# Patient Record
Sex: FEMALE | Race: BLACK OR AFRICAN AMERICAN | Employment: OTHER | URBAN - METROPOLITAN AREA
[De-identification: names, ages, dates, MRNs, and addresses within clinical notes are randomized per-mention and may not be internally consistent; named-entity substitution may affect disease eponyms.]

---

## 2017-01-18 ENCOUNTER — TELEPHONE (OUTPATIENT)
Dept: INTERNAL MEDICINE CLINIC | Age: 82
End: 2017-01-18

## 2017-01-18 ENCOUNTER — OFFICE VISIT (OUTPATIENT)
Dept: INTERNAL MEDICINE CLINIC | Age: 82
End: 2017-01-18

## 2017-01-18 VITALS
RESPIRATION RATE: 18 BRPM | OXYGEN SATURATION: 96 % | DIASTOLIC BLOOD PRESSURE: 88 MMHG | HEART RATE: 82 BPM | SYSTOLIC BLOOD PRESSURE: 164 MMHG | BODY MASS INDEX: 27.69 KG/M2 | TEMPERATURE: 98.1 F | HEIGHT: 65 IN | WEIGHT: 166.2 LBS

## 2017-01-18 DIAGNOSIS — M62.838 NECK MUSCLE SPASM: Primary | ICD-10-CM

## 2017-01-18 RX ORDER — TRAMADOL HYDROCHLORIDE 50 MG/1
50 TABLET ORAL
Qty: 15 TAB | Refills: 0 | Status: SHIPPED | OUTPATIENT
Start: 2017-01-18 | End: 2017-01-22

## 2017-01-18 RX ORDER — LEVOTHYROXINE SODIUM 50 UG/1
TABLET ORAL
Qty: 30 TAB | Refills: 0 | Status: SHIPPED | OUTPATIENT
Start: 2017-01-18 | End: 2018-01-01

## 2017-01-18 RX ORDER — NAPROXEN 500 MG/1
500 TABLET ORAL 2 TIMES DAILY WITH MEALS
Qty: 60 TAB | Refills: 1 | Status: SHIPPED | OUTPATIENT
Start: 2017-01-18 | End: 2017-02-27

## 2017-01-18 RX ORDER — CYCLOBENZAPRINE HCL 10 MG
10 TABLET ORAL
Qty: 20 TAB | Refills: 0 | Status: SHIPPED | OUTPATIENT
Start: 2017-01-18 | End: 2017-01-22

## 2017-01-18 NOTE — TELEPHONE ENCOUNTER
Pt was seen here in office today, Dr Bryon Reddy printed script for tramadol- pt asked if our office can call tramadol in to pharmacy. Medication below has been phoned in to [facility]. traMADol (ULTRAM) 50 mg tablet [[id number]]   Order Details   Dose: 50 mg Route: Oral Frequency: EVERY 6 HOURS AS NEEDED for Pain   Dispense Quantity:  15 Tab Refills:  0 Fills Remainin           Sig: Take 1 Tab by mouth every six (6) hours as needed for Pain.  Max Daily Amount: 200 mg.          Written Date:  17 Expiration Date:  --     Start Date:  17 End Date:  --            Ordering Provider:  -- NISA #:  -- NPI:  --    Authorizing Provider:  Corey Dias MD NISA #:  [id number] NPI:  [id number]    Diagnosis Association: Neck muscle spasm (O60.922)      Original Order:  traMADol Logan Rembrandt) 50 mg tablet [[id number]]      Pharmacy:  [facility] #:  [id number]     Pharmacy Comments:  --

## 2017-01-18 NOTE — PROGRESS NOTES
1. Have you been to the ER, urgent care clinic since your last visit? Hospitalized since your last visit?no    2. Have you seen or consulted any other health care providers outside of the [address] [place] [address] since your last visit? Include any pap smears or colon screening.  no

## 2017-01-18 NOTE — MR AVS SNAPSHOT
Visit Information Date & Time Provider Department Dept. Phone Encounter #  
 1/18/2017  4:15 PM Alondra Villela, [address] [place] [address],[address] [phone] [id number] Follow-up Instructions Return if symptoms worsen or fail to improve. Follow-up and Disposition History Your Appointments 6/16/2017  1:30 PM  
ROUTINE CARE with Alondra Villela MD  
[facility] [address] [facility]) Appt Note: 6 mon f/u  
 [facility] [address] [id number]  
[address] [facility] [address] Upcoming Health Maintenance Date Due  
 FOOT EXAM Q1 2/24/2016 MEDICARE YEARLY EXAM 7/9/2016 Pneumococcal 65+ Low/Medium Risk (2 of 2 - PCV13) 11/10/2016 EYE EXAM RETINAL OR DILATED Q1 4/11/2017 HEMOGLOBIN A1C Q6M 6/9/2017 LIPID PANEL Q1 12/9/2017 MICROALBUMIN Q1 12/16/2017 GLAUCOMA SCREENING Q2Y 4/11/2018 DTaP/Tdap/Td series (2 - Td) 11/10/2025 Allergies as of 1/18/2017  Review Complete On: 1/18/2017 By: Alondra Villela MD  
  
 Severity Noted Reaction Type Reactions Percocet [Oxycodone-acetaminophen]  10/16/2009    Itching Current Immunizations  Reviewed on 12/16/2016 Name Date Influenza Vaccine 9/16/2016, 10/15/2015, 9/2/2014 Pneumococcal Polysaccharide (PPSV-23) 11/10/2015 Pneumococcal Vaccine (Unspecified Type)  Deferred (Patient Refused) Tdap 11/10/2015 Not reviewed this visit You Were Diagnosed With   
  
 Codes Comments Neck muscle spasm    -  Primary ICD-10-CM: G73.179 ICD-9-CM: 728.85 Vitals BP Pulse Temp Resp Height(growth percentile) Weight(growth percentile) 164/88 (BP 1 Location: Left arm, BP Patient Position: Sitting) 82 98.1 °F (36.7 °C) (Oral) 18 5' 5\" (1.651 m) 166 lb 3.2 oz (75.4 kg) SpO2 BMI OB Status Smoking Status 96% 27.66 kg/m2 Hysterectomy Never Smoker Vitals History BMI and BSA Data Body Mass Index Body Surface Area  
 27.66 kg/m 2 1.86 m 2 Preferred Pharmacy Pharmacy Name Phone [address] [facility],[address] [place] [phone] Your Updated Medication List  
  
   
This list is accurate as of: 1/18/17  5:27 PM.  Always use your most recent med list.  
  
  
  
  
 aspirin delayed-release 81 mg tablet Take 81 mg by mouth daily. Blood-Glucose Meter monitoring kit Commonly known as:  Medina Minium Check blood sugar daily, as directed CALCIUM + D PO Take  by mouth. COQ-10 PO Take  by mouth. CRESTOR 20 mg tablet Generic drug:  rosuvastatin TAKE 1 TABLET BY MOUTH EVERY DAY  
  
 cyanocobalamin 1,000 mcg tablet Take 1 Tab by mouth daily. cyclobenzaprine 10 mg tablet Commonly known as:  FLEXERIL Take 1 Tab by mouth three (3) times daily as needed for Muscle Spasm(s). DULoxetine 60 mg capsule Commonly known as:  CYMBALTA Take 1 Cap by mouth daily. gabapentin 100 mg capsule Commonly known as:  NEURONTIN Take 1 Cap by mouth three (3) times daily. glucose blood VI test strips strip Commonly known as:  ONETOUCH ULTRA TEST Check fsbs up to bid E11.9  
  
 guaiFENesin-codeine 100-10 mg/5 mL solution Commonly known as:  ROBITUSSIN AC Take 5 mL by mouth three (3) times daily as needed for Cough. Max Daily Amount: 15 mL. Lancets Misc Commonly known as: One Touch Michaela Garcia Test up to bid  
  
 levothyroxine 50 mcg tablet Commonly known as:  SYNTHROID  
TAKE 1 TABLET BY MOUTH DAILY BEFORE BREAKFAST  
  
 lisinopril-hydroCHLOROthiazide 20-25 mg per tablet Commonly known as:  PRINZIDE, ZESTORETIC  
TAKE 1 TABLET BY MOUTH EVERY DAY  
  
 metFORMIN 1,000 mg tablet Commonly known as:  GLUCOPHAGE Take 1 Tab by mouth two (2) times daily (with meals). naproxen 500 mg tablet Commonly known as:  NAPROSYN  
 Take 1 Tab by mouth two (2) times daily (with meals). ondansetron 4 mg disintegrating tablet Commonly known as:  ZOFRAN ODT Take 1 Tab by mouth every eight (8) hours as needed for Nausea. pneumococcal 13 rochelle conj dip 0.5 mL Syrg injection Commonly known as:  PREVNAR 13 (PF)  
0.5 mL by IntraMUSCular route PRIOR TO DISCHARGE for 1 dose. traMADol 50 mg tablet Commonly known as:  ULTRAM  
Take 1 Tab by mouth every six (6) hours as needed for Pain. Max Daily Amount: 200 mg. Prescriptions Printed Refills  
 traMADol (ULTRAM) 50 mg tablet 0 Sig: Take 1 Tab by mouth every six (6) hours as needed for Pain. Max Daily Amount: 200 mg. Class: Print Route: Oral  
  
Prescriptions Sent to Pharmacy Refills  
 cyclobenzaprine (FLEXERIL) 10 mg tablet 0 Sig: Take 1 Tab by mouth three (3) times daily as needed for Muscle Spasm(s). Class: Normal  
 Pharmacy: [facility] [address] [place], [place] [address]est [facility], [address] [place] [address] #: [phone] Route: Oral  
 naproxen (NAPROSYN) 500 mg tablet 1 Sig: Take 1 Tab by mouth two (2) times daily (with meals). Class: Normal  
 Pharmacy: [facility] [address] [place], [place] [address]est [facility], [address] [place] [address] #: [phone] Route: Oral  
  
Follow-up Instructions Return if symptoms worsen or fail to improve. Patient Instructions Neck Pain: Care Instructions Your Care Instructions You can have neck pain anywhere from the bottom of your head to the top of your shoulders. It can spread to the upper back or arms. Injuries, painting a ceiling, sleeping with your neck twisted, staying in one position for too long, and many other activities can cause neck pain. Most neck pain gets better with home care. Your doctor may recommend medicine to relieve pain or relax your muscles.  He or she may suggest exercise and physical therapy to increase flexibility and relieve stress. You may need to wear a special (cervical) collar to support your neck for a day or two. Follow-up care is a key part of your treatment and safety. Be sure to make and go to all appointments, and call your doctor if you are having problems. It's also a good idea to know your test results and keep a list of the medicines you take. How can you care for yourself at home? · Try using a heating pad on a low or medium setting for 15 to 20 minutes every 2 or 3 hours. Try a warm shower in place of one session with the heating pad. · You can also try an ice pack for 10 to 15 minutes every 2 to 3 hours. Put a thin cloth between the ice and your skin. · Take pain medicines exactly as directed. ¨ If the doctor gave you a prescription medicine for pain, take it as prescribed. ¨ If you are not taking a prescription pain medicine, ask your doctor if you can take an over-the-counter medicine. · If your doctor recommends a cervical collar, wear it exactly as directed. When should you call for help? Call your doctor now or seek immediate medical care if: 
· You have new or worsening numbness in your arms, buttocks or legs. · You have new or worsening weakness in your arms or legs. (This could make it hard to stand up.) · You lose control of your bladder or bowels. Watch closely for changes in your health, and be sure to contact your doctor if: 
· Your neck pain is getting worse. · You are not getting better after 1 week. · You do not get better as expected. Where can you learn more? Go to http://jeremi-travis.info/. Enter [id number] in the search box to learn more about \"Neck Pain: Care Instructions. \" Current as of: May 23, 2016 Content Version: 11.1 © 2215-4881 [employer].  Care instructions adapted under license by [employer] (which disclaims liability or warranty for this information). If you have questions about a medical condition or this instruction, always ask your healthcare professional. [address] any warranty or liability for your use of this information. Introducing [facility] SERVICES! Charity Anderson introduces Transmit patient portal. Now you can access parts of your medical record, email your doctor's office, and request medication refills online. 1. In your internet browser, go to [email or web address] Backup Circle/"[email or web address] 2. Click on the First Time User? Click Here link in the Sign In box. You will see the New Member Sign Up page. 3. Enter your Transmit Access Code exactly as it appears below. You will not need to use this code after youve completed the sign-up process. If you do not sign up before the expiration date, you must request a new code. · Transmit Access Code: [email or web address] Expires: 3/16/2017  2:08 PM 
 
4. Enter the last four digits of your Social Security Number (xxxx) and Date of Birth (mm/dd/yyyy) as indicated and click Submit. You will be taken to the next sign-up page. 5. Create a Transmit ID. This will be your Transmit login ID and cannot be changed, so think of one that is secure and easy to remember. 6. Create a Transmit password. You can change your password at any time. 7. Enter your Password Reset Question and Answer. This can be used at a later time if you forget your password. 8. Enter your e-mail address. You will receive e-mail notification when new information is available in [address] 9. Click Sign Up. You can now view and download portions of your medical record. 10. Click the Download Summary menu link to download a portable copy of your medical information. If you have questions, please visit the Frequently Asked Questions section of the Transmit website. Remember, Transmit is NOT to be used for urgent needs. For medical emergencies, dial 911. Now available from your iPhone and Android! Please provide this summary of care documentation to your next provider. Your primary care clinician is listed as [place]. If you have any questions after today's visit, please call [phone].

## 2017-01-18 NOTE — PROGRESS NOTES
SUBJECTIVE  Ms. Ayan Byrne presents today acutely for     Chief Complaint   Patient presents with    Neck Pain     pt here today c/o pain in neck that radiates to L shoulder        The pain was noticed about 5 days ago, and became worse. She took a left over ultram.     She has a history of L shoulder pain and corticosteroid injection by Dr. Damaso Guerra. OBJECTIVE  Visit Vitals    /88 (BP 1 Location: Left arm, BP Patient Position: Sitting)    Pulse 82    Temp 98.1 °F (36.7 °C) (Oral)    Resp 18    Ht 5' 5\" (1.651 m)    Wt 166 lb 3.2 oz (75.4 kg)    SpO2 96%    BMI 27.66 kg/m2     Gen: Pleasant 80 y.o.  female in NAD.   HEENT: PERRLA. EOMI. OP moist and pink.  Neck: Supple.  +TTP posterior neck musculature L, with palpable spasm.  HEART: RRR, No M/G/R.   LUNGS: CTAB No W/R.   SKIN: Warm. Dry. No rashes or other lesions noted. ASSESSMENT / PLAN    ICD-10-CM ICD-9-CM    1. Neck muscle spasm M62.838 728.85 traMADol (ULTRAM) 50 mg tablet      cyclobenzaprine (FLEXERIL) 10 mg tablet      naproxen (NAPROSYN) 500 mg tablet       I have reviewed with the patient details of the assessment and plan and all questions were answered. Relevant patient education was performed. Follow-up Disposition:  Return if symptoms worsen or fail to improve.

## 2017-01-18 NOTE — PATIENT INSTRUCTIONS
Neck Pain: Care Instructions  Your Care Instructions  You can have neck pain anywhere from the bottom of your head to the top of your shoulders. It can spread to the upper back or arms. Injuries, painting a ceiling, sleeping with your neck twisted, staying in one position for too long, and many other activities can cause neck pain. Most neck pain gets better with home care. Your doctor may recommend medicine to relieve pain or relax your muscles. He or she may suggest exercise and physical therapy to increase flexibility and relieve stress. You may need to wear a special (cervical) collar to support your neck for a day or two. Follow-up care is a key part of your treatment and safety. Be sure to make and go to all appointments, and call your doctor if you are having problems. It's also a good idea to know your test results and keep a list of the medicines you take. How can you care for yourself at home? · Try using a heating pad on a low or medium setting for 15 to 20 minutes every 2 or 3 hours. Try a warm shower in place of one session with the heating pad. · You can also try an ice pack for 10 to 15 minutes every 2 to 3 hours. Put a thin cloth between the ice and your skin. · Take pain medicines exactly as directed. ¨ If the doctor gave you a prescription medicine for pain, take it as prescribed. ¨ If you are not taking a prescription pain medicine, ask your doctor if you can take an over-the-counter medicine. · If your doctor recommends a cervical collar, wear it exactly as directed. When should you call for help? Call your doctor now or seek immediate medical care if:  · You have new or worsening numbness in your arms, buttocks or legs. · You have new or worsening weakness in your arms or legs. (This could make it hard to stand up.)  · You lose control of your bladder or bowels.   Watch closely for changes in your health, and be sure to contact your doctor if:  · Your neck pain is getting worse.  · You are not getting better after 1 week. · You do not get better as expected. Where can you learn more? Go to http://jeremi-travis.info/. Enter [id number] in the search box to learn more about \"Neck Pain: Care Instructions. \"  Current as of: May 23, 2016  Content Version: 11.1  © 8992-9622 [employer]. Care instructions adapted under license by [employer] (which disclaims liability or warranty for this information). If you have questions about a medical condition or this instruction, always ask your healthcare professional. [place] [address] any warranty or liability for your use of this information.

## 2017-01-19 RX ORDER — ROSUVASTATIN CALCIUM 20 MG/1
TABLET, FILM COATED ORAL
Qty: 30 TAB | Refills: 0 | Status: SHIPPED | OUTPATIENT
Start: 2017-01-19 | End: 2017-09-04 | Stop reason: SDUPTHER

## 2017-01-22 ENCOUNTER — APPOINTMENT (OUTPATIENT)
Dept: GENERAL RADIOLOGY | Age: 82
End: 2017-01-22
Attending: STUDENT IN AN ORGANIZED HEALTH CARE EDUCATION/TRAINING PROGRAM
Payer: MEDICARE

## 2017-01-22 ENCOUNTER — APPOINTMENT (OUTPATIENT)
Dept: GENERAL RADIOLOGY | Age: 82
End: 2017-01-22
Attending: EMERGENCY MEDICINE
Payer: MEDICARE

## 2017-01-22 ENCOUNTER — HOSPITAL ENCOUNTER (EMERGENCY)
Age: 82
Discharge: HOME | End: 2017-01-22
Attending: STUDENT IN AN ORGANIZED HEALTH CARE EDUCATION/TRAINING PROGRAM
Payer: MEDICARE

## 2017-01-22 ENCOUNTER — APPOINTMENT (OUTPATIENT)
Dept: CT IMAGING | Age: 82
End: 2017-01-22
Attending: STUDENT IN AN ORGANIZED HEALTH CARE EDUCATION/TRAINING PROGRAM
Payer: MEDICARE

## 2017-01-22 VITALS
OXYGEN SATURATION: 100 % | TEMPERATURE: 98.1 F | HEART RATE: 85 BPM | HEIGHT: 65 IN | BODY MASS INDEX: 27.49 KG/M2 | DIASTOLIC BLOOD PRESSURE: 68 MMHG | WEIGHT: 165 LBS | RESPIRATION RATE: 18 BRPM | SYSTOLIC BLOOD PRESSURE: 136 MMHG
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
## 2017-01-22 DIAGNOSIS — M25.512 ACUTE PAIN OF LEFT SHOULDER: ICD-10-CM

## 2017-01-22 LAB
ALBUMIN SERPL BCP-MCNC: 3.4 G/DL (ref 3.5–5)
ALBUMIN/GLOB SERPL: 0.7 {RATIO} (ref 1.1–2.2)
ALP SERPL-CCNC: 99 U/L (ref 45–117)
ALT SERPL-CCNC: 18 U/L (ref 12–78)
ANION GAP BLD CALC-SCNC: 7 MMOL/L (ref 5–15)
APPEARANCE UR: CLEAR
AST SERPL W P-5'-P-CCNC: 45 U/L (ref 15–37)
ATRIAL RATE: 93 BPM
BACTERIA URNS QL MICRO: NEGATIVE /HPF
BASOPHILS # BLD AUTO: 0 K/UL (ref 0–0.1)
BASOPHILS # BLD: 0 % (ref 0–1)
BILIRUB SERPL-MCNC: 0.6 MG/DL (ref 0.2–1)
BILIRUB UR QL: NEGATIVE
BUN SERPL-MCNC: 13 MG/DL (ref 6–20)
BUN/CREAT SERPL: 14 (ref 12–20)
CALCIUM SERPL-MCNC: 10.1 MG/DL (ref 8.5–10.1)
CALCULATED P AXIS, ECG09: 16 DEGREES
CALCULATED R AXIS, ECG10: -29 DEGREES
CALCULATED T AXIS, ECG11: -17 DEGREES
CHLORIDE SERPL-SCNC: 97 MMOL/L (ref 97–108)
CO2 SERPL-SCNC: 29 MMOL/L (ref 21–32)
COLOR UR: NORMAL
CREAT SERPL-MCNC: 0.9 MG/DL (ref 0.55–1.02)
DIAGNOSIS, 93000: NORMAL
EOSINOPHIL # BLD: 0 K/UL (ref 0–0.4)
EOSINOPHIL NFR BLD: 0 % (ref 0–7)
EPITH CASTS URNS QL MICRO: NORMAL /LPF
ERYTHROCYTE [DISTWIDTH] IN BLOOD BY AUTOMATED COUNT: 12.6 % (ref 11.5–14.5)
GLOBULIN SER CALC-MCNC: 5 G/DL (ref 2–4)
GLUCOSE SERPL-MCNC: 172 MG/DL (ref 65–100)
GLUCOSE UR STRIP.AUTO-MCNC: NEGATIVE MG/DL
HCT VFR BLD AUTO: 34.8 % (ref 35–47)
HGB BLD-MCNC: 11.2 G/DL (ref 11.5–16)
HGB UR QL STRIP: NEGATIVE
HYALINE CASTS URNS QL MICRO: NORMAL /LPF (ref 0–5)
KETONES UR QL STRIP.AUTO: NEGATIVE MG/DL
LEUKOCYTE ESTERASE UR QL STRIP.AUTO: NEGATIVE
LYMPHOCYTES # BLD AUTO: 11 % (ref 12–49)
LYMPHOCYTES # BLD: 0.8 K/UL (ref 0.8–3.5)
MCH RBC QN AUTO: 29.9 PG (ref 26–34)
MCHC RBC AUTO-ENTMCNC: 32.2 G/DL (ref 30–36.5)
MCV RBC AUTO: 92.8 FL (ref 80–99)
MONOCYTES # BLD: 0.8 K/UL (ref 0–1)
MONOCYTES NFR BLD AUTO: 11 % (ref 5–13)
NEUTS SEG # BLD: 6.1 K/UL (ref 1.8–8)
NEUTS SEG NFR BLD AUTO: 78 % (ref 32–75)
NITRITE UR QL STRIP.AUTO: NEGATIVE
P-R INTERVAL, ECG05: 214 MS
PH UR STRIP: 6 [PH] (ref 5–8)
PLATELET # BLD AUTO: 391 K/UL (ref 150–400)
POTASSIUM SERPL-SCNC: 4.1 MMOL/L (ref 3.5–5.1)
PROT SERPL-MCNC: 8.4 G/DL (ref 6.4–8.2)
PROT UR STRIP-MCNC: NEGATIVE MG/DL
Q-T INTERVAL, ECG07: 398 MS
QRS DURATION, ECG06: 128 MS
QTC CALCULATION (BEZET), ECG08: 494 MS
RBC # BLD AUTO: 3.75 M/UL (ref 3.8–5.2)
RBC #/AREA URNS HPF: NORMAL /HPF (ref 0–5)
SODIUM SERPL-SCNC: 133 MMOL/L (ref 136–145)
SP GR UR REFRACTOMETRY: 1.01 (ref 1–1.03)
UA: UC IF INDICATED,UAUC: NORMAL
UROBILINOGEN UR QL STRIP.AUTO: 1 EU/DL (ref 0.2–1)
VENTRICULAR RATE, ECG03: 93 BPM
WBC # BLD AUTO: 7.8 K/UL (ref 3.6–11)
WBC URNS QL MICRO: NORMAL /HPF (ref 0–4)

## 2017-01-22 PROCEDURE — 74011250636 HC RX REV CODE- 250/636

## 2017-01-22 PROCEDURE — 99285 EMERGENCY DEPT VISIT HI MDM: CPT

## 2017-01-22 PROCEDURE — 70450 CT HEAD/BRAIN W/O DYE: CPT

## 2017-01-22 PROCEDURE — 93005 ELECTROCARDIOGRAM TRACING: CPT

## 2017-01-22 PROCEDURE — 80053 COMPREHEN METABOLIC PANEL: CPT | Performed by: STUDENT IN AN ORGANIZED HEALTH CARE EDUCATION/TRAINING PROGRAM

## 2017-01-22 PROCEDURE — 36415 COLL VENOUS BLD VENIPUNCTURE: CPT | Performed by: STUDENT IN AN ORGANIZED HEALTH CARE EDUCATION/TRAINING PROGRAM

## 2017-01-22 PROCEDURE — 72170 X-RAY EXAM OF PELVIS: CPT

## 2017-01-22 PROCEDURE — 81001 URINALYSIS AUTO W/SCOPE: CPT | Performed by: EMERGENCY MEDICINE

## 2017-01-22 PROCEDURE — 73030 X-RAY EXAM OF SHOULDER: CPT

## 2017-01-22 PROCEDURE — 85025 COMPLETE CBC W/AUTO DIFF WBC: CPT | Performed by: STUDENT IN AN ORGANIZED HEALTH CARE EDUCATION/TRAINING PROGRAM

## 2017-01-22 RX ORDER — ONDANSETRON 2 MG/ML
INJECTION INTRAMUSCULAR; INTRAVENOUS
Status: DISCONTINUED
Start: 2017-01-22 | End: 2017-01-22 | Stop reason: HOSPADM

## 2017-01-22 RX ORDER — HYDROMORPHONE HYDROCHLORIDE 1 MG/ML
INJECTION, SOLUTION INTRAMUSCULAR; INTRAVENOUS; SUBCUTANEOUS
Status: DISCONTINUED
Start: 2017-01-22 | End: 2017-01-22 | Stop reason: HOSPADM

## 2017-01-22 NOTE — ED TRIAGE NOTES
Triage: Patient presents from home for GLF, no LOC. Has had 2 falls in the last 2 days, progressively getting weaker and some confusion noted per daughter. Also has complaints of neck pain for the last week.

## 2017-01-22 NOTE — ED NOTES
Pt assisted up onto bedpan to void. Urine sample obtained. Pt cleaned and repositioned in a position of comfort in bed. No other needs or complaints expressed by pt or family members.

## 2017-01-22 NOTE — DISCHARGE INSTRUCTIONS
Preventing Falls: Care Instructions  Your Care Instructions  Getting around your home safely can be a challenge if you have injuries or health problems that make it easy for you to fall. Loose rugs and furniture in walkways are among the dangers for many older people who have problems walking or who have poor eyesight. People who have conditions such as arthritis, osteoporosis, or dementia also have to be careful not to fall. You can make your home safer with a few simple measures. Follow-up care is a key part of your treatment and safety. Be sure to make and go to all appointments, and call your doctor if you are having problems. It's also a good idea to know your test results and keep a list of the medicines you take. How can you care for yourself at home? Taking care of yourself  · You may get dizzy if you do not drink enough water. To prevent dehydration, drink plenty of fluids, enough so that your urine is light yellow or clear like water. Choose water and other caffeine-free clear liquids. If you have kidney, heart, or liver disease and have to limit fluids, talk with your doctor before you increase the amount of fluids you drink. · Exercise regularly to improve your strength, muscle tone, and balance. Walk if you can. Swimming may be a good choice if you cannot walk easily. · Have your vision and hearing checked each year or any time you notice a change. If you have trouble seeing and hearing, you might not be able to avoid objects and could lose your balance. · Know the side effects of the medicines you take. Ask your doctor or pharmacist whether the medicines you take can affect your balance. Sleeping pills or sedatives can affect your balance. · Limit the amount of alcohol you drink. Alcohol can impair your balance and other senses. · Ask your doctor whether calluses or corns on your feet need to be removed.  If you wear loose-fitting shoes because of calluses or corns, you can lose your balance and fall. · Talk to your doctor if you have numbness in your feet. Preventing falls at home  · Remove raised doorway thresholds, throw rugs, and clutter. Repair loose carpet or raised areas in the floor. · Move furniture and electrical cords to keep them out of walking paths. · Use nonskid floor wax, and wipe up spills right away, especially on ceramic tile floors. · If you use a walker or cane, put rubber tips on it. If you use crutches, clean the bottoms of them regularly with an abrasive pad, such as steel wool. · Keep your house well lit, especially Leon Pavy, and outside walkways. Use night-lights in areas such as hallways and bathrooms. Add extra light switches or use remote switches (such as switches that go on or off when you clap your hands) to make it easier to turn lights on if you have to get up during the night. · Install sturdy handrails on stairways. · Move items in your cabinets so that the things you use a lot are on the lower shelves (about waist level). · Keep a cordless phone and a flashlight with new batteries by your bed. If possible, put a phone in each of the main rooms of your house, or carry a cell phone in case you fall and cannot reach a phone. Or, you can wear a device around your neck or wrist. You push a button that sends a signal for help. · Wear low-heeled shoes that fit well and give your feet good support. Use footwear with nonskid soles. Check the heels and soles of your shoes for wear. Repair or replace worn heels or soles. · Do not wear socks without shoes on wood floors. · Walk on the grass when the sidewalks are slippery. If you live in an area that gets snow and ice in the winter, sprinkle salt on slippery steps and sidewalks. Preventing falls in the bath  · Install grab bars and nonskid mats inside and outside your shower or tub and near the toilet and sinks. · Use shower chairs and bath benches.   · Use a hand-held shower head that will allow you to sit while showering. · Get into a tub or shower by putting the weaker leg in first. Get out of a tub or shower with your strong side first.  · Repair loose toilet seats and consider installing a raised toilet seat to make getting on and off the toilet easier. · Keep your bathroom door unlocked while you are in the shower. Where can you learn more? Go to http://jeremi-travis.info/. Enter [id number] [id number] [phone] in the search box to learn more about \"Preventing Falls: Care Instructions. \"  Current as of: August 4, 2016  Content Version: 11.1  © 8044-9463 [employer]. Care instructions adapted under license by [employer] (which disclaims liability or warranty for this information). If you have questions about a medical condition or this instruction, always ask your healthcare professional. [place] [address] any warranty or liability for your use of this information. We hope that we have addressed all of your medical concerns. The examination and treatment you received in the Emergency Department were for an emergent problem and were not intended as complete care. It is important that you follow up with your healthcare provider(s) for ongoing care. If your symptoms worsen or do not improve as expected, and you are unable to reach your usual health care provider(s), you should return to the Emergency Department. Today's healthcare is undergoing tremendous change, and patient satisfaction surveys are one of the many tools to assess the quality of medical care. You may receive a survey from the [employer] organization regarding your experience in the Emergency Department. I hope that your experience has been completely positive, particularly the medical care that I provided. As such, please participate in the survey; anything less than excellent does not meet my expectations or intentions.         [facility], [place] and [employer] participate in nationally recognized quality of care measures. If your blood pressure is greater than 120/80, as reported below, we urge that you seek medical care to address the potential of high blood pressure, commonly known as hypertension. Hypertension can be hereditary or can be caused by certain medical conditions, pain, stress, or \"white coat syndrome. \"       Please make an appointment with your health care provider(s) for follow up of your Emergency Department visit. VITALS:   Patient Vitals for the past 8 hrs:   Temp Pulse Resp BP SpO2   01/22/17 0937 97.9 °F (36.6 °C) 88 18 153/69 100 %   01/22/17 0915 - - - - 97 %   01/22/17 0900 - - - 150/75 92 %   01/22/17 0845 - - - - 95 %   01/22/17 0835 - - - - 96 %   01/22/17 0834 - - - 151/74 -   01/22/17 0815 - - - - 95 %   01/22/17 0800 - - - - 97 %   01/22/17 0745 - - - - 98 %   01/22/17 0700 - - - 132/66 -   01/22/17 0630 - - - 141/59 95 %   01/22/17 0619 98.4 °F (36.9 °C) 86 17 141/70 95 %          Thank you for allowing us to provide you with medical care today. We realize that you have many choices for your emergency care needs. Please choose us in the future for any continued health care needs. Jose Daigle [facility], [address] [place] [address].   Office: [phone]            Recent Results (from the past 24 hour(s))   EKG, 12 LEAD, INITIAL    Collection Time: 01/22/17  6:58 AM   Result Value Ref Range    Ventricular Rate 93 BPM    Atrial Rate 93 BPM    P-R Interval 214 ms    QRS Duration 128 ms    Q-T Interval 398 ms    QTC Calculation (Bezet) 494 ms    Calculated P Axis 16 degrees    Calculated R Axis -29 degrees    Calculated T Axis -17 degrees    Diagnosis       Sinus rhythm with 1st degree AV block with premature atrial complexes  Right bundle branch block  Left ventricular hypertrophy  When compared with ECG of 12-JUN-2015 14:27,  premature atrial complexes are now present  CA interval has increased     CBC WITH AUTOMATED DIFF    Collection Time: 01/22/17  7:46 AM   Result Value Ref Range    WBC 7.8 3.6 - 11.0 K/uL    RBC 3.75 (L) 3.80 - 5.20 M/uL    HGB 11.2 (L) 11.5 - 16.0 g/dL    HCT 34.8 (L) 35.0 - 47.0 %    MCV 92.8 80.0 - 99.0 FL    MCH 29.9 26.0 - 34.0 PG    MCHC 32.2 30.0 - 36.5 g/dL    RDW 12.6 11.5 - 14.5 %    PLATELET 561 126 - 539 K/uL    NEUTROPHILS 78 (H) 32 - 75 %    LYMPHOCYTES 11 (L) 12 - 49 %    MONOCYTES 11 5 - 13 %    EOSINOPHILS 0 0 - 7 %    BASOPHILS 0 0 - 1 %    ABS. NEUTROPHILS 6.1 1.8 - 8.0 K/UL    ABS. LYMPHOCYTES 0.8 0.8 - 3.5 K/UL    ABS. MONOCYTES 0.8 0.0 - 1.0 K/UL    ABS. EOSINOPHILS 0.0 0.0 - 0.4 K/UL    ABS. BASOPHILS 0.0 0.0 - 0.1 K/UL   METABOLIC PANEL, COMPREHENSIVE    Collection Time: 01/22/17  7:46 AM   Result Value Ref Range    Sodium 133 (L) 136 - 145 mmol/L    Potassium 4.1 3.5 - 5.1 mmol/L    Chloride 97 97 - 108 mmol/L    CO2 29 21 - 32 mmol/L    Anion gap 7 5 - 15 mmol/L    Glucose 172 (H) 65 - 100 mg/dL    BUN 13 6 - 20 MG/DL    Creatinine 0.90 0.55 - 1.02 MG/DL    BUN/Creatinine ratio 14 12 - 20      GFR est AA >60 >60 ml/min/1.73m2    GFR est non-AA 60 (L) >60 ml/min/1.73m2    Calcium 10.1 8.5 - 10.1 MG/DL    Bilirubin, total 0.6 0.2 - 1.0 MG/DL    ALT 18 12 - 78 U/L    AST 45 (H) 15 - 37 U/L    Alk.  phosphatase 99 45 - 117 U/L    Protein, total 8.4 (H) 6.4 - 8.2 g/dL    Albumin 3.4 (L) 3.5 - 5.0 g/dL    Globulin 5.0 (H) 2.0 - 4.0 g/dL    A-G Ratio 0.7 (L) 1.1 - 2.2     URINALYSIS W/ REFLEX CULTURE    Collection Time: 01/22/17  8:31 AM   Result Value Ref Range    Color YELLOW/STRAW      Appearance CLEAR CLEAR      Specific gravity 1.013 1.003 - 1.030      pH (UA) 6.0 5.0 - 8.0      Protein NEGATIVE  NEG mg/dL    Glucose NEGATIVE  NEG mg/dL    Ketone NEGATIVE  NEG mg/dL    Bilirubin NEGATIVE  NEG      Blood NEGATIVE  NEG      Urobilinogen 1.0 0.2 - 1.0 EU/dL    Nitrites NEGATIVE  NEG      Leukocyte Esterase NEGATIVE  NEG      WBC 0-4 0 - 4 /hpf    RBC 0-5 0 - 5 /hpf    Epithelial cells FEW FEW /lpf    Bacteria NEGATIVE  NEG /hpf    UA:UC IF INDICATED CULTURE NOT INDICATED BY UA RESULT CNI      Hyaline Cast 2-5 0 - 5 /lpf       Xr Shoulder Lt Ap/lat Min 2 V    Result Date: 1/22/2017  EXAM:  XR SHOULDER LT AP/LAT MIN 2 V INDICATION:   Trauma. COMPARISON: None. FINDINGS: Three views of the left shoulder demonstrate no fracture, dislocation or other acute abnormality. Incidentally noted significant hypertrophic bone formation at the acromioclavicular joint and the glenohumeral joint. IMPRESSION:  No acute abnormality. Ct Head Wo Cont    Result Date: 1/22/2017  INDICATION: fall EXAM: CT HEAD WITHOUT CONTRAST. COMPARISON: None . PROCEDURE: Unenhanced head CT. Axial images were obtained from skull base to the vertex. Images were reformatted in the coronal and sagittal planes. Soft tissue and bone windows were examined. No contrast. CT dose reduction was achieved through use of a standardized protocol tailored for this examination and automatic exposure control for dose modulation. FINDINGS: Cerebral sulci and ventricular size are increased, but commensurate with age. There are patchy diffuse mild to moderate stable periventricular white matter changes. While nonspecific these are likely due to small vessel ischemic change. There is no evidence of midline shift, extra-axial collection, mass lesion or mass effect. No evidence of acute bleed. No acute bony abnormality. No obvious acute ischemia. Willis Patterson IMPRESSION: 1. No acute intracranial abnormality. 2. Microvascular ischemic and other age-related changes. Xr Pelv 1 Or 2 V    Result Date: 1/22/2017  INDICATION:  falls EXAM: SINGLE VIEW PELVIS. FINDINGS: An AP view of the pelvis reveals no fracture, dislocation or other acute or focal bony abnormality. IMPRESSION: No acute bony abnormality of the pelvis.

## 2017-01-22 NOTE — ED NOTES
Bedside and Verbal shift change report given to Irene Moya RN (oncoming nurse) by Ela Watkins RN (offgoing nurse). Report included the following information ED Summary.

## 2017-01-22 NOTE — ED PROVIDER NOTES
HPI Comments: 80 y.o. female with past medical history significant for diabetes, HTN, arthritis who presents from home via EMS accompanied by family with chief complaint of evaluation s/p GLF. Daughter states she saw the patient fall after being unable to support herself last night. Daughter states patient has fallen 3 times in 3 days. Daughter states patient fell onto her bottom on 1/19/17 and has been complaining of some lower back pain since. Patient also complains of decreased strength in her legs as well as feeling progressively weaker. Daughter states patient was recently prescribed Tramadol and Flexeril for some left sided neck and shoulder pain by her PCP. Daughter states patient is not on any blood thinners. There are no other acute medical concerns at this time. Social hx: never smoker, rare alcohol  PCP: Elysia Dan MD    Note written by Meghna Foster, as dictated by Aleksandar Smart MD 6:47 AM     The history is provided by the patient. No  was used. Past Medical History:   Diagnosis Date    Anxiety     Arthritis     Chest pain 2008     Negative stress test and Holter monitor w/Dr Josie Avila.  Chronic back pain     Chronic venous insufficiency     Colon polyp      last scope normal 2007; Dr. Susu Martin Cyst of right kidney     Depression     Diabetes ([facility] [address]     Hypercholesterolemia     Hypertension     Hypothyroidism     Memory disorder     Other ill-defined conditions(799.89)      heart murmur    Painful hip     Urinary frequency      [facility] Bruce Ramirez [facility]       Past Surgical History:   Procedure Laterality Date    Hx knee replacement       left    Hx knee replacement  2006     right    Hx hysterectomy       Due to a prolapsed uterus    Hx breast biopsy  2002    Hx orthopaedic       B knees.      Upper gi endoscopy,biopsy  6/12/2015          Colonoscopy,diagnostic  6/12/2015               Family History: Problem Relation Age of Onset    Cancer Mother      pancreatic    Cancer Father      colon    Cancer Sister      pancreatic     Diabetes Sister     Alzheimer Brother        Social History     Social History    Marital status:      Spouse name: N/A    Number of children: N/A    Years of education: N/A     Occupational History    Not on file. Social History Main Topics    Smoking status: Never Smoker    Smokeless tobacco: Never Used    Alcohol use No      Comment: Rare    Drug use: No    Sexual activity: No     Other Topics Concern    Not on file     Social History Narrative         ALLERGIES: Percocet [oxycodone-acetaminophen]    Review of Systems   Constitutional: Negative for activity change, diaphoresis, fatigue and fever. HENT: Negative for congestion and sore throat. Eyes: Negative for photophobia and visual disturbance. Respiratory: Negative for chest tightness and shortness of breath. Cardiovascular: Negative for chest pain, palpitations and leg swelling. Gastrointestinal: Negative for abdominal pain, blood in stool, constipation, diarrhea, nausea and vomiting. Genitourinary: Negative for difficulty urinating, dysuria, flank pain, frequency and hematuria. Musculoskeletal: Positive for arthralgias and back pain. Neurological: Positive for weakness. Negative for dizziness, syncope, numbness and headaches. Vitals:    01/22/17 0619   BP: 141/70   Pulse: 86   Resp: 17   Temp: 98.4 °F (36.9 °C)   SpO2: 95%   Weight: 74.8 kg (165 lb)   Height: 5' 5\" (1.651 m)            Physical Exam   Constitutional: She is oriented to person, place, and time. She appears well-developed and well-nourished. No distress. HENT:   Head: Normocephalic and atraumatic. Nose: Nose normal.   Mouth/Throat: Oropharynx is clear and moist. No oropharyngeal exudate. Eyes: Conjunctivae and EOM are normal. Right eye exhibits no discharge. Left eye exhibits no discharge. No scleral icterus. Neck: Normal range of motion. Neck supple. No JVD present. No tracheal deviation present. No thyromegaly present. Cardiovascular: Normal rate, regular rhythm, normal heart sounds and intact distal pulses. Exam reveals no gallop and no friction rub. 4/6 systolic murmur at the left sternal border   Pulmonary/Chest: Effort normal and breath sounds normal. No stridor. No respiratory distress. She has no wheezes. She has no rales. She exhibits no tenderness. Abdominal: Bowel sounds are normal. She exhibits no distension and no mass. There is no tenderness. There is no rebound. Musculoskeletal: Normal range of motion. She exhibits no edema or tenderness. B/l posterior iliac crest pain  Left shoulder tenderness   Lymphadenopathy:     She has no cervical adenopathy. Neurological: She is alert and oriented to person, place, and time. No cranial nerve deficit. Coordination normal.   Skin: Skin is warm and dry. No rash noted. She is not diaphoretic. No erythema. No pallor. Psychiatric: She has a normal mood and affect. Her behavior is normal. Judgment and thought content normal.   Note written by Meghna Ravi, as dictated by Nancy Mcelroy MD 6:56 AM      MDM  Number of Diagnoses or Management Options  Diagnosis management comments: Medication reaction, syncope, fall. 79 y/o female with multiple recent falls which appears mechanical however pt. Recently on Ultram AND Flexeril for muscle pains which could be causing falls. Will eval with CT head, pelvis xray, cbc, cmp, ua. dispo pending.        Amount and/or Complexity of Data Reviewed  Clinical lab tests: ordered and reviewed  Tests in the radiology section of CPT®: ordered and reviewed  Obtain history from someone other than the patient: yes  Review and summarize past medical records: yes  Discuss the patient with other providers: yes    Risk of Complications, Morbidity, and/or Mortality  Presenting problems: moderate  Diagnostic procedures: moderate  Management options: moderate    Patient Progress  Patient progress: stable    ED Course       Procedures

## 2017-01-22 NOTE — ED NOTES
8:21 AM  Dr. Costa Pryor: Change of shift. Care of patient taken over from Dr. Ludy Chapman; H&P reviewed, handoff complete. Awaiting labs/imaging/consultant.

## 2017-01-24 ENCOUNTER — TELEPHONE (OUTPATIENT)
Dept: INTERNAL MEDICINE CLINIC | Age: 82
End: 2017-01-24

## 2017-01-24 ENCOUNTER — PATIENT OUTREACH (OUTPATIENT)
Dept: INTERNAL MEDICINE CLINIC | Age: 82
End: 2017-01-24

## 2017-01-24 NOTE — LETTER
1/24/2017 3:35 PM 
 
Ms. Carrie Gordon [address] [place] [phone] I am a Nurse Navigator working with Dr. Latha Corbin. Part of my job is to follow up with patients who have recently been discharged from the Emergency Department to see how they are feeling, answer any questions they may have about their ED Visit or discharge instructions, and also to make sure that they have the recommended outpatient follow-up appointments scheduled. I have been unable to reach you by telephone and wanted to follow-up with you. Additionally, our office is open Monday-Friday, 8:00 am till 5:00 pm.  There is always a doctor on-call after our office closes and until we reopen and this includes weekends. The doctor on-call can advise you on what you need to do about your medical concerns. Our office phone number is [phone]. If you have any questions or concerns, please feel free to call me on my direct line at [phone]. Thank you for allowing us to participate in your care!  
 
 
 
 
 
 
Sincerely, 
 
 
Steve Flores RN

## 2017-01-24 NOTE — PROGRESS NOTES
This note will not be viewable in [address]            Most recent HIPAA form in the patient's chart (dated 6/23/16) was reviewed; authorized individual(s) listed on HIPAA form include Neelam Mota, Patsy Bergeron, Malachi Moe. NN follow-up    Patient referred to this NN via Referral from [facility] ED Report. Patient with ED visit to [facility] on 1/22/17 with diagnosis of Fall (Initial Encounter), Acute Pain of Left Shoulder. Per notes, patient was discharged from ED to Home. New Presciption(s) at ED Discharge: N/A  Change(s) to existing Medication(s) at ED Discharge: N/A  Medication(s) discontinued at ED Discharge: Flexeril, Tramadol  Patient to follow-up with: Follow-up Information   Follow up With Details Comments [facility] MD Adrián   [address]   [address] [place] [address] [address] [address] [phone]      [address]  If symptoms worsen [address] [facility]   [phone]         Patient's most recent Office Visit with PCP: 1/18/17  Patient's currently scheduled future appointments are listed below. Future Appointments      Provider Ashleigh Michaud   6/16/2017 1:30 PM Andreia Marquez MD [address] [facility]               NN follow-up phone call  NN attempted to reach the patient today to complete NN Post-ED discharge assessment and to schedule the patient for a follow-up appointment with Dr. Kvng Camarena. NN was unable to reach the patient today. LVM requesting a return phone call to this NN; NN direct contact information provided. NN will route this encounter to Dr. Kvng Camarena for notification/review.

## 2017-01-26 ENCOUNTER — PATIENT OUTREACH (OUTPATIENT)
Dept: INTERNAL MEDICINE CLINIC | Age: 82
End: 2017-01-26

## 2017-01-26 NOTE — PROGRESS NOTES
Most recent HIPAA form in the patient's chart (dated 6/23/16) was reviewed; authorized individual(s) listed on HIPAA form include Cesar Gutierrez, Isela Mason, Bri Andresmary jane.           NN follow-up     Patient referred to this NN via Referral from [facility] ED Report.        Patient with ED visit to [facility] on 1/22/17 with diagnosis of Fall (Initial Encounter), Acute Pain of Left Shoulder. Per notes, patient was discharged from ED to Home. New Presciption(s) at ED Discharge: N/A  Change(s) to existing Medication(s) at ED Discharge: N/A  Medication(s) discontinued at ED Discharge: Flexeril, Tramadol  Patient to follow-up with: Follow-up Information   Follow up With Details Comments [facility] MD Adrián     1965 [address]   [address] [address]   [phone]     [facility] EMERGENCY DEP   If symptoms worsen [address] [facility]   [phone]            Patient's most recent Office Visit with PCP: 1/18/17  Patient's currently scheduled future appointments are listed below. NN follow-up phone call - Second Attempt  NN returned phone call to the patient's Daughter, Cesar Gutierrez, today to complete NN Post-ED discharge assessment (see telephone encounter dated 1/24/17). Two patient identifiers verified. NN introduced self to the patient's Daughter, including NN role and purpose of NN follow-up phone call; patient's Daughter verbalizes understanding. NN inquired about the patient's current condition and how the patient is feeling; Ashley Ceja states, \"She's walking much better and she's doing better, but she's still got that pain in her neck and her shoulder. \"     Ashley Ceja denies patient falls since ED visit on 1/22/17. Ashley Ceja denies presence of hallucinations and/or confusion in patient at this time; states, \"The first night (as ED Visit) she did, but she's better now; she kept seeing these bugs. \"         Dot Fercho understanding that Tramadol and Flexeril were discontinued at ED Visit; denies any questions/concerns. Quinn Cabral expressed no questions, concerns or needs for this NN at this time. Quinn Cabral verbalized understanding of all information discussed. NN contact information provided and Quinn Cabral advised to contact NN as needed. PLAN    -Patient to attend ED follow-up Appointment with Dr. Jamil Flores - Patient's Daughter declines patient PCP follow-up at this time; states that patient will follow-up with Dr. Loreto Mead (Ortho) for management of Shoulder Pain as patient was referred to Dr. Loreto Mead by PCP 6/2016 (Tramadol and Flexeril prescribed by PCP on 1/18/17 - see note for details). NN provided patient's Daughter with contact information for Dr. Loreto Mead' office.  -Patient to contact this NN and/or PCP office with any questions/concerns.  -Notified of availability of PCP on-call physician after-hours and on the weekends for non-emergent/non-life threatening medical questions/concerns; patient's Daughter verbalizes understanding. NN will route this encounter to Dr. Jamil Flores for notification/review.

## 2017-02-06 RX ORDER — LISINOPRIL AND HYDROCHLOROTHIAZIDE 20; 25 MG/1; MG/1
TABLET ORAL
Qty: 90 TAB | Refills: 0 | Status: SHIPPED | OUTPATIENT
Start: 2017-02-06 | End: 2017-05-29 | Stop reason: SDUPTHER

## 2017-02-14 RX ORDER — LEVOTHYROXINE SODIUM 50 UG/1
TABLET ORAL
Qty: 30 TAB | Refills: 0 | Status: SHIPPED | OUTPATIENT
Start: 2017-02-14 | End: 2018-01-01

## 2017-02-27 ENCOUNTER — OFFICE VISIT (OUTPATIENT)
Dept: INTERNAL MEDICINE CLINIC | Age: 82
End: 2017-02-27

## 2017-02-27 VITALS
OXYGEN SATURATION: 97 % | WEIGHT: 168 LBS | DIASTOLIC BLOOD PRESSURE: 84 MMHG | HEIGHT: 65 IN | RESPIRATION RATE: 18 BRPM | BODY MASS INDEX: 27.99 KG/M2 | SYSTOLIC BLOOD PRESSURE: 135 MMHG | HEART RATE: 71 BPM | TEMPERATURE: 98.2 F
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
## 2017-02-27 DIAGNOSIS — G93.40 ENCEPHALOPATHY: Primary | ICD-10-CM

## 2017-02-27 NOTE — MR AVS SNAPSHOT
Visit Information Date & Time Provider Department Dept. Phone Encounter #  
 2/27/2017  1:45 PM Andrea Marshall, [address] [facility] [phone] [id number] Your Appointments 6/16/2017  1:30 PM  
ROUTINE CARE with Andrea Marshall MD  
[facility] Appt Note: 6 mon f/u  
 [address] [address]  
[address] [place] [address] Upcoming Health Maintenance Date Due  
 FOOT EXAM Q1 2/24/2016 MEDICARE YEARLY EXAM 7/9/2016 Pneumococcal 65+ Low/Medium Risk (2 of 2 - PCV13) 11/10/2016 EYE EXAM RETINAL OR DILATED Q1 4/11/2017 HEMOGLOBIN A1C Q6M 6/9/2017 LIPID PANEL Q1 12/9/2017 MICROALBUMIN Q1 12/16/2017 GLAUCOMA SCREENING Q2Y 4/11/2018 DTaP/Tdap/Td series (2 - Td) 11/10/2025 Allergies as of 2/27/2017  Review Complete On: 2/27/2017 By: Andrea Marshall MD  
  
 Severity Noted Reaction Type Reactions Flexeril [Cyclobenzaprine]  02/27/2017    Other (comments) Caused pt to fall & hallucination Percocet [Oxycodone-acetaminophen]  10/16/2009    Itching Tramadol  02/27/2017    Other (comments) Caused falls Current Immunizations  Reviewed on 12/16/2016 Name Date Influenza Vaccine 9/16/2016, 10/15/2015, 9/2/2014 Pneumococcal Polysaccharide (PPSV-23) 11/10/2015 Pneumococcal Vaccine (Unspecified Type)  Deferred (Patient Refused) Tdap 11/10/2015 Not reviewed this visit You Were Diagnosed With   
  
 Codes Comments Encephalopathy    -  Primary ICD-10-CM: G93.40 ICD-9-CM: 348.30 Falls, initial encounter     ICD-10-CM: W19. Hilario Palacios ICD-9-CM: E888.9 Vitals BP  
  
  
  
  
  
 135/84 (BP 1 Location: Left arm, BP Patient Position: Sitting) Vitals History BMI and BSA Data Body Mass Index Body Surface Area  
 27.96 kg/m 2 1.87 m 2 Preferred Pharmacy Pharmacy Name Phone [facility] [phone] Your Updated Medication List  
  
   
This list is accurate as of: 2/27/17  2:44 PM.  Always use your most recent med list.  
  
  
  
  
 aspirin delayed-release 81 mg tablet Take 81 mg by mouth daily. Blood-Glucose Meter monitoring kit Commonly known as:  Ileene Ohm Check blood sugar daily, as directed CALCIUM + D PO Take  by mouth. COQ-10 PO Take  by mouth. CRESTOR 20 mg tablet Generic drug:  rosuvastatin TAKE 1 TABLET BY MOUTH EVERY DAY  
  
 cyanocobalamin 1,000 mcg tablet Take 1 Tab by mouth daily. glucose blood VI test strips strip Commonly known as:  ONETOUCH ULTRA TEST Check fsbs up to bid E11.9 Lancets Misc Commonly known as: One Touch Karron Feller Test up to bid Leg Brace Misc Commonly known as:  KNEE BRACE Bilateral knee instability. Use as directed * levothyroxine 50 mcg tablet Commonly known as:  SYNTHROID  
TAKE 1 TABLET BY MOUTH DAILY BEFORE BREAKFAST * levothyroxine 50 mcg tablet Commonly known as:  SYNTHROID  
TAKE 1 TABLET BY MOUTH DAILY BEFORE BREAKFAST  
  
 lisinopril-hydroCHLOROthiazide 20-25 mg per tablet Commonly known as:  PRINZIDE, ZESTORETIC  
TAKE 1 TABLET BY MOUTH EVERY DAY  
  
 metFORMIN 1,000 mg tablet Commonly known as:  GLUCOPHAGE Take 1 Tab by mouth two (2) times daily (with meals). miscellaneous medical supply [facility] Adult Diapers, use as directed * Notice: This list has 2 medication(s) that are the same as other medications prescribed for you. Read the directions carefully, and ask your doctor or other care provider to review them with you. Prescriptions Printed Refills Leg Brace (KNEE BRACE) misc 0 Sig: Bilateral knee instability. Use as directed  Class: Print  
 miscellaneous medical supply misc 11  
 Sig: Adult Diapers, use as directed Class: Print Introducing [facility] & HEALTH SERVICES! Lilo Gordon introduces BidRazor patient portal. Now you can access parts of your medical record, email your doctor's office, and request medication refills online. 1. In your internet browser, go to [email or web address] 2. Click on the First Time User? Click Here link in the Sign In box. You will see the New Member Sign Up page. 3. Enter your BidRazor Access Code exactly as it appears below. You will not need to use this code after youve completed the sign-up process. If you do not sign up before the expiration date, you must request a new code. · BidRazor Access Code: [email or web address] Expires: 3/16/2017  2:08 PM 
 
4. Enter the last four digits of your Social Security Number (xxxx) and Date of Birth (mm/dd/yyyy) as indicated and click Submit. You will be taken to the next sign-up page. 5. Create a BidRazor ID. This will be your BidRazor login ID and cannot be changed, so think of one that is secure and easy to remember. 6. Create a BidRazor password. You can change your password at any time. 7. Enter your Password Reset Question and Answer. This can be used at a later time if you forget your password. 8. Enter your e-mail address. You will receive e-mail notification when new information is available in [address] 9. Click Sign Up. You can now view and download portions of your medical record. 10. Click the Download Summary menu link to download a portable copy of your medical information. If you have questions, please visit the Frequently Asked Questions section of the BidRazor website. Remember, BidRazor is NOT to be used for urgent needs. For medical emergencies, dial 911. Now available from your iPhone and Android! Please provide this summary of care documentation to your next provider. Your primary care clinician is listed as [place].  If you have any questions after today's visit, please call [phone].

## 2017-02-27 NOTE — PROGRESS NOTES
SUBJECTIVE  Ms. Susie Zhu presents today acutely for     Chief Complaint   Patient presents with    Medication Evaluation     pt here today to discuss her medication; pt states that flexeril and tramadol caused falls and hallucinations    Knee Pain     pt c/o pain in both knee's; R knee is worse, pt wants to discuss getting a brace        \"I wound up in the hospital at [facility], due to falls. They said I was on medicines that was too strong for me. \"     Per ER notes from 1/22:   80 y.o. female with past medical history significant for diabetes, HTN, arthritis who presents from home via EMS accompanied by family with chief complaint of evaluation s/p GLF. Daughter states she saw the patient fall after being unable to support herself last night. Daughter states patient has fallen 3 times in 3 days. Daughter states patient fell onto her bottom on 1/19/17 and has been complaining of some lower back pain since. Patient also complains of decreased strength in her legs as well as feeling progressively weaker. Daughter states patient was recently prescribed Tramadol and Flexeril for some left sided neck and shoulder pain by her PCP. Daughter states patient is not on any blood thinners. There are no other acute medical concerns at this time. Now, she is feeling better. No falls since Er visit. \"I feel weak and tired sometimes. \"  Her thinking has cleared up. Reviewing med list, she says she is not on naproxen, and has read that it can cause heart attacks. She is not on duloxetine. OBJECTIVE  Visit Vitals    /84 (BP 1 Location: Left arm, BP Patient Position: Sitting)    Pulse 71    Temp 98.2 °F (36.8 °C) (Oral)    Resp 18    Ht 5' 5\" (1.651 m)    Wt 168 lb (76.2 kg)    SpO2 97%    BMI 27.96 kg/m2     Gen: Pleasant 80 y.o.  female in NAD.   HEENT: PERRLA. EOMI.  OP moist and pink.  Neck: Supple.  No LAD.  HEART: RRR, No M/G/R.   LUNGS: CTAB No W/R.   ABDOMEN: S, NT, ND, BS+.   EXTREMITIES: Warm. No C/C/E. MUSCULOSKELETAL: Normal ROM, muscle strength 5/5 all groups. NEURO: Alert and oriented x 3.  Cranial nerves grossly intact.  No focal sensory or motor deficits noted. SKIN: Warm. Dry. No rashes or other lesions noted. ASSESSMENT   Fatigue / falls / Enecphalpathy: Better off tramadol and flexeril. Recommend she stay off these. In fact, \"by the book\" she is really limited to tylenol for pain (NSAIDs contraindicated in elderly per [facility] list). Can consider topical agents. PLAN  Stay off flexeril, and tramadol. Stop naproxen. She is not currently taken duloxetine--could consider this down the road. I have reviewed with the patient details of the assessment and plan and all questions were answered. Relevant patient education was performed. Follow-up Disposition: As planned.

## 2017-04-11 RX ORDER — LEVOTHYROXINE SODIUM 50 UG/1
TABLET ORAL
Qty: 30 TAB | Refills: 0 | Status: SHIPPED | OUTPATIENT
Start: 2017-04-11 | End: 2017-05-11 | Stop reason: SDUPTHER

## 2017-04-12 ENCOUNTER — PATIENT OUTREACH (OUTPATIENT)
Dept: INTERNAL MEDICINE CLINIC | Age: 82
End: 2017-04-12

## 2017-05-09 ENCOUNTER — OFFICE VISIT (OUTPATIENT)
Dept: INTERNAL MEDICINE CLINIC | Age: 82
End: 2017-05-09

## 2017-05-09 VITALS
OXYGEN SATURATION: 97 % | WEIGHT: 175 LBS | BODY MASS INDEX: 29.12 KG/M2 | DIASTOLIC BLOOD PRESSURE: 79 MMHG | HEART RATE: 75 BPM | TEMPERATURE: 97.7 F | SYSTOLIC BLOOD PRESSURE: 147 MMHG | RESPIRATION RATE: 97 BRPM

## 2017-05-09 DIAGNOSIS — M25.562 LEFT KNEE PAIN, UNSPECIFIED CHRONICITY: ICD-10-CM

## 2017-05-09 DIAGNOSIS — M25.552 LEFT HIP PAIN: Primary | ICD-10-CM

## 2017-05-09 NOTE — PROGRESS NOTES
SUBJECTIVE  Ms. Zach Perea presents today acutely for     Chief Complaint   Patient presents with    Hip Pain     Pt here today c.o pain in L hip radiating down to L knee x 4 days; pt states that pain is worse at night        Pain especially in knee, which she thinks is swollen a bit. No falls, or other trauma or injury that she can recall. She has taken a couple of aleve. OBJECTIVE  Visit Vitals    /79 (BP 1 Location: Left arm, BP Patient Position: Sitting)    Pulse 75    Temp 97.7 °F (36.5 °C) (Oral)    Resp (!) 97    Ht (P) 5' 5\" (1.651 m)    Wt 175 lb (79.4 kg)    SpO2 97%    BMI (P) 29.12 kg/m2     Gen: Pleasant 80 y.o.  female in NAD.   HEENT: PERRLA. EOMI. OP moist and pink.  Neck: Supple.  No LAD.  HEART: RRR, No M/G/R.   LUNGS: CTAB No W/R.   ABDOMEN: S, NT, ND, BS+.   EXTREMITIES: Warm. No C/C/E. MUSCULOSKELETAL: Old scar from L knee replacement noted; joint is nontender. ASSESSMENT / PLAN    ICD-10-CM ICD-9-CM    1. Left hip pain M25.552 719.45 REFERRAL TO ORTHOPEDIC SURGERY      XR FEMUR LT 2 V      XR HIP LT W OR WO PELV MIN 4 VWS   2. Left knee pain, unspecified chronicity M25.562 719.46 REFERRAL TO ORTHOPEDIC SURGERY      XR KNEE LT MIN 4 V       I have reviewed with the patient details of the assessment and plan and all questions were answered. Relevant patient education was performed. Follow-up Disposition:  Return if symptoms worsen or fail to improve.

## 2017-05-09 NOTE — MR AVS SNAPSHOT
Visit Information Date & Time Provider Department Dept. Phone Encounter #  
 5/9/2017 11:45 AM Flores Bran, [address] [place] [address],[address] [phone] [id number] Follow-up Instructions Return if symptoms worsen or fail to improve. Follow-up and Disposition History Your Appointments 6/16/2017  1:30 PM  
ROUTINE CARE with Flores Bran MD  
[facility]) Appt Note: 6 mon f/u  
 [address] [phone]  
[address] [facility] [address] [place] [address] Upcoming Health Maintenance Date Due  
 FOOT EXAM Q1 2/24/2016 MEDICARE YEARLY EXAM 7/9/2016 Pneumococcal 65+ Low/Medium Risk (2 of 2 - PCV13) 11/10/2016 EYE EXAM RETINAL OR DILATED Q1 4/11/2017 HEMOGLOBIN A1C Q6M 6/9/2017 INFLUENZA AGE 9 TO ADULT 8/1/2017 LIPID PANEL Q1 12/9/2017 MICROALBUMIN Q1 12/16/2017 GLAUCOMA SCREENING Q2Y 4/11/2018 DTaP/Tdap/Td series (2 - Td) 11/10/2025 Allergies as of 5/9/2017  Review Complete On: 5/9/2017 By: Flores Bran MD  
  
 Severity Noted Reaction Type Reactions Flexeril [Cyclobenzaprine]  02/27/2017    Other (comments) Caused pt to fall & hallucination Percocet [Oxycodone-acetaminophen]  10/16/2009    Itching Tramadol  02/27/2017    Other (comments) Caused falls Current Immunizations  Reviewed on 12/16/2016 Name Date Influenza Vaccine 9/16/2016, 10/15/2015, 9/2/2014 Pneumococcal Polysaccharide (PPSV-23) 11/10/2015 Pneumococcal Vaccine (Unspecified Type)  Deferred (Patient Refused) Tdap 11/10/2015 Not reviewed this visit You Were Diagnosed With   
  
 Codes Comments Left hip pain    -  Primary ICD-10-CM: A58.127 ICD-9-CM: 719.45 Left knee pain, unspecified chronicity     ICD-10-CM: S49.901 ICD-9-CM: 719.46 Vitals BP Pulse Temp Resp Height(growth percentile) Weight(growth percentile) 147/79 (BP 1 Location: Left arm, BP Patient Position: Sitting) 75 97.7 °F (36.5 °C) (Oral) (!) 97 (P) 5' 5\" (1.651 m) 175 lb (79.4 kg) SpO2 BMI OB Status Smoking Status 97% (P) 29.12 kg/m2 Hysterectomy Never Smoker Vitals History BMI and BSA Data Body Mass Index Body Surface Area (P) 29.12 kg/m 2 (P) 1.91 m 2 Preferred Pharmacy Pharmacy Name Phone [facility] [phone] Your Updated Medication List  
  
   
This list is accurate as of: 5/9/17 12:22 PM.  Always use your most recent med list.  
  
  
  
  
 aspirin delayed-release 81 mg tablet Take 81 mg by mouth daily. Blood-Glucose Meter monitoring kit Commonly known as:  March Landau Check blood sugar daily, as directed CALCIUM + D PO Take  by mouth. COQ-10 PO Take  by mouth. CRESTOR 20 mg tablet Generic drug:  rosuvastatin TAKE 1 TABLET BY MOUTH EVERY DAY  
  
 cyanocobalamin 1,000 mcg tablet Take 1 Tab by mouth daily. glucose blood VI test strips strip Commonly known as:  ONETOUCH ULTRA TEST Check fsbs up to bid E11.9 Lancets Misc Commonly known as: One Touch Marquita Larger Test up to bid Leg Brace Misc Commonly known as:  KNEE BRACE Bilateral knee instability. Use as directed * levothyroxine 50 mcg tablet Commonly known as:  SYNTHROID  
TAKE 1 TABLET BY MOUTH DAILY BEFORE BREAKFAST * levothyroxine 50 mcg tablet Commonly known as:  SYNTHROID  
TAKE 1 TABLET BY MOUTH DAILY BEFORE BREAKFAST * levothyroxine 50 mcg tablet Commonly known as:  SYNTHROID  
TAKE 1 TABLET BY MOUTH DAILY BEFORE BREAKFAST  
  
 lisinopril-hydroCHLOROthiazide 20-25 mg per tablet Commonly known as:  PRINZIDE, ZESTORETIC  
TAKE 1 TABLET BY MOUTH EVERY DAY  
  
 metFORMIN 1,000 mg tablet Commonly known as:  GLUCOPHAGE  
 Take 1 Tab by mouth two (2) times daily (with meals). miscellaneous medical supply [facility] Adult Diapers, use as directed * Notice: This list has 3 medication(s) that are the same as other medications prescribed for you. Read the directions carefully, and ask your doctor or other care provider to review them with you. We Performed the Following REFERRAL TO ORTHOPEDIC SURGERY [REF62 Custom] Follow-up Instructions Return if symptoms worsen or fail to improve. To-Do List   
 05/09/2017 Imaging:  XR FEMUR LT 2 V   
  
 05/09/2017 Imaging:  XR HIP LT W OR WO PELV MIN 4 VWS   
  
 05/09/2017 Imaging:  XR KNEE LT MIN 4 V Referral Information Referral ID Referred By Referred To  
  
 [id number] [facility] [id number], [address] Visits Status Start Date End Date 1 New Request 5/9/17 5/9/18 If your referral has a status of pending review or denied, additional information will be sent to support the outcome of this decision. Introducing [facility] & HEALTH SERVICES! [facility] introduces MyPronostic patient portal. Now you can access parts of your medical record, email your doctor's office, and request medication refills online. 1. In your internet browser, go to [email or web address] 2. Click on the First Time User? Click Here link in the Sign In box. You will see the New Member Sign Up page. 3. Enter your MyPronostic Access Code exactly as it appears below. You will not need to use this code after youve completed the sign-up process. If you do not sign up before the expiration date, you must request a new code. · MyPronostic Access Code: [email or web address] Expires: 8/7/2017 12:21 PM 
 
4. Enter the last four digits of your Social Security Number (xxxx) and Date of Birth (mm/dd/yyyy) as indicated and click Submit. You will be taken to the next sign-up page. 5. Create a for; to (do) ID. This will be your for; to (do) login ID and cannot be changed, so think of one that is secure and easy to remember. 6. Create a for; to (do) password. You can change your password at any time. 7. Enter your Password Reset Question and Answer. This can be used at a later time if you forget your password. 8. Enter your e-mail address. You will receive e-mail notification when new information is available in [address] 9. Click Sign Up. You can now view and download portions of your medical record. 10. Click the Download Summary menu link to download a portable copy of your medical information. If you have questions, please visit the Frequently Asked Questions section of the for; to (do) website. Remember, for; to (do) is NOT to be used for urgent needs. For medical emergencies, dial 911. Now available from your iPhone and Android! Please provide this summary of care documentation to your next provider. Your primary care clinician is listed as [place]. If you have any questions after today's visit, please call [phone].

## 2017-05-09 NOTE — PROGRESS NOTES
1. Have you been to the ER, urgent care clinic since your last visit? Hospitalized since your last visit?no  2. Have you seen or consulted any other health care providers outside of the [address] [place] [address] since your last visit? Include any pap smears or colon screening.  no

## 2017-05-11 DIAGNOSIS — E03.4 HYPOTHYROIDISM DUE TO ACQUIRED ATROPHY OF THYROID: Primary | ICD-10-CM

## 2017-05-11 RX ORDER — LEVOTHYROXINE SODIUM 50 UG/1
TABLET ORAL
Qty: 30 TAB | Refills: 3 | Status: SHIPPED | OUTPATIENT
Start: 2017-05-11 | End: 2017-06-13 | Stop reason: SDUPTHER

## 2017-05-11 NOTE — TELEPHONE ENCOUNTER
Eliza with [address] states that the pt is needing this refilled. Eliza states that they tried sending the request through but the fax would not go through. Please call Eliza if needed.

## 2017-05-30 RX ORDER — LISINOPRIL AND HYDROCHLOROTHIAZIDE 20; 25 MG/1; MG/1
TABLET ORAL
Qty: 90 TAB | Refills: 0 | Status: SHIPPED | OUTPATIENT
Start: 2017-05-30 | End: 2017-08-31 | Stop reason: SDUPTHER

## 2017-05-30 RX ORDER — METFORMIN HYDROCHLORIDE 1000 MG/1
1000 TABLET ORAL 2 TIMES DAILY WITH MEALS
Qty: 180 TAB | Refills: 3 | Status: SHIPPED | OUTPATIENT
Start: 2017-05-30 | End: 2018-01-01 | Stop reason: SDUPTHER

## 2017-06-13 DIAGNOSIS — E03.4 HYPOTHYROIDISM DUE TO ACQUIRED ATROPHY OF THYROID: ICD-10-CM

## 2017-06-13 RX ORDER — LEVOTHYROXINE SODIUM 50 UG/1
TABLET ORAL
Qty: 90 TAB | Refills: 3 | Status: SHIPPED | OUTPATIENT
Start: 2017-06-13 | End: 2018-01-01

## 2017-06-16 ENCOUNTER — HOSPITAL ENCOUNTER (OUTPATIENT)
Dept: LAB | Age: 82
Discharge: HOME | End: 2017-06-16
Payer: MEDICARE

## 2017-06-16 ENCOUNTER — OFFICE VISIT (OUTPATIENT)
Dept: INTERNAL MEDICINE CLINIC | Age: 82
End: 2017-06-16

## 2017-06-16 VITALS
WEIGHT: 169 LBS | SYSTOLIC BLOOD PRESSURE: 135 MMHG | RESPIRATION RATE: 18 BRPM | BODY MASS INDEX: 28.16 KG/M2 | HEART RATE: 72 BPM | TEMPERATURE: 98.8 F | OXYGEN SATURATION: 97 % | DIASTOLIC BLOOD PRESSURE: 75 MMHG | HEIGHT: 65 IN

## 2017-06-16 DIAGNOSIS — I10 ESSENTIAL HYPERTENSION: ICD-10-CM

## 2017-06-16 DIAGNOSIS — E78.00 HYPERCHOLESTEROLEMIA: ICD-10-CM

## 2017-06-16 DIAGNOSIS — R53.1 WEAKNESS: ICD-10-CM

## 2017-06-16 DIAGNOSIS — Z13.39 SCREENING FOR ALCOHOLISM: ICD-10-CM

## 2017-06-16 DIAGNOSIS — Z23 ENCOUNTER FOR IMMUNIZATION: ICD-10-CM

## 2017-06-16 DIAGNOSIS — Z78.0 POSTMENOPAUSAL: ICD-10-CM

## 2017-06-16 DIAGNOSIS — Z00.00 ROUTINE GENERAL MEDICAL EXAMINATION AT A HEALTH CARE FACILITY: Primary | ICD-10-CM

## 2017-06-16 DIAGNOSIS — E03.4 HYPOTHYROIDISM DUE TO ACQUIRED ATROPHY OF THYROID: ICD-10-CM

## 2017-06-16 DIAGNOSIS — E11.9 TYPE 2 DIABETES MELLITUS WITHOUT COMPLICATION, UNSPECIFIED LONG TERM INSULIN USE STATUS: ICD-10-CM

## 2017-06-16 PROCEDURE — 80061 LIPID PANEL: CPT

## 2017-06-16 PROCEDURE — 36415 COLL VENOUS BLD VENIPUNCTURE: CPT

## 2017-06-16 PROCEDURE — 83036 HEMOGLOBIN GLYCOSYLATED A1C: CPT

## 2017-06-16 PROCEDURE — 84443 ASSAY THYROID STIM HORMONE: CPT

## 2017-06-16 PROCEDURE — 85025 COMPLETE CBC W/AUTO DIFF WBC: CPT

## 2017-06-16 PROCEDURE — 84439 ASSAY OF FREE THYROXINE: CPT

## 2017-06-16 PROCEDURE — 80053 COMPREHEN METABOLIC PANEL: CPT

## 2017-06-16 NOTE — PROGRESS NOTES
This is a Subsequent Medicare Annual Wellness Visit providing Personalized Prevention Plan Services (PPPS) (Performed 12 months after initial AWV and PPPS )    I have reviewed the patient's medical history in detail and updated the computerized patient record. History     Past Medical History:   Diagnosis Date    Anxiety     Arthritis     Chest pain 2008    Negative stress test and Holter monitor w/Dr Sher Edge.  Chronic back pain     Chronic venous insufficiency     Colon polyp     last scope normal 2007; Dr. Sierra Neither Cyst of right kidney     Depression     Diabetes ([address]     Hypercholesterolemia     Hypertension     Hypothyroidism     Memory disorder     Other ill-defined conditions     heart murmur    Painful hip     Urinary frequency     Sees Dr[facility] Kavin Cassidy with [facility]      Past Surgical History:   Procedure Laterality Date    COLONOSCOPY,DIAGNOSTIC  6/12/2015         HX BREAST BIOPSY  2002    HX HYSTERECTOMY      Due to a prolapsed uterus    HX KNEE REPLACEMENT      left    HX KNEE REPLACEMENT  2006    right    HX ORTHOPAEDIC      B knees.  UPPER GI ENDOSCOPY,BIOPSY  6/12/2015          Current Outpatient Prescriptions   Medication Sig Dispense Refill    levothyroxine (SYNTHROID) 50 mcg tablet TAKE 1 TABLET BY MOUTH DAILY BEFORE BREAKFAST 90 Tab 3    lisinopril-hydroCHLOROthiazide (PRINZIDE, ZESTORETIC) 20-25 mg per tablet TAKE 1 TABLET BY MOUTH EVERY DAY 90 Tab 0    metFORMIN (GLUCOPHAGE) 1,000 mg tablet Take 1 Tab by mouth two (2) times daily (with meals). 180 Tab 3    Leg Brace (KNEE BRACE) misc Bilateral knee instability.    Use as directed 2 Each 0    miscellaneous medical supply [facility] Adult Diapers, use as directed 30 Each 11    levothyroxine (SYNTHROID) 50 mcg tablet TAKE 1 TABLET BY MOUTH DAILY BEFORE BREAKFAST 30 Tab 0    CRESTOR 20 mg tablet TAKE 1 TABLET BY MOUTH EVERY DAY 30 Tab 0    levothyroxine (SYNTHROID) 50 mcg tablet TAKE 1 TABLET BY MOUTH DAILY BEFORE BREAKFAST 30 Tab 0    glucose blood VI test strips (ONETOUCH ULTRA TEST) strip Check fsbs up to bid E11.9 100 Strip 11    Blood-Glucose Meter (ONETOUCH ULTRA2) monitoring kit Check blood sugar daily, as directed 1 Kit 0    Lancets (ONE TOUCH DELICA) misc Test up to bid 1 Package 11    cyanocobalamin 1,000 mcg tablet Take 1 Tab by mouth daily. 30 Each 11    aspirin delayed-release 81 mg tablet Take 81 mg by mouth daily.  CALCIUM CARBONATE/VITAMIN D3 (CALCIUM + D PO) Take  by mouth.  UBIDECARENONE (COQ-10 PO) Take  by mouth. Allergies   Allergen Reactions    Flexeril [Cyclobenzaprine] Other (comments)     Caused pt to fall & hallucination    Percocet [Oxycodone-Acetaminophen] Itching    Tramadol Other (comments)     Caused falls      Family History   Problem Relation Age of Onset    Cancer Mother      pancreatic    Cancer Father      colon    Cancer Sister      pancreatic     Diabetes Sister     Alzheimer Brother      Social History   Substance Use Topics    Smoking status: Never Smoker    Smokeless tobacco: Never Used    Alcohol use No      Comment: Rare     Patient Active Problem List   Diagnosis Code    Hypertension I10    Hypercholesterolemia E78.00    Hypothyroidism E03.9    Depression F32.9    Anxiety F41.9    Urinary frequency R35.0    Chronic venous insufficiency I87.2    Chronic back pain M54.9, G89.29    Painful hip M25.559    Syncope and collapse R55    Diabetes mellitus (HCC) E11.9    Somatization disorder F45.0    ADD (attention deficit disorder) F98.8       Depression Risk Factor Screening:     PHQ over the last two weeks 6/16/2017   Little interest or pleasure in doing things Not at all   Feeling down, depressed or hopeless Not at all   Total Score PHQ 2 0     Alcohol Risk Factor Screening: On any occasion during the past 3 months, have you had more than 3 drinks containing alcohol? No    Do you average more than 7 drinks per week? No      Functional Ability and Level of Safety:     Hearing Loss   normal-to-mild    Activities of Daily Living   Partial assistance. Requires assistance with: walking - using walker  ADL Assessment 6/16/2017   Feeding yourself No Help Needed   Getting from bed to chair No Help Needed   Getting dressed No Help Needed   Bathing or showering No Help Needed   Walk across the room (includes cane/walker) Help Needed   Using the telphone No Help Needed   Taking your medications No Help Needed   Preparing meals No Help Needed   Managing money (expenses/bills) No Help Needed   Moderately strenuous housework (laundry) No Help Needed   Shopping for personal items (toiletries/medicines) No Help Needed   Shopping for groceries No Help Needed   Driving No Help Needed   Climbing a flight of stairs Help Needed   Getting to places beyond walking distances No Help Needed       Fall Risk     Fall Risk Assessment, last 12 mths 6/16/2017   Able to walk? Yes   Fall in past 12 months? Yes   Fall with injury? Yes   Number of falls in past 12 months 4   Fall Risk Score 5     Falls due to medication side effects. Abuse Screen   Patient is not abused    Review of Systems   Not required    Physical Examination     Evaluation of Cognitive Function:  Mood/affect:  happy  Appearance: age appropriate and casually dressed  Family member/caregiver input: n/a    No exam performed today for medicare wellness visit.     Patient Care Team:  Jillian Correa MD as PCP - General  [facility]  Rd Castro MD (Ophthalmology)  Jovita Winn MD (Gastroenterology)  Lisa Emmanuel MD (Neurology)  Sonia Miranda PsyD (Psychology)  Shanta Sprague RN as Ambulatory Care Navigator    Advice/Referrals/Counseling   Education and counseling provided:  Pneumococcal Vaccine  Screening Mammography  Screening Pap and pelvic (covered once every 2 years)  Cardiovascular screening blood test  Bone mass measurement (DEXA)  Screening for glaucoma  Diabetes screening test      Assessment/Plan     Encounter Diagnoses   Name Primary?  Type 2 diabetes mellitus without complication, unspecified long term insulin use status Yes    Routine general medical examination at a health care facility     Screening for alcoholism     Weakness     Encounter for immunization     Essential hypertension     Hypercholesterolemia     Hypothyroidism due to acquired atrophy of thyroid     Postmenopausal      ADL's and support. Patient lives independently at home. Daughter lives next store and provides support as needed. Glaucoma screenings. Annual eye exams with Dr. Cyndie Rodriguez. Pneumonia vaccine. Completed today. Advance care planning. NN discussed advance care planning with the patient. Patient expressed interest.  NN provided invitation for a facilitated conversation through [employer]. Patient to review honoring choices patient packet provided during visit and schedule appointment when ready. Bone density. Ordered today. Mammography. Declined.     Brief history and med reconciliation completed by MA/LPN and reviewed by MD.
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
## 2017-06-16 NOTE — PROGRESS NOTES
1. Have you been to the ER, urgent care clinic since your last visit? Hospitalized since your last visit?no    2. Have you seen or consulted any other health care providers outside of the [address] [place] [address] since your last visit? Include any pap smears or colon screening.  no

## 2017-06-16 NOTE — MR AVS SNAPSHOT
Visit Information Date & Time Provider Department Dept. Phone Encounter #  
 6/16/2017  1:30 PM Dione Lopez, [address] [place] [address],[address] [phone] [id number] Follow-up Instructions Return in about 6 months (around 12/16/2017) for DM. Upcoming Health Maintenance Date Due  
 FOOT EXAM Q1 2/24/2016 MEDICARE YEARLY EXAM 7/9/2016 Pneumococcal 65+ Low/Medium Risk (2 of 2 - PCV13) 11/10/2016 EYE EXAM RETINAL OR DILATED Q1 4/11/2017 HEMOGLOBIN A1C Q6M 6/9/2017 INFLUENZA AGE 9 TO ADULT 8/1/2017 LIPID PANEL Q1 12/9/2017 MICROALBUMIN Q1 12/16/2017 GLAUCOMA SCREENING Q2Y 4/11/2018 DTaP/Tdap/Td series (2 - Td) 11/10/2025 Allergies as of 6/16/2017  Review Complete On: 6/16/2017 By: Dione Lopez MD  
  
 Severity Noted Reaction Type Reactions Flexeril [Cyclobenzaprine]  02/27/2017    Other (comments) Caused pt to fall & hallucination Percocet [Oxycodone-acetaminophen]  10/16/2009    Itching Tramadol  02/27/2017    Other (comments) Caused falls Current Immunizations  Reviewed on 12/16/2016 Name Date Influenza Vaccine 9/16/2016, 10/15/2015, 9/2/2014 Pneumococcal Conjugate (PCV-13)  Incomplete Pneumococcal Polysaccharide (PPSV-23) 11/10/2015 Pneumococcal Vaccine (Unspecified Type)  Deferred (Patient Refused) Tdap 11/10/2015 Not reviewed this visit You Were Diagnosed With   
  
 Codes Comments Type 2 diabetes mellitus without complication, unspecified long term insulin use status    -  Primary ICD-10-CM: E11.9 ICD-9-CM: 250.00 Routine general medical examination at a health care facility     ICD-10-CM: Z00.00 ICD-9-CM: V70.0 Screening for alcoholism     ICD-10-CM: Z13.89 ICD-9-CM: V79.1 Weakness     ICD-10-CM: R53.1 ICD-9-CM: 780.79 Encounter for immunization     ICD-10-CM: F23 ICD-9-CM: V03.89 Essential hypertension     ICD-10-CM: I10 
ICD-9-CM: 401.9 Hypercholesterolemia     ICD-10-CM: E78.00 ICD-9-CM: 272.0 Hypothyroidism due to acquired atrophy of thyroid     ICD-10-CM: E03.4 ICD-9-CM: 244.8, 246.8 Postmenopausal     ICD-10-CM: Z78.0 ICD-9-CM: V49.81 Vitals BP Pulse Temp Resp Height(growth percentile) Weight(growth percentile) 135/75 (BP 1 Location: Left arm, BP Patient Position: Sitting) 72 98.8 °F (37.1 °C) (Oral) 18 5' 5\" (1.651 m) 169 lb (76.7 kg) SpO2 BMI OB Status Smoking Status 97% 28.12 kg/m2 Hysterectomy Never Smoker Vitals History BMI and BSA Data Body Mass Index Body Surface Area  
 28.12 kg/m 2 1.88 m 2 Preferred Pharmacy [facility]acy Name [facility] [phone] Your Updated Medication List  
  
   
This list is accurate as of: 6/16/17  2:30 PM.  Always use your most recent med list.  
  
  
  
  
 aspirin delayed-release 81 mg tablet Take 81 mg by mouth daily. Blood-Glucose Meter monitoring kit Commonly known as:  Angel Otoole Check blood sugar daily, as directed CALCIUM + D PO Take  by mouth. COQ-10 PO Take  by mouth. CRESTOR 20 mg tablet Generic drug:  rosuvastatin TAKE 1 TABLET BY MOUTH EVERY DAY  
  
 cyanocobalamin 1,000 mcg tablet Take 1 Tab by mouth daily. glucose blood VI test strips strip Commonly known as:  ONETOUCH ULTRA TEST Check fsbs up to bid E11.9 Lancets Misc Commonly known as: One Touch Zollie Right Test up to bid Leg Brace Misc Commonly known as:  KNEE SUPPORT BRACE Bilateral knee instability. Use as directed * levothyroxine 50 mcg tablet Commonly known as:  SYNTHROID  
TAKE 1 TABLET BY MOUTH DAILY BEFORE BREAKFAST * levothyroxine 50 mcg tablet Commonly known as:  SYNTHROID  
TAKE 1 TABLET BY MOUTH DAILY BEFORE BREAKFAST * levothyroxine 50 mcg tablet Commonly known as:  SYNTHROID  
TAKE 1 TABLET BY MOUTH DAILY BEFORE BREAKFAST  
  
 lisinopril-hydroCHLOROthiazide 20-25 mg per tablet Commonly known as:  PRINZIDE, ZESTORETIC  
TAKE 1 TABLET BY MOUTH EVERY DAY  
  
 metFORMIN 1,000 mg tablet Commonly known as:  GLUCOPHAGE Take 1 Tab by mouth two (2) times daily (with meals). miscellaneous medical supply [facility], use as directed  
  
 varicella zoster vacine live 19,400 unit/0.65 mL Susr injection Commonly known as:  varicella-zoster vacine live 1 Vial by SubCUTAneous route once for 1 dose. * Notice: This list has 3 medication(s) that are the same as other medications prescribed for you. Read the directions carefully, and ask your doctor or other care provider to review them with you. Prescriptions Printed Refills  
 varicella zoster vacine live (VARICELLA-ZOSTER VACINE LIVE) 19,400 unit/0.65 mL susr injection 0 Si Vial by SubCUTAneous route once for 1 dose. Class: Print Route: SubCUTAneous We Performed the Following CBC WITH AUTOMATED DIFF [97182 CPT(R)] HEMOGLOBIN A1C WITH EAG [32649 CPT(R)] LIPID PANEL [98464 CPT(R)] METABOLIC PANEL, COMPREHENSIVE [82837 CPT(R)] PNEUMOCOCCAL CONJ VACCINE 13 VALENT IM [id number] CPT(R)] REFERRAL TO PHYSICAL THERAPY [MPH57 Custom] T4, FREE [id number] CPT(R)] TSH 3RD GENERATION [19257 CPT(R)] Follow-up Instructions Return in about 6 months (around 2017) for DM. To-Do List   
 2017 Imaging:  DEXA BONE DENSITY STUDY AXIAL Referral Information Referral ID Referred By Referred To  
  
 [id number] [facility] Not Available Visits Status Start Date End Date 1 New Request 17 If your referral has a status of pending review or denied, additional information will be sent to support the outcome of this decision. Patient Instructions Damian Tuttlens Date 17 Medicare Part B Preventive Services Limitations Recommendation/Date completed if known Scheduled/ Next Due Bone Mass Measurement 
(age 72 & older, biennial) Requires diagnosis related to osteoporosis or estrogen deficiency. Biennial benefit unless patient has history of long-term glucocorticoid tx or baseline is needed because initial test was by other method Completed: 
7/22/15 Osteopenia Recommended every 2 years DUE: 
 
7/2017 Cardiovascular Screening Blood Tests (every 5 years) Total cholesterol, HDL, Triglycerides Order as a panel if possible Completed: 
12/9/16 Recommended annually DUE: 
12/2017 Colorectal Cancer Screening 
-Fecal occult blood test (annual) -Flexible sigmoidoscopy (5y) 
-Screening colonoscopy (10y) -Barium Enema  Completed: 
 
6/12/15 Colonoscopy with Dr. Bayron Craft DUE: 
 
Routine screening not indicated Counseling to Prevent Tobacco Use (up to 8 sessions per year) - Counseling greater than 3 and up to 10 minutes - Counseling greater than 10 minutes Patients must be asymptomatic of tobacco-related conditions to receive as preventive service  Keep up the good work! Diabetes Screening Tests (at least every 3 years, Medicare covers annually or at 6-month intervals for prediabetic patients) Fasting blood sugar (FBS) or glucose tolerance test (GTT) Patient must be diagnosed with one of the following: 
-Hypertension, Dyslipidemia, obesity, previous impaired FBS or GTT 
Or any two of the following: overweight, FH of diabetes, age ? 72, history of gestational diabetes, birth of baby weighing more than 9 pounds Completed: 
 
A1c checked 12/9/16 DUE: 
 
A1c due now Diabetes Self-Management Training (DSMT) (no USPSTF recommendation) Requires referral by treating physician for patient with diabetes or renal disease. 10 hours of initial DSMT session of no less than 30 minutes each in a continuous 12-month period.   2 hours of follow-up DSMT in subsequent years.  Contact us if you would like refresher classes Glaucoma Screening (no USPSTF recommendation) Diabetes mellitus, family history, , age 48 or over,  American, age 72 or over Completed: Annually Dr. Flora Sharma Recommended annually DUE: 
 
Now - please schedule Human Immunodeficiency Virus (HIV) Screening (annually for increased risk patients) HIV-1 and HIV-2 by EIA, PILI, rapid antibody test, or oral mucosa transudate Patient must be at increased risk for HIV infection per USPSTF guidelines or pregnant. Tests covered annually for patients at increased risk. Pregnant patients may receive up to 3 test during pregnancy. N/a Medical Nutrition Therapy (MNT) (for diabetes or renal disease not recommended schedule) Requires referral by treating physician for patient with diabetes or renal disease. Can be provided in same year as diabetes self-management training (DSMT), and CMS recommends medical nutrition therapy take place after DSMT. Up to 3 hours for initial year and 2 hours in subsequent years. N/a Seasonal Influenza Vaccination (annually)  Completed: 
 
9/16/16 Recommended annually DUE every Fall Pneumococcal Vaccination (once after 65)  Completed: 
Pneumovax 23 - 11/10/15 A Shingles Vaccine is also recommended once in a lifetime after age 61. Due now Hepatitis B Vaccinations (if medium/high risk) Medium/high risk factors:  End-stage renal disease, Hemophiliacs who received Factor VIII or IX concentrates, Clients of institutions for the mentally retarded, Persons who live in the same house as a HepB virus carrier, Homosexual men, Illicit injectable drug abusers. N/a Screening Mammography (biennial age 54-69)? Annually (age 36 or over) Completed:  
5/28/15 Recommended annually DUE: 
Declined Screening Pap Tests and Pelvic Examination (up to age 79 and after 79 if unknown history or abnormal study last 10 years) Every 24 months except high risk Completed: 
8/24/16 with  [address] DUE: 
8/2017 Ultrasound Screening for Abdominal Aortic Aneurysm (AAA) (once) Patient must be referred through IPPE and not have had a screening for abdominal aortic aneurysm before under Medicare. Limited to patients who meet one of the following criteria: 
- Men who are 73-68 years old and have smoked more than 100 cigarettes in their lifetime. 
-Anyone with a FH of AAA 
-Anyone recommended for screening by USPSTF Not covered by Medicare as preventive. Not indicated at this time Advance Directives: Care Instructions Your Care Instructions An advance directive is a legal way to state your wishes at the end of your life. It tells your family and your doctor what to do if you can no longer say what you want. There are two main types of advance directives. You can change them any time that your wishes change. · A living will tells your family and your doctor your wishes about life support and other treatment. · A durable power of  for health care lets you name a person to make treatment decisions for you when you can't speak for yourself. This person is called a health care agent. If you do not have an advance directive, decisions about your medical care may be made by a doctor or a  who doesn't know you. It may help to think of an advance directive as a gift to the people who care for you. If you have one, they won't have to make tough decisions by themselves. Follow-up care is a key part of your treatment and safety. Be sure to make and go to all appointments, and call your doctor if you are having problems. It's also a good idea to know your test results and keep a list of the medicines you take. How can you care for yourself at home? · Discuss your wishes with your loved ones and your doctor. This way, there are no surprises. · Many states have a unique form.  Or you might use a universal form that has been approved by many states. This kind of form can sometimes be completed and stored online. Your electronic copy will then be available wherever you have a connection to the Internet. In most cases, doctors will respect your wishes even if you have a form from a different state. · You don't need a  to do an advance directive. But you may want to get legal advice. · Think about these questions when you prepare an advance directive: ¨ Who do you want to make decisions about your medical care if you are not able to? Many people choose a family member or close friend. ¨ Do you know enough about life support methods that might be used? If not, talk to your doctor so you understand. ¨ What are you most afraid of that might happen? You might be afraid of having pain, losing your independence, or being kept alive by machines. ¨ Where would you prefer to die? Choices include your home, a hospital, or a nursing home. ¨ Would you like to have information about hospice care to support you and your family? ¨ Do you want to donate organs when you die? ¨ Do you want certain Jehovah's witness practices performed before you die? If so, put your wishes in the advance directive. · Read your advance directive every year, and make changes as needed. When should you call for help? Be sure to contact your doctor if you have any questions. Where can you learn more? Go to http://jeremi-travis.info/. Enter R264 in the search box to learn more about \"Advance Directives: Care Instructions. \" Current as of: November 17, 2016 Content Version: 11.2 © 3029-9415 [employer]wise[employer]. Care instructions adapted under license by [employer] (which disclaims liability or warranty for this information).  If you have questions about a medical condition or this instruction, always ask your healthcare professional. [address] any warranty or liability for your use of this information. Vaccine Information Statement Pneumococcal Conjugate Vaccine (PCV13): What You Need to Know Many Vaccine Information Statements are available in [place]ic and other languages. See www.immunize.org/vis. Hojas de información Sobre Vacunas están disponibles en español y en muchos otros idiomas. Visite www.immunize.org/vis. 1. Why get vaccinated? Vaccination can protect both children and adults from pneumococcal disease. Pneumococcal disease is caused by bacteria that can spread from person to person through close contact. It can cause ear infections, and it can also lead to more serious infections of the: 
 Lungs (pneumonia),  Blood (bacteremia), and 
 Covering of the brain and spinal cord (meningitis). Pneumococcal pneumonia is most common among adults. Pneumococcal meningitis can cause deafness and brain damage, and it kills about 1 child in 10 who get it. Anyone can get pneumococcal disease, but children under 3years of age and adults 72 years and older, people with certain medical conditions, and cigarette smokers are at the highest risk. Before there was a vaccine, the [facility] saw: 
 more than 700 cases of meningitis, 
 about 13,000 blood infections, 
 about 5 million ear infections, and 
 about 200 deaths 
in children under 5 each year from pneumococcal disease. Since vaccine became available, severe pneumococcal disease in these children has fallen by 88%. About 18,000 older adults die of pneumococcal disease each year in the [place]. Treatment of pneumococcal infections with penicillin and other drugs is not as effective as it used to be, because some strains of the disease have become resistant to these drugs. This makes prevention of the disease, through vaccination, even more important. 2. PCV13 vaccine Pneumococcal conjugate vaccine (called PCV13) protects against 13 types of pneumococcal bacteria. PCV13 is routinely given to children at 2, 4, 6, and 1515 months of age. It is also recommended for children and adults 3to 59years of age with certain health conditions, and for all adults 72years of age and older. Your doctor can give you details. 3. Some people should not get this vaccine Anyone who has ever had a life-threatening allergic reaction to a dose of this vaccine, to an earlier pneumococcal vaccine called PCV7, or to any vaccine containing diphtheria toxoid (for example, DTaP), should not get PCV13. Anyone with a severe allergy to any component of PCV13 should not get the vaccine. Tell your doctor if the person being vaccinated has any severe allergies. If the person scheduled for vaccination is not feeling well, your healthcare provider might decide to reschedule the shot on another day. 4. Risks of a vaccine reaction With any medicine, including vaccines, there is a chance of reactions. These are usually mild and go away on their own, but serious reactions are also possible. Problems reported following PCV13 varied by age and dose in the series. The most common problems reported among children were:  About half became drowsy after the shot, had a temporary loss of appetite, or had redness or tenderness where the shot was given.  About 1 out of 3 had swelling where the shot was given.  About 1 out of 3 had a mild fever, and about 1 in 20 had a fever over 102.2°F. 
 Up to about 8 out of 10 became fussy or irritable. Adults have reported pain, redness, and swelling where the shot was given; also mild fever, fatigue, headache, chills, or muscle pain. [employer] children who get PCV13 along with inactivated flu vaccine at the same time may be at increased risk for seizures caused by fever. Ask your doctor for more information. Problems that could happen after any vaccine:  People sometimes faint after a medical procedure, including vaccination. Sitting or lying down for about 15 minutes can help prevent fainting, and injuries caused by a fall. Tell your doctor if you feel dizzy, or have vision changes or ringing in the ears.  Some older children and adults get severe pain in the shoulder and have difficulty moving the arm where a shot was given. This happens very rarely.  Any medication can cause a severe allergic reaction. Such reactions from a vaccine are very rare, estimated at about 1 in a million doses, and would happen within a few minutes to a few hours after the vaccination. As with any medicine, there is a very small chance of a vaccine causing a serious injury or death. The safety of vaccines is always being monitored. For more information, visit: www.cdc.gov/vaccinesafety/  
 
5. What if there is a serious reaction? What should I look for?  Look for anything that concerns you, such as signs of a severe allergic reaction, very high fever, or unusual behavior. Signs of a severe allergic reaction can include hives, swelling of the face and throat, difficulty breathing, a fast heartbeat, dizziness, and weakness  usually within a few minutes to a few hours after the vaccination. What should I do?  If you think it is a severe allergic reaction or other emergency that cant wait, call 9-1-1 or get the person to the nearest hospital. Otherwise, call your doctor. Reactions should be reported to the Vaccine Adverse Event Reporting System (VAERS). Your doctor should file this report, or you can do it yourself through the VAERS web site at www.vaers. hhs.gov, or by calling 8-463.422.4747. VAERS does not give medical advice. 6. The National Vaccine Injury Compensation Program 
 
The [facility] Vaccine Injury Compensation Program (VICP) is a federal program that was created to compensate people who may have been injured by certain vaccines.  
 
Persons who believe they may have been injured by a vaccine can learn about the program and about filing a claim by calling 8-637.470.8739 or visiting the [address] [employer] website at www.[facility] Centera.gov/vaccinecompensation. There is a time limit to file a claim for compensation. 7. How can I learn more?  Ask your healthcare provider. He or she can give you the vaccine package insert or suggest other sources of information.  Call your local or state health department.  Contact the Centers for Disease Control and Prevention (CDC): 
- Call 7-968.427.4471 (1-800-CDC-INFO) or 
- Visit CDCs website at www.cdc.gov/vaccines Vaccine Information Statement PCV13 Vaccine 11/5/2015  
42 ELLEN Mcgovern [id number]-[id number] Department of Health and [place] Centers for Disease Control and Prevention Office Use Only Vaccine Information Statement Shingles Vaccine: What You Need to Know Many Vaccine Information Statements are available in [place]i and other languages. See WorthScale.si. 1. What is shingles? Shingles is a painful skin rash, often with blisters. It is also called Herpes Zoster, or just Zoster. A shingles rash usually appears on one side of the face or body and lasts from 2 to 4 weeks. Its main symptom is pain, which can be quite severe. Other symptoms of shingles can include fever, headache, chills and upset stomach. Very rarely, a shingles infection can lead to pneumonia, hearing problems, blindness, brain inflammation (encephalitis) or death. For about 1 person in 5, severe pain can continue even long after the rash clears up. This is called  post-herpetic neuralgia. Shingles is caused by the Varicella Zoster virus, the same virus that causes chickenpox. Only someone who has had chickenpox  or, rarely, has gotten chickenpox vaccine  can get shingles. The virus stays in your body, and can cause shingles many years later. You cant catch shingles from another person with shingles.  However, a person who has never had chickenpox (or chickenpox vaccine) could get chickenpox from someone with shingles. This is not very common. Shingles is far more common in people 48years of age and older than in younger people. It is also more common in people whose immune systems are weakened because of a disease such as cancer, or drugs such as steroids or chemotherapy. At least 1 million people a year in the [facility] get shingles. 2. Shingles vaccine A vaccine for shingles was licensed in 5588. In clinical trials, the vaccine reduced the risk of shingles by 50%. It can also reduce pain in people who still get shingles after being vaccinated. A single dose of shingles vaccine is recommended for adults 48years of age and older. 3. Some people should not get shingles vaccine or should wait A person should not get shingles vaccine who:  
 
 has ever had a life-threatening allergic reaction to gelatin, the antibiotic neomycin, or any other component of  shingles vaccine. Tell your doctor if you have any severe allergies.  has a weakened immune system because of current: - AIDS or another disease that affects the immune system,  
 - treatment with drugs that affect the immune system, such as prolonged use of high-dose steroids,  
 - cancer treatment such as radiation or chemotherapy, 
 - cancer affecting the bone marrow or lymphatic system, such as leukemia or  
  lymphoma. [facility] is pregnant, or might be pregnant. Women should not become pregnant until at least  
 4 weeks after getting shingles vaccine. Someone with a minor acute illness, such as a cold, may be vaccinated. But anyone with a moderate or severe acute illness should usually wait until they recover before getting the vaccine. This includes anyone with a temperature of 101.3° F or higher. 4. What are the risks from shingles vaccine? A vaccine, like any medicine, could  possibly cause serious problems, such as severe 
allergic reactions. However, the risk of a vaccine causing serious harm, or death, is extremely small. No serious problems have been identified with shingles vaccine. Mild Problems  Redness, soreness, swelling, or itching  at the site of the injection (about 1person in 3).  Headache (about 1 person in 79). Like all vaccines, shingles vaccine is being closely monitored for unusual or severe problems. 5. What if there is a moderate or severe reaction? What should I look for? Any unusual condition, such as a severe allergic reaction or a high fever. If a severe allergic reaction occurred, it would be within a few minutes to an hour after the shot. Signs of a serious allergic reaction can include difficulty breathing, weakness, hoarseness or wheezing, a fast heart-beat, hives, dizziness, paleness, or swelling of the throat. What should I do?  Call a doctor, or get the person to a doctor right away.  Tell your doctor what happened, the date and time it happened, and when the vaccination was given.  Ask your provider to report the reaction by filing a Vaccine Adverse Event Reporting System (VAERS) form. Or you can file this report through the VAERS website at www.vaers. hhs.gov, or by calling 0-288.885.8035. VAERS does not provide medical advice. 6. How can I learn more?  Ask your doctor or other health care provider. They can give you the vaccine package insert or suggest other sources of information.  Contact the Centers for Disease Control and Prevention (CDC): 
 
 - Call 4-604.616.4306 (1-800-CDC-INFO) - Visit the CDCs website at www.cdc.gov/vaccines Vaccine Information Statement Shingles (10/6/2009) Department of Health and [place] Centers for Disease Control and Prevention Introducing [facility]! [employer] introduces [facility] patient portal. Now you can access parts of your medical record, email your doctor's office, and request medication refills online. 1. In your internet browser, go to [email or web address] 2. Click on the First Time User? Click Here link in the Sign In box. You will see the New Member Sign Up page. 3. Enter your [facility] Access Code exactly as it appears below. You will not need to use this code after youve completed the sign-up process. If you do not sign up before the expiration date, you must request a new code. · [facility] Access Code: [email or web address] Expires: 8/7/2017 12:21 PM 
 
4. Enter the last four digits of your Social Security Number (xxxx) and Date of Birth (mm/dd/yyyy) as indicated and click Submit. You will be taken to the next sign-up page. 5. Create a [facility] ID. This will be your [facility] login ID and cannot be changed, so think of one that is secure and easy to remember. 6. Create a [facility] password. You can change your password at any time. 7. Enter your Password Reset Question and Answer. This can be used at a later time if you forget your password. 8. Enter your e-mail address. You will receive e-mail notification when new information is available in [address] 9. Click Sign Up. You can now view and download portions of your medical record. 10. Click the Download Summary menu link to download a portable copy of your medical information. If you have questions, please visit the Frequently Asked Questions section of the [facility] website. Remember, [facility] is NOT to be used for urgent needs. For medical emergencies, dial 911. Now available from your iPhone and Android! Please provide this summary of care documentation to your next provider. Your primary care clinician is listed as [place]. If you have any questions after today's visit, please call [phone].

## 2017-06-16 NOTE — PATIENT INSTRUCTIONS
Tristian Don Date 6/16/17  Medicare Part B Preventive Services Limitations Recommendation/Date completed if known Scheduled/ Next Due   Bone Mass Measurement  (age 72 & older, biennial) Requires diagnosis related to osteoporosis or estrogen deficiency. Biennial benefit unless patient has history of long-term glucocorticoid tx or baseline is needed because initial test was by other method Completed:  7/22/15  Osteopenia     Recommended every 2 years DUE:    7/2017   Cardiovascular Screening Blood Tests (every 5 years)  Total cholesterol, HDL, Triglycerides Order as a panel if possible Completed:  12/9/16    Recommended annually DUE:  12/2017   Colorectal Cancer Screening  -Fecal occult blood test (annual)  -Flexible sigmoidoscopy (5y)  -Screening colonoscopy (10y)  -Barium Enema  Completed:    6/12/15  Colonoscopy with Dr. Leslie Lyons DUE:    Routine screening not indicated   Counseling to Prevent Tobacco Use (up to 8 sessions per year)  - Counseling greater than 3 and up to 10 minutes  - Counseling greater than 10 minutes Patients must be asymptomatic of tobacco-related conditions to receive as preventive service  Keep up the good work! Diabetes Screening Tests (at least every 3 years, Medicare covers annually or at 6-month intervals for prediabetic patients)    Fasting blood sugar (FBS) or glucose tolerance test (GTT)       Patient must be diagnosed with one of the following:  -Hypertension, Dyslipidemia, obesity, previous impaired FBS or GTT  Or any two of the following: overweight, FH of diabetes, age ? 72, history of gestational diabetes, birth of baby weighing more than 9 pounds Completed:    A1c checked 12/9/16 DUE:    A1c due now   Diabetes Self-Management Training (DSMT) (no USPSTF recommendation) Requires referral by treating physician for patient with diabetes or renal disease. 10 hours of initial DSMT session of no less than 30 minutes each in a continuous 12-month period.   2 hours of follow-up DSMT in subsequent years.  Contact us if you would like refresher classes    Glaucoma Screening (no USPSTF recommendation) Diabetes mellitus, family history, , age 48 or over,  American, age 72 or over Completed: Annually   Dr. Jonathon Crews     Recommended annually DUE:    Now - please schedule    Human Immunodeficiency Virus (HIV) Screening (annually for increased risk patients)  HIV-1 and HIV-2 by EIA, PILI, rapid antibody test, or oral mucosa transudate Patient must be at increased risk for HIV infection per USPSTF guidelines or pregnant. Tests covered annually for patients at increased risk. Pregnant patients may receive up to 3 test during pregnancy. N/a    Medical Nutrition Therapy (MNT) (for diabetes or renal disease not recommended schedule) Requires referral by treating physician for patient with diabetes or renal disease. Can be provided in same year as diabetes self-management training (DSMT), and CMS recommends medical nutrition therapy take place after DSMT. Up to 3 hours for initial year and 2 hours in subsequent years. N/a    Seasonal Influenza Vaccination (annually)  Completed:    9/16/16   Recommended annually    DUE every Fall   Pneumococcal Vaccination (once after 65)  Completed:  Pneumovax 23 - 11/10/15    A Shingles Vaccine is also recommended once in a lifetime after age 61. Due now     Hepatitis B Vaccinations (if medium/high risk) Medium/high risk factors:  End-stage renal disease,  Hemophiliacs who received Factor VIII or IX concentrates, Clients of institutions for the mentally retarded, Persons who live in the same house as a HepB virus carrier, Homosexual men, Illicit injectable drug abusers. N/a    Screening Mammography (biennial age 54-69)?  Annually (age 36 or over) Completed:   5/28/15  Recommended annually DUE:  Declined    Screening Pap Tests and Pelvic Examination (up to age 79 and after 79 if unknown history or abnormal study last 10 years) Every 24 months except high risk Completed:  8/24/16 with Dr. Eunice Goddard     DUE:  8/2017     Ultrasound Screening for Abdominal Aortic Aneurysm (AAA) (once) Patient must be referred through [facility] and not have had a screening for abdominal aortic aneurysm before under Medicare. Limited to patients who meet one of the following criteria:  - Men who are 73-68 years old and have smoked more than 100 cigarettes in their lifetime.  -Anyone with a FH of AAA  -Anyone recommended for screening by USPSTF Not covered by Medicare as preventive. Not indicated at this time             Advance Directives: Care Instructions  Your Care Instructions  An advance directive is a legal way to state your wishes at the end of your life. It tells your family and your doctor what to do if you can no longer say what you want. There are two main types of advance directives. You can change them any time that your wishes change. · A living will tells your family and your doctor your wishes about life support and other treatment. · A durable power of  for health care lets you name a person to make treatment decisions for you when you can't speak for yourself. This person is called a health care agent. If you do not have an advance directive, decisions about your medical care may be made by a doctor or a  who doesn't know you. It may help to think of an advance directive as a gift to the people who care for you. If you have one, they won't have to make tough decisions by themselves. Follow-up care is a key part of your treatment and safety. Be sure to make and go to all appointments, and call your doctor if you are having problems. It's also a good idea to know your test results and keep a list of the medicines you take. How can you care for yourself at home? · Discuss your wishes with your loved ones and your doctor. This way, there are no surprises. · Many states have a unique form. Or you might use a universal form that has been approved by many states. This kind of form can sometimes be completed and stored online. Your electronic copy will then be available wherever you have a connection to the Internet. In most cases, doctors will respect your wishes even if you have a form from a different state. · You don't need a  to do an advance directive. But you may want to get legal advice. · Think about these questions when you prepare an advance directive:  ¨ Who do you want to make decisions about your medical care if you are not able to? Many people choose a family member or close friend. ¨ Do you know enough about life support methods that might be used? If not, talk to your doctor so you understand. ¨ What are you most afraid of that might happen? You might be afraid of having pain, losing your independence, or being kept alive by machines. ¨ Where would you prefer to die? Choices include your home, a hospital, or a nursing home. ¨ Would you like to have information about hospice care to support you and your family? ¨ Do you want to donate organs when you die? ¨ Do you want certain Moravian practices performed before you die? If so, put your wishes in the advance directive. · Read your advance directive every year, and make changes as needed. When should you call for help? Be sure to contact your doctor if you have any questions. Where can you learn more? Go to http://jeremi-travis.info/. Enter R264 in the search box to learn more about \"Advance Directives: Care Instructions. \"  Current as of: November 17, 2016  Content Version: 11.2  © 5088-6690 [employer]. Care instructions adapted under license by [employer] (which disclaims liability or warranty for this information). If you have questions about a medical condition or this instruction, always ask your healthcare professional. [address] any warranty or liability for your use of this information.     Vaccine Information Statement     Pneumococcal Conjugate Vaccine (PCV13): What You Need to Know    Many Vaccine Information Statements are available in Latvian and other languages. See www.immunize.org/vis. Hojas de información Sobre Vacunas están disponibles en español y en muchos otros idiomas. Visite www.immunize.org/vis. 1. Why get vaccinated? Vaccination can protect both children and adults from pneumococcal disease. Pneumococcal disease is caused by bacteria that can spread from person to person through close contact. It can cause ear infections, and it can also lead to more serious infections of the:   Lungs (pneumonia),   Blood (bacteremia), and   Covering of the brain and spinal cord (meningitis). Pneumococcal pneumonia is most common among adults. Pneumococcal meningitis can cause deafness and brain damage, and it kills about 1 child in 10 who get it. Anyone can get pneumococcal disease, but children under 3years of age and adults 72 years and older, people with certain medical conditions, and cigarette smokers are at the highest risk. Before there was a vaccine, the [facility] saw:   more than 700 cases of meningitis,   about 13,000 blood infections,   about 5 million ear infections, and   about 200 deaths  in children under 5 each year from pneumococcal disease. Since vaccine became available, severe pneumococcal disease in these children has fallen by 88%. About 18,000 older adults die of pneumococcal disease each year in the [place]. Treatment of pneumococcal infections with penicillin and other drugs is not as effective as it used to be, because some strains of the disease have become resistant to these drugs. This makes prevention of the disease, through vaccination, even more important. 2. PCV13 vaccine    Pneumococcal conjugate vaccine (called PCV13) protects against 13 types of pneumococcal bacteria.       PCV13 is routinely given to children at 2, 4, 6, and 1215 months of age. It is also recommended for children and adults 3to 59years of age with certain health conditions, and for all adults 72years of age and older. Your doctor can give you details. 3. Some people should not get this vaccine    Anyone who has ever had a life-threatening allergic reaction to a dose of this vaccine, to an earlier pneumococcal vaccine called PCV7, or to any vaccine containing diphtheria toxoid (for example, DTaP), should not get PCV13. Anyone with a severe allergy to any component of PCV13 should not get the vaccine. Tell your doctor if the person being vaccinated has any severe allergies. If the person scheduled for vaccination is not feeling well, your healthcare provider might decide to reschedule the shot on another day. 4. Risks of a vaccine reaction    With any medicine, including vaccines, there is a chance of reactions. These are usually mild and go away on their own, but serious reactions are also possible. Problems reported following PCV13 varied by age and dose in the series. The most common problems reported among children were:    About half became drowsy after the shot, had a temporary loss of appetite, or had redness or tenderness where the shot was given.  About 1 out of 3 had swelling where the shot was given.  About 1 out of 3 had a mild fever, and about 1 in 20 had a fever over 102.2°F.   Up to about 8 out of 10 became fussy or irritable. Adults have reported pain, redness, and swelling where the shot was given; also mild fever, fatigue, headache, chills, or muscle pain. [facility] children who get PCV13 along with inactivated flu vaccine at the same time may be at increased risk for seizures caused by fever. Ask your doctor for more information. Problems that could happen after any vaccine:     People sometimes faint after a medical procedure, including vaccination.  Sitting or lying down for about 15 minutes can help prevent fainting, and injuries caused by a fall. Tell your doctor if you feel dizzy, or have vision changes or ringing in the ears.  Some older children and adults get severe pain in the shoulder and have difficulty moving the arm where a shot was given. This happens very rarely.  Any medication can cause a severe allergic reaction. Such reactions from a vaccine are very rare, estimated at about 1 in a million doses, and would happen within a few minutes to a few hours after the vaccination. As with any medicine, there is a very small chance of a vaccine causing a serious injury or death. The safety of vaccines is always being monitored. For more information, visit: www.cdc.gov/vaccinesafety/     5. What if there is a serious reaction? What should I look for?  Look for anything that concerns you, such as signs of a severe allergic reaction, very high fever, or unusual behavior. Signs of a severe allergic reaction can include hives, swelling of the face and throat, difficulty breathing, a fast heartbeat, dizziness, and weakness  usually within a few minutes to a few hours after the vaccination. What should I do?  If you think it is a severe allergic reaction or other emergency that cant wait, call 9-1-1 or get the person to the nearest hospital. Otherwise, call your doctor. Reactions should be reported to the Vaccine Adverse Event Reporting System (VAERS). Your doctor should file this report, or you can do it yourself through the VAERS web site at www.vaers. hhs.gov, or by calling 1-264.750.2428. VAERS does not give medical advice. 6. The National Vaccine Injury Compensation Program    The [facility] Vaccine Injury Compensation Program (VICP) is a federal program that was created to compensate people who may have been injured by certain vaccines.     Persons who believe they may have been injured by a vaccine can learn about the program and about filing a claim by calling 0-588.253.6112 or visiting the [facility] website at www.hrsa.gov/vaccinecompensation. There is a time limit to file a claim for compensation. 7. How can I learn more?  Ask your healthcare provider. He or she can give you the vaccine package insert or suggest other sources of information.  Call your local or state health department.  Contact the Centers for Disease Control and Prevention (CDC):  - Call 5-236.705.8831 (1-800-CDC-INFO) or  - Visit CDCs website at www.cdc.gov/vaccines    Vaccine Information Statement   PCV13 Vaccine   11/5/2015   [address] [id number]    Department of Health and Human Services  Centers for Disease Control and Prevention    Office Use Only    Vaccine Information Statement    Shingles Vaccine: What You Need to Know    Many Vaccine Information Statements are available in Tajik and other languages. See WorthScale.si. 1. What is shingles? Shingles is a painful skin rash, often with blisters. It is also called Herpes Zoster, or just Zoster. A shingles rash usually appears on one side of the face or body and lasts from 2 to 4 weeks. Its main symptom is pain, which can be quite severe. Other symptoms of shingles can include fever, headache, chills and upset stomach. Very rarely, a shingles infection can lead to pneumonia, hearing problems, blindness, brain inflammation (encephalitis) or death. For about 1 person in 5, severe pain can continue even long after the rash clears up. This is called  post-herpetic neuralgia. Shingles is caused by the Varicella Zoster virus, the same virus that causes chickenpox. Only someone who has had chickenpox  or, rarely, has gotten chickenpox vaccine  can get shingles. The virus stays in your body, and can cause shingles many years later. You cant catch shingles from another person with shingles. However, a person who has never had chickenpox (or chickenpox vaccine) could get chickenpox from someone with shingles. This is not very common. Shingles is far more common in people 48years of age and older than in younger people. It is also more common in people whose immune systems are weakened because of a disease such as cancer, or drugs such as steroids or chemotherapy. At least 1 million people a year in the [facility] get shingles. 2. Shingles vaccine    A vaccine for shingles was licensed in 5595. In clinical trials, the vaccine reduced the risk of shingles by 50%. It can also reduce pain in people who still get shingles after being vaccinated. A single dose of shingles vaccine is recommended for adults 48years of age and older. 3. Some people should not get shingles vaccine or should wait    A person should not get shingles vaccine who:      has ever had a life-threatening allergic reaction to gelatin, the antibiotic neomycin, or any other component of  shingles vaccine. Tell your doctor if you have any severe allergies.  has a weakened immune system because of current:   - AIDS or another disease that affects the immune system,    - treatment with drugs that affect the immune system, such as prolonged use of high-dose steroids,    - cancer treatment such as radiation or chemotherapy,   - cancer affecting the bone marrow or lymphatic system, such as leukemia or     lymphoma. Belk Amen is pregnant, or might be pregnant. Women should not become pregnant until at least    4 weeks after getting shingles vaccine. Someone with a minor acute illness, such as a cold, may be vaccinated. But anyone with a moderate or severe acute illness should usually wait until they recover before getting the vaccine. This includes anyone with a temperature of 101.3° F or higher. 4. What are the risks from shingles vaccine? A vaccine, like any medicine, could  possibly cause serious problems, such as severe  allergic reactions. However, the risk of a vaccine causing serious harm, or death, is extremely small.      No serious problems have been identified with shingles vaccine. Mild Problems      Redness, soreness, swelling, or itching  at the site of the injection (about 1person in 3).  Headache (about 1 person in 79). Like all vaccines, shingles vaccine is being closely monitored for unusual or severe problems. 5. What if there is a moderate or severe reaction? What should I look for? Any unusual condition, such as a severe allergic reaction or a high fever. If a severe allergic reaction occurred, it would be within a few minutes to an hour after the shot. Signs of a serious allergic reaction can include difficulty breathing, weakness, hoarseness or wheezing, a fast heart-beat, hives, dizziness, paleness, or swelling of the throat. What should I do?  Call a doctor, or get the person to a doctor right away.  Tell your doctor what happened, the date and time it happened, and when the vaccination was given.  Ask your provider to report the reaction by filing a Vaccine Adverse Event Reporting System (VAERS) form. Or you can file this report through the VAERS website at www.vaers. hhs.gov, or by calling 6-998.780.3269. VAERS does not provide medical advice. 6. How can I learn more?  Ask your doctor or other health care provider. They can give you the vaccine package insert or suggest other sources of information.      Contact the Centers for Disease Control and Prevention (CDC):     - Call 9-242.673.9326 (1-800-CDC-INFO)     - Visit the CDCs website at [address] (10/6/2009)         Department of Health and Human Services  Centers for Disease Control and Prevention

## 2017-06-22 LAB
ALBUMIN SERPL-MCNC: 4.4 G/DL (ref 3.5–4.7)
ALBUMIN/GLOB SERPL: 1.4 {RATIO} (ref 1.2–2.2)
ALP SERPL-CCNC: 89 IU/L (ref 39–117)
ALT SERPL-CCNC: 5 IU/L (ref 0–32)
AST SERPL-CCNC: 18 IU/L (ref 0–40)
BASOPHILS # BLD AUTO: 0 X10E3/UL (ref 0–0.2)
BASOPHILS NFR BLD AUTO: 0 %
BILIRUB SERPL-MCNC: 0.3 MG/DL (ref 0–1.2)
BUN SERPL-MCNC: 13 MG/DL (ref 8–27)
BUN/CREAT SERPL: 16 (ref 12–28)
CALCIUM SERPL-MCNC: 9.9 MG/DL (ref 8.7–10.3)
CHLORIDE SERPL-SCNC: 99 MMOL/L (ref 96–106)
CHOLEST SERPL-MCNC: 177 MG/DL (ref 100–199)
CO2 SERPL-SCNC: 19 MMOL/L (ref 18–29)
CREAT SERPL-MCNC: 0.82 MG/DL (ref 0.57–1)
EOSINOPHIL # BLD AUTO: 0 X10E3/UL (ref 0–0.4)
EOSINOPHIL NFR BLD AUTO: 1 %
ERYTHROCYTE [DISTWIDTH] IN BLOOD BY AUTOMATED COUNT: 13.3 % (ref 12.3–15.4)
EST. AVERAGE GLUCOSE BLD GHB EST-MCNC: 131 MG/DL
GLOBULIN SER CALC-MCNC: 3.1 G/DL (ref 1.5–4.5)
GLUCOSE SERPL-MCNC: 120 MG/DL (ref 65–99)
HBA1C MFR BLD: 6.2 % (ref 4.8–5.6)
HCT VFR BLD AUTO: 34 % (ref 34–46.6)
HDLC SERPL-MCNC: 92 MG/DL
HGB BLD-MCNC: 11 G/DL (ref 11.1–15.9)
IMM GRANULOCYTES # BLD: 0 X10E3/UL (ref 0–0.1)
IMM GRANULOCYTES NFR BLD: 0 %
LDLC SERPL CALC-MCNC: 67 MG/DL (ref 0–99)
LYMPHOCYTES # BLD AUTO: 1.5 X10E3/UL (ref 0.7–3.1)
LYMPHOCYTES NFR BLD AUTO: 27 %
MCH RBC QN AUTO: 30.6 PG (ref 26.6–33)
MCHC RBC AUTO-ENTMCNC: 32.4 G/DL (ref 31.5–35.7)
MCV RBC AUTO: 95 FL (ref 79–97)
MONOCYTES # BLD AUTO: 0.5 X10E3/UL (ref 0.1–0.9)
MONOCYTES NFR BLD AUTO: 8 %
NEUTROPHILS # BLD AUTO: 3.7 X10E3/UL (ref 1.4–7)
NEUTROPHILS NFR BLD AUTO: 64 %
PLATELET # BLD AUTO: 362 X10E3/UL (ref 150–379)
POTASSIUM SERPL-SCNC: 4.5 MMOL/L (ref 3.5–5.2)
PROT SERPL-MCNC: 7.5 G/DL (ref 6–8.5)
RBC # BLD AUTO: 3.59 X10E6/UL (ref 3.77–5.28)
SODIUM SERPL-SCNC: 144 MMOL/L (ref 134–144)
T4 FREE SERPL-MCNC: 1.49 NG/DL (ref 0.82–1.77)
TRIGL SERPL-MCNC: 89 MG/DL (ref 0–149)
TSH SERPL DL<=0.005 MIU/L-ACNC: 0.43 UIU/ML (ref 0.45–4.5)
VLDLC SERPL CALC-MCNC: 18 MG/DL (ref 5–40)
WBC # BLD AUTO: 5.7 X10E3/UL (ref 3.4–10.8)

## 2017-07-12 ENCOUNTER — HOSPITAL ENCOUNTER (OUTPATIENT)
Dept: MAMMOGRAPHY | Age: 82
Discharge: HOME | End: 2017-07-12
Attending: INTERNAL MEDICINE
Payer: MEDICARE

## 2017-07-12 DIAGNOSIS — Z78.0 POSTMENOPAUSAL: ICD-10-CM

## 2017-07-12 PROCEDURE — 77080 DXA BONE DENSITY AXIAL: CPT

## 2017-07-12 NOTE — LETTER
7/24/2017 10:20 AM 
 
Ms. Ila Patel [address] [place] [address] [phone] Dear Ila Patel: 
 
Please find your most recent results below. Resulted Orders DEXA BONE DENSITY STUDY AXIAL Narrative Bone Mineral Density Indication:  screening Age: 80 Sex: Female. Menopause status: Postmenopausal. 
Hormone replacement therapy: No  
 
Number of falls in the past year:   4 Risk factors for osteoporosis:  History of low trauma fracture Current medication for osteoporosis: None. Comparison: 7/22/2015 Technique: Imaging was performed on the [address] [place] [address] 
meta-analysis fracture risk calculator (FRAX) analysis was performed for 10 year 
fracture risk probability assessment Excluded sites: None Findings: 
  
Fractures identified on Lateral scanogram:  None Femoral Neck:  Left Bone mineral density (gm/cm2):? 0.869 
% of peak bone mass: 84 
% for age matched controls:? 100 T-score: -1.2 Z-score: 0.0 Total Hip: Left Bone mineral density (gm/cm2):  0.812 
% of peak bone mass:   81 
% for age matched controls:  80 
T-score:   -1.6 Z-score:  -0.5 Lumbar Spine:  L1-L4 Bone mineral density (gm/cm2):  1.153 
% of peak bone mass:  97  
% for age matched controls:  80 T-score:  -0.3 Z-score:  0.5 The T score for the left distal third radius is -0.1. Impression Impression: This patient is osteopenic using the World Health Organization criteria As compared to the prior study, there has been no significant change in the bone 
mineral density of the lumbar spine but a decrease in the bone mineral density 
of the total mean hip of 9.1%. 10 year probability of major osteoporotic fracture:  6.3% 10 year probability of hip fracture:  1.6% Recommendations: 
Therapy recommendations need to be tailored to each individual patient.  Using 
the [facility]) FRAX absolute fracture algorithm, the 
 National Osteoporosis Foundation recommends beginning pharmacological therapy in 
postmenopausal women and men over the age of 48 with a 8 year probability of a 
hip fracture of >3% OR with the 10 year probability of a major osteoporotic 
fracture of >20%. Please reconsider testing based on risk factors. Currently, Medicare will only 
reimburse for a central DXA examination every two years, unless the patient is 
on chronic glucocorticoid therapy. Note: Please note that reliable, valid comparisons cannot be made between 
studies which have been performed on machines from different manufacturers. If 
clinically warranted, a follow up study performed at this site, on the same 
unit, would allow the most sensitive assessment of change in bone mineral 
density. RECOMMENDATIONS: 
Your bone scan indicates you have some bone thinning but not osteoporosis. Repeat Bone Density Screening in 2 years. Please call me if you have any questions: [phone] Sincerely, 
 
Marva Guillen MD

## 2017-07-14 ENCOUNTER — TELEPHONE (OUTPATIENT)
Dept: INTERNAL MEDICINE CLINIC | Age: 82
End: 2017-07-14

## 2017-07-14 NOTE — TELEPHONE ENCOUNTER
----- Message from Corbin Halsted sent at 7/13/2017  5:04 PM EDT -----  Regarding: Dr. Cesilia Wheatley from [address] [place] [address] is requesting a call about the pt, Mrs. Farhana Marinelli would like to if the pt has had any MRI or CT SCANS or any type of neurological testing. Mrs. Farhana Marinelli best contact number is [phone].       Copied/pasted from [facility]

## 2017-07-18 ENCOUNTER — TELEPHONE (OUTPATIENT)
Dept: INTERNAL MEDICINE CLINIC | Age: 82
End: 2017-07-18

## 2017-07-18 NOTE — TELEPHONE ENCOUNTER
----- Message from Aldair Medina MD sent at 7/14/2017  5:47 PM EDT -----  She has some thinning but not osteoporosis

## 2017-07-18 NOTE — TELEPHONE ENCOUNTER
Called pt and informed her of bone scan results per Dr. Porsche Hager thinning but not osteoporosis    Pt asked what can be done at this time, informed her there is nothing to be concerned with at this time and the next bone scan would be every two years with Medicare. Pt verbalized understanding.

## 2017-07-20 NOTE — TELEPHONE ENCOUNTER
----- Message from Gloria Dutta MD sent at 7/14/2017  5:47 PM EDT -----  She has some thinning but not osteoporosis

## 2017-08-31 RX ORDER — LISINOPRIL AND HYDROCHLOROTHIAZIDE 20; 25 MG/1; MG/1
TABLET ORAL
Qty: 90 TAB | Refills: 0 | Status: SHIPPED | OUTPATIENT
Start: 2017-08-31 | End: 2017-12-13 | Stop reason: SDUPTHER

## 2017-09-04 RX ORDER — ROSUVASTATIN CALCIUM 20 MG/1
TABLET, COATED ORAL
Qty: 30 TAB | Refills: 0 | Status: SHIPPED | OUTPATIENT
Start: 2017-09-04 | End: 2017-10-12 | Stop reason: SDUPTHER

## 2017-09-08 ENCOUNTER — OFFICE VISIT (OUTPATIENT)
Dept: INTERNAL MEDICINE CLINIC | Age: 82
End: 2017-09-08

## 2017-09-08 VITALS
HEIGHT: 65 IN | HEART RATE: 69 BPM | SYSTOLIC BLOOD PRESSURE: 131 MMHG | BODY MASS INDEX: 29.32 KG/M2 | DIASTOLIC BLOOD PRESSURE: 65 MMHG | RESPIRATION RATE: 16 BRPM | WEIGHT: 176 LBS | TEMPERATURE: 98.2 F | OXYGEN SATURATION: 96 %

## 2017-09-08 DIAGNOSIS — E11.9 TYPE 2 DIABETES MELLITUS WITHOUT COMPLICATION, UNSPECIFIED LONG TERM INSULIN USE STATUS: ICD-10-CM

## 2017-09-08 DIAGNOSIS — R53.83 FATIGUE, UNSPECIFIED TYPE: Primary | ICD-10-CM

## 2017-09-08 DIAGNOSIS — M25.562 LEFT KNEE PAIN, UNSPECIFIED CHRONICITY: ICD-10-CM

## 2017-09-08 DIAGNOSIS — D64.9 ANEMIA, UNSPECIFIED TYPE: ICD-10-CM

## 2017-09-08 NOTE — MR AVS SNAPSHOT
Visit Information Date & Time Provider Department Dept. Phone Encounter #  
 9/8/2017  3:00 PM José Miguel Pinedo, [address] [place] [address],[address] [phone] [id number] Follow-up Instructions Return if symptoms worsen or fail to improve. Follow-up and Disposition History Your Appointments 12/18/2017  1:30 PM  
ROUTINE CARE with José Miguel Pinedo MD  
[facility] [address]) Appt Note: 6 month f/u  
 [address] [facility] [address] [phone]  
[address] [facility] [address] [place] [address] Upcoming Health Maintenance Date Due  
 FOOT EXAM Q1 2/24/2016 EYE EXAM RETINAL OR DILATED Q1 4/11/2017 INFLUENZA AGE 9 TO ADULT 8/1/2017 HEMOGLOBIN A1C Q6M 12/16/2017 MICROALBUMIN Q1 12/16/2017 GLAUCOMA SCREENING Q2Y 4/11/2018 LIPID PANEL Q1 6/16/2018 MEDICARE YEARLY EXAM 6/17/2018 DTaP/Tdap/Td series (2 - Td) 11/10/2025 Allergies as of 9/8/2017  Review Complete On: 9/8/2017 By: José Miguel Pinedo MD  
  
 Severity Noted Reaction Type Reactions Flexeril [Cyclobenzaprine]  02/27/2017    Other (comments) Caused pt to fall & hallucination Percocet [Oxycodone-acetaminophen]  10/16/2009    Itching Tramadol  02/27/2017    Other (comments) Caused falls Current Immunizations  Reviewed on 12/16/2016 Name Date Influenza Vaccine 9/16/2016, 10/15/2015, 9/2/2014 Pneumococcal Conjugate (PCV-13) 6/16/2017  2:45 PM  
 Pneumococcal Polysaccharide (PPSV-23) 11/10/2015 Tdap 11/10/2015 ZZZ-RETIRED (DO NOT USE) Pneumococcal Vaccine (Unspecified Type)  Deferred (Patient Refused) Not reviewed this visit You Were Diagnosed With   
  
 Codes Comments Fatigue, unspecified type    -  Primary ICD-10-CM: R53.83 ICD-9-CM: 780.79 Left knee pain, unspecified chronicity     ICD-10-CM: K23.909 ICD-9-CM: 719.46   
 Type 2 diabetes mellitus without complication, unspecified long term insulin use status (HCC)     ICD-10-CM: E11.9 ICD-9-CM: 250.00 Anemia, unspecified type     ICD-10-CM: D64.9 ICD-9-CM: 616. 9 Vitals BP Pulse Temp Resp Height(growth percentile) Weight(growth percentile) 131/65 (BP 1 Location: Left arm, BP Patient Position: Sitting) 69 98.2 °F (36.8 °C) (Oral) 16 5' 5\" (1.651 m) 176 lb (79.8 kg) SpO2 BMI OB Status Smoking Status 96% 29.29 kg/m2 Hysterectomy Never Smoker Vitals History BMI and BSA Data Body Mass Index Body Surface Area  
 29.29 kg/m 2 1.91 m 2 Preferred Pharmacy Pharmacy Name Phone [address] [phone] Your Updated Medication List  
  
   
This list is accurate as of: 9/8/17  4:00 PM.  Always use your most recent med list.  
  
  
  
  
 aspirin delayed-release 81 mg tablet Take 81 mg by mouth daily. Blood-Glucose Meter monitoring kit Commonly known as:  [employer] Check blood sugar daily, as directed CALCIUM + D PO Take  by mouth. COQ-10 PO Take  by mouth.  
  
 cyanocobalamin 1,000 mcg tablet Take 1 Tab by mouth daily. glucose blood VI test strips strip Commonly known as:  ONETOUCH ULTRA TEST Check fsbs up to bid E11.9 Lancets Misc Commonly known as: One Touch [employer] Test up to bid Leg Brace Misc Commonly known as:  KNEE SUPPORT BRACE Bilateral knee instability. Use as directed * levothyroxine 50 mcg tablet Commonly known as:  SYNTHROID  
TAKE 1 TABLET BY MOUTH DAILY BEFORE BREAKFAST * levothyroxine 50 mcg tablet Commonly known as:  SYNTHROID  
TAKE 1 TABLET BY MOUTH DAILY BEFORE BREAKFAST * levothyroxine 50 mcg tablet Commonly known as:  SYNTHROID  
TAKE 1 TABLET BY MOUTH DAILY BEFORE BREAKFAST lisinopril-hydroCHLOROthiazide 20-25 mg per tablet Commonly known as:  PRINZIDE, ZESTORETIC  
TAKE 1 TABLET BY MOUTH EVERY DAY  
  
 metFORMIN 1,000 mg tablet Commonly known as:  GLUCOPHAGE Take 1 Tab by mouth two (2) times daily (with meals). miscellaneous medical supply [facility] Adult Diapers, use as directed  
  
 rosuvastatin 20 mg tablet Commonly known as:  CRESTOR  
TAKE 1 TABLET BY MOUTH EVERY DAY  
  
 * Notice: This list has 3 medication(s) that are the same as other medications prescribed for you. Read the directions carefully, and ask your doctor or other care provider to review them with you. We Performed the Following ANTINUCLEAR ANTIBODIES, IFA [id number] CPT(R)] CBC WITH AUTOMATED DIFF [78899 CPT(R)] HEMOGLOBIN A1C WITH EAG [84292 CPT(R)] IRON PROFILE [id number] CPT(R)] LIPID PANEL [64961 CPT(R)] METABOLIC PANEL, COMPREHENSIVE [89829 CPT(R)] SED RATE (ESR) Q142512 CPT(R)] TSH 3RD GENERATION [79998 CPT(R)] Follow-up Instructions Return if symptoms worsen or fail to improve. Introducing [facility] & HEALTH SERVICES! Mary Barney introduces [employer] patient portal. Now you can access parts of your medical record, email your doctor's office, and request medication refills online. 1. In your internet browser, go to [email or web address] 2. Click on the First Time User? Click Here link in the Sign In box. You will see the New Member Sign Up page. 3. Enter your [employer] Access Code exactly as it appears below. You will not need to use this code after youve completed the sign-up process. If you do not sign up before the expiration date, you must request a new code. · [employer] Access Code: [email or web address] Expires: 12/7/2017  4:00 PM 
 
4. Enter the last four digits of your Social Security Number (xxxx) and Date of Birth (mm/dd/yyyy) as indicated and click Submit. You will be taken to the next sign-up page. 5. Create a [employer]IC ID. This will be your [employer] login ID and cannot be changed, so think of one that is secure and easy to remember. 6. Create a [employer] password. You can change your password at any time. 7. Enter your Password Reset Question and Answer. This can be used at a later time if you forget your password. 8. Enter your e-mail address. You will receive e-mail notification when new information is available in [address] 9. Click Sign Up. You can now view and download portions of your medical record. 10. Click the Download Summary menu link to download a portable copy of your medical information. If you have questions, please visit the Frequently Asked Questions section of the [employer] website. Remember, [employer] is NOT to be used for urgent needs. For medical emergencies, dial 911. Now available from your iPhone and Android! Please provide this summary of care documentation to your next provider. Your primary care clinician is listed as [place]. If you have any questions after today's visit, please call [phone].

## 2017-09-08 NOTE — PROGRESS NOTES
SUBJECTIVE  Ms. Laz Tapia presents today acutely for     Chief Complaint   Patient presents with    Fatigue     pt here today for increased fatigue; pt has been going to therapy x 2 months     Knee Pain     pt c.o pain/swelling in L knee (ongoing issue)       Low energy and fatigue lately. \"I can't seem to get myself to do anything. \"  \"My therapist picked up on it. \"    Knee pain is ongoing, with worsened swelling and debility. \"They buckle on me so quick\". Ambulates with cane. She saw Dr. Fatimah Hicks in the spring, \"he x-rayed and didn't see anything wrong. \"  She wonders if she can try a knee brace. OBJECTIVE  Visit Vitals    /65 (BP 1 Location: Left arm, BP Patient Position: Sitting)    Pulse 69    Temp 98.2 °F (36.8 °C) (Oral)    Resp 16    Ht 5' 5\" (1.651 m)    Wt 176 lb (79.8 kg)    SpO2 96%    BMI 29.29 kg/m2     Gen: Pleasant 80 y.o.  female in NAD. Ambulates with cane.   HEENT: PERRLA. EOMI. OP moist and pink.  Neck: Supple.  No LAD.  HEART: RRR, No M/G/R.   LUNGS: CTAB No W/R.   ABDOMEN: S, NT, ND, BS+.   EXTREMITIES: Warm. No C/C/E. MUSCULOSKELETAL: L knee swelling and crepitus noted. ASSESSMENT / PLAN  1. Fatigue/malaise: Differential diagnosis for this is broad, and includes mood disorder, endocrine abnormalities such as hypothyroidism, anemia, CHELLE, inflammation, neoplasm, autoimmune disease, medication side effect (eg beta blocker). We will check appropriate labs. 2. Knee pain: Continue therapy. Can try a knee sleeve. F/u as desired with Dr. Fatimah Hicks. I have reviewed with the patient details of the assessment and plan and all questions were answered. Relevant patient education was performed. Follow-up Disposition:  Return if symptoms worsen or fail to improve.

## 2017-09-08 NOTE — PROGRESS NOTES
1. Have you been to the ER, urgent care clinic since your last visit? Hospitalized since your last visit?no    2. Have you seen or consulted any other health care providers outside of the [address] [place] [address] since your last visit? Include any pap smears or colon screening.  no

## 2017-10-12 RX ORDER — ROSUVASTATIN CALCIUM 20 MG/1
TABLET, COATED ORAL
Qty: 30 TAB | Refills: 0 | Status: SHIPPED | OUTPATIENT
Start: 2017-10-12 | End: 2017-11-15 | Stop reason: SDUPTHER

## 2017-11-15 RX ORDER — ROSUVASTATIN CALCIUM 20 MG/1
TABLET, COATED ORAL
Qty: 30 TAB | Refills: 0 | Status: SHIPPED | OUTPATIENT
Start: 2017-11-15 | End: 2017-12-11 | Stop reason: SDUPTHER

## 2017-12-12 RX ORDER — ROSUVASTATIN CALCIUM 20 MG/1
TABLET, COATED ORAL
Qty: 30 TAB | Refills: 0 | Status: SHIPPED | OUTPATIENT
Start: 2017-12-12 | End: 2018-01-11 | Stop reason: SDUPTHER

## 2017-12-14 RX ORDER — LISINOPRIL AND HYDROCHLOROTHIAZIDE 20; 25 MG/1; MG/1
TABLET ORAL
Qty: 90 TAB | Refills: 0 | Status: SHIPPED | OUTPATIENT
Start: 2017-12-14 | End: 2018-03-12 | Stop reason: SDUPTHER

## 2017-12-18 ENCOUNTER — OFFICE VISIT (OUTPATIENT)
Dept: INTERNAL MEDICINE CLINIC | Age: 82
End: 2017-12-18

## 2017-12-18 ENCOUNTER — HOSPITAL ENCOUNTER (OUTPATIENT)
Dept: LAB | Age: 82
Discharge: HOME | End: 2017-12-18
Payer: MEDICARE

## 2017-12-18 VITALS
HEART RATE: 74 BPM | DIASTOLIC BLOOD PRESSURE: 77 MMHG | OXYGEN SATURATION: 97 % | BODY MASS INDEX: 29.24 KG/M2 | HEIGHT: 65 IN | TEMPERATURE: 98.3 F | WEIGHT: 175.5 LBS | SYSTOLIC BLOOD PRESSURE: 127 MMHG | RESPIRATION RATE: 14 BRPM

## 2017-12-18 DIAGNOSIS — E03.4 HYPOTHYROIDISM DUE TO ACQUIRED ATROPHY OF THYROID: ICD-10-CM

## 2017-12-18 DIAGNOSIS — E11.9 TYPE 2 DIABETES MELLITUS WITHOUT COMPLICATION, UNSPECIFIED LONG TERM INSULIN USE STATUS: Primary | ICD-10-CM

## 2017-12-18 DIAGNOSIS — I10 ESSENTIAL HYPERTENSION: ICD-10-CM

## 2017-12-18 DIAGNOSIS — I87.2 CHRONIC VENOUS INSUFFICIENCY: ICD-10-CM

## 2017-12-18 DIAGNOSIS — E78.00 HYPERCHOLESTEROLEMIA: ICD-10-CM

## 2017-12-18 PROCEDURE — 84443 ASSAY THYROID STIM HORMONE: CPT

## 2017-12-18 PROCEDURE — 86038 ANTINUCLEAR ANTIBODIES: CPT

## 2017-12-18 PROCEDURE — 85651 RBC SED RATE NONAUTOMATED: CPT

## 2017-12-18 PROCEDURE — 80053 COMPREHEN METABOLIC PANEL: CPT

## 2017-12-18 PROCEDURE — 80061 LIPID PANEL: CPT

## 2017-12-18 PROCEDURE — 85025 COMPLETE CBC W/AUTO DIFF WBC: CPT

## 2017-12-18 PROCEDURE — 83550 IRON BINDING TEST: CPT

## 2017-12-18 PROCEDURE — 83036 HEMOGLOBIN GLYCOSYLATED A1C: CPT

## 2017-12-18 PROCEDURE — 36415 COLL VENOUS BLD VENIPUNCTURE: CPT

## 2017-12-18 RX ORDER — POLYETHYLENE GLYCOL 3350 17 G/17G
17 POWDER, FOR SOLUTION ORAL DAILY
Qty: 30 PACKET | Refills: 11 | Status: SHIPPED | OUTPATIENT
Start: 2017-12-18 | End: 2018-01-01

## 2017-12-18 NOTE — MR AVS SNAPSHOT
Visit Information Date & Time Provider Department Dept. Phone Encounter #  
 12/18/2017  1:30 PM Christen Jeff, [address] [place] [address],[address] [phone] [id number] Follow-up Instructions Return in about 6 months (around 6/18/2018) for DM. Follow-up and Disposition History Upcoming Health Maintenance Date Due  
 FOOT EXAM Q1 2/24/2016 EYE EXAM RETINAL OR DILATED Q1 4/11/2017 Influenza Age 5 to Adult 8/1/2017 HEMOGLOBIN A1C Q6M 12/16/2017 MICROALBUMIN Q1 12/16/2017 GLAUCOMA SCREENING Q2Y 4/11/2018 LIPID PANEL Q1 6/16/2018 MEDICARE YEARLY EXAM 6/17/2018 DTaP/Tdap/Td series (2 - Td) 11/10/2025 Allergies as of 12/18/2017  Review Complete On: 12/18/2017 By: Christen Jeff MD  
  
 Severity Noted Reaction Type Reactions Flexeril [Cyclobenzaprine]  02/27/2017    Other (comments) Caused pt to fall & hallucination Percocet [Oxycodone-acetaminophen]  10/16/2009    Itching Tramadol  02/27/2017    Other (comments) Caused falls Current Immunizations  Reviewed on 12/16/2016 Name Date Influenza Vaccine 9/16/2016, 10/15/2015, 9/2/2014 Pneumococcal Conjugate (PCV-13) 6/16/2017  2:45 PM  
 Pneumococcal Polysaccharide (PPSV-23) 11/10/2015 Tdap 11/10/2015 ZZZ-RETIRED (DO NOT USE) Pneumococcal Vaccine (Unspecified Type)  Deferred (Patient Refused) Not reviewed this visit You Were Diagnosed With   
  
 Codes Comments Type 2 diabetes mellitus without complication, unspecified long term insulin use status (HCC)    -  Primary ICD-10-CM: E11.9 ICD-9-CM: 250.00 Essential hypertension     ICD-10-CM: I10 
ICD-9-CM: 401.9 Hypercholesterolemia     ICD-10-CM: E78.00 ICD-9-CM: 272.0 Hypothyroidism due to acquired atrophy of thyroid     ICD-10-CM: E03.4 ICD-9-CM: 244.8, 246.8 Chronic venous insufficiency     ICD-10-CM: I87.2 ICD-9-CM: 459.81 Vitals BP Pulse Temp Resp Height(growth percentile) Weight(growth percentile) 127/77 (BP 1 Location: Left arm, BP Patient Position: Sitting) 74 98.3 °F (36.8 °C) (Oral) 14 5' 5\" (1.651 m) 175 lb 8 oz (79.6 kg) SpO2 BMI OB Status Smoking Status 97% 29.2 kg/m2 Hysterectomy Never Smoker Vitals History BMI and BSA Data Body Mass Index Body Surface Area  
 29.2 kg/m 2 1.91 m 2 Preferred Pharmacy Pharmacy Name Phone [address] [phone] Your Updated Medication List  
  
   
This list is accurate as of: 12/18/17  2:12 PM.  Always use your most recent med list.  
  
  
  
  
 aspirin delayed-release 81 mg tablet Take 81 mg by mouth daily. Blood-Glucose Meter monitoring kit Commonly known as:  [employer] Check blood sugar daily, as directed CALCIUM + D PO Take  by mouth. COQ-10 PO Take  by mouth.  
  
 cyanocobalamin 1,000 mcg tablet Take 1 Tab by mouth daily. diosmin complex no. 1 630 mg Tab Commonly known as:  Syble Rater Take 1 Tab by mouth daily. glucose blood VI test strips strip Commonly known as:  ONETOUCH ULTRA TEST Check fsbs up to bid E11.9 Lancets Misc Commonly known as: One Touch [place] Test up to bid Leg Brace Misc Commonly known as:  KNEE SUPPORT BRACE Bilateral knee instability. Use as directed * levothyroxine 50 mcg tablet Commonly known as:  SYNTHROID  
TAKE 1 TABLET BY MOUTH DAILY BEFORE BREAKFAST * levothyroxine 50 mcg tablet Commonly known as:  SYNTHROID  
TAKE 1 TABLET BY MOUTH DAILY BEFORE BREAKFAST * levothyroxine 50 mcg tablet Commonly known as:  SYNTHROID  
TAKE 1 TABLET BY MOUTH DAILY BEFORE BREAKFAST  
  
 lisinopril-hydroCHLOROthiazide 20-25 mg per tablet Commonly known as:  PRINZIDE, ZESTORETIC  
TAKE 1 TABLET BY MOUTH EVERY DAY  
  
 metFORMIN 1,000 mg tablet Commonly known as:  GLUCOPHAGE Take 1 Tab by mouth two (2) times daily (with meals). miscellaneous medical supply Misc Adult Diapers, use as directed  
  
 polyethylene glycol 17 gram packet Commonly known as:  Leata Sans Take 1 Packet by mouth daily. rosuvastatin 20 mg tablet Commonly known as:  CRESTOR  
TAKE 1 TABLET BY MOUTH EVERY DAY  
  
 * Notice: This list has 3 medication(s) that are the same as other medications prescribed for you. Read the directions carefully, and ask your doctor or other care provider to review them with you. Prescriptions Sent to Pharmacy Refills  
 diosmin complex no.1 (VASCULERA) 630 mg tab 11 Sig: Take 1 Tab by mouth daily. Class: Normal  
 Pharmacy: [facility] [address] Ph #: [phone] Route: Oral  
 polyethylene glycol (MIRALAX) 17 gram packet 11 Sig: Take 1 Packet by mouth daily. Class: Normal  
 Pharmacy: [facility] [address] Ph #: [phone] Route: Oral  
  
Follow-up Instructions Return in about 6 months (around 6/18/2018) for DM. Introducing [facility] & HEALTH SERVICES! Dai Ireland introduces [email or web address] patient portal. Now you can access parts of your medical record, email your doctor's office, and request medication refills online. 1. In your internet browser, go to [email or web address] 2. Click on the First Time User? Click Here link in the Sign In box. You will see the New Member Sign Up page. 3. Enter your [email or web address] Access Code exactly as it appears below. You will not need to use this code after youve completed the sign-up process. If you do not sign up before the expiration date, you must request a new code. · [email or web address] Access Code: [email or web address] Expires: 3/18/2018  2:12 PM 
 
 4. Enter the last four digits of your Social Security Number (xxxx) and Date of Birth (mm/dd/yyyy) as indicated and click Submit. You will be taken to the next sign-up page. 5. Create a [employer] ID. This will be your [employer] login ID and cannot be changed, so think of one that is secure and easy to remember. 6. Create a [employer] password. You can change your password at any time. 7. Enter your Password Reset Question and Answer. This can be used at a later time if you forget your password. 8. Enter your e-mail address. You will receive e-mail notification when new information is available in [address] 9. Click Sign Up. You can now view and download portions of your medical record. 10. Click the Download Summary menu link to download a portable copy of your medical information. If you have questions, please visit the Frequently Asked Questions section of the [employer] website. Remember, [employer] is NOT to be used for urgent needs. For medical emergencies, dial 911. Now available from your iPhone and Android! Please provide this summary of care documentation to your next provider. Your primary care clinician is listed as [place]. If you have any questions after today's visit, please call [phone].
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
## 2017-12-18 NOTE — PROGRESS NOTES
1. Have you been to the ER, urgent care clinic since your last visit? Hospitalized since your last visit?no    2. Have you seen or consulted any other health care providers outside of the [address] [place] [address] since your last visit? Include any pap smears or colon screening.  no

## 2017-12-19 ENCOUNTER — TELEPHONE (OUTPATIENT)
Dept: INTERNAL MEDICINE CLINIC | Age: 82
End: 2017-12-19

## 2017-12-19 LAB
ALBUMIN SERPL-MCNC: 4.1 G/DL (ref 3.5–4.7)
ALBUMIN/GLOB SERPL: 1.3 {RATIO} (ref 1.2–2.2)
ALP SERPL-CCNC: 84 IU/L (ref 39–117)
ALT SERPL-CCNC: 6 IU/L (ref 0–32)
ANA TITR SER IF: NEGATIVE {TITER}
AST SERPL-CCNC: 12 IU/L (ref 0–40)
BASOPHILS # BLD AUTO: 0 X10E3/UL (ref 0–0.2)
BASOPHILS NFR BLD AUTO: 0 %
BILIRUB SERPL-MCNC: 0.3 MG/DL (ref 0–1.2)
BUN SERPL-MCNC: 12 MG/DL (ref 8–27)
BUN/CREAT SERPL: 14 (ref 12–28)
CALCIUM SERPL-MCNC: 9.6 MG/DL (ref 8.7–10.3)
CHLORIDE SERPL-SCNC: 96 MMOL/L (ref 96–106)
CHOLEST SERPL-MCNC: 160 MG/DL (ref 100–199)
CO2 SERPL-SCNC: 27 MMOL/L (ref 18–29)
CREAT SERPL-MCNC: 0.85 MG/DL (ref 0.57–1)
EOSINOPHIL # BLD AUTO: 0 X10E3/UL (ref 0–0.4)
EOSINOPHIL NFR BLD AUTO: 1 %
ERYTHROCYTE [DISTWIDTH] IN BLOOD BY AUTOMATED COUNT: 13.9 % (ref 12.3–15.4)
ERYTHROCYTE [SEDIMENTATION RATE] IN BLOOD BY WESTERGREN METHOD: 22 MM/HR (ref 0–40)
EST. AVERAGE GLUCOSE BLD GHB EST-MCNC: 151 MG/DL
GLOBULIN SER CALC-MCNC: 3.1 G/DL (ref 1.5–4.5)
GLUCOSE SERPL-MCNC: 203 MG/DL (ref 65–99)
HBA1C MFR BLD: 6.9 % (ref 4.8–5.6)
HCT VFR BLD AUTO: 32.5 % (ref 34–46.6)
HDLC SERPL-MCNC: 80 MG/DL
HGB BLD-MCNC: 10 G/DL (ref 11.1–15.9)
IMM GRANULOCYTES # BLD: 0 X10E3/UL (ref 0–0.1)
IMM GRANULOCYTES NFR BLD: 0 %
IRON SATN MFR SERPL: 29 % (ref 15–55)
IRON SERPL-MCNC: 83 UG/DL (ref 27–139)
LDLC SERPL CALC-MCNC: 63 MG/DL (ref 0–99)
LYMPHOCYTES # BLD AUTO: 1.4 X10E3/UL (ref 0.7–3.1)
LYMPHOCYTES NFR BLD AUTO: 24 %
MCH RBC QN AUTO: 29.1 PG (ref 26.6–33)
MCHC RBC AUTO-ENTMCNC: 30.8 G/DL (ref 31.5–35.7)
MCV RBC AUTO: 95 FL (ref 79–97)
MONOCYTES # BLD AUTO: 0.6 X10E3/UL (ref 0.1–0.9)
MONOCYTES NFR BLD AUTO: 10 %
NEUTROPHILS # BLD AUTO: 3.7 X10E3/UL (ref 1.4–7)
NEUTROPHILS NFR BLD AUTO: 65 %
PLATELET # BLD AUTO: 353 X10E3/UL (ref 150–379)
POTASSIUM SERPL-SCNC: 3.6 MMOL/L (ref 3.5–5.2)
PROT SERPL-MCNC: 7.2 G/DL (ref 6–8.5)
RBC # BLD AUTO: 3.44 X10E6/UL (ref 3.77–5.28)
SODIUM SERPL-SCNC: 139 MMOL/L (ref 134–144)
TIBC SERPL-MCNC: 286 UG/DL (ref 250–450)
TRIGL SERPL-MCNC: 83 MG/DL (ref 0–149)
TSH SERPL DL<=0.005 MIU/L-ACNC: 0.63 UIU/ML (ref 0.45–4.5)
UIBC SERPL-MCNC: 203 UG/DL (ref 118–369)
VLDLC SERPL CALC-MCNC: 17 MG/DL (ref 5–40)
WBC # BLD AUTO: 5.7 X10E3/UL (ref 3.4–10.8)

## 2018-01-01 ENCOUNTER — APPOINTMENT (OUTPATIENT)
Dept: GENERAL RADIOLOGY | Age: 83
DRG: 683 | End: 2018-01-01
Attending: EMERGENCY MEDICINE
Payer: MEDICARE

## 2018-01-01 ENCOUNTER — HOME CARE VISIT (OUTPATIENT)
Dept: SCHEDULING | Facility: HOME HEALTH | Age: 83
End: 2018-01-01
Payer: MEDICARE

## 2018-01-01 ENCOUNTER — OFFICE VISIT (OUTPATIENT)
Dept: INTERNAL MEDICINE CLINIC | Age: 83
End: 2018-01-01

## 2018-01-01 ENCOUNTER — PATIENT OUTREACH (OUTPATIENT)
Dept: FAMILY MEDICINE CLINIC | Age: 83
End: 2018-01-01

## 2018-01-01 ENCOUNTER — OFFICE VISIT (OUTPATIENT)
Dept: NEUROLOGY | Age: 83
End: 2018-01-01

## 2018-01-01 ENCOUNTER — TELEPHONE (OUTPATIENT)
Dept: INTERNAL MEDICINE CLINIC | Age: 83
End: 2018-01-01

## 2018-01-01 ENCOUNTER — APPOINTMENT (OUTPATIENT)
Dept: CT IMAGING | Age: 83
DRG: 683 | End: 2018-01-01
Attending: EMERGENCY MEDICINE
Payer: MEDICARE

## 2018-01-01 ENCOUNTER — APPOINTMENT (OUTPATIENT)
Dept: MRI IMAGING | Age: 83
DRG: 683 | End: 2018-01-01
Attending: INTERNAL MEDICINE
Payer: MEDICARE

## 2018-01-01 ENCOUNTER — HOME HEALTH ADMISSION (OUTPATIENT)
Dept: HOME HEALTH SERVICES | Facility: HOME HEALTH | Age: 83
End: 2018-01-01
Payer: MEDICARE

## 2018-01-01 ENCOUNTER — APPOINTMENT (OUTPATIENT)
Dept: ULTRASOUND IMAGING | Age: 83
DRG: 683 | End: 2018-01-01
Attending: INTERNAL MEDICINE
Payer: MEDICARE

## 2018-01-01 ENCOUNTER — HOME CARE VISIT (OUTPATIENT)
Dept: HOME HEALTH SERVICES | Facility: HOME HEALTH | Age: 83
End: 2018-01-01
Payer: MEDICARE

## 2018-01-01 ENCOUNTER — HOSPITAL ENCOUNTER (OUTPATIENT)
Dept: LAB | Age: 83
Discharge: HOME | End: 2018-06-15
Payer: MEDICARE

## 2018-01-01 ENCOUNTER — DOCUMENTATION ONLY (OUTPATIENT)
Dept: NEUROLOGY | Age: 83
End: 2018-01-01

## 2018-01-01 ENCOUNTER — HOSPITAL ENCOUNTER (INPATIENT)
Age: 83
LOS: 2 days | Discharge: HOME | DRG: 683 | End: 2018-06-19
Attending: EMERGENCY MEDICINE | Admitting: INTERNAL MEDICINE
Payer: MEDICARE

## 2018-01-01 VITALS
OXYGEN SATURATION: 97 % | TEMPERATURE: 98.5 F | RESPIRATION RATE: 18 BRPM | DIASTOLIC BLOOD PRESSURE: 70 MMHG | HEART RATE: 70 BPM | SYSTOLIC BLOOD PRESSURE: 140 MMHG

## 2018-01-01 VITALS
HEART RATE: 78 BPM | OXYGEN SATURATION: 98 % | RESPIRATION RATE: 18 BRPM | SYSTOLIC BLOOD PRESSURE: 140 MMHG | DIASTOLIC BLOOD PRESSURE: 70 MMHG | TEMPERATURE: 98.1 F

## 2018-01-01 VITALS
DIASTOLIC BLOOD PRESSURE: 70 MMHG | HEART RATE: 74 BPM | TEMPERATURE: 98.2 F | OXYGEN SATURATION: 98 % | RESPIRATION RATE: 18 BRPM | SYSTOLIC BLOOD PRESSURE: 140 MMHG

## 2018-01-01 VITALS
DIASTOLIC BLOOD PRESSURE: 74 MMHG | RESPIRATION RATE: 20 BRPM | TEMPERATURE: 98.9 F | HEIGHT: 65 IN | SYSTOLIC BLOOD PRESSURE: 142 MMHG | OXYGEN SATURATION: 99 % | HEART RATE: 76 BPM | BODY MASS INDEX: 26.42 KG/M2 | WEIGHT: 158.6 LBS

## 2018-01-01 VITALS
OXYGEN SATURATION: 98 % | TEMPERATURE: 98.2 F | RESPIRATION RATE: 18 BRPM | HEART RATE: 62 BPM | SYSTOLIC BLOOD PRESSURE: 130 MMHG | DIASTOLIC BLOOD PRESSURE: 70 MMHG

## 2018-01-01 VITALS
DIASTOLIC BLOOD PRESSURE: 70 MMHG | SYSTOLIC BLOOD PRESSURE: 138 MMHG | TEMPERATURE: 98.8 F | HEART RATE: 94 BPM | OXYGEN SATURATION: 99 % | RESPIRATION RATE: 18 BRPM

## 2018-01-01 VITALS
HEART RATE: 64 BPM | RESPIRATION RATE: 18 BRPM | SYSTOLIC BLOOD PRESSURE: 140 MMHG | TEMPERATURE: 98.5 F | OXYGEN SATURATION: 98 % | DIASTOLIC BLOOD PRESSURE: 60 MMHG

## 2018-01-01 VITALS
WEIGHT: 154 LBS | SYSTOLIC BLOOD PRESSURE: 140 MMHG | BODY MASS INDEX: 25.66 KG/M2 | HEIGHT: 65 IN | OXYGEN SATURATION: 98 % | HEART RATE: 75 BPM | DIASTOLIC BLOOD PRESSURE: 88 MMHG

## 2018-01-01 VITALS
HEART RATE: 60 BPM | OXYGEN SATURATION: 98 % | SYSTOLIC BLOOD PRESSURE: 140 MMHG | DIASTOLIC BLOOD PRESSURE: 80 MMHG | TEMPERATURE: 98.5 F | RESPIRATION RATE: 18 BRPM

## 2018-01-01 VITALS
TEMPERATURE: 98.5 F | SYSTOLIC BLOOD PRESSURE: 142 MMHG | HEART RATE: 77 BPM | HEIGHT: 65 IN | RESPIRATION RATE: 14 BRPM | BODY MASS INDEX: 27.29 KG/M2 | DIASTOLIC BLOOD PRESSURE: 83 MMHG | WEIGHT: 163.8 LBS | OXYGEN SATURATION: 96 %

## 2018-01-01 VITALS
SYSTOLIC BLOOD PRESSURE: 126 MMHG | DIASTOLIC BLOOD PRESSURE: 80 MMHG | TEMPERATURE: 98.4 F | OXYGEN SATURATION: 98 % | RESPIRATION RATE: 18 BRPM | HEART RATE: 86 BPM

## 2018-01-01 VITALS
HEART RATE: 70 BPM | SYSTOLIC BLOOD PRESSURE: 140 MMHG | DIASTOLIC BLOOD PRESSURE: 80 MMHG | TEMPERATURE: 98.2 F | RESPIRATION RATE: 18 BRPM | OXYGEN SATURATION: 96 %

## 2018-01-01 VITALS
HEIGHT: 65 IN | HEART RATE: 74 BPM | DIASTOLIC BLOOD PRESSURE: 82 MMHG | WEIGHT: 147 LBS | SYSTOLIC BLOOD PRESSURE: 171 MMHG | TEMPERATURE: 97.6 F | BODY MASS INDEX: 24.49 KG/M2 | OXYGEN SATURATION: 96 %

## 2018-01-01 VITALS
RESPIRATION RATE: 18 BRPM | OXYGEN SATURATION: 98 % | DIASTOLIC BLOOD PRESSURE: 60 MMHG | TEMPERATURE: 99.7 F | HEART RATE: 60 BPM | SYSTOLIC BLOOD PRESSURE: 130 MMHG

## 2018-01-01 VITALS
OXYGEN SATURATION: 98 % | HEART RATE: 54 BPM | TEMPERATURE: 98.7 F | RESPIRATION RATE: 18 BRPM | SYSTOLIC BLOOD PRESSURE: 125 MMHG | DIASTOLIC BLOOD PRESSURE: 60 MMHG

## 2018-01-01 VITALS
HEART RATE: 74 BPM | TEMPERATURE: 98.4 F | RESPIRATION RATE: 18 BRPM | DIASTOLIC BLOOD PRESSURE: 70 MMHG | SYSTOLIC BLOOD PRESSURE: 130 MMHG | OXYGEN SATURATION: 99 %

## 2018-01-01 VITALS
DIASTOLIC BLOOD PRESSURE: 77 MMHG | HEIGHT: 65 IN | OXYGEN SATURATION: 96 % | RESPIRATION RATE: 14 BRPM | WEIGHT: 155.2 LBS | BODY MASS INDEX: 25.86 KG/M2 | HEART RATE: 73 BPM | SYSTOLIC BLOOD PRESSURE: 143 MMHG | TEMPERATURE: 97.4 F

## 2018-01-01 VITALS
TEMPERATURE: 98.4 F | HEART RATE: 80 BPM | RESPIRATION RATE: 18 BRPM | SYSTOLIC BLOOD PRESSURE: 130 MMHG | OXYGEN SATURATION: 98 % | DIASTOLIC BLOOD PRESSURE: 70 MMHG

## 2018-01-01 VITALS
DIASTOLIC BLOOD PRESSURE: 70 MMHG | SYSTOLIC BLOOD PRESSURE: 130 MMHG | TEMPERATURE: 99 F | HEART RATE: 80 BPM | RESPIRATION RATE: 18 BRPM | OXYGEN SATURATION: 98 %

## 2018-01-01 VITALS
DIASTOLIC BLOOD PRESSURE: 70 MMHG | HEART RATE: 76 BPM | OXYGEN SATURATION: 98 % | SYSTOLIC BLOOD PRESSURE: 140 MMHG | HEART RATE: 80 BPM | OXYGEN SATURATION: 97 % | DIASTOLIC BLOOD PRESSURE: 70 MMHG | RESPIRATION RATE: 18 BRPM | RESPIRATION RATE: 18 BRPM | SYSTOLIC BLOOD PRESSURE: 160 MMHG | TEMPERATURE: 98.8 F | TEMPERATURE: 99.4 F

## 2018-01-01 VITALS
DIASTOLIC BLOOD PRESSURE: 70 MMHG | SYSTOLIC BLOOD PRESSURE: 138 MMHG | BODY MASS INDEX: 24.49 KG/M2 | WEIGHT: 147 LBS | HEART RATE: 94 BPM | HEIGHT: 65 IN | OXYGEN SATURATION: 98 %

## 2018-01-01 VITALS
HEART RATE: 59 BPM | RESPIRATION RATE: 21 BRPM | OXYGEN SATURATION: 97 % | DIASTOLIC BLOOD PRESSURE: 60 MMHG | TEMPERATURE: 98.3 F | SYSTOLIC BLOOD PRESSURE: 156 MMHG

## 2018-01-01 VITALS
RESPIRATION RATE: 18 BRPM | BODY MASS INDEX: 26.12 KG/M2 | OXYGEN SATURATION: 99 % | WEIGHT: 156.8 LBS | HEIGHT: 65 IN | HEART RATE: 86 BPM | TEMPERATURE: 97.8 F | SYSTOLIC BLOOD PRESSURE: 121 MMHG | DIASTOLIC BLOOD PRESSURE: 71 MMHG

## 2018-01-01 VITALS
OXYGEN SATURATION: 96 % | RESPIRATION RATE: 18 BRPM | DIASTOLIC BLOOD PRESSURE: 70 MMHG | TEMPERATURE: 98.4 F | HEART RATE: 60 BPM | SYSTOLIC BLOOD PRESSURE: 140 MMHG

## 2018-01-01 VITALS
DIASTOLIC BLOOD PRESSURE: 60 MMHG | TEMPERATURE: 99.2 F | RESPIRATION RATE: 18 BRPM | OXYGEN SATURATION: 98 % | SYSTOLIC BLOOD PRESSURE: 140 MMHG | HEART RATE: 62 BPM

## 2018-01-01 VITALS
BODY MASS INDEX: 27.33 KG/M2 | HEART RATE: 77 BPM | RESPIRATION RATE: 16 BRPM | WEIGHT: 164.24 LBS | TEMPERATURE: 99.1 F | DIASTOLIC BLOOD PRESSURE: 81 MMHG | OXYGEN SATURATION: 97 % | SYSTOLIC BLOOD PRESSURE: 139 MMHG

## 2018-01-01 VITALS
TEMPERATURE: 98.5 F | RESPIRATION RATE: 18 BRPM | HEART RATE: 60 BPM | OXYGEN SATURATION: 97 % | SYSTOLIC BLOOD PRESSURE: 150 MMHG | DIASTOLIC BLOOD PRESSURE: 70 MMHG

## 2018-01-01 DIAGNOSIS — Z00.00 MEDICARE ANNUAL WELLNESS VISIT, SUBSEQUENT: Primary | ICD-10-CM

## 2018-01-01 DIAGNOSIS — E78.00 HYPERCHOLESTEROLEMIA: ICD-10-CM

## 2018-01-01 DIAGNOSIS — E11.9 TYPE 2 DIABETES MELLITUS WITHOUT COMPLICATION, UNSPECIFIED WHETHER LONG TERM INSULIN USE (HCC): Primary | ICD-10-CM

## 2018-01-01 DIAGNOSIS — E11.9 TYPE 2 DIABETES MELLITUS WITHOUT COMPLICATION, UNSPECIFIED WHETHER LONG TERM INSULIN USE (HCC): ICD-10-CM

## 2018-01-01 DIAGNOSIS — F32.A DEPRESSION, UNSPECIFIED DEPRESSION TYPE: ICD-10-CM

## 2018-01-01 DIAGNOSIS — R27.0 ATAXIA: Primary | ICD-10-CM

## 2018-01-01 DIAGNOSIS — I95.9 HYPOTENSIVE EPISODE: ICD-10-CM

## 2018-01-01 DIAGNOSIS — E11.9 CONTROLLED TYPE 2 DIABETES MELLITUS WITHOUT COMPLICATION, UNSPECIFIED WHETHER LONG TERM INSULIN USE (HCC): ICD-10-CM

## 2018-01-01 DIAGNOSIS — I10 ESSENTIAL HYPERTENSION: ICD-10-CM

## 2018-01-01 DIAGNOSIS — R63.0 POOR APPETITE: ICD-10-CM

## 2018-01-01 DIAGNOSIS — G45.9 TRANSIENT CEREBRAL ISCHEMIA, UNSPECIFIED TYPE: Primary | ICD-10-CM

## 2018-01-01 DIAGNOSIS — I80.8 SUPERFICIAL THROMBOPHLEBITIS OF RIGHT UPPER EXTREMITY: ICD-10-CM

## 2018-01-01 DIAGNOSIS — R19.8 ANAL SYMPTOMS: ICD-10-CM

## 2018-01-01 DIAGNOSIS — G20 PD (PARKINSON'S DISEASE) (HCC): ICD-10-CM

## 2018-01-01 DIAGNOSIS — E87.6 HYPOKALEMIA: ICD-10-CM

## 2018-01-01 DIAGNOSIS — E03.4 HYPOTHYROIDISM DUE TO ACQUIRED ATROPHY OF THYROID: ICD-10-CM

## 2018-01-01 DIAGNOSIS — I10 ESSENTIAL HYPERTENSION: Primary | ICD-10-CM

## 2018-01-01 DIAGNOSIS — G20 PD (PARKINSON'S DISEASE) (HCC): Primary | ICD-10-CM

## 2018-01-01 DIAGNOSIS — N17.9 ACUTE RENAL FAILURE, UNSPECIFIED ACUTE RENAL FAILURE TYPE (HCC): Primary | ICD-10-CM

## 2018-01-01 DIAGNOSIS — R53.81 DEBILITY: Primary | ICD-10-CM

## 2018-01-01 DIAGNOSIS — R60.9 EDEMA, UNSPECIFIED TYPE: ICD-10-CM

## 2018-01-01 DIAGNOSIS — R10.9 RIGHT FLANK PAIN: ICD-10-CM

## 2018-01-01 LAB
ALBUMIN SERPL-MCNC: 2.7 G/DL (ref 3.5–5)
ALBUMIN SERPL-MCNC: 3.1 G/DL (ref 3.5–5)
ALBUMIN SERPL-MCNC: 3.3 G/DL (ref 3.5–5)
ALBUMIN SERPL-MCNC: 4.1 G/DL (ref 3.5–4.7)
ALBUMIN/GLOB SERPL: 0.8 {RATIO} (ref 1.1–2.2)
ALBUMIN/GLOB SERPL: 0.9 {RATIO} (ref 1.1–2.2)
ALBUMIN/GLOB SERPL: 1.5 {RATIO} (ref 1.2–2.2)
ALP SERPL-CCNC: 66 U/L (ref 45–117)
ALP SERPL-CCNC: 67 U/L (ref 45–117)
ALP SERPL-CCNC: 69 IU/L (ref 39–117)
ALT SERPL-CCNC: 11 U/L (ref 12–78)
ALT SERPL-CCNC: 12 U/L (ref 12–78)
ALT SERPL-CCNC: 8 IU/L (ref 0–32)
ANA SER QL: NEGATIVE
ANION GAP SERPL CALC-SCNC: 10 MMOL/L (ref 5–15)
ANION GAP SERPL CALC-SCNC: 11 MMOL/L (ref 5–15)
ANION GAP SERPL CALC-SCNC: 13 MMOL/L (ref 5–15)
ANION GAP SERPL CALC-SCNC: 9 MMOL/L (ref 5–15)
ANION GAP SERPL CALC-SCNC: 9 MMOL/L (ref 5–15)
APPEARANCE UR: CLEAR
AST SERPL-CCNC: 18 U/L (ref 15–37)
AST SERPL-CCNC: 19 IU/L (ref 0–40)
AST SERPL-CCNC: 21 U/L (ref 15–37)
ATRIAL RATE: 75 BPM
BACTERIA URNS QL MICRO: NEGATIVE /HPF
BASOPHILS # BLD AUTO: 0 X10E3/UL (ref 0–0.2)
BASOPHILS # BLD: 0 K/UL (ref 0–0.1)
BASOPHILS NFR BLD AUTO: 0 %
BASOPHILS NFR BLD: 0 % (ref 0–1)
BILIRUB SERPL-MCNC: 0.4 MG/DL (ref 0.2–1)
BILIRUB SERPL-MCNC: 0.4 MG/DL (ref 0.2–1)
BILIRUB SERPL-MCNC: 0.4 MG/DL (ref 0–1.2)
BILIRUB UR QL: NEGATIVE
BUN SERPL-MCNC: 18 MG/DL (ref 6–20)
BUN SERPL-MCNC: 19 MG/DL (ref 6–20)
BUN SERPL-MCNC: 24 MG/DL (ref 6–20)
BUN SERPL-MCNC: 24 MG/DL (ref 8–27)
BUN SERPL-MCNC: 26 MG/DL (ref 6–20)
BUN SERPL-MCNC: 28 MG/DL (ref 6–20)
BUN/CREAT SERPL: 7 (ref 12–20)
BUN/CREAT SERPL: 7 (ref 12–20)
BUN/CREAT SERPL: 8 (ref 12–20)
BUN/CREAT SERPL: 8 (ref 12–28)
BUN/CREAT SERPL: 9 (ref 12–20)
BUN/CREAT SERPL: 9 (ref 12–20)
C-ANCA TITR SER IF: NORMAL TITER
C3 SERPL-MCNC: 112 MG/DL (ref 82–167)
C4 SERPL-MCNC: 35 MG/DL (ref 14–44)
CALCIUM SERPL-MCNC: 8 MG/DL (ref 8.5–10.1)
CALCIUM SERPL-MCNC: 8.3 MG/DL (ref 8.5–10.1)
CALCIUM SERPL-MCNC: 8.5 MG/DL (ref 8.5–10.1)
CALCIUM SERPL-MCNC: 8.8 MG/DL (ref 8.5–10.1)
CALCIUM SERPL-MCNC: 8.8 MG/DL (ref 8.5–10.1)
CALCIUM SERPL-MCNC: 9.1 MG/DL (ref 8.7–10.3)
CALCULATED P AXIS, ECG09: 36 DEGREES
CALCULATED R AXIS, ECG10: -29 DEGREES
CALCULATED T AXIS, ECG11: -16 DEGREES
CHLORIDE SERPL-SCNC: 101 MMOL/L (ref 97–108)
CHLORIDE SERPL-SCNC: 105 MMOL/L (ref 97–108)
CHLORIDE SERPL-SCNC: 110 MMOL/L (ref 97–108)
CHLORIDE SERPL-SCNC: 110 MMOL/L (ref 97–108)
CHLORIDE SERPL-SCNC: 93 MMOL/L (ref 96–106)
CHLORIDE SERPL-SCNC: 97 MMOL/L (ref 97–108)
CHOLEST SERPL-MCNC: 120 MG/DL
CHOLEST SERPL-MCNC: 139 MG/DL (ref 100–199)
CK MB CFR SERPL CALC: NORMAL % (ref 0–2.5)
CK MB CFR SERPL CALC: NORMAL % (ref 0–2.5)
CK MB SERPL-MCNC: <1 NG/ML (ref 5–25)
CK MB SERPL-MCNC: <1 NG/ML (ref 5–25)
CK SERPL-CCNC: 104 U/L (ref 26–192)
CK SERPL-CCNC: 108 U/L (ref 26–192)
CK SERPL-CCNC: 87 U/L (ref 26–192)
CO2 SERPL-SCNC: 25 MMOL/L (ref 21–32)
CO2 SERPL-SCNC: 26 MMOL/L (ref 21–32)
CO2 SERPL-SCNC: 28 MMOL/L (ref 21–32)
CO2 SERPL-SCNC: 29 MMOL/L (ref 20–29)
CO2 SERPL-SCNC: 29 MMOL/L (ref 21–32)
CO2 SERPL-SCNC: 32 MMOL/L (ref 21–32)
COLOR UR: ABNORMAL
CREAT SERPL-MCNC: 2.53 MG/DL (ref 0.55–1.02)
CREAT SERPL-MCNC: 2.59 MG/DL (ref 0.55–1.02)
CREAT SERPL-MCNC: 2.85 MG/DL (ref 0.55–1.02)
CREAT SERPL-MCNC: 2.92 MG/DL (ref 0.57–1)
CREAT SERPL-MCNC: 2.94 MG/DL (ref 0.55–1.02)
CREAT SERPL-MCNC: 3.21 MG/DL (ref 0.55–1.02)
DIAGNOSIS, 93000: NORMAL
DIFFERENTIAL METHOD BLD: ABNORMAL
EOSINOPHIL # BLD AUTO: 0.1 X10E3/UL (ref 0–0.4)
EOSINOPHIL # BLD: 0.1 K/UL (ref 0–0.4)
EOSINOPHIL NFR BLD AUTO: 1 %
EOSINOPHIL NFR BLD: 1 % (ref 0–7)
EOSINOPHIL NFR BLD: 2 % (ref 0–7)
EOSINOPHIL NFR BLD: 2 % (ref 0–7)
EPITH CASTS URNS QL MICRO: ABNORMAL /LPF
ERYTHROCYTE [DISTWIDTH] IN BLOOD BY AUTOMATED COUNT: 13.2 % (ref 11.5–14.5)
ERYTHROCYTE [DISTWIDTH] IN BLOOD BY AUTOMATED COUNT: 13.3 % (ref 11.5–14.5)
ERYTHROCYTE [DISTWIDTH] IN BLOOD BY AUTOMATED COUNT: 13.5 % (ref 11.5–14.5)
ERYTHROCYTE [DISTWIDTH] IN BLOOD BY AUTOMATED COUNT: 13.6 % (ref 11.5–14.5)
ERYTHROCYTE [DISTWIDTH] IN BLOOD BY AUTOMATED COUNT: 14.2 % (ref 12.3–15.4)
EST. AVERAGE GLUCOSE BLD GHB EST-MCNC: 117 MG/DL
EST. AVERAGE GLUCOSE BLD GHB EST-MCNC: 120 MG/DL
FERRITIN SERPL-MCNC: 172 NG/ML (ref 8–252)
FOLATE SERPL-MCNC: 11.9 NG/ML (ref 5–21)
GFR SERPLBLD CREATININE-BSD FMLA CKD-EPI: 14 ML/MIN/1.73
GFR SERPLBLD CREATININE-BSD FMLA CKD-EPI: 16 ML/MIN/1.73
GLOBULIN SER CALC-MCNC: 2.8 G/DL (ref 1.5–4.5)
GLOBULIN SER CALC-MCNC: 3.4 G/DL (ref 2–4)
GLOBULIN SER CALC-MCNC: 4.1 G/DL (ref 2–4)
GLUCOSE BLD STRIP.AUTO-MCNC: 106 MG/DL (ref 65–100)
GLUCOSE BLD STRIP.AUTO-MCNC: 113 MG/DL (ref 65–100)
GLUCOSE BLD STRIP.AUTO-MCNC: 121 MG/DL (ref 65–100)
GLUCOSE BLD STRIP.AUTO-MCNC: 137 MG/DL (ref 65–100)
GLUCOSE BLD STRIP.AUTO-MCNC: 137 MG/DL (ref 65–100)
GLUCOSE BLD STRIP.AUTO-MCNC: 140 MG/DL (ref 65–100)
GLUCOSE BLD STRIP.AUTO-MCNC: 144 MG/DL (ref 65–100)
GLUCOSE BLD STRIP.AUTO-MCNC: 147 MG/DL (ref 65–100)
GLUCOSE BLD STRIP.AUTO-MCNC: 155 MG/DL (ref 65–100)
GLUCOSE BLD STRIP.AUTO-MCNC: 178 MG/DL (ref 65–100)
GLUCOSE BLD STRIP.AUTO-MCNC: 180 MG/DL (ref 65–100)
GLUCOSE BLD STRIP.AUTO-MCNC: 216 MG/DL (ref 65–100)
GLUCOSE BLD STRIP.AUTO-MCNC: 85 MG/DL (ref 65–100)
GLUCOSE BLD STRIP.AUTO-MCNC: 95 MG/DL (ref 65–100)
GLUCOSE BLD STRIP.AUTO-MCNC: 96 MG/DL (ref 65–100)
GLUCOSE BLD STRIP.AUTO-MCNC: 99 MG/DL (ref 65–100)
GLUCOSE SERPL-MCNC: 110 MG/DL (ref 65–100)
GLUCOSE SERPL-MCNC: 115 MG/DL (ref 65–100)
GLUCOSE SERPL-MCNC: 124 MG/DL (ref 65–99)
GLUCOSE SERPL-MCNC: 150 MG/DL (ref 65–100)
GLUCOSE SERPL-MCNC: 82 MG/DL (ref 65–100)
GLUCOSE SERPL-MCNC: 92 MG/DL (ref 65–100)
GLUCOSE UR STRIP.AUTO-MCNC: NEGATIVE MG/DL
HAV IGM SER QL: NONREACTIVE
HBA1C MFR BLD: 5.7 % (ref 4.8–5.6)
HBA1C MFR BLD: 5.8 % (ref 4.2–6.3)
HBV CORE IGM SER QL: NONREACTIVE
HBV SURFACE AG SER QL: <0.1 INDEX
HBV SURFACE AG SER QL: NEGATIVE
HCT VFR BLD AUTO: 25.6 % (ref 35–47)
HCT VFR BLD AUTO: 26.2 % (ref 35–47)
HCT VFR BLD AUTO: 26.6 % (ref 35–47)
HCT VFR BLD AUTO: 29.5 % (ref 35–47)
HCT VFR BLD AUTO: 31 % (ref 34–46.6)
HCV AB SERPL QL IA: NONREACTIVE
HCV COMMENT,HCGAC: NORMAL
HDLC SERPL-MCNC: 63 MG/DL
HDLC SERPL-MCNC: 63 MG/DL
HDLC SERPL: 1.9 {RATIO} (ref 0–5)
HGB BLD-MCNC: 8.4 G/DL (ref 11.5–16)
HGB BLD-MCNC: 8.5 G/DL (ref 11.5–16)
HGB BLD-MCNC: 8.6 G/DL (ref 11.5–16)
HGB BLD-MCNC: 9.4 G/DL (ref 11.5–16)
HGB BLD-MCNC: 9.7 G/DL (ref 11.1–15.9)
HGB UR QL STRIP: ABNORMAL
HYALINE CASTS URNS QL MICRO: ABNORMAL /LPF (ref 0–5)
IGA SERPL-MCNC: 173 MG/DL (ref 64–422)
IGG SERPL-MCNC: 801 MG/DL (ref 700–1600)
IGM SERPL-MCNC: 37 MG/DL (ref 26–217)
IMM GRANULOCYTES # BLD: 0 K/UL (ref 0–0.04)
IMM GRANULOCYTES # BLD: 0 X10E3/UL (ref 0–0.1)
IMM GRANULOCYTES NFR BLD AUTO: 0 % (ref 0–0.5)
IMM GRANULOCYTES NFR BLD: 0 %
INR PPP: 1 (ref 0.9–1.1)
IRON SATN MFR SERPL: 15 % (ref 20–50)
IRON SERPL-MCNC: 37 UG/DL (ref 35–150)
KAPPA LC FREE SER-MCNC: 43.6 MG/L (ref 3.3–19.4)
KAPPA LC FREE/LAMBDA FREE SER: 1.08 {RATIO} (ref 0.26–1.65)
KETONES UR QL STRIP.AUTO: NEGATIVE MG/DL
LAMBDA LC FREE SERPL-MCNC: 40.3 MG/L (ref 5.7–26.3)
LDLC SERPL CALC-MCNC: 39 MG/DL (ref 0–100)
LDLC SERPL CALC-MCNC: 54 MG/DL (ref 0–99)
LEUKOCYTE ESTERASE UR QL STRIP.AUTO: ABNORMAL
LIPID PROFILE,FLP: NORMAL
LYMPHOCYTES # BLD AUTO: 1.2 X10E3/UL (ref 0.7–3.1)
LYMPHOCYTES # BLD: 1.8 K/UL (ref 0.8–3.5)
LYMPHOCYTES # BLD: 1.9 K/UL (ref 0.8–3.5)
LYMPHOCYTES # BLD: 2.1 K/UL (ref 0.8–3.5)
LYMPHOCYTES NFR BLD AUTO: 21 %
LYMPHOCYTES NFR BLD: 27 % (ref 12–49)
LYMPHOCYTES NFR BLD: 34 % (ref 12–49)
LYMPHOCYTES NFR BLD: 43 % (ref 12–49)
MAGNESIUM SERPL-MCNC: 1.7 MG/DL (ref 1.6–2.4)
MAGNESIUM SERPL-MCNC: 1.9 MG/DL (ref 1.6–2.4)
MCH RBC QN AUTO: 29.8 PG (ref 26.6–33)
MCH RBC QN AUTO: 30.8 PG (ref 26–34)
MCH RBC QN AUTO: 30.9 PG (ref 26–34)
MCH RBC QN AUTO: 31.4 PG (ref 26–34)
MCH RBC QN AUTO: 31.8 PG (ref 26–34)
MCHC RBC AUTO-ENTMCNC: 31.3 G/DL (ref 31.5–35.7)
MCHC RBC AUTO-ENTMCNC: 31.9 G/DL (ref 30–36.5)
MCHC RBC AUTO-ENTMCNC: 32.3 G/DL (ref 30–36.5)
MCHC RBC AUTO-ENTMCNC: 32.4 G/DL (ref 30–36.5)
MCHC RBC AUTO-ENTMCNC: 32.8 G/DL (ref 30–36.5)
MCV RBC AUTO: 95 FL (ref 79–97)
MCV RBC AUTO: 95.3 FL (ref 80–99)
MCV RBC AUTO: 96.7 FL (ref 80–99)
MCV RBC AUTO: 97 FL (ref 80–99)
MCV RBC AUTO: 97 FL (ref 80–99)
MONOCYTES # BLD AUTO: 0.6 X10E3/UL (ref 0.1–0.9)
MONOCYTES # BLD: 0.4 K/UL (ref 0–1)
MONOCYTES # BLD: 0.5 K/UL (ref 0–1)
MONOCYTES # BLD: 0.6 K/UL (ref 0–1)
MONOCYTES NFR BLD AUTO: 10 %
MONOCYTES NFR BLD: 10 % (ref 5–13)
MONOCYTES NFR BLD: 9 % (ref 5–13)
MONOCYTES NFR BLD: 9 % (ref 5–13)
MYELOPEROXIDASE AB SER IA-ACNC: <9 U/ML (ref 0–9)
NEUTROPHILS # BLD AUTO: 4.1 X10E3/UL (ref 1.4–7)
NEUTROPHILS NFR BLD AUTO: 68 %
NEUTS SEG # BLD: 2.2 K/UL (ref 1.8–8)
NEUTS SEG # BLD: 3 K/UL (ref 1.8–8)
NEUTS SEG # BLD: 4.1 K/UL (ref 1.8–8)
NEUTS SEG NFR BLD: 46 % (ref 32–75)
NEUTS SEG NFR BLD: 55 % (ref 32–75)
NEUTS SEG NFR BLD: 62 % (ref 32–75)
NITRITE UR QL STRIP.AUTO: NEGATIVE
NRBC # BLD: 0 K/UL (ref 0–0.01)
NRBC BLD-RTO: 0 PER 100 WBC
P-ANCA ATYPICAL TITR SER IF: NORMAL TITER
P-ANCA TITR SER IF: NORMAL TITER
P-R INTERVAL, ECG05: 200 MS
PH UR STRIP: 7.5 [PH] (ref 5–8)
PHOSPHATE SERPL-MCNC: 2.6 MG/DL (ref 2.6–4.7)
PHOSPHATE SERPL-MCNC: 3.1 MG/DL (ref 2.6–4.7)
PLATELET # BLD AUTO: 275 K/UL (ref 150–400)
PLATELET # BLD AUTO: 276 K/UL (ref 150–400)
PLATELET # BLD AUTO: 285 K/UL (ref 150–400)
PLATELET # BLD AUTO: 302 K/UL (ref 150–400)
PLATELET # BLD AUTO: 353 X10E3/UL (ref 150–379)
PMV BLD AUTO: 10.3 FL (ref 8.9–12.9)
PMV BLD AUTO: 10.5 FL (ref 8.9–12.9)
PMV BLD AUTO: 10.7 FL (ref 8.9–12.9)
PMV BLD AUTO: 10.7 FL (ref 8.9–12.9)
POTASSIUM SERPL-SCNC: 2.4 MMOL/L (ref 3.5–5.1)
POTASSIUM SERPL-SCNC: 2.5 MMOL/L (ref 3.5–5.1)
POTASSIUM SERPL-SCNC: 2.8 MMOL/L (ref 3.5–5.1)
POTASSIUM SERPL-SCNC: 2.8 MMOL/L (ref 3.5–5.2)
POTASSIUM SERPL-SCNC: 3 MMOL/L (ref 3.5–5.1)
POTASSIUM SERPL-SCNC: 3.6 MMOL/L (ref 3.5–5.1)
PROT PATTERN SERPL IFE-IMP: NORMAL
PROT SERPL-MCNC: 6.5 G/DL (ref 6.4–8.2)
PROT SERPL-MCNC: 6.9 G/DL (ref 6–8.5)
PROT SERPL-MCNC: 7.4 G/DL (ref 6.4–8.2)
PROT UR STRIP-MCNC: 30 MG/DL
PROTEINASE3 AB SER IA-ACNC: <3.5 U/ML (ref 0–3.5)
PROTHROMBIN TIME: 10.4 SEC (ref 9–11.1)
Q-T INTERVAL, ECG07: 442 MS
QRS DURATION, ECG06: 156 MS
QTC CALCULATION (BEZET), ECG08: 493 MS
RBC # BLD AUTO: 2.64 M/UL (ref 3.8–5.2)
RBC # BLD AUTO: 2.71 M/UL (ref 3.8–5.2)
RBC # BLD AUTO: 2.79 M/UL (ref 3.8–5.2)
RBC # BLD AUTO: 3.04 M/UL (ref 3.8–5.2)
RBC # BLD AUTO: 3.26 X10E6/UL (ref 3.77–5.28)
RBC #/AREA URNS HPF: ABNORMAL /HPF (ref 0–5)
SEE BELOW:, 164879: NORMAL
SERVICE CMNT-IMP: ABNORMAL
SERVICE CMNT-IMP: NORMAL
SODIUM SERPL-SCNC: 138 MMOL/L (ref 136–145)
SODIUM SERPL-SCNC: 143 MMOL/L (ref 134–144)
SODIUM SERPL-SCNC: 143 MMOL/L (ref 136–145)
SODIUM SERPL-SCNC: 143 MMOL/L (ref 136–145)
SODIUM SERPL-SCNC: 145 MMOL/L (ref 136–145)
SODIUM SERPL-SCNC: 146 MMOL/L (ref 136–145)
SP GR UR REFRACTOMETRY: 1.01 (ref 1–1.03)
SP1: NORMAL
SP2: NORMAL
SP3: NORMAL
T4 FREE SERPL-MCNC: 1.72 NG/DL (ref 0.82–1.77)
TIBC SERPL-MCNC: 241 UG/DL (ref 250–450)
TRIGL SERPL-MCNC: 112 MG/DL (ref 0–149)
TRIGL SERPL-MCNC: 90 MG/DL (ref ?–150)
TROPONIN I SERPL-MCNC: <0.05 NG/ML
TSH SERPL DL<=0.005 MIU/L-ACNC: 0.77 UIU/ML (ref 0.45–4.5)
TSH SERPL DL<=0.05 MIU/L-ACNC: 0.97 UIU/ML (ref 0.36–3.74)
UR CULT HOLD, URHOLD: NORMAL
UROBILINOGEN UR QL STRIP.AUTO: 1 EU/DL (ref 0.2–1)
VENTRICULAR RATE, ECG03: 75 BPM
VIT B12 SERPL-MCNC: 202 PG/ML (ref 193–986)
VLDLC SERPL CALC-MCNC: 18 MG/DL
VLDLC SERPL CALC-MCNC: 22 MG/DL (ref 5–40)
WBC # BLD AUTO: 4.9 K/UL (ref 3.6–11)
WBC # BLD AUTO: 5.5 K/UL (ref 3.6–11)
WBC # BLD AUTO: 6 X10E3/UL (ref 3.4–10.8)
WBC # BLD AUTO: 6.6 K/UL (ref 3.6–11)
WBC # BLD AUTO: 8 K/UL (ref 3.6–11)
WBC URNS QL MICRO: ABNORMAL /HPF (ref 0–4)

## 2018-01-01 PROCEDURE — 3331090001 HH PPS REVENUE CREDIT

## 2018-01-01 PROCEDURE — 3331090002 HH PPS REVENUE DEBIT

## 2018-01-01 PROCEDURE — 74011250636 HC RX REV CODE- 250/636: Performed by: NURSE PRACTITIONER

## 2018-01-01 PROCEDURE — 80053 COMPREHEN METABOLIC PANEL: CPT | Performed by: EMERGENCY MEDICINE

## 2018-01-01 PROCEDURE — 80053 COMPREHEN METABOLIC PANEL: CPT | Performed by: INTERNAL MEDICINE

## 2018-01-01 PROCEDURE — G0151 HHCP-SERV OF PT,EA 15 MIN: HCPCS

## 2018-01-01 PROCEDURE — 82550 ASSAY OF CK (CPK): CPT | Performed by: INTERNAL MEDICINE

## 2018-01-01 PROCEDURE — 74011250636 HC RX REV CODE- 250/636: Performed by: EMERGENCY MEDICINE

## 2018-01-01 PROCEDURE — 71046 X-RAY EXAM CHEST 2 VIEWS: CPT

## 2018-01-01 PROCEDURE — 74011250636 HC RX REV CODE- 250/636: Performed by: INTERNAL MEDICINE

## 2018-01-01 PROCEDURE — 93880 EXTRACRANIAL BILAT STUDY: CPT

## 2018-01-01 PROCEDURE — 84484 ASSAY OF TROPONIN QUANT: CPT | Performed by: EMERGENCY MEDICINE

## 2018-01-01 PROCEDURE — 84439 ASSAY OF FREE THYROXINE: CPT

## 2018-01-01 PROCEDURE — 82550 ASSAY OF CK (CPK): CPT | Performed by: HOSPITALIST

## 2018-01-01 PROCEDURE — 3331090003 HH PPS REVENUE ADJ

## 2018-01-01 PROCEDURE — 74011000258 HC RX REV CODE- 258: Performed by: INTERNAL MEDICINE

## 2018-01-01 PROCEDURE — 99218 HC RM OBSERVATION: CPT

## 2018-01-01 PROCEDURE — G8978 MOBILITY CURRENT STATUS: HCPCS

## 2018-01-01 PROCEDURE — 82962 GLUCOSE BLOOD TEST: CPT

## 2018-01-01 PROCEDURE — 85025 COMPLETE CBC W/AUTO DIFF WBC: CPT | Performed by: HOSPITALIST

## 2018-01-01 PROCEDURE — 86160 COMPLEMENT ANTIGEN: CPT | Performed by: HOSPITALIST

## 2018-01-01 PROCEDURE — 97535 SELF CARE MNGMENT TRAINING: CPT

## 2018-01-01 PROCEDURE — 84100 ASSAY OF PHOSPHORUS: CPT | Performed by: INTERNAL MEDICINE

## 2018-01-01 PROCEDURE — 97161 PT EVAL LOW COMPLEX 20 MIN: CPT

## 2018-01-01 PROCEDURE — G8980 MOBILITY D/C STATUS: HCPCS

## 2018-01-01 PROCEDURE — 85025 COMPLETE CBC W/AUTO DIFF WBC: CPT | Performed by: EMERGENCY MEDICINE

## 2018-01-01 PROCEDURE — 70544 MR ANGIOGRAPHY HEAD W/O DYE: CPT

## 2018-01-01 PROCEDURE — 83036 HEMOGLOBIN GLYCOSYLATED A1C: CPT

## 2018-01-01 PROCEDURE — 96372 THER/PROPH/DIAG INJ SC/IM: CPT

## 2018-01-01 PROCEDURE — 74011250637 HC RX REV CODE- 250/637: Performed by: HOSPITALIST

## 2018-01-01 PROCEDURE — 80069 RENAL FUNCTION PANEL: CPT | Performed by: HOSPITALIST

## 2018-01-01 PROCEDURE — 80061 LIPID PANEL: CPT | Performed by: INTERNAL MEDICINE

## 2018-01-01 PROCEDURE — 96365 THER/PROPH/DIAG IV INF INIT: CPT

## 2018-01-01 PROCEDURE — 83520 IMMUNOASSAY QUANT NOS NONAB: CPT | Performed by: HOSPITALIST

## 2018-01-01 PROCEDURE — 76770 US EXAM ABDO BACK WALL COMP: CPT

## 2018-01-01 PROCEDURE — 65660000000 HC RM CCU STEPDOWN

## 2018-01-01 PROCEDURE — 83735 ASSAY OF MAGNESIUM: CPT | Performed by: HOSPITALIST

## 2018-01-01 PROCEDURE — 74011636637 HC RX REV CODE- 636/637: Performed by: INTERNAL MEDICINE

## 2018-01-01 PROCEDURE — 84484 ASSAY OF TROPONIN QUANT: CPT | Performed by: INTERNAL MEDICINE

## 2018-01-01 PROCEDURE — 36415 COLL VENOUS BLD VENIPUNCTURE: CPT | Performed by: HOSPITALIST

## 2018-01-01 PROCEDURE — 85610 PROTHROMBIN TIME: CPT | Performed by: EMERGENCY MEDICINE

## 2018-01-01 PROCEDURE — 36415 COLL VENOUS BLD VENIPUNCTURE: CPT | Performed by: EMERGENCY MEDICINE

## 2018-01-01 PROCEDURE — 92610 EVALUATE SWALLOWING FUNCTION: CPT | Performed by: SPEECH-LANGUAGE PATHOLOGIST

## 2018-01-01 PROCEDURE — 80074 ACUTE HEPATITIS PANEL: CPT | Performed by: HOSPITALIST

## 2018-01-01 PROCEDURE — 83540 ASSAY OF IRON: CPT | Performed by: INTERNAL MEDICINE

## 2018-01-01 PROCEDURE — 86038 ANTINUCLEAR ANTIBODIES: CPT | Performed by: HOSPITALIST

## 2018-01-01 PROCEDURE — 74011250637 HC RX REV CODE- 250/637: Performed by: NURSE PRACTITIONER

## 2018-01-01 PROCEDURE — 83735 ASSAY OF MAGNESIUM: CPT | Performed by: INTERNAL MEDICINE

## 2018-01-01 PROCEDURE — 80061 LIPID PANEL: CPT

## 2018-01-01 PROCEDURE — 85025 COMPLETE CBC W/AUTO DIFF WBC: CPT | Performed by: INTERNAL MEDICINE

## 2018-01-01 PROCEDURE — 51798 US URINE CAPACITY MEASURE: CPT

## 2018-01-01 PROCEDURE — 70450 CT HEAD/BRAIN W/O DYE: CPT

## 2018-01-01 PROCEDURE — 93005 ELECTROCARDIOGRAM TRACING: CPT

## 2018-01-01 PROCEDURE — 80048 BASIC METABOLIC PNL TOTAL CA: CPT | Performed by: HOSPITALIST

## 2018-01-01 PROCEDURE — 97165 OT EVAL LOW COMPLEX 30 MIN: CPT

## 2018-01-01 PROCEDURE — 74011250637 HC RX REV CODE- 250/637: Performed by: INTERNAL MEDICINE

## 2018-01-01 PROCEDURE — 82607 VITAMIN B-12: CPT | Performed by: INTERNAL MEDICINE

## 2018-01-01 PROCEDURE — 36415 COLL VENOUS BLD VENIPUNCTURE: CPT | Performed by: INTERNAL MEDICINE

## 2018-01-01 PROCEDURE — 82784 ASSAY IGA/IGD/IGG/IGM EACH: CPT | Performed by: HOSPITALIST

## 2018-01-01 PROCEDURE — 82746 ASSAY OF FOLIC ACID SERUM: CPT | Performed by: INTERNAL MEDICINE

## 2018-01-01 PROCEDURE — 83036 HEMOGLOBIN GLYCOSYLATED A1C: CPT | Performed by: INTERNAL MEDICINE

## 2018-01-01 PROCEDURE — 93306 TTE W/DOPPLER COMPLETE: CPT

## 2018-01-01 PROCEDURE — 400013 HH SOC

## 2018-01-01 PROCEDURE — 80053 COMPREHEN METABOLIC PANEL: CPT

## 2018-01-01 PROCEDURE — 84443 ASSAY THYROID STIM HORMONE: CPT | Performed by: INTERNAL MEDICINE

## 2018-01-01 PROCEDURE — 85025 COMPLETE CBC W/AUTO DIFF WBC: CPT

## 2018-01-01 PROCEDURE — 85027 COMPLETE CBC AUTOMATED: CPT | Performed by: HOSPITALIST

## 2018-01-01 PROCEDURE — 84443 ASSAY THYROID STIM HORMONE: CPT

## 2018-01-01 PROCEDURE — 74011250636 HC RX REV CODE- 250/636: Performed by: HOSPITALIST

## 2018-01-01 PROCEDURE — 82728 ASSAY OF FERRITIN: CPT | Performed by: INTERNAL MEDICINE

## 2018-01-01 PROCEDURE — 70551 MRI BRAIN STEM W/O DYE: CPT

## 2018-01-01 PROCEDURE — G8979 MOBILITY GOAL STATUS: HCPCS

## 2018-01-01 PROCEDURE — 99285 EMERGENCY DEPT VISIT HI MDM: CPT

## 2018-01-01 PROCEDURE — 83883 ASSAY NEPHELOMETRY NOT SPEC: CPT | Performed by: HOSPITALIST

## 2018-01-01 PROCEDURE — 81001 URINALYSIS AUTO W/SCOPE: CPT | Performed by: HOSPITALIST

## 2018-01-01 RX ORDER — ROSUVASTATIN CALCIUM 20 MG/1
TABLET, COATED ORAL
Qty: 90 TAB | Refills: 0 | Status: SHIPPED | OUTPATIENT
Start: 2018-01-01 | End: 2018-01-01

## 2018-01-01 RX ORDER — MIRTAZAPINE 15 MG/1
15 TABLET, FILM COATED ORAL
Qty: 30 TAB | Refills: 11 | Status: SHIPPED | OUTPATIENT
Start: 2018-01-01 | End: 2018-01-01 | Stop reason: SDUPTHER

## 2018-01-01 RX ORDER — MEGESTROL ACETATE 40 MG/1
40 TABLET ORAL
Status: DISCONTINUED | OUTPATIENT
Start: 2018-01-01 | End: 2018-01-01

## 2018-01-01 RX ORDER — CARBIDOPA AND LEVODOPA 25; 100 MG/1; MG/1
1 TABLET ORAL 3 TIMES DAILY
Qty: 90 TAB | Refills: 2 | Status: SHIPPED | OUTPATIENT
Start: 2018-01-01 | End: 2018-01-01 | Stop reason: SDUPTHER

## 2018-01-01 RX ORDER — AMLODIPINE BESYLATE 10 MG/1
10 TABLET ORAL DAILY
Qty: 30 TAB | Refills: 3 | Status: SHIPPED | OUTPATIENT
Start: 2018-01-01 | End: 2018-01-01

## 2018-01-01 RX ORDER — POTASSIUM CHLORIDE 7.45 MG/ML
10 INJECTION INTRAVENOUS
Status: COMPLETED | OUTPATIENT
Start: 2018-01-01 | End: 2018-01-01

## 2018-01-01 RX ORDER — ROSUVASTATIN CALCIUM 20 MG/1
TABLET, COATED ORAL
Qty: 30 TAB | Refills: 0 | Status: SHIPPED | OUTPATIENT
Start: 2018-01-01 | End: 2018-01-01 | Stop reason: SDUPTHER

## 2018-01-01 RX ORDER — GLIMEPIRIDE 4 MG/1
TABLET ORAL
Status: ON HOLD | COMMUNITY
End: 2019-01-01

## 2018-01-01 RX ORDER — DEXTROSE 50 % IN WATER (D50W) INTRAVENOUS SYRINGE
12.5-25 AS NEEDED
Status: DISCONTINUED | OUTPATIENT
Start: 2018-01-01 | End: 2018-01-01 | Stop reason: HOSPADM

## 2018-01-01 RX ORDER — AMLODIPINE BESYLATE 5 MG/1
5 TABLET ORAL DAILY
Qty: 30 TAB | Refills: 2 | Status: SHIPPED | OUTPATIENT
Start: 2018-01-01 | End: 2018-01-01

## 2018-01-01 RX ORDER — MIRTAZAPINE 15 MG/1
15 TABLET, FILM COATED ORAL
COMMUNITY
End: 2018-01-01

## 2018-01-01 RX ORDER — MAGNESIUM SULFATE HEPTAHYDRATE 40 MG/ML
2 INJECTION, SOLUTION INTRAVENOUS ONCE
Status: COMPLETED | OUTPATIENT
Start: 2018-01-01 | End: 2018-01-01

## 2018-01-01 RX ORDER — SODIUM CHLORIDE 0.9 % (FLUSH) 0.9 %
5-10 SYRINGE (ML) INJECTION AS NEEDED
Status: DISCONTINUED | OUTPATIENT
Start: 2018-01-01 | End: 2018-01-01 | Stop reason: HOSPADM

## 2018-01-01 RX ORDER — METFORMIN HYDROCHLORIDE 1000 MG/1
500 TABLET ORAL 2 TIMES DAILY WITH MEALS
COMMUNITY
End: 2018-01-01

## 2018-01-01 RX ORDER — ONDANSETRON 2 MG/ML
4 INJECTION INTRAMUSCULAR; INTRAVENOUS
Status: DISCONTINUED | OUTPATIENT
Start: 2018-01-01 | End: 2018-01-01 | Stop reason: HOSPADM

## 2018-01-01 RX ORDER — MIRTAZAPINE 15 MG/1
TABLET, FILM COATED ORAL
Qty: 90 TAB | Refills: 11 | Status: SHIPPED | OUTPATIENT
Start: 2018-01-01 | End: 2018-01-01

## 2018-01-01 RX ORDER — ROSUVASTATIN CALCIUM 10 MG/1
20 TABLET, COATED ORAL
Status: DISCONTINUED | OUTPATIENT
Start: 2018-01-01 | End: 2018-01-01

## 2018-01-01 RX ORDER — POTASSIUM CHLORIDE 14.9 MG/ML
10 INJECTION INTRAVENOUS
Status: DISCONTINUED | OUTPATIENT
Start: 2018-01-01 | End: 2018-01-01

## 2018-01-01 RX ORDER — POTASSIUM CHLORIDE 20MEQ/15ML
20 LIQUID (ML) ORAL DAILY
Status: DISCONTINUED | OUTPATIENT
Start: 2018-01-01 | End: 2018-01-01

## 2018-01-01 RX ORDER — METFORMIN HYDROCHLORIDE 1000 MG/1
TABLET ORAL
Qty: 180 TAB | Refills: 0 | Status: SHIPPED | OUTPATIENT
Start: 2018-01-01 | End: 2018-01-01

## 2018-01-01 RX ORDER — DICLOXACILLIN SODIUM 250 MG/1
250 CAPSULE ORAL
Qty: 28 CAP | Refills: 0 | Status: SHIPPED | OUTPATIENT
Start: 2018-01-01 | End: 2018-01-01

## 2018-01-01 RX ORDER — GLIMEPIRIDE 4 MG/1
4 TABLET ORAL
Qty: 30 TAB | Refills: 11 | Status: SHIPPED | OUTPATIENT
Start: 2018-01-01 | End: 2018-01-01 | Stop reason: SDUPTHER

## 2018-01-01 RX ORDER — SODIUM CHLORIDE 9 MG/ML
100 INJECTION, SOLUTION INTRAVENOUS CONTINUOUS
Status: DISCONTINUED | OUTPATIENT
Start: 2018-01-01 | End: 2018-01-01

## 2018-01-01 RX ORDER — MIRTAZAPINE 15 MG/1
TABLET, FILM COATED ORAL
Qty: 90 TAB | Refills: 5 | Status: SHIPPED | OUTPATIENT
Start: 2018-01-01 | End: 2018-01-01 | Stop reason: SDUPTHER

## 2018-01-01 RX ORDER — MIRTAZAPINE 15 MG/1
15 TABLET, FILM COATED ORAL
Status: DISCONTINUED | OUTPATIENT
Start: 2018-01-01 | End: 2018-01-01 | Stop reason: HOSPADM

## 2018-01-01 RX ORDER — GLIMEPIRIDE 4 MG/1
TABLET ORAL
Qty: 90 TAB | Refills: 11 | Status: SHIPPED | OUTPATIENT
Start: 2018-01-01 | End: 2018-01-01

## 2018-01-01 RX ORDER — ROSUVASTATIN CALCIUM 20 MG/1
TABLET, COATED ORAL
Qty: 90 TAB | Refills: 0 | Status: SHIPPED | OUTPATIENT
Start: 2018-01-01 | End: 2019-01-01

## 2018-01-01 RX ORDER — AMLODIPINE BESYLATE AND ATORVASTATIN CALCIUM 10; 10 MG/1; MG/1
1 TABLET, FILM COATED ORAL DAILY
COMMUNITY
End: 2019-01-01

## 2018-01-01 RX ORDER — INSULIN PUMP SYRINGE, 3 ML
EACH MISCELLANEOUS
Qty: 1 KIT | Refills: 0 | Status: ON HOLD | OUTPATIENT
Start: 2018-01-01 | End: 2019-01-01

## 2018-01-01 RX ORDER — LISINOPRIL 40 MG/1
TABLET ORAL
Refills: 3 | COMMUNITY
Start: 2018-01-01 | End: 2018-01-01

## 2018-01-01 RX ORDER — SODIUM CHLORIDE 0.9 % (FLUSH) 0.9 %
5-10 SYRINGE (ML) INJECTION EVERY 8 HOURS
Status: DISCONTINUED | OUTPATIENT
Start: 2018-01-01 | End: 2018-01-01 | Stop reason: HOSPADM

## 2018-01-01 RX ORDER — POTASSIUM CHLORIDE 750 MG/1
40 TABLET, FILM COATED, EXTENDED RELEASE ORAL EVERY 6 HOURS
Status: COMPLETED | OUTPATIENT
Start: 2018-01-01 | End: 2018-01-01

## 2018-01-01 RX ORDER — INSULIN LISPRO 100 [IU]/ML
INJECTION, SOLUTION INTRAVENOUS; SUBCUTANEOUS
Status: DISCONTINUED | OUTPATIENT
Start: 2018-01-01 | End: 2018-01-01 | Stop reason: HOSPADM

## 2018-01-01 RX ORDER — ASPIRIN 81 MG/1
81 TABLET ORAL DAILY
Status: DISCONTINUED | OUTPATIENT
Start: 2018-01-01 | End: 2018-01-01 | Stop reason: HOSPADM

## 2018-01-01 RX ORDER — MEGESTROL ACETATE 40 MG/1
20 TABLET ORAL 2 TIMES DAILY WITH MEALS
Status: DISCONTINUED | OUTPATIENT
Start: 2018-01-01 | End: 2018-01-01

## 2018-01-01 RX ORDER — METFORMIN HYDROCHLORIDE 500 MG/1
1 TABLET, FILM COATED, EXTENDED RELEASE ORAL 2 TIMES DAILY
Qty: 180 TAB | Refills: 3 | Status: SHIPPED | OUTPATIENT
Start: 2018-01-01 | End: 2018-01-01

## 2018-01-01 RX ORDER — LEVOTHYROXINE SODIUM 50 UG/1
50 TABLET ORAL
Status: DISCONTINUED | OUTPATIENT
Start: 2018-01-01 | End: 2018-01-01 | Stop reason: HOSPADM

## 2018-01-01 RX ORDER — MAGNESIUM SULFATE 100 %
4 CRYSTALS MISCELLANEOUS AS NEEDED
Status: DISCONTINUED | OUTPATIENT
Start: 2018-01-01 | End: 2018-01-01 | Stop reason: HOSPADM

## 2018-01-01 RX ORDER — MIRTAZAPINE 15 MG/1
TABLET, FILM COATED ORAL
Qty: 90 TAB | Refills: 5 | Status: SHIPPED | OUTPATIENT
Start: 2018-01-01 | End: 2018-01-01 | Stop reason: ALTCHOICE

## 2018-01-01 RX ORDER — SITAGLIPTIN 25 MG/1
TABLET, FILM COATED ORAL
COMMUNITY
Start: 2018-01-01 | End: 2018-01-01

## 2018-01-01 RX ORDER — CARBIDOPA AND LEVODOPA 25; 100 MG/1; MG/1
TABLET ORAL
Qty: 270 TAB | Refills: 2 | Status: SHIPPED | OUTPATIENT
Start: 2018-01-01

## 2018-01-01 RX ORDER — AMLODIPINE BESYLATE 5 MG/1
5 TABLET ORAL DAILY
Status: DISCONTINUED | OUTPATIENT
Start: 2018-01-01 | End: 2018-01-01 | Stop reason: HOSPADM

## 2018-01-01 RX ORDER — POTASSIUM CHLORIDE AND SODIUM CHLORIDE 450; 150 MG/100ML; MG/100ML
INJECTION, SOLUTION INTRAVENOUS CONTINUOUS
Status: DISCONTINUED | OUTPATIENT
Start: 2018-01-01 | End: 2018-01-01 | Stop reason: HOSPADM

## 2018-01-01 RX ORDER — MIRTAZAPINE 15 MG/1
15 TABLET, FILM COATED ORAL
Qty: 30 TAB | Refills: 5 | Status: SHIPPED | OUTPATIENT
Start: 2018-01-01 | End: 2018-01-01 | Stop reason: SDUPTHER

## 2018-01-01 RX ORDER — POTASSIUM CHLORIDE 750 MG/1
40 TABLET, FILM COATED, EXTENDED RELEASE ORAL 3 TIMES DAILY
Status: COMPLETED | OUTPATIENT
Start: 2018-01-01 | End: 2018-01-01

## 2018-01-01 RX ORDER — POTASSIUM CHLORIDE 7.45 MG/ML
10 INJECTION INTRAVENOUS
Status: DISCONTINUED | OUTPATIENT
Start: 2018-01-01 | End: 2018-01-01

## 2018-01-01 RX ORDER — HYDRALAZINE HYDROCHLORIDE 20 MG/ML
10 INJECTION INTRAMUSCULAR; INTRAVENOUS
Status: DISCONTINUED | OUTPATIENT
Start: 2018-01-01 | End: 2018-01-01 | Stop reason: HOSPADM

## 2018-01-01 RX ORDER — POTASSIUM CHLORIDE 750 MG/1
20 TABLET, FILM COATED, EXTENDED RELEASE ORAL
Status: COMPLETED | OUTPATIENT
Start: 2018-01-01 | End: 2018-01-01

## 2018-01-01 RX ORDER — MEGESTROL ACETATE 40 MG/1
80 TABLET ORAL 2 TIMES DAILY WITH MEALS
Status: DISCONTINUED | OUTPATIENT
Start: 2018-01-01 | End: 2018-01-01 | Stop reason: HOSPADM

## 2018-01-01 RX ORDER — AMLODIPINE BESYLATE 10 MG/1
5 TABLET ORAL DAILY
Qty: 30 TAB | Refills: 3 | Status: SHIPPED | OUTPATIENT
Start: 2018-01-01 | End: 2018-01-01

## 2018-01-01 RX ORDER — POTASSIUM CHLORIDE 750 MG/1
40 TABLET, FILM COATED, EXTENDED RELEASE ORAL DAILY
Status: DISCONTINUED | OUTPATIENT
Start: 2018-01-01 | End: 2018-01-01 | Stop reason: DRUGHIGH

## 2018-01-01 RX ORDER — LEVOTHYROXINE SODIUM 50 UG/1
TABLET ORAL
Qty: 90 TAB | Refills: 3 | Status: SHIPPED | OUTPATIENT
Start: 2018-01-01

## 2018-01-01 RX ORDER — HEPARIN SODIUM 5000 [USP'U]/ML
5000 INJECTION, SOLUTION INTRAVENOUS; SUBCUTANEOUS EVERY 8 HOURS
Status: DISCONTINUED | OUTPATIENT
Start: 2018-01-01 | End: 2018-01-01 | Stop reason: HOSPADM

## 2018-01-01 RX ORDER — METFORMIN HYDROCHLORIDE 500 MG/1
1 TABLET, FILM COATED, EXTENDED RELEASE ORAL 2 TIMES DAILY
COMMUNITY
End: 2018-01-01

## 2018-01-01 RX ORDER — ROSUVASTATIN CALCIUM 20 MG/1
TABLET, COATED ORAL
Qty: 90 TAB | Refills: 0 | Status: SHIPPED | OUTPATIENT
Start: 2018-01-01 | End: 2018-01-01 | Stop reason: SDUPTHER

## 2018-01-01 RX ADMIN — SODIUM CHLORIDE 100 ML/HR: 900 INJECTION, SOLUTION INTRAVENOUS at 11:23

## 2018-01-01 RX ADMIN — POTASSIUM CHLORIDE AND SODIUM CHLORIDE: 450; 150 INJECTION, SOLUTION INTRAVENOUS at 13:02

## 2018-01-01 RX ADMIN — MEGESTROL ACETATE 40 MG: 40 TABLET ORAL at 11:22

## 2018-01-01 RX ADMIN — HEPARIN SODIUM 5000 UNITS: 5000 INJECTION, SOLUTION INTRAVENOUS; SUBCUTANEOUS at 21:20

## 2018-01-01 RX ADMIN — POTASSIUM CHLORIDE 40 MEQ: 750 TABLET, EXTENDED RELEASE ORAL at 08:51

## 2018-01-01 RX ADMIN — LEVOTHYROXINE SODIUM 50 MCG: 50 TABLET ORAL at 07:06

## 2018-01-01 RX ADMIN — AMLODIPINE BESYLATE 5 MG: 5 TABLET ORAL at 09:02

## 2018-01-01 RX ADMIN — Medication 10 ML: at 22:05

## 2018-01-01 RX ADMIN — SODIUM CHLORIDE 100 ML/HR: 900 INJECTION, SOLUTION INTRAVENOUS at 13:11

## 2018-01-01 RX ADMIN — IRON SUCROSE 300 MG: 20 INJECTION, SOLUTION INTRAVENOUS at 16:11

## 2018-01-01 RX ADMIN — ASPIRIN 81 MG: 81 TABLET, COATED ORAL at 09:05

## 2018-01-01 RX ADMIN — HEPARIN SODIUM 5000 UNITS: 5000 INJECTION, SOLUTION INTRAVENOUS; SUBCUTANEOUS at 22:22

## 2018-01-01 RX ADMIN — HEPARIN SODIUM 5000 UNITS: 5000 INJECTION, SOLUTION INTRAVENOUS; SUBCUTANEOUS at 11:21

## 2018-01-01 RX ADMIN — MAGNESIUM SULFATE HEPTAHYDRATE 2 G: 40 INJECTION, SOLUTION INTRAVENOUS at 08:52

## 2018-01-01 RX ADMIN — HEPARIN SODIUM 5000 UNITS: 5000 INJECTION, SOLUTION INTRAVENOUS; SUBCUTANEOUS at 12:59

## 2018-01-01 RX ADMIN — ASPIRIN 81 MG: 81 TABLET, COATED ORAL at 08:51

## 2018-01-01 RX ADMIN — MIRTAZAPINE 15 MG: 15 TABLET, FILM COATED ORAL at 22:01

## 2018-01-01 RX ADMIN — POTASSIUM CHLORIDE 10 MEQ: 10 INJECTION, SOLUTION INTRAVENOUS at 05:13

## 2018-01-01 RX ADMIN — MEGESTROL ACETATE 80 MG: 40 TABLET ORAL at 07:30

## 2018-01-01 RX ADMIN — Medication 10 ML: at 05:01

## 2018-01-01 RX ADMIN — MEGESTROL ACETATE 80 MG: 40 TABLET ORAL at 07:20

## 2018-01-01 RX ADMIN — AMLODIPINE BESYLATE 5 MG: 5 TABLET ORAL at 08:51

## 2018-01-01 RX ADMIN — POTASSIUM CHLORIDE 20 MEQ: 750 TABLET, EXTENDED RELEASE ORAL at 05:17

## 2018-01-01 RX ADMIN — INSULIN LISPRO 2 UNITS: 100 INJECTION, SOLUTION INTRAVENOUS; SUBCUTANEOUS at 13:16

## 2018-01-01 RX ADMIN — LEVOTHYROXINE SODIUM 50 MCG: 50 TABLET ORAL at 07:30

## 2018-01-01 RX ADMIN — POTASSIUM CHLORIDE 40 MEQ: 750 TABLET, EXTENDED RELEASE ORAL at 17:06

## 2018-01-01 RX ADMIN — Medication 10 ML: at 05:18

## 2018-01-01 RX ADMIN — MEGESTROL ACETATE 40 MG: 40 TABLET ORAL at 07:06

## 2018-01-01 RX ADMIN — POTASSIUM CHLORIDE 10 MEQ: 10 INJECTION, SOLUTION INTRAVENOUS at 21:58

## 2018-01-01 RX ADMIN — LEVOTHYROXINE SODIUM 50 MCG: 50 TABLET ORAL at 07:20

## 2018-01-01 RX ADMIN — POTASSIUM CHLORIDE 10 MEQ: 200 INJECTION, SOLUTION INTRAVENOUS at 23:18

## 2018-01-01 RX ADMIN — INSULIN LISPRO 2 UNITS: 100 INJECTION, SOLUTION INTRAVENOUS; SUBCUTANEOUS at 11:21

## 2018-01-01 RX ADMIN — INSULIN LISPRO 2 UNITS: 100 INJECTION, SOLUTION INTRAVENOUS; SUBCUTANEOUS at 17:06

## 2018-01-01 RX ADMIN — Medication 10 ML: at 13:11

## 2018-01-01 RX ADMIN — HEPARIN SODIUM 5000 UNITS: 5000 INJECTION, SOLUTION INTRAVENOUS; SUBCUTANEOUS at 22:01

## 2018-01-01 RX ADMIN — HEPARIN SODIUM 5000 UNITS: 5000 INJECTION, SOLUTION INTRAVENOUS; SUBCUTANEOUS at 05:28

## 2018-01-01 RX ADMIN — ASPIRIN 81 MG: 81 TABLET, COATED ORAL at 10:29

## 2018-01-01 RX ADMIN — LEVOTHYROXINE SODIUM 50 MCG: 50 TABLET ORAL at 06:54

## 2018-01-01 RX ADMIN — POTASSIUM CHLORIDE 10 MEQ: 10 INJECTION, SOLUTION INTRAVENOUS at 06:58

## 2018-01-01 RX ADMIN — HEPARIN SODIUM 5000 UNITS: 5000 INJECTION, SOLUTION INTRAVENOUS; SUBCUTANEOUS at 05:18

## 2018-01-01 RX ADMIN — ASPIRIN 81 MG: 81 TABLET, COATED ORAL at 09:02

## 2018-01-01 RX ADMIN — SODIUM CHLORIDE 100 ML/HR: 900 INJECTION, SOLUTION INTRAVENOUS at 21:57

## 2018-01-01 RX ADMIN — Medication 10 ML: at 21:21

## 2018-01-01 RX ADMIN — POTASSIUM CHLORIDE 40 MEQ: 750 TABLET, EXTENDED RELEASE ORAL at 13:13

## 2018-01-01 RX ADMIN — Medication 10 ML: at 21:35

## 2018-01-01 RX ADMIN — Medication 10 ML: at 13:31

## 2018-01-01 RX ADMIN — MEGESTROL ACETATE 40 MG: 40 TABLET ORAL at 22:01

## 2018-01-01 RX ADMIN — MIRTAZAPINE 15 MG: 15 TABLET, FILM COATED ORAL at 21:35

## 2018-01-01 RX ADMIN — Medication 10 ML: at 11:21

## 2018-01-01 RX ADMIN — HEPARIN SODIUM 5000 UNITS: 5000 INJECTION, SOLUTION INTRAVENOUS; SUBCUTANEOUS at 21:35

## 2018-01-01 RX ADMIN — POTASSIUM CHLORIDE 40 MEQ: 750 TABLET, EXTENDED RELEASE ORAL at 21:20

## 2018-01-01 RX ADMIN — SODIUM CHLORIDE 100 ML/HR: 900 INJECTION, SOLUTION INTRAVENOUS at 05:01

## 2018-01-01 RX ADMIN — Medication 10 ML: at 05:28

## 2018-01-01 RX ADMIN — MEGESTROL ACETATE 80 MG: 40 TABLET ORAL at 17:21

## 2018-01-01 RX ADMIN — POTASSIUM CHLORIDE 40 MEQ: 750 TABLET, EXTENDED RELEASE ORAL at 13:00

## 2018-01-01 RX ADMIN — INSULIN LISPRO 2 UNITS: 100 INJECTION, SOLUTION INTRAVENOUS; SUBCUTANEOUS at 21:35

## 2018-01-01 RX ADMIN — HEPARIN SODIUM 5000 UNITS: 5000 INJECTION, SOLUTION INTRAVENOUS; SUBCUTANEOUS at 13:00

## 2018-01-01 RX ADMIN — MEGESTROL ACETATE 80 MG: 40 TABLET ORAL at 16:14

## 2018-01-01 RX ADMIN — POTASSIUM CHLORIDE 40 MEQ: 750 TABLET, EXTENDED RELEASE ORAL at 09:05

## 2018-01-01 RX ADMIN — HEPARIN SODIUM 5000 UNITS: 5000 INJECTION, SOLUTION INTRAVENOUS; SUBCUTANEOUS at 05:00

## 2018-01-01 RX ADMIN — IRON SUCROSE 300 MG: 20 INJECTION, SOLUTION INTRAVENOUS at 13:00

## 2018-01-01 RX ADMIN — Medication 10 ML: at 07:07

## 2018-01-01 RX ADMIN — MEGESTROL ACETATE 40 MG: 40 TABLET ORAL at 17:06

## 2018-01-01 RX ADMIN — POTASSIUM CHLORIDE 10 MEQ: 200 INJECTION, SOLUTION INTRAVENOUS at 01:36

## 2018-01-01 RX ADMIN — MIRTAZAPINE 15 MG: 15 TABLET, FILM COATED ORAL at 21:20

## 2018-01-01 RX ADMIN — POTASSIUM CHLORIDE 40 MEQ: 750 TABLET, EXTENDED RELEASE ORAL at 07:30

## 2018-01-01 RX ADMIN — Medication 10 ML: at 00:00

## 2018-01-11 RX ORDER — ROSUVASTATIN CALCIUM 20 MG/1
TABLET, COATED ORAL
Qty: 30 TAB | Refills: 0 | Status: SHIPPED | OUTPATIENT
Start: 2018-01-11 | End: 2018-02-20 | Stop reason: SDUPTHER

## 2018-01-31 ENCOUNTER — OFFICE VISIT (OUTPATIENT)
Dept: SURGERY | Age: 83
End: 2018-01-31

## 2018-01-31 VITALS
OXYGEN SATURATION: 97 % | WEIGHT: 170.6 LBS | SYSTOLIC BLOOD PRESSURE: 157 MMHG | HEART RATE: 74 BPM | BODY MASS INDEX: 28.42 KG/M2 | TEMPERATURE: 96.8 F | DIASTOLIC BLOOD PRESSURE: 79 MMHG | HEIGHT: 65 IN

## 2018-01-31 DIAGNOSIS — N95.9 MENOPAUSAL AND POSTMENOPAUSAL DISORDER: ICD-10-CM

## 2018-01-31 DIAGNOSIS — N95.2 POSTMENOPAUSAL ATROPHIC VAGINITIS: ICD-10-CM

## 2018-01-31 DIAGNOSIS — N81.6 RECTOCELE: ICD-10-CM

## 2018-01-31 DIAGNOSIS — Z90.710 HISTORY OF HYSTERECTOMY INCLUDING CERVIX: ICD-10-CM

## 2018-01-31 DIAGNOSIS — N99.3 PROLAPSE OF VAGINAL VAULT AFTER HYSTERECTOMY: Primary | ICD-10-CM

## 2018-01-31 NOTE — MR AVS SNAPSHOT
[address] [address] [phone] Patient: Miguelina Paula MRN:  [id number] Visit Information Date & Time Provider Department Dept. Phone Encounter #  
 1/31/2018  9:45 AM Forest Muniz, [facility] Surgical [address] [id number] Follow-up Instructions Return if symptoms worsen or fail to improve. Routing History Follow-up and Disposition History Your Appointments 6/15/2018  1:45 PM  
ROUTINE CARE with Emile Palacios MD  
[facility]) Appt Note: 6 mos for DM  
 [address] [address]  
[address] [place] [address] [address] [place] [address] Upcoming Health Maintenance Date Due  
 FOOT EXAM Q1 2/24/2016 Influenza Age 5 to Adult 8/1/2017 MICROALBUMIN Q1 12/16/2017 MEDICARE YEARLY EXAM 6/17/2018 HEMOGLOBIN A1C Q6M 6/18/2018 EYE EXAM RETINAL OR DILATED Q1 11/15/2018 LIPID PANEL Q1 12/18/2018 GLAUCOMA SCREENING Q2Y 11/15/2019 DTaP/Tdap/Td series (2 - Td) 11/10/2025 Allergies as of 1/31/2018  Review Complete On: 1/31/2018 By: Forest Muniz MD  
  
 Severity Noted Reaction Type Reactions Flexeril [Cyclobenzaprine]  02/27/2017    Other (comments) Caused pt to fall & hallucination Percocet [Oxycodone-acetaminophen]  10/16/2009    Itching Tramadol  02/27/2017    Other (comments) Caused falls Current Immunizations  Reviewed on 12/16/2016 Name Date Influenza Vaccine 9/16/2016, 10/15/2015, 9/2/2014 Pneumococcal Conjugate (PCV-13) 6/16/2017  2:45 PM  
 Pneumococcal Polysaccharide (PPSV-23) 11/10/2015 Tdap 11/10/2015 ZZZ-RETIRED (DO NOT USE) Pneumococcal Vaccine (Unspecified Type)  Deferred (Patient Refused) Not reviewed this visit You Were Diagnosed With   
  
 Codes Comments Prolapse of vaginal vault after hysterectomy    -  Primary ICD-10-CM: N99.3 ICD-9-CM: 618.5 Menopausal and postmenopausal disorder     ICD-10-CM: N95.9 ICD-9-CM: 627.9 Postmenopausal atrophic vaginitis     ICD-10-CM: N95.2 ICD-9-CM: 627.3 History of hysterectomy including cervix     ICD-10-CM: Z90.710 ICD-9-CM: V88.01 Rectocele     ICD-10-CM: N81.6 ICD-9-CM: 618.04 Vitals BP Pulse Temp Height(growth percentile) Weight(growth percentile) SpO2  
 157/79 74 96.8 °F (36 °C) (Temporal) 5' 5\" (1.651 m) 170 lb 9.6 oz (77.4 kg) 97% BMI OB Status Smoking Status 28.39 kg/m2 Hysterectomy Never Smoker Vitals History BMI and BSA Data Body Mass Index Body Surface Area  
 28.39 kg/m 2 1.88 m 2 Preferred Pharmacy Pharmacy Name Phone [facility] [address] [phone] Your Updated Medication List  
  
   
This list is accurate as of: 1/31/18 11:23 AM.  Always use your most recent med list.  
  
  
  
  
 aspirin delayed-release 81 mg tablet Take 81 mg by mouth daily. Blood-Glucose Meter monitoring kit Commonly known as:  Power Judd Check blood sugar daily, as directed CALCIUM + D PO Take  by mouth. COQ-10 PO Take  by mouth.  
  
 cyanocobalamin 1,000 mcg tablet Take 1 Tab by mouth daily. diosmin complex no. 1 630 mg Tab Commonly known as:  Venson Angela Take 1 Tab by mouth daily. glucose blood VI test strips strip Commonly known as:  ONETOUCH ULTRA TEST Check fsbs up to bid E11.9 Lancets Misc Commonly known as: One Touch Margarie Patter Test up to bid Leg Brace Misc Commonly known as:  KNEE SUPPORT BRACE Bilateral knee instability. Use as directed * levothyroxine 50 mcg tablet Commonly known as:  SYNTHROID  
TAKE 1 TABLET BY MOUTH DAILY BEFORE BREAKFAST * levothyroxine 50 mcg tablet Commonly known as:  SYNTHROID  
TAKE 1 TABLET BY MOUTH DAILY BEFORE BREAKFAST * levothyroxine 50 mcg tablet Commonly known as:  SYNTHROID  
TAKE 1 TABLET BY MOUTH DAILY BEFORE BREAKFAST  
  
 lisinopril-hydroCHLOROthiazide 20-25 mg per tablet Commonly known as:  PRINZIDE, ZESTORETIC  
TAKE 1 TABLET BY MOUTH EVERY DAY  
  
 metFORMIN 1,000 mg tablet Commonly known as:  GLUCOPHAGE Take 1 Tab by mouth two (2) times daily (with meals). miscellaneous medical supply Misc Adult Diapers, use as directed  
  
 polyethylene glycol 17 gram packet Commonly known as:  Amy Arts Take 1 Packet by mouth daily. rosuvastatin 20 mg tablet Commonly known as:  CRESTOR  
TAKE 1 TABLET BY MOUTH EVERY DAY  
  
 * Notice: This list has 3 medication(s) that are the same as other medications prescribed for you. Read the directions carefully, and ask your doctor or other care provider to review them with you. We Performed the Following REFERRAL TO FEMALE PELVIC MEDICINE AND RECONSTRUCTIVE SURGERY [XRT489 Custom] Follow-up Instructions Return if symptoms worsen or fail to improve. Referral Information Referral ID Referred By Referred To [id number] [facility] [address], [address] [facility] Visits Status Start Date End Date 1 New Request 1/31/18 1/31/19 If your referral has a status of pending review or denied, additional information will be sent to support the outcome of this decision. Introducing [address]! Sarina Salmeron introduces [employer] patient portal. Now you can access parts of your medical record, email your doctor's office, and request medication refills online. 1. In your internet browser, go to [email or web address] 2. Click on the First Time User? Click Here link in the Sign In box. You will see the New Member Sign Up page. 3. Enter your [employer] Access Code exactly as it appears below. You will not need to use this code after youve completed the sign-up process. If you do not sign up before the expiration date, you must request a new code. · [employer] Access Code: [email or web address] Expires: 3/18/2018  2:12 PM 
 
4. Enter the last four digits of your Social Security Number (xxxx) and Date of Birth (mm/dd/yyyy) as indicated and click Submit. You will be taken to the next sign-up page. 5. Create a Marakana ID. This will be your Marak[employer] login ID and cannot be changed, so think of one that is secure and easy to remember. 6. Create a [employer] password. You can change your password at any time. 7. Enter your Password Reset Question and Answer. This can be used at a later time if you forget your password. 8. Enter your e-mail address. You will receive e-mail notification when new information is available in [address] 9. Click Sign Up. You can now view and download portions of your medical record. 10. Click the Download Summary menu link to download a portable copy of your medical information. If you have questions, please visit the Frequently Asked Questions section of the [employer] website. Remember, [employer] is NOT to be used for urgent needs. For medical emergencies, dial 911. Now available from your iPhone and Android! Please provide this summary of care documentation to your next provider. Your primary care clinician is listed as [place]. If you have any questions after today's visit, please call [phone].

## 2018-01-31 NOTE — PROGRESS NOTES
Gynecology Consult  Referred by Dr. Misbah Romero    Name: Emilee Garza MRN: [id number] SSN: [id number]    YOB: 1932  Age: 80 y.o. Sex: female       Subjective:      Chief complaint:  Vaginal growth    Ana Maria Maldonado is a 80 y.o.  female, D8M3Sn2 (Abortions 0, Miscarriages 0), with a history of vaginal growth that was detected about one month ago. Having no pain or bleeding. Previous treatment measures include none. Symptom onset has been abrupt for a time period of 1 month(s). Severity is described as mild. No LMP recorded. Patient has had a hysterectomy. The current method of family planning is status post hysterectomy. Allergies   Allergen Reactions    Flexeril [Cyclobenzaprine] Other (comments)     Caused pt to fall & hallucination    Percocet [Oxycodone-Acetaminophen] Itching    Tramadol Other (comments)     Caused falls      Past Medical History:   Diagnosis Date    Anxiety     Arthritis     Chest pain     Negative stress test and Holter monitor w/Dr Ybarra .  Chronic back pain     Chronic venous insufficiency     Colon polyp     last scope normal ; Dr. Gordon Lozano Cyst of right kidney     Depression     Diabetes ([facility] [address]     Hypercholesterolemia     Hypertension     Hypothyroidism     Memory disorder     Other ill-defined conditions(799.89)     heart murmur    Painful hip     Urinary frequency     Sees Dr[facility] Frieda Hernandez with [facility]     Past Surgical History:   Procedure Laterality Date    COLONOSCOPY,DIAGNOSTIC  2015         HX BREAST BIOPSY      HX HYSTERECTOMY      Due to a prolapsed uterus    HX KNEE REPLACEMENT      left    HX KNEE REPLACEMENT      right    HX ORTHOPAEDIC      B knees.      UPPER GI ENDOSCOPY,BIOPSY  2015          OB History     No data available        Current Outpatient Prescriptions   Medication Sig    rosuvastatin (CRESTOR) 20 mg tablet TAKE 1 TABLET BY MOUTH EVERY DAY    diosmin complex no.1 (VASCULERA) 630 mg tab Take 1 Tab by mouth daily.  polyethylene glycol (MIRALAX) 17 gram packet Take 1 Packet by mouth daily.  lisinopril-hydroCHLOROthiazide (PRINZIDE, ZESTORETIC) 20-25 mg per tablet TAKE 1 TABLET BY MOUTH EVERY DAY    levothyroxine (SYNTHROID) 50 mcg tablet TAKE 1 TABLET BY MOUTH DAILY BEFORE BREAKFAST    metFORMIN (GLUCOPHAGE) 1,000 mg tablet Take 1 Tab by mouth two (2) times daily (with meals).  Leg Brace (KNEE BRACE) misc Bilateral knee instability. Use as directed    miscellaneous medical supply [facility] Adult Diapers, use as directed    levothyroxine (SYNTHROID) 50 mcg tablet TAKE 1 TABLET BY MOUTH DAILY BEFORE BREAKFAST    levothyroxine (SYNTHROID) 50 mcg tablet TAKE 1 TABLET BY MOUTH DAILY BEFORE BREAKFAST    glucose blood VI test strips (ONETOUCH ULTRA TEST) strip Check fsbs up to bid E11.9    Blood-Glucose Meter (ONETOUCH ULTRA2) monitoring kit Check blood sugar daily, as directed    Lancets (ONE TOUCH DELICA) misc Test up to bid    cyanocobalamin 1,000 mcg tablet Take 1 Tab by mouth daily.  aspirin delayed-release 81 mg tablet Take 81 mg by mouth daily.  CALCIUM CARBONATE/VITAMIN D3 (CALCIUM + D PO) Take  by mouth.  UBIDECARENONE (COQ-10 PO) Take  by mouth. No current facility-administered medications for this visit. Family History   Problem Relation Age of Onset   Ana Rung Cancer Mother      pancreatic    Cancer Father      colon    Cancer Sister      pancreatic     Diabetes Sister     Alzheimer Brother      Social History     Social History    Marital status:      Spouse name: N/A    Number of children: N/A    Years of education: N/A     Occupational History    Not on file.      Social History Main Topics    Smoking status: Never Smoker    Smokeless tobacco: Never Used    Alcohol use No      Comment: Rare    Drug use: No    Sexual activity: No     Other Topics Concern    Not on file     Social History Narrative       Review of Systems:  Constitutional: No weight change, chills or fever, anorexia, weakness or sleep disturbance . Cardiovascular: No chest pain, shortness of breath, or palpitations . Respiratory: No cough, shortness of breath, hemoptysis, or orthopnea . Neurologic: No syncope, headaches or seizures . Hematologic: No easy bruising or unusual bleeding . Psychiatric: No insomnia, confusion, depression, or anxiety . GI:No nausea and vomiting, diarrhea or constipation  . : See HPI . Musculoskeletal: No joint pain or muscle pain . Endocrine: No polydipsia, polyuria, cold intolerance, excessive fatigue, or sleep disturbance . Integumentary: No breast pain, lumps, nipple discharge, or axillary lumps . Objective:     Vitals:    01/31/18 1015   BP: 157/79   Pulse: 74   Temp: 96.8 °F (36 °C)   TempSrc: Temporal   SpO2: 97%   Weight: 170 lb 9.6 oz (77.4 kg)   Height: 5' 5\" (1.651 m)       General:  alert, cooperative, no distress, appears stated age   Skin:  no rash or abnormalities   Eyes: negative   Mouth: MMM no lesions   Lymph Nodes:  Cervical, supraclavicular, and axillary nodes normal.   Breast Exam: normal appearance, no masses or tenderness    Lungs:  clear to auscultation bilaterally   Heart:  regular rate and rhythm   Abdomen: soft, non-tender. Bowel sounds normal. No masses,  no organomegaly   Back:  Costovertebral angle tenderness absent   Genitourinary: VULVA: normal appearing vulva with no masses, tenderness or lesions, VAGINA: atrophic, PELVIC FLOOR EXAM: rectocele 3rd degree, CERVIX: surgically absent, UTERUS: surgically absent, vaginal cuff well healed, ADNEXA: surgically absent bilateral.    Extremities:  extremities normal, atraumatic, no cyanosis or edema   Neurologic:  sensation grossly intact. Psychiatric:  non focal     Assessment:       ICD-10-CM ICD-9-CM    1.  Prolapse of vaginal vault after hysterectomy N99.3 618.5 REFERRAL TO FEMALE PELVIC MEDICINE AND RECONSTRUCTIVE SURGERY   2. Menopausal and postmenopausal disorder N95.9 627.9    3. Postmenopausal atrophic vaginitis N95.2 627.3    4. History of hysterectomy including cervix Z90.710 V88.01    5. Rectocele N81.6 618.04        Plan:     Ready to have surgery ASAP. Refer to Dr. Bruce Hughes.     Follow-up Disposition: Not on File    Signed By:  Ilan Irby MD     January 31, 2018

## 2018-02-21 RX ORDER — ROSUVASTATIN CALCIUM 20 MG/1
TABLET, COATED ORAL
Qty: 30 TAB | Refills: 0 | Status: SHIPPED | OUTPATIENT
Start: 2018-02-21 | End: 2018-03-20 | Stop reason: SDUPTHER

## 2018-03-05 ENCOUNTER — TELEPHONE (OUTPATIENT)
Dept: INTERNAL MEDICINE CLINIC | Age: 83
End: 2018-03-05

## 2018-03-05 NOTE — TELEPHONE ENCOUNTER
----- Message from Love Cross sent at 3/5/2018  9:29 AM EST -----  Regarding: Dr. Shereen Tabor Telephone  Pt will like to know when will be a good time to bring in 7 page Medical Report on the pt to be filled out by the doctor and returned to [facility] by Monday 3/12/18. Best Contact [phone].         Message copied/pasted from [facility]

## 2018-03-05 NOTE — TELEPHONE ENCOUNTER
Pt's daughter on [employer] dropped off dmv forms to be completed and Faxed to [facility]. Pt's daughter also states that patient would like a copy of forms after they have been completed. Pt daughter was advised that forms do take at least 7 to 10 business days to be completed, she states that forms need to be done by 3/15/18.

## 2018-03-05 NOTE — TELEPHONE ENCOUNTER
Identified patient 2 identifiers verified. Patient will be bringing in [facility] forms for completion DMV requesting because of patient's age.

## 2018-03-07 NOTE — TELEPHONE ENCOUNTER
Patient is requesting a call back concerning the DMV form that was dropped off to the office this week.

## 2018-03-07 NOTE — TELEPHONE ENCOUNTER
Identified patient 2 identifiers verified. Patient aware that forms  Are pending completion by Dr. Molly Bella and she will be notified when the have been completed .  Message was sent to Dr. Molly Bella for review

## 2018-03-08 NOTE — TELEPHONE ENCOUNTER
Identified patient 2 identifiers verified. Patient aware that DMV form have been completed and that the copy will be at . Forms faxed to [facility]. Fax confirmation was received.

## 2018-03-12 RX ORDER — LISINOPRIL AND HYDROCHLOROTHIAZIDE 20; 25 MG/1; MG/1
TABLET ORAL
Qty: 90 TAB | Refills: 0 | Status: SHIPPED | OUTPATIENT
Start: 2018-03-12 | End: 2018-01-01

## 2018-03-20 RX ORDER — ROSUVASTATIN CALCIUM 20 MG/1
TABLET, COATED ORAL
Qty: 30 TAB | Refills: 0 | Status: SHIPPED | OUTPATIENT
Start: 2018-03-20 | End: 2018-04-24 | Stop reason: SDUPTHER

## 2018-03-22 ENCOUNTER — TELEPHONE (OUTPATIENT)
Dept: INTERNAL MEDICINE CLINIC | Age: 83
End: 2018-03-22

## 2018-03-22 NOTE — TELEPHONE ENCOUNTER
----- Message from [facility] sent at 3/22/2018 11:58 AM EDT -----  Regarding: Dr. Collette Norfolk  Pt has been having some issues with her bowels and wanted to come in to see Dr. Lupis Thrasher sometime next week if possible? Pt best contact [phone].           Message copied/pasted from [facility]

## 2018-03-23 NOTE — TELEPHONE ENCOUNTER
Message was left for patient to call the office if she wanted to be scheduled for march 27 at 2:30pm with Dr. Susana Bettencourt

## 2018-03-26 NOTE — TELEPHONE ENCOUNTER
Called patient. Two patient identifiers verified. Offered appointment with Dr. Susana Bettencourt on 3/27/18 at 26 [phone] PM.   Patient accepted appointment and scheduled in [facility].

## 2018-03-26 NOTE — TELEPHONE ENCOUNTER
----- Message from Jacek Hernandez sent at 3/23/2018  4:38 PM EDT -----  Regarding: Dr. Neeru Solares Telephone  Contact: [phone]  Pt. Returning call from practice.          Message copied/pasted from [facility]

## 2018-03-27 ENCOUNTER — OFFICE VISIT (OUTPATIENT)
Dept: INTERNAL MEDICINE CLINIC | Age: 83
End: 2018-03-27

## 2018-03-27 VITALS
WEIGHT: 164.6 LBS | HEART RATE: 97 BPM | HEIGHT: 65 IN | DIASTOLIC BLOOD PRESSURE: 67 MMHG | BODY MASS INDEX: 27.42 KG/M2 | SYSTOLIC BLOOD PRESSURE: 109 MMHG | RESPIRATION RATE: 14 BRPM | OXYGEN SATURATION: 97 % | TEMPERATURE: 97.3 F

## 2018-03-27 DIAGNOSIS — R01.1 HEART MURMUR: ICD-10-CM

## 2018-03-27 DIAGNOSIS — R19.7 DIARRHEA, UNSPECIFIED TYPE: ICD-10-CM

## 2018-03-27 DIAGNOSIS — R15.9 INCONTINENCE OF FECES, UNSPECIFIED FECAL INCONTINENCE TYPE: ICD-10-CM

## 2018-03-27 DIAGNOSIS — E73.9 LACTOSE INTOLERANCE: Primary | ICD-10-CM

## 2018-03-27 NOTE — MR AVS SNAPSHOT
[address] [facility] 
[phone] Patient: Tiffanie Newman MRN:  [id number] Visit Information Date & Time Provider Department Dept. Phone Encounter #  
 3/27/2018  2:30 PM Alondra Morales, [address] [place] [address],[address] [phone] [id number] Follow-up Instructions Return if symptoms worsen or fail to improve. Follow-up and Disposition History Your Appointments 6/15/2018  1:45 PM  
ROUTINE CARE with Alondra Morales MD  
[facility] [address]) Appt Note: 6 mos for DM  
 [address] [address]  
[address] [place] [address] [address] [facility] Upcoming Health Maintenance Date Due  
 FOOT EXAM Q1 2/24/2016 Influenza Age 5 to Adult 8/1/2017 MICROALBUMIN Q1 12/16/2017 MEDICARE YEARLY EXAM 6/17/2018 HEMOGLOBIN A1C Q6M 6/18/2018 EYE EXAM RETINAL OR DILATED Q1 11/15/2018 LIPID PANEL Q1 12/18/2018 GLAUCOMA SCREENING Q2Y 11/15/2019 DTaP/Tdap/Td series (2 - Td) 11/10/2025 Allergies as of 3/27/2018  Review Complete On: 3/27/2018 By: Ellie Puente Severity Noted Reaction Type Reactions Flexeril [Cyclobenzaprine]  02/27/2017    Other (comments) Caused pt to fall & hallucination Percocet [Oxycodone-acetaminophen]  10/16/2009    Itching Tramadol  02/27/2017    Other (comments) Caused falls Current Immunizations  Reviewed on 12/16/2016 Name Date Influenza Vaccine 9/16/2016, 10/15/2015, 9/2/2014 Pneumococcal Conjugate (PCV-13) 6/16/2017  2:45 PM  
 Pneumococcal Polysaccharide (PPSV-23) 11/10/2015 Tdap 11/10/2015 ZZZ-RETIRED (DO NOT USE) Pneumococcal Vaccine (Unspecified Type)  Deferred (Patient Refused) Not reviewed this visit You Were Diagnosed With   
  
 Codes Comments Lactose intolerance    -  Primary ICD-10-CM: E73.9 ICD-9-CM: 271.3 Heart murmur     ICD-10-CM: R01.1 ICD-9-CM: 785.2 Incontinence of feces, unspecified fecal incontinence type     ICD-10-CM: R15.9 ICD-9-CM: 787.60 Diarrhea, unspecified type     ICD-10-CM: R19.7 ICD-9-CM: 787.91 Vitals BP Pulse Temp Resp Height(growth percentile) Weight(growth percentile) 109/67 (BP 1 Location: Left arm, BP Patient Position: Sitting) 97 97.3 °F (36.3 °C) (Oral) 14 5' 5\" (1.651 m) 164 lb 9.6 oz (74.7 kg) SpO2 BMI OB Status Smoking Status 97% 27.39 kg/m2 Hysterectomy Never Smoker Vitals History BMI and BSA Data Body Mass Index Body Surface Area  
 27.39 kg/m 2 1.85 m 2 Preferred Pharmacy Pharmacy Name Phone [facility] [phone] Your Updated Medication List  
  
   
This list is accurate as of 3/27/18  3:24 PM.  Always use your most recent med list.  
  
  
  
  
 aspirin delayed-release 81 mg tablet Take 81 mg by mouth daily. Blood-Glucose Meter monitoring kit Commonly known as:  Lima Barros Check blood sugar daily, as directed CALCIUM + D PO Take  by mouth. COQ-10 PO Take  by mouth.  
  
 cyanocobalamin 1,000 mcg tablet Take 1 Tab by mouth daily. diosmin complex no. 1 630 mg Tab Commonly known as:  Venson Angela Take 1 Tab by mouth daily. glucose blood VI test strips strip Commonly known as:  ONETOUCH ULTRA TEST Check fsbs up to bid E11.9 Lancets Misc Commonly known as: One Touch Margarie Patter Test up to bid Leg Brace Misc Commonly known as:  KNEE SUPPORT BRACE Bilateral knee instability. Use as directed * levothyroxine 50 mcg tablet Commonly known as:  SYNTHROID  
TAKE 1 TABLET BY MOUTH DAILY BEFORE BREAKFAST * levothyroxine 50 mcg tablet Commonly known as:  SYNTHROID  
TAKE 1 TABLET BY MOUTH DAILY BEFORE BREAKFAST * levothyroxine 50 mcg tablet Commonly known as:  SYNTHROID  
TAKE 1 TABLET BY MOUTH DAILY BEFORE BREAKFAST  
  
 lisinopril-hydroCHLOROthiazide 20-25 mg per tablet Commonly known as:  PRINZIDE, ZESTORETIC  
TAKE 1 TABLET BY MOUTH EVERY DAY  
  
 metFORMIN 1,000 mg tablet Commonly known as:  GLUCOPHAGE Take 1 Tab by mouth two (2) times daily (with meals). miscellaneous medical supply [facility] Adult Diapers, use as directed  
  
 polyethylene glycol 17 gram packet Commonly known as:  Chaim Angie Take 1 Packet by mouth daily. rosuvastatin 20 mg tablet Commonly known as:  CRESTOR  
TAKE 1 TABLET BY MOUTH EVERY DAY  
  
 * Notice: This list has 3 medication(s) that are the same as other medications prescribed for you. Read the directions carefully, and ask your doctor or other care provider to review them with you. We Performed the Following REFERRAL TO GASTROENTEROLOGY [PSA23 Custom] Follow-up Instructions Return if symptoms worsen or fail to improve. To-Do List   
 03/27/2018 ECHO:  2D ECHO COMPLETE ADULT (TTE) W OR WO CONTR Referral Information Referral ID Referred By Referred To  
  
 [id number] [address]   
   305 [facility] [address] Visits Status Start Date End Date 1 New Request 3/27/18 3/27/19 If your referral has a status of pending review or denied, additional information will be sent to support the outcome of this decision. Patient Instructions Lactose Intolerance: Care Instructions Your Care Instructions Lactose is sugar that is found in milk and milk products. Some people do not make enough of an enzyme called lactase, which digests lactose. When this happens it can cause gas, belly pain, diarrhea, and bloating. This is called lactose intolerance. This is not the same as food allergy to milk. Lactose intolerance affects different people in different ways. Some people cannot digest any milk products. Other people can eat or drink small amounts of milk products or certain types of milk products without problems. You can learn how to avoid discomfort and still get enough calcium to maintain healthy bones. Follow-up care is a key part of your treatment and safety. Be sure to make and go to all appointments, and call your doctor if you are having problems. It's also a good idea to know your test results and keep a list of the medicines you take. How can you care for yourself at home? · Limit the amount of milk and milk products in your diet. Try to drink 1 glass of milk each day. Drink small amounts several times a day. All types of milk contain the same amount of lactose. If you are not sure whether a milk product causes symptoms, try a small amount and wait to see how you feel before you eat or drink more. · Eat or drink milk and milk products along with other foods. For some people, combining a solid food (like cereal) with a dairy product (like milk) can reduce symptoms. · Eat small amounts of milk products throughout the day instead of larger amounts all at once. · Eat or drink milk and milk products that have reduced lactose. In most grocery stores, you can buy milk with reduced lactose, such as Lactaid milk. · Eat or drink other foods instead of milk and milk products. Try soy milk and soy cheese, and use nondairy creamers in your coffee. Keep in mind that nondairy creamers may contain more fat than milk. · Use lactase products. These are dietary supplements that help you digest lactose. Some are pills that you chew (such as Lactaid) before you eat or drink milk products. Others are liquids that you add to milk 24 hours before you drink it. Try a few products and brands to see which ones work best for you.  
· Some people who are lactose-intolerant can eat some kinds of yogurt without problems, especially yogurt with live cultures. It's best to try a small amount of different brands of yogurt to see which ones work best for you. · Watch out for lactose in foods you buy. Some prepared foods contain lactose, including breads and baked goods, breakfast cereals, instant potatoes and soups, margarine, salad dressings, and many snacks. Be sure to read labels for lactose and for lactose's \"hidden\" names. These include dry milk solids, whey, curds, milk by-products, and nonfat dry milk powder. · Be sure to get enough calcium in your diet, especially if you avoid milk products completely. To get enough calcium, you would need to eat calcium-rich foods as often as someone would drink milk. Calcium is very important because it keeps bones strong and reduces the risk of osteoporosis. Ask your dietitian for advice on how to get enough calcium. Foods that have calcium include: ¨ Broccoli, spinach, kale, and lindsay, mustard, and turnip greens. ¨ Canned sardines and other small fish that have bones you can eat. ¨ Calcium-fortified orange juice. ¨ Soy products such as fortified soy milk and tofu. ¨ Almonds. ¨ Dried beans. · If you are worried about getting enough nutrients, ask your doctor about taking supplements, such as calcium and vitamin D. When should you call for help? Call your doctor now or seek immediate medical care if: 
? · You have new or worse belly pain. ? Watch closely for changes in your health, and be sure to contact your doctor if: 
? · You do not get better as expected. Where can you learn more? Go to http://jeremi-travis.info/. Enter H201 in the search box to learn more about \"Lactose Intolerance: Care Instructions. \" Current as of: September 29, 2016 Content Version: 11.4 © 2331-8525 [employer]wise, [employer].  Care instructions adapted under license by [employer] (which disclaims liability or warranty for this information). If you have questions about a medical condition or this instruction, always ask your healthcare professional. [address] any warranty or liability for your use of this information. Introducing [address]! Emerald Grant introduces Basha patient portal. Now you can access parts of your medical record, email your doctor's office, and request medication refills online. 1. In your internet browser, go to [email or web address] 2. Click on the First Time User? Click Here link in the Sign In box. You will see the New Member Sign Up page. 3. Enter your Basha Access Code exactly as it appears below. You will not need to use this code after youve completed the sign-up process. If you do not sign up before the expiration date, you must request a new code. · Basha Access Code: [email or web address] Expires: 6/25/2018  3:22 PM 
 
4. Enter the last four digits of your Social Security Number (xxxx) and Date of Birth (mm/dd/yyyy) as indicated and click Submit. You will be taken to the next sign-up page. 5. Create a Basha ID. This will be your Basha login ID and cannot be changed, so think of one that is secure and easy to remember. 6. Create a Basha password. You can change your password at any time. 7. Enter your Password Reset Question and Answer. This can be used at a later time if you forget your password. 8. Enter your e-mail address. You will receive e-mail notification when new information is available in [address] 9. Click Sign Up. You can now view and download portions of your medical record. 10. Click the Download Summary menu link to download a portable copy of your medical information. If you have questions, please visit the Frequently Asked Questions section of the Basha website. Remember, Basha is NOT to be used for urgent needs. For medical emergencies, dial 911. Now available from your iPhone and Android! Please provide this summary of care documentation to your next provider. Your primary care clinician is listed as [place]. If you have any questions after today's visit, please call [phone].

## 2018-03-27 NOTE — PATIENT INSTRUCTIONS
Lactose Intolerance: Care Instructions  Your Care Instructions    Lactose is sugar that is found in milk and milk products. Some people do not make enough of an enzyme called lactase, which digests lactose. When this happens it can cause gas, belly pain, diarrhea, and bloating. This is called lactose intolerance. This is not the same as food allergy to milk. Lactose intolerance affects different people in different ways. Some people cannot digest any milk products. Other people can eat or drink small amounts of milk products or certain types of milk products without problems. You can learn how to avoid discomfort and still get enough calcium to maintain healthy bones. Follow-up care is a key part of your treatment and safety. Be sure to make and go to all appointments, and call your doctor if you are having problems. It's also a good idea to know your test results and keep a list of the medicines you take. How can you care for yourself at home? · Limit the amount of milk and milk products in your diet. Try to drink 1 glass of milk each day. Drink small amounts several times a day. All types of milk contain the same amount of lactose. If you are not sure whether a milk product causes symptoms, try a small amount and wait to see how you feel before you eat or drink more. · Eat or drink milk and milk products along with other foods. For some people, combining a solid food (like cereal) with a dairy product (like milk) can reduce symptoms. · Eat small amounts of milk products throughout the day instead of larger amounts all at once. · Eat or drink milk and milk products that have reduced lactose. In most grocery stores, you can buy milk with reduced lactose, such as Lactaid milk. · Eat or drink other foods instead of milk and milk products. Try soy milk and soy cheese, and use nondairy creamers in your coffee. Keep in mind that nondairy creamers may contain more fat than milk. · Use lactase products.  These are dietary supplements that help you digest lactose. Some are pills that you chew (such as Lactaid) before you eat or drink milk products. Others are liquids that you add to milk 24 hours before you drink it. Try a few products and brands to see which ones work best for you. · Some people who are lactose-intolerant can eat some kinds of yogurt without problems, especially yogurt with live cultures. It's best to try a small amount of different brands of yogurt to see which ones work best for you. · Watch out for lactose in foods you buy. Some prepared foods contain lactose, including breads and baked goods, breakfast cereals, instant potatoes and soups, margarine, salad dressings, and many snacks. Be sure to read labels for lactose and for lactose's \"hidden\" names. These include dry milk solids, whey, curds, milk by-products, and nonfat dry milk powder. · Be sure to get enough calcium in your diet, especially if you avoid milk products completely. To get enough calcium, you would need to eat calcium-rich foods as often as someone would drink milk. Calcium is very important because it keeps bones strong and reduces the risk of osteoporosis. Ask your dietitian for advice on how to get enough calcium. Foods that have calcium include:  ¨ Broccoli, spinach, kale, and lindsay, mustard, and turnip greens. ¨ Canned sardines and other small fish that have bones you can eat. ¨ Calcium-fortified orange juice. ¨ Soy products such as fortified soy milk and tofu. ¨ Almonds. ¨ Dried beans. · If you are worried about getting enough nutrients, ask your doctor about taking supplements, such as calcium and vitamin D. When should you call for help? Call your doctor now or seek immediate medical care if:  ? · You have new or worse belly pain. ? Watch closely for changes in your health, and be sure to contact your doctor if:  ? · You do not get better as expected. Where can you learn more?   Go to http://jeremi-travis.info/. Enter H201 in the search box to learn more about \"Lactose Intolerance: Care Instructions. \"  Current as of: September 29, 2016  Content Version: 11.4  © 0230-8203 [employer]. Care instructions adapted under license by [employer] (which disclaims liability or warranty for this information). If you have questions about a medical condition or this instruction, always ask your healthcare professional. [place] [address] any warranty or liability for your use of this information.

## 2018-03-27 NOTE — PROGRESS NOTES
SUBJECTIVE  Ms. Soo Garcia presents today acutely for acute complaint    Chief Complaint   Patient presents with    Incontinence     pt concnered of bowel incontinence    Medication Evaluation     pt wants to discuss a mediation pertaing with diary product        She has had some rectal incontinence. \"I don't even know I'm going. I have had to start wearing depends. \"  Small stools, \"like water\". Since her prolapse surgery, \"hasn't been as much. \"      She has come to feel that maybe she is allergic to dairy products. \"I stopped dairy, and I haven't had a diarrhea attack in 3 months. \"     SH:  reports that she has never smoked. She has never used smokeless tobacco. She reports that she does not drink alcohol or use illicit drugs. ROS: Complete review of systems was performed and is otherwise unremarkable except as noted elsewhere. OBJECTIVE  Visit Vitals    /67 (BP 1 Location: Left arm, BP Patient Position: Sitting)    Pulse 97    Temp 97.3 °F (36.3 °C) (Oral)    Resp 14    Ht 5' 5\" (1.651 m)    Wt 164 lb 9.6 oz (74.7 kg)    SpO2 97%    BMI 27.39 kg/m2     Gen: Pleasant 80 y.o.  female in NAD.   HEENT: PERRLA. EOMI. OP moist and pink.  Neck: Supple.  No LAD.  HEART: RRR, 3/6 systolic murmur LSB.   LUNGS: CTAB No W/R.   ABDOMEN: S, NT, ND, BS+.   EXTREMITIES: Warm. No C/C/E.     ASSESSMENT / PLAN    ICD-10-CM ICD-9-CM    1. Lactose intolerance E73.9 271.3    2. Heart murmur R01.1 785.2 2D ECHO COMPLETE ADULT (TTE) W OR WO CONTR   3. Incontinence of feces, unspecified fecal incontinence type R15.9 787.60 REFERRAL TO GASTROENTEROLOGY   4. Diarrhea, unspecified type R19.7 787.91 REFERRAL TO GASTROENTEROLOGY       I have reviewed with the patient details of the assessment and plan and all questions were answered. Relevant patient education was performed. Follow-up Disposition:  Return if symptoms worsen or fail to improve.

## 2018-03-27 NOTE — PROGRESS NOTES
1. Have you been to the ER, urgent care clinic since your last visit? Hospitalized since your last visit?no  2. Have you seen or consulted any other health care providers outside of the [address] [place] [address] since your last visit? Include any pap smears or colon screening.  no

## 2018-04-19 ENCOUNTER — HOSPITAL ENCOUNTER (OUTPATIENT)
Dept: NON INVASIVE DIAGNOSTICS | Age: 83
Discharge: HOME | End: 2018-04-19
Attending: INTERNAL MEDICINE
Payer: MEDICARE

## 2018-04-19 DIAGNOSIS — R01.1 HEART MURMUR: ICD-10-CM

## 2018-04-19 PROCEDURE — 93306 TTE W/DOPPLER COMPLETE: CPT

## 2018-04-20 ENCOUNTER — TELEPHONE (OUTPATIENT)
Dept: INTERNAL MEDICINE CLINIC | Age: 83
End: 2018-04-20

## 2018-04-20 NOTE — PROGRESS NOTES
Please call the patient and let the patient know that her test result(s) is/are normal.  Thanks. Velvet Abarca.

## 2018-04-20 NOTE — TELEPHONE ENCOUNTER
----- Message from Baron Delio MD sent at 4/20/2018  3:12 PM EDT -----  Please call the patient and let the patient know that her test result(s) is/are normal.  Thanks. Nicole Proctor.

## 2018-04-20 NOTE — TELEPHONE ENCOUNTER
----- Message from Michell Baltazar MD sent at 4/20/2018  3:12 PM EDT -----  Please call the patient and let the patient know that her test result(s) is/are normal.  Thanks. Leila Galan.

## 2018-04-24 RX ORDER — ROSUVASTATIN CALCIUM 20 MG/1
TABLET, COATED ORAL
Qty: 30 TAB | Refills: 0 | Status: SHIPPED | OUTPATIENT
Start: 2018-04-24 | End: 2018-01-01 | Stop reason: SDUPTHER

## 2018-05-09 ENCOUNTER — HOSPITAL ENCOUNTER (OUTPATIENT)
Dept: CT IMAGING | Age: 83
Discharge: HOME | End: 2018-05-09
Attending: INTERNAL MEDICINE
Payer: MEDICARE

## 2018-05-09 DIAGNOSIS — R19.7 DIARRHEA: ICD-10-CM

## 2018-05-09 DIAGNOSIS — R63.4 ABNORMAL WEIGHT LOSS: ICD-10-CM

## 2018-05-09 PROCEDURE — 74176 CT ABD & PELVIS W/O CONTRAST: CPT

## 2018-06-15 PROBLEM — G45.9 TIA (TRANSIENT ISCHEMIC ATTACK): Status: ACTIVE | Noted: 2018-01-01

## 2018-06-15 NOTE — IP AVS SNAPSHOT
[address] [place] [address] 
[phone] Patient: Jensen Lara MRN: [id number] A check gabriel indicates which time of day the medication should be taken. My Medications START taking these medications Instructions Each Dose to Equal  
 Morning Noon Evening Bedtime  
 amLODIPine 10 mg tablet Commonly known as:  Sundra [place] Start taking on:  6/20/2018 Your last dose was: Your next dose is: Take 1 Tab by mouth daily. Indications: hypertension 10 mg CONTINUE taking these medications Instructions Each Dose to Equal  
 Morning Noon Evening Bedtime  
 aspirin delayed-release 81 mg tablet Your last dose was: Your next dose is: Take 81 mg by mouth daily. 81 mg  
    
   
   
   
  
 levothyroxine 50 mcg tablet Commonly known as:  SYNTHROID Your last dose was: Your next dose is: TAKE 1 TABLET BY MOUTH DAILY BEFORE BREAKFAST  
     
   
   
   
  
 mirtazapine 15 mg tablet Commonly known as:  Unk Lorinst Your last dose was: Your next dose is: Take 15 mg by mouth nightly. New script 6/15/18 (has never taken) 15 mg  
    
   
   
   
  
 polyethylene glycol 17 gram packet Commonly known as:  Lugene Minder Your last dose was: Your next dose is: Take 1 Packet by mouth daily. 17 g  
    
   
   
   
  
 rosuvastatin 20 mg tablet Commonly known as:  CRESTOR Your last dose was: Your next dose is: TAKE 1 TABLET BY MOUTH EVERY DAY  
     
   
   
   
  
  
STOP taking these medications GLUMETZA 500 mg Tg24 24 hour tablet Generic drug:  metFORMIN  
   
  
 lisinopril-hydroCHLOROthiazide 20-25 mg per tablet Commonly known as:  Shivam Rose Where to Get Your Medications Information on where to get these meds will be given to you by the nurse or doctor. ! Ask your nurse or doctor about these medications  
  amLODIPine 10 mg tablet

## 2018-06-15 NOTE — IP AVS SNAPSHOT
[address] [facility] [address] [place] [address] 
[phone] Patient: Jensen Lara MRN: [id number] VGC:[id number] About your hospitalization You were admitted on:  Jaqueline 15, 2018 You last received care in the:  [facility] 2N MED SURG You were discharged on:  June 19, 2018 Why you were hospitalized Your primary diagnosis was:  Arf (Acute Renal Failure) (Hcc) Your diagnoses also included:  Tia (Transient Ischemic Attack) Follow-up Information Follow up With Details Comments Contact Info Dayday Vilchis MD In 1 week  [address] [facility] 
[phone] Gauri Bob MD In 2 weeks  [facility] 7 [address] 
[phone] Discharge Orders None A check gabriel indicates which time of day the medication should be taken. My Medications START taking these medications Instructions Each Dose to Equal  
 Morning Noon Evening Bedtime  
 amLODIPine 10 mg tablet Commonly known as:  Sundra [place] Start taking on:  6/20/2018 Your last dose was: Your next dose is: Take 1 Tab by mouth daily. Indications: hypertension 10 mg CONTINUE taking these medications Instructions Each Dose to Equal  
 Morning Noon Evening Bedtime  
 aspirin delayed-release 81 mg tablet Your last dose was: Your next dose is: Take 81 mg by mouth daily. 81 mg  
    
   
   
   
  
 levothyroxine 50 mcg tablet Commonly known as:  SYNTHROID Your last dose was: Your next dose is: TAKE 1 TABLET BY MOUTH DAILY BEFORE BREAKFAST  
     
   
   
   
  
 mirtazapine 15 mg tablet Commonly known as:  Unk Lust Your last dose was: Your next dose is: Take 15 mg by mouth nightly. New script 6/15/18 (has never taken)  15 mg  
    
   
   
   
  
 polyethylene glycol 17 gram packet Commonly known as:  Maurene Lamp Your last dose was: Your next dose is: Take 1 Packet by mouth daily. 17 g  
    
   
   
   
  
 rosuvastatin 20 mg tablet Commonly known as:  CRESTOR Your last dose was: Your next dose is: TAKE 1 TABLET BY MOUTH EVERY DAY  
     
   
   
   
  
  
STOP taking these medications GLUMETZA 500 mg Tg24 24 hour tablet Generic drug:  metFORMIN  
   
  
 lisinopril-hydroCHLOROthiazide 20-25 mg per tablet Commonly known as:  Ramila Martineza Where to Get Your Medications Information on where to get these meds will be given to you by the nurse or doctor. ! Ask your nurse or doctor about these medications  
  amLODIPine 10 mg tablet Discharge Instructions Discharge Instructions PATIENT ID: Tim Caal MRN: [id number] YOB: 1932 DATE OF ADMISSION: 6/15/2018  7:15 PM   
DATE OF DISCHARGE: 6/19/2018 PRIMARY CARE PROVIDER: Yong Monet MD  
 
 
DISCHARGING PHYSICIAN: Arsh Loredo MD   
To contact this individual call [phone] and ask the  to page. If unavailable ask to be transferred the Adult Hospitalist Department. DISCHARGE DIAGNOSES: Acute kidney Injury, Hypokalemia, CONSULTATIONS: IP CONSULT TO NEUROLOGY 
IP CONSULT TO NEPHROLOGY PROCEDURES/SURGERIES: * No surgery found * PENDING TEST RESULTS:  
At the time of discharge the following test results are still pending: Acute Kidney Kidney Injury, Hypokalemia FOLLOW UP APPOINTMENTS:  
Follow-up Information Follow up With Details Comments Contact Info Yong Monet MD In 1 week  [address] [facility] 83. 
[phone] Diamond Rios MD In 2 weeks  [facility] 7 [address] 
[phone] ADDITIONAL CARE RECOMMENDATIONS: repeat BMP in 6/26/18 DIET: Cardiac Diet ACTIVITY: Activity as tolerated WOUND CARE: none EQUIPMENT needed:  
 
 
  
 SNF/Inpatient Rehab/LTAC Independent/assisted living Hospice Other: CDMP Checked: Yes X Signed:  
Mariama Gant MD 
6/19/2018 
2:11 PM 
 
ACO Transitions of Care [facility] offers a voluntary care coordination program to provide high quality service and care to [facility] fee-for-service beneficiaries. Lizzie Ramirez was designed to help you enhance your health and well-being through the following services: ? Transitions of Care  support for individuals who are transitioning from one care setting to another (example: Hospital to home). ? Chronic and Complex Care Coordination  support for individuals and caregivers of those with serious or chronic illnesses or with more than one chronic (ongoing) condition and those who take a number of different medications.   
 
 
If you meet specific medical criteria, a [address] [place] [address] Rd may call you directly to coordinate your care with your primary care physician and your other care providers. For questions about the [facility] programs, please, contact your physicians office. For general questions or additional information about Accountable Care Organizations: 
Please visit www.medicare.gov/acos. html or call 1-800-MEDICARE (2-363.344.5301) TTY users should call 0-535.604.2009. [employer] Announcement We are excited to announce that we are making your provider's discharge notes available to you in [employer]. You will see these notes when they are completed and signed by the physician that discharged you from your recent hospital stay. If you have any questions or concerns about any information you see in [employer], please call the Health Information Department where you were seen or reach out to your Primary Care Provider for more information about your plan of care. Introducing [facility] & HEALTH SERVICES! [employer] introduces [employer] patient portal. Now you can access parts of your medical record, email your doctor's office, and request medication refills online. 1. In your internet browser, go to [email or web address] 2. Click on the First Time User? Click Here link in the Sign In box. You will see the New Member Sign Up page. 3. Enter your [employer] Access Code exactly as it appears below. You will not need to use this code after youve completed the sign-up process. If you do not sign up before the expiration date, you must request a new code. · [employer] Access Code: [email or web address] Expires: 6/25/2018  3:22 PM 
 
4. Enter the last four digits of your Social Security Number (xxxx) and Date of Birth (mm/dd/yyyy) as indicated and click Submit. You will be taken to the next sign-up page. 5. Create a [employer] ID. This will be your [employer] login ID and cannot be changed, so think of one that is secure and easy to remember. 6. Create a [employer] password. You can change your password at any time. 7. Enter your Password Reset Question and Answer. This can be used at a later time if you forget your password. 8. Enter your e-mail address. You will receive e-mail notification when new information is available in [address] 9. Click Sign Up. You can now view and download portions of your medical record. 10. Click the Download Summary menu link to download a portable copy of your medical information. If you have questions, please visit the Frequently Asked Questions section of the [employer] website. Remember, [employer] is NOT to be used for urgent needs. For medical emergencies, dial 911. Now available from your iPhone and Android! Introducing Shay Corea As a [employer] patient, I wanted to make you aware of our electronic visit tool called Shay Anmol. [employer] allows you to connect within minutes with a medical provider 24 hours a day, seven days a week via a mobile device or tablet or logging into a secure website from your computer. You can access Shay Corea from anywhere in the [place]. A virtual visit might be right for you when you have a simple condition and feel like you just dont want to get out of bed, or cant get away from work for an appointment, when your regular [employer] provider is not available (evenings, weekends or holidays), or when youre out of town and need minor care. Electronic visits cost only $49 and if the [employer] provider determines a prescription is needed to treat your condition, one can be electronically transmitted to a nearby pharmacy*. Please take a moment to enroll today if you have not already done so. The enrollment process is free and takes just a few minutes. To enroll, please download the [employer] lyle to your tablet or phone, or visit [email or web address] to enroll on your computer.    
And, as an [address] [place] [address] patient with a [employer] account, the results of your visits will be scanned into your electronic medical record and your primary care provider will be able to view the scanned results. We urge you to continue to see your regular [employer] provider for your ongoing medical care. And while your primary care provider may not be the one available when you seek a Shay Corea virtual visit, the peace of mind you get from getting a real diagnosis real time can be priceless. For more information on Shay Waltersheribertofin, view our Frequently Asked Questions (FAQs) at [email or web address]. Sincerely, 
 
Tiffany Anglin MD 
Chief Medical Officer Gissell Krause *:  certain medications cannot be prescribed via Shay Corea Unresulted tests-please follow up with your PCP on these results Procedure/Test Authorizing Provider  2D ECHO COMPLETE ADULT (TTE) W OR WO CONTR Razaak Peterson Saba MD  
 AMPARO QL, W/REFLEX CASCADE Caterina Pace MD  
 ANCA PANEL Caterina Pace MD  
 CBC W/O DIFF Caterina Pace MD  
 CBC WITH AUTOMATED DIFF Dot Tamez MD  
 CBC WITH AUTOMATED DIFF Karla Barrera MD  
 CBC WITH AUTOMATED DIFF Sheryl Fuentes MD  
 CK Caterina Pace MD  
 CK W/ CKMB & INDEX Karla Barrera MD  
 CK W/ CKMB & INDEX Karla Barrera MD  
 COMPLEMENT, C3 Caterina Pace MD  
 COMPLEMENT, C4 Caterina Pace MD  
 CT CODE NEURO HEAD WO CONTRAST Dot Tamez MD  
 [address] [facility] Peterson Saba MD  
 EKG, 12 LEAD, INITIAL MD Randa Medina MD Seleta Bimler, MD  
 FREE LIGHT CHAINS, KAPPA/LAMBDA, QT Caterina Pace MD  
 HEMOGLOBIN A1C WITH Lora Angeles MD  
 HEPATITIS PANEL, ACUTE Caterina Pace MD  
 IMMUNOELECTROPHORESIS ([facility] MD Alka Smalls MD  
 LIPID PANEL MD Carlos Nixon MD MAGNESIUM Hermine Brash, MD  
 Tamia Gale MD  
 MAGNESIUM Gunjan Harry MD  
 METABOLIC PANEL, BASIC Randa Gastelum MD  
 METABOLIC PANEL, BASIC Gunjan Harry MD  
 METABOLIC PANEL, COMPREHENSIVE Mo Zamorano MD  
 METABOLIC PANEL, MD Nurys Garcia MD  
 MRI Vidya Bowles MD  
 PHOSPHORUS Corwin Cervantes MD  
 PROTHROMBIN TIME + INR Mo Zamorano MD  
 RENAL FUNCTION PANEL Gunjan Harry MD  
 SAMPLES BEING HELD Mo Zamorano MD  
 TROPONIN I Jeremiah Pugh, MD Harlon Closs, MD Harlon Closs, MD  
 TSH Shamar Salinas MD  
 URINALYSIS W/MICROSCOPIC Randa Gastelum MD  
 URINE CULTURE HOLD SAMPLE Randa Gastelum MD  
 US RETROPERITONEUM COMP Litzy Kasper MD  
 VITAMIN B12 Corwin Cervantes MD  
 XR CHEST PA LAT Mo Zamorano MD  
  
Providers Seen During Your Hospitalization Provider Specialty Primary office phone Mo Zamorano MD Emergency Medicine [phone] Corwin Cervantes MD Internal Medicine [phone] Randa Gastelum MD Internal Medicine [phone] Ashleigh Lee MD Internal Medicine [phone] Gunjan Harry MD Internal Medicine [phone] Your Primary Care Physician (PCP) Primary Care Physician Office Phone Office Fax Larry Ochoa [phone] You are allergic to the following Allergen Reactions Flexeril (Cyclobenzaprine) Other (comments) Caused pt to fall & hallucination Percocet (Oxycodone-Acetaminophen) Itching Tramadol Other (comments) Caused falls Recent Documentation Weight BMI OB Status Smoking Status 74.5 kg 27.33 kg/m2 Hysterectomy Never Smoker Emergency Contacts Name Discharge Info Relation Home Work Mobile Teresa Childs DISCHARGE CAREGIVER [3] Daughter [21] [phone] Teresa Childs DISCHARGE CAREGIVER [3] Child [2] [phone] Patient Belongings The following personal items are in your possession at time of discharge: 
  Dental Appliances: With patient  Visual Aid: None      Home Medications: None   Jewelry: Sent home  Clothing: At bedside    Other Valuables: Jelly Berumen Please provide this summary of care documentation to your next provider. Signatures-by signing, you are acknowledging that this After Visit Summary has been reviewed with you and you have received a copy. Patient Signature:  ____________________________________________________________ Date:  ____________________________________________________________  
  
[facility] Provider Signature:  ____________________________________________________________ Date:  ____________________________________________________________

## 2018-06-15 NOTE — ED PROVIDER NOTES
HPI Comments: 80 y.o. female with past medical history significant for hypercholesterolemia, hypertension, and diabetes who presents from home via private vehiclewith chief complaint of dizziness. Pt reports onset of symptoms this evening at approximately 1730. Per daughter, the pt had a doctors appointment this morning and was doing fine. Per daughter, they went shopping today and then took the pt home. Per daughter, she found the pt in her bed who \"didn't sound like herself. \" Pt reports she felt dizzy for a brief moment. Per daughter, the pt's blood glucose was 127 and her blood pressure was 110/43. Per daughter, her local pharmacist recommended her to bring the pt to the ED. Pt denies loss of consciousness, headache, diaphoresis, fever, chills, cough, congestion, chest pain, shortness of breath, abdominal pain, nausea, vomiting, diarrhea, difficulty with urination or dysuria. There are no other acute medical concerns at this time. Social hx - Tobacco use: none, Alcohol Use: none    PCP: Darek Timmons MD    Note written by Domingo Quintanilla, as dictated by Juana Corona MD 7:38 PM.        The history is provided by the patient and a relative. No  was used. Past Medical History:   Diagnosis Date    Anxiety     Arthritis     Chest pain 2008    Negative stress test and Holter monitor w/Dr Russ Muller.  Chronic back pain     Chronic venous insufficiency     Colon polyp     last scope normal 2007;  Dr. Campos Million Cyst of right kidney     Depression     Diabetes ([facility] [address]     Hypercholesterolemia     Hypertension     Hypothyroidism     Memory disorder     Other ill-defined conditions(799.89)     heart murmur    Painful hip     Urinary frequency     Se[facility] Longo Mask [facility]       Past Surgical History:   Procedure Laterality Date    COLONOSCOPY,DIAGNOSTIC  6/12/2015         HX BREAST BIOPSY  2002    HX HYSTERECTOMY      Due to a prolapsed uterus    HX KNEE REPLACEMENT      left    HX KNEE REPLACEMENT  2006    right    HX ORTHOPAEDIC      B knees.  HX OTHER SURGICAL      pelvic prolasp    UPPER GI ENDOSCOPY,BIOPSY  6/12/2015              Family History:   Problem Relation Age of Onset    Cancer Mother      pancreatic    Cancer Father      colon    Cancer Sister      pancreatic     Diabetes Sister     Alzheimer Brother        Social History     Social History    Marital status:      Spouse name: N/A    Number of children: N/A    Years of education: N/A     Occupational History    Not on file. Social History Main Topics    Smoking status: Never Smoker    Smokeless tobacco: Never Used    Alcohol use No      Comment: Rare    Drug use: No    Sexual activity: No     Other Topics Concern    Not on file     Social History Narrative         ALLERGIES: Flexeril [cyclobenzaprine]; Percocet [oxycodone-acetaminophen]; and Tramadol    Review of Systems   Constitutional: Negative for chills, fatigue and fever. HENT: Negative for ear pain and sore throat. Eyes: Negative for pain. Respiratory: Negative for chest tightness and shortness of breath. Cardiovascular: Negative for chest pain and leg swelling. Gastrointestinal: Negative for abdominal pain, nausea and vomiting. Genitourinary: Negative for dysuria and flank pain. Musculoskeletal: Negative for back pain. Skin: Negative for rash. Neurological: Positive for dizziness and speech difficulty. Negative for syncope and headaches. All other systems reviewed and are negative. Vitals:    06/15/18 1930 06/15/18 1945 06/15/18 2100 06/15/18 2115   BP: 154/73 153/58 126/55 136/59   Pulse:  75 72 71   Resp:  17 15 17   SpO2:  98% 98% 99%            Physical Exam   Constitutional: She is oriented to person, place, and time. She appears well-developed and well-nourished. No distress. HENT:   Head: Normocephalic and atraumatic.    Nose: Nose normal. Mouth/Throat: Oropharynx is clear and moist.   Eyes: Conjunctivae and EOM are normal. Pupils are equal, round, and reactive to light. No scleral icterus. Neck: Normal range of motion. Neck supple. No JVD present. No tracheal deviation present. No thyromegaly present. No carotid bruits noted. Cardiovascular: Normal rate, regular rhythm, normal heart sounds and intact distal pulses. Exam reveals no gallop and no friction rub. No murmur heard. Pulmonary/Chest: Effort normal and breath sounds normal. No respiratory distress. She has no wheezes. She has no rales. She exhibits no tenderness. Abdominal: Soft. Bowel sounds are normal. She exhibits no distension and no mass. There is no tenderness. There is no rebound and no guarding. Musculoskeletal: Normal range of motion. She exhibits no edema or tenderness. Lymphadenopathy:     She has no cervical adenopathy. Neurological: She is alert and oriented to person, place, and time. She has normal reflexes. No cranial nerve deficit. Coordination normal.   Skin: Skin is warm and dry. No rash noted. No erythema. Psychiatric: She has a normal mood and affect. Her behavior is normal. Judgment and thought content normal.   Nursing note and vitals reviewed.      Note written by Meghna Morillo, as dictated by Brian Mao MD 7:38 PM.    MDM  Number of Diagnoses or Management Options     Amount and/or Complexity of Data Reviewed  Clinical lab tests: ordered and reviewed  Tests in the radiology section of CPT®: reviewed and ordered  Decide to obtain previous medical records or to obtain history from someone other than the patient: yes  Obtain history from someone other than the patient: yes  Review and summarize past medical records: yes  Discuss the patient with other providers: yes  Independent visualization of images, tracings, or specimens: yes    Risk of Complications, Morbidity, and/or Mortality  Presenting problems: high  Diagnostic procedures: high  Management options: high    Patient Progress  Patient progress: stable        ED Course       Procedures    ED EKG interpretation:  7:30 PM  Rhythm: normal sinus rhythm; and regular . Rate (approx.): 75; Axis: left axis deviation; right bundle branch block  Note written by Mary Fallon, as dictated by Key Rondon MD 7:38 PM.    CONSULT NOTE:  7.35 PM Key Rondon MD spoke with Dr. Zeferino Craft, Consult for Neurology. Discussed available diagnostic tests and clinical findings. Dr. Zeferino Craft recommends admitting the pt for further workup. CONSULT NOTE:  8:33 PM Key Rondon MD spoke with Dr. New Carpenter for Hospitalist.  Discussed available diagnostic tests and clinical findings. Dr. Melanie Kim will come evaluate the pt for admission. 9:08 PM  Patient is being admitted to the hospital.  The results of their tests and reasons for their admission have been discussed with them and/or available family. They convey agreement and understanding for the need to be admitted and for their admission diagnosis. Consultation will be made now with the inpatient physician for hospitalization.

## 2018-06-15 NOTE — PROGRESS NOTES
Reviewed record in preparation for visit and have obtained necessary documentation. Identified pt with two pt identifiers(name and ). Chief Complaint   Patient presents with    Diabetes     6 month check up       Health Maintenance Due   Topic Date Due    Diabetic Foot Care  2016    Albumin Urine Test  2017    Hemoglobin A1C    2018       Ms. Coreen Almaguer has a reminder for a \"due or due soon\" health maintenance. I have asked that she discuss this further with her primary care provider for follow-up on this health maintenance. Coordination of Care Questionnaire:  :     1) Have you been to an emergency room, urgent care clinic since your last visit? no   Hospitalized since your last visit? no             2) Have you seen or consulted any other health care providers outside of [address] [place] [address] since your last visit? no  (Include any pap smears or colon screenings in this section.)    3) In the event something were to happen to you and you were unable to speak on your behalf, do you have an Advance Directive/ Living Will in place stating your wishes? NO    Do you have an Advance Directive on file? no    4) Are you interested in receiving information on Advance Directives? NO    Patient is accompanied by daughter I have received verbal consent from Estrellita Proctor to discuss any/all medical information while they are present in the room.    mariel

## 2018-06-15 NOTE — MR AVS SNAPSHOT
[address] 
[phone] Patient: Lillie Manjarrez MRN:  [id number] Visit Information Date & Time Provider Department Dept. Phone Encounter #  
 6/15/2018  1:45 PM Lm Tapia, [address] [place] [address],[address] [phone] [id number] Follow-up Instructions Return in about 6 months (around 12/15/2018) for DM. Upcoming Health Maintenance Date Due  
 FOOT EXAM Q1 2/24/2016 MICROALBUMIN Q1 12/16/2017 HEMOGLOBIN A1C Q6M 6/18/2018 MEDICARE YEARLY EXAM 6/17/2018 Influenza Age 5 to Adult 8/1/2018 EYE EXAM RETINAL OR DILATED Q1 11/15/2018 LIPID PANEL Q1 12/18/2018 GLAUCOMA SCREENING Q2Y 11/15/2019 DTaP/Tdap/Td series (2 - Td) 11/10/2025 Allergies as of 6/15/2018  Review Complete On: 6/15/2018 By: Lm Tapia MD  
  
 Severity Noted Reaction Type Reactions Flexeril [Cyclobenzaprine]  02/27/2017    Other (comments) Caused pt to fall & hallucination Percocet [Oxycodone-acetaminophen]  10/16/2009    Itching Tramadol  02/27/2017    Other (comments) Caused falls Current Immunizations  Reviewed on 12/16/2016 Name Date Influenza Vaccine 9/16/2016, 10/15/2015, 9/2/2014 Pneumococcal Conjugate (PCV-13) 6/16/2017  2:45 PM  
 Pneumococcal Polysaccharide (PPSV-23) 11/10/2015 Tdap 11/10/2015 ZZZ-RETIRED (DO NOT USE) Pneumococcal Vaccine (Unspecified Type)  Deferred (Patient Refused) Not reviewed this visit You Were Diagnosed With   
  
 Codes Comments Controlled type 2 diabetes mellitus without complication, unspecified whether long term insulin use ([facility]    -  Primary ICD-10-CM: E11.9 ICD-9-CM: 250.00 Essential hypertension     ICD-10-CM: I10 
ICD-9-CM: 401.9 Hypercholesterolemia     ICD-10-CM: E78.00 ICD-9-CM: 272.0 Hypothyroidism due to acquired atrophy of thyroid     ICD-10-CM: E03.4 ICD-9-CM: 244.8, 246.8 Depression, unspecified depression type     ICD-10-CM: F32.9 ICD-9-CM: 466 Medicare annual wellness visit, subsequent     ICD-10-CM: Z00.00 ICD-9-CM: V70.0 Anal symptoms     ICD-10-CM: R19.8 ICD-9-CM: 787.99 Vitals BP Pulse Temp Resp Height(growth percentile) Weight(growth percentile) 121/71 (BP 1 Location: Right arm, BP Patient Position: Sitting) 86 97.8 °F (36.6 °C) (Oral) 18 5' 5\" (1.651 m) 156 lb 12.8 oz (71.1 kg) SpO2 BMI OB Status Smoking Status 99% 26.09 kg/m2 Hysterectomy Never Smoker Vitals History BMI and BSA Data Body Mass Index Body Surface Area 26.09 kg/m 2 1.81 m 2 Preferred Pharmacy Pharmacy Name Phone [address] [phone] Your Updated Medication List  
  
   
This list is accurate as of 6/15/18  2:39 PM.  Always use your most recent med list.  
  
  
  
  
 aspirin delayed-release 81 mg tablet Take 81 mg by mouth daily. Blood-Glucose Meter monitoring kit Commonly known as:  Lv Maldonado Check blood sugar daily, as directed CALCIUM + D PO Take  by mouth. COQ-10 PO Take  by mouth.  
  
 cyanocobalamin 1,000 mcg tablet Take 1 Tab by mouth daily. diosmin complex no. 1 630 mg Tab Commonly known as:  [place]  Take 1 Tab by mouth daily. glucose blood VI test strips strip Commonly known as:  ONETOUCH ULTRA TEST Check fsbs up to bid E11.9 Lancets Misc Commonly known as: One Touch Bard Joaquín Test up to bid Leg Brace Misc Commonly known as:  KNEE SUPPORT BRACE Bilateral knee instability. Use as directed * levothyroxine 50 mcg tablet Commonly known as:  SYNTHROID  
TAKE 1 TABLET BY MOUTH DAILY BEFORE BREAKFAST * levothyroxine 50 mcg tablet Commonly known as:  SYNTHROID  
TAKE 1 TABLET BY MOUTH DAILY BEFORE BREAKFAST * levothyroxine 50 mcg tablet Commonly known as:  SYNTHROID  
TAKE 1 TABLET BY MOUTH DAILY BEFORE BREAKFAST  
  
 lisinopril-hydroCHLOROthiazide 20-25 mg per tablet Commonly known as:  PRINZIDE, ZESTORETIC  
TAKE 1 TABLET BY MOUTH EVERY DAY  
  
 metFORMIN 500 mg Tg24 24 hour tablet Commonly known asElio Garcia ER Take 500 mg by mouth two (2) times a day. miscellaneous medical supply [facility] Adult Diapers, use as directed  
  
 polyethylene glycol 17 gram packet Commonly known as:  Arben Spaniel Take 1 Packet by mouth daily. rosuvastatin 20 mg tablet Commonly known as:  CRESTOR  
TAKE 1 TABLET BY MOUTH EVERY DAY  
  
 * Notice: This list has 3 medication(s) that are the same as other medications prescribed for you. Read the directions carefully, and ask your doctor or other care provider to review them with you. Prescriptions Sent to Pharmacy Refills  
 metFORMIN (GLUMETZA ER) 500 mg TG24 24 hour tablet 3 Sig: Take 500 mg by mouth two (2) times a day. Class: Normal  
 Pharmacy: [facility] [address] [place] [address] #: [phone] Route: Oral  
  
We Performed the Following  DIABETES FOOT EXAM [HM7 Custom] REFERRAL TO COLON AND RECTAL SURGERY [REF17 Custom] Follow-up Instructions Return in about 6 months (around 12/15/2018) for DM. Referral Information Referral ID Referred By Referred To  
  
 [id number] Dave PORTILLO MD   
   [facility] [address] Phone: [phone] Fax: [phone] Visits Status Start Date End Date 1 New Request 6/15/18 6/15/19 If your referral has a status of pending review or denied, additional information will be sent to support the outcome of this decision. Patient Instructions Medicare Wellness Visit, Female The best way to live healthy is to have a lifestyle where you eat a well-balanced diet, exercise regularly, limit alcohol use, and quit all forms of tobacco/nicotine, if applicable. Regular preventive services are another way to keep healthy. Preventive services (vaccines, screening tests, monitoring & exams) can help personalize your care plan, which helps you manage your own care. Screening tests can find health problems at the earliest stages, when they are easiest to treat. DeniaLon Autumn Krause follows the current, evidence-based guidelines published by the [facility] ([facility]) when recommending preventive services for our patients. Because we follow these guidelines, sometimes recommendations change over time as research supports it. (For example, mammograms used to be recommended annually. Even though Medicare will still pay for an annual mammogram, the newer guidelines recommend a mammogram every two years for women of average risk.) Of course, you and your provider may decide to screen more often for some diseases, based on your risk and co-morbidities (chronic disease you are already diagnosed with). Preventive services for you include: - Medicare offers their members a free annual wellness visit, which is time for you and your primary care provider to discuss and plan for your preventive service needs. Take advantage of this benefit every year! 
 
-All people over age 72 should receive the recommended pneumonia vaccines. Current USPSTF guidelines recommend a series of two vaccines for the best pneumonia protection.  
 
-All adults should have a yearly flu vaccine and a tetanus vaccine every 10 years. All adults age 61 years should receive a shingles vaccine once in their lifetime.   
 
-A bone mass density test is recommended when a woman turns 65 to screen for osteoporosis. This test is only recommended once as a screening.  Some providers will use this same test as a disease monitoring tool if you already have osteoporosis. -All adults age 38-68 years who are overweight should have a diabetes screening test once every three years.  
 
-Other screening tests & preventive services for persons with diabetes include: an eye exam to screen for diabetic retinopathy, a kidney function test, a foot exam, and stricter control over your cholesterol.  
 
-Cardiovascular screening for adults with routine risk involves an electrocardiogram (ECG) at intervals determined by the provider.  
 
-Colorectal cancer screenings should be done for adults age 54-65 years with normal risk. There are a number of acceptable methods of screening for this type of cancer. Each test has its own benefits and drawbacks. Discuss with your provider what is most appropriate for you during your annual wellness visit. The different tests include: colonoscopy (considered the best screening method), a fecal occult blood test, a fecal DNA test, and sigmoidoscopy. -Breast cancer screenings are recommended every other year for women of normal risk age 54-69 years.  
 
-Cervical cancer screenings for women over age 72 are only recommended with certain risk factors.  
 
-All adults born between [facility] should be screened once for Hepatitis C. Here is a list of your current Health Maintenance items (your personalized list of preventive services) with a due date: 
Health Maintenance Due Topic Date Due  
 Diabetic Foot Care  02/24/2016  Albumin Urine Test  12/16/2017  Hemoglobin A1C    06/18/2018 Introducing [facility]! [facility] introduces [employer] patient portal. Now you can access parts of your medical record, email your doctor's office, and request medication refills online. 1. In your internet browser, go to [email or web address] 2. Click on the First Time User? Click Here link in the Sign In box. You will see the New Member Sign Up page. 3. Enter your Magic Leap Access Code exactly as it appears below. You will not need to use this code after youve completed the sign-up process. If you do not sign up before the expiration date, you must request a new code. · Magic Leap Access Code: [email or web address] Expires: 6/25/2018  3:22 PM 
 
4. Enter the last four digits of your Social Security Number (xxxx) and Date of Birth (mm/dd/yyyy) as indicated and click Submit. You will be taken to the next sign-up page. 5. Create a Magic Leap ID. This will be your Magic Leap login ID and cannot be changed, so think of one that is secure and easy to remember. 6. Create a Magic Leap password. You can change your password at any time. 7. Enter your Password Reset Question and Answer. This can be used at a later time if you forget your password. 8. Enter your e-mail address. You will receive e-mail notification when new information is available in [address]. 9. Click Sign Up. You can now view and download portions of your medical record. 10. Click the Download Summary menu link to download a portable copy of your medical information. If you have questions, please visit the Frequently Asked Questions section of the Magic Leap website. Remember, Magic Leap is NOT to be used for urgent needs. For medical emergencies, dial 911. Now available from your iPhone and Android! Please provide this summary of care documentation to your next provider. Your primary care clinician is listed as [place]. If you have any questions after today's visit, please call [phone].
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
## 2018-06-15 NOTE — PROGRESS NOTES
This is the Subsequent Medicare Annual Wellness Exam, performed 12 months or more after the Initial AWV or the last Subsequent AWV    I have reviewed the patient's medical history in detail and updated the computerized patient record. History     Past Medical History:   Diagnosis Date    Anxiety     Arthritis     Chest pain 2008    Negative stress test and Holter monitor w/Dr Eligio Mercado.  Chronic back pain     Chronic venous insufficiency     Colon polyp     last scope normal 2007; Dr. Rell Mandujano Cyst of right kidney     Depression     Diabetes ([facility] [address]     Hypercholesterolemia     Hypertension     Hypothyroidism     Memory disorder     Other ill-defined conditions(799.89)     heart murmur    Painful hip     Urinary frequency     Sees Dr. Catrachito Drew with [facility]      Past Surgical History:   Procedure Laterality Date    COLONOSCOPY,DIAGNOSTIC  6/12/2015         HX BREAST BIOPSY  2002    HX HYSTERECTOMY      Due to a prolapsed uterus    HX KNEE REPLACEMENT      left    HX KNEE REPLACEMENT  2006    right    HX ORTHOPAEDIC      B knees.  HX OTHER SURGICAL      pelvic prolasp    UPPER GI ENDOSCOPY,BIOPSY  6/12/2015          Current Outpatient Prescriptions   Medication Sig Dispense Refill    metFORMIN (GLUCOPHAGE) 1,000 mg tablet TAKE 1 TABLET BY MOUTH TWICE DAILY WITH MEALS 180 Tab 0    rosuvastatin (CRESTOR) 20 mg tablet TAKE 1 TABLET BY MOUTH EVERY DAY 90 Tab 0    lisinopril-hydroCHLOROthiazide (PRINZIDE, ZESTORETIC) 20-25 mg per tablet TAKE 1 TABLET BY MOUTH EVERY DAY 90 Tab 0    diosmin complex no.1 (VASCULERA) 630 mg tab Take 1 Tab by mouth daily. 30 Each 11    polyethylene glycol (MIRALAX) 17 gram packet Take 1 Packet by mouth daily. 30 Packet 11    levothyroxine (SYNTHROID) 50 mcg tablet TAKE 1 TABLET BY MOUTH DAILY BEFORE BREAKFAST 90 Tab 3    Leg Brace (KNEE BRACE) misc Bilateral knee instability.    Use as directed 2 Each 0    miscellaneous medical supply misc Adult Diapers, use as directed 30 Each 11    levothyroxine (SYNTHROID) 50 mcg tablet TAKE 1 TABLET BY MOUTH DAILY BEFORE BREAKFAST 30 Tab 0    levothyroxine (SYNTHROID) 50 mcg tablet TAKE 1 TABLET BY MOUTH DAILY BEFORE BREAKFAST 30 Tab 0    glucose blood VI test strips (ONETOUCH ULTRA TEST) strip Check fsbs up to bid E11.9 100 Strip 11    Blood-Glucose Meter (ONETOUCH ULTRA2) monitoring kit Check blood sugar daily, as directed 1 Kit 0    Lancets (ONE TOUCH DELICA) misc Test up to bid 1 Package 11    cyanocobalamin 1,000 mcg tablet Take 1 Tab by mouth daily. 30 Each 11    aspirin delayed-release 81 mg tablet Take 81 mg by mouth daily.  CALCIUM CARBONATE/VITAMIN D3 (CALCIUM + D PO) Take  by mouth.  UBIDECARENONE (COQ-10 PO) Take  by mouth.        Allergies   Allergen Reactions    Flexeril [Cyclobenzaprine] Other (comments)     Caused pt to fall & hallucination    Percocet [Oxycodone-Acetaminophen] Itching    Tramadol Other (comments)     Caused falls      Family History   Problem Relation Age of Onset   Sherry Barrs Cancer Mother      pancreatic    Cancer Father      colon    Cancer Sister      pancreatic     Diabetes Sister     Alzheimer Brother      Social History   Substance Use Topics    Smoking status: Never Smoker    Smokeless tobacco: Never Used    Alcohol use No      Comment: Rare     Patient Active Problem List   Diagnosis Code    Hypertension I10    Hypercholesterolemia E78.00    Hypothyroidism E03.9    Depression F32.9    Anxiety F41.9    Urinary frequency R35.0    Chronic venous insufficiency I87.2    Chronic back pain M54.9, G89.29    Painful hip M25.559    Syncope and collapse R55    Diabetes mellitus (HCC) E11.9    Somatization disorder F45.0    ADD (attention deficit disorder) F98.8    Postmenopausal atrophic vaginitis N95.2    History of hysterectomy including cervix Z90.710    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 Functional Ability and Level of Safety:   Hearing Loss  The patient wears hearing aids. Activities of Daily Living  The home contains: Uses cane to ambulate. +Grab bars, shower chair. Patient does total self care    Fall Risk  Fall Risk Assessment, last 12 mths 6/15/2018   Able to walk? Yes   Fall in past 12 months? No   Fall with injury? -   Number of falls in past 12 months -   Fall Risk Score -       Abuse Screen  Patient is not abused    Cognitive Screening   Evaluation of Cognitive Function:  Has your family/caregiver stated any concerns about your memory: yes  MMSE 26/30 (-3 recall, -1 orientation to date)    Patient Care Team   Patient Care Team:  Gina Noriega MD as PCP - Monique Wells MD (Ophthalmology)  Rowan Fu MD (Gastroenterology)  Gibran Danielson MD (Neurology)  Italia Stovall PsyD (Psychology)    Assessment/Plan   Education and counseling provided:  Are appropriate based on today's review and evaluation    Diagnoses and all orders for this visit:    1. Controlled type 2 diabetes mellitus without complication, unspecified whether long term insulin use (HCC)  -      DIABETES FOOT EXAM    2. Essential hypertension    3. Hypercholesterolemia    4. Hypothyroidism due to acquired atrophy of thyroid    5. Depression, unspecified depression type    6.  Medicare annual wellness visit, subsequent        Health Maintenance Due   Topic Date Due    FOOT EXAM Q1  02/24/2016    MICROALBUMIN Q1  12/16/2017    HEMOGLOBIN A1C Q6M  06/18/2018

## 2018-06-15 NOTE — PATIENT INSTRUCTIONS
Medicare Wellness Visit, Female    The best way to live healthy is to have a lifestyle where you eat a well-balanced diet, exercise regularly, limit alcohol use, and quit all forms of tobacco/nicotine, if applicable. Regular preventive services are another way to keep healthy. Preventive services (vaccines, screening tests, monitoring & exams) can help personalize your care plan, which helps you manage your own care. Screening tests can find health problems at the earliest stages, when they are easiest to treat. [facility] follows the current, evidence-based guidelines published by the [facility] ([facility]) when recommending preventive services for our patients. Because we follow these guidelines, sometimes recommendations change over time as research supports it. (For example, mammograms used to be recommended annually. Even though Medicare will still pay for an annual mammogram, the newer guidelines recommend a mammogram every two years for women of average risk.)    Of course, you and your provider may decide to screen more often for some diseases, based on your risk and co-morbidities (chronic disease you are already diagnosed with). Preventive services for you include:    - Medicare offers their members a free annual wellness visit, which is time for you and your primary care provider to discuss and plan for your preventive service needs. Take advantage of this benefit every year!    -All people over age 72 should receive the recommended pneumonia vaccines. Current USPSTF guidelines recommend a series of two vaccines for the best pneumonia protection.     -All adults should have a yearly flu vaccine and a tetanus vaccine every 10 years. All adults age 61 years should receive a shingles vaccine once in their lifetime.      -A bone mass density test is recommended when a woman turns 65 to screen for osteoporosis.  This test is only recommended once as a screening. Some providers will use this same test as a disease monitoring tool if you already have osteoporosis. -All adults age 38-68 years who are overweight should have a diabetes screening test once every three years.     -Other screening tests & preventive services for persons with diabetes include: an eye exam to screen for diabetic retinopathy, a kidney function test, a foot exam, and stricter control over your cholesterol.     -Cardiovascular screening for adults with routine risk involves an electrocardiogram (ECG) at intervals determined by the provider.     -Colorectal cancer screenings should be done for adults age 54-65 years with normal risk. There are a number of acceptable methods of screening for this type of cancer. Each test has its own benefits and drawbacks. Discuss with your provider what is most appropriate for you during your annual wellness visit. The different tests include: colonoscopy (considered the best screening method), a fecal occult blood test, a fecal DNA test, and sigmoidoscopy. -Breast cancer screenings are recommended every other year for women of normal risk age 54-69 years.     -Cervical cancer screenings for women over age 72 are only recommended with certain risk factors.     -All adults born between [facility] should be screened once for Hepatitis C.      Here is a list of your current Health Maintenance items (your personalized list of preventive services) with a due date:  Health Maintenance Due   Topic Date Due    Diabetic Foot Care  02/24/2016    Albumin Urine Test  12/16/2017    Hemoglobin A1C    06/18/2018

## 2018-06-15 NOTE — ED TRIAGE NOTES
Triage note: about 45 minutes pt started not feeling well, complaining of dizziness and daughter reports abnormal speak. Symptoms now resolved.  NIH - 0  Glucose - 137

## 2018-06-16 PROBLEM — N17.9 ARF (ACUTE RENAL FAILURE) (HCC): Status: ACTIVE | Noted: 2018-01-01

## 2018-06-16 NOTE — ED NOTES
TRANSFER - OUT REPORT:    Verbal report given to Lizandro Ch RN(name) on Vena Figures  being transferred to NEURO(unit) for routine progression of care       Report consisted of patients Situation, Background, Assessment and   Recommendations(SBAR). Information from the following report(s) SBAR, ED Summary, Intake/Output, MAR, Recent Results and Cardiac Rhythm SR was reviewed with the receiving nurse. Lines:   Peripheral IV 06/15/18 Right Wrist (Active)   Site Assessment Clean, dry, & intact 6/15/2018  7:35 PM   Phlebitis Assessment 0 6/15/2018  7:35 PM   Infiltration Assessment 0 6/15/2018  7:35 PM   Dressing Status Clean, dry, & intact 6/15/2018  7:35 PM   Dressing Type Tape;Transparent 6/15/2018  7:35 PM   Hub Color/Line Status Pink;Capped;Flushed;Patent 6/15/2018  7:35 PM   Action Taken Blood drawn 6/15/2018  7:35 PM        Opportunity for questions and clarification was provided.       Patient transported with:   [employer]

## 2018-06-16 NOTE — PROGRESS NOTES
physical Therapy EVALUATION/DISCHARGE  Patient: Tim Caal (85 y.o. female)  Date: 6/16/2018  Primary Diagnosis: TIA (transient ischemic attack)        Precautions:   Fall  ASSESSMENT :  Based on the objective data described below, the patient presents with good strength, balance, and mobility at baseline following admission for TIA. CT scan and MRI negative at this time. Patient is overall mod I for transfers and gait. She ambulated around unit with SPC without LOB or gait deviations. Patient scored a 42/56 on GUTIERREZ this date indicating low fall risk. No reports of dizziness throughout session with position changes or mobility. She reports no concerns with going home. Will complete order. Skilled physical therapy is not indicated at this time. PLAN :  Discharge Recommendations: None  Further Equipment Recommendations for Discharge: Owns [facility]     SUBJECTIVE:   Patient stated I wasn't having difficulty talking I was just tired.     OBJECTIVE DATA SUMMARY:   HISTORY:    Past Medical History:   Diagnosis Date    Anxiety     Arthritis     Chest pain 2008    Negative stress test and Holter monitor w/Dr Andrea Rodriguez.  Chronic back pain     Chronic venous insufficiency     Colon polyp     last scope normal 2007; Dr. Bailey Correa Cyst of right kidney     Depression     Diabetes ([facility] [address]     Hypercholesterolemia     Hypertension     Hypothyroidism     Memory disorder     Other ill-defined conditions(799.89)     heart murmur    Painful hip     Urinary frequency     Sees Dr. Aleksandra Hammonds with [facility]     Past Surgical History:   Procedure Laterality Date    COLONOSCOPY,DIAGNOSTIC  6/12/2015         HX BREAST BIOPSY  2002    HX HYSTERECTOMY      Due to a prolapsed uterus    HX KNEE REPLACEMENT      left    HX KNEE REPLACEMENT  2006    right    HX ORTHOPAEDIC      B knees.      HX OTHER SURGICAL      pelvic prolasp    UPPER GI ENDOSCOPY,BIOPSY  6/12/2015          Prior Level of Function/Home Situation: mod I with SPC  Personal factors and/or comorbidities impacting plan of care:     Home Situation  Home Environment: Apartment  # Steps to Enter: 0  One/Two Story Residence: One story  Living Alone: Yes  Support Systems: Child(nicanor)  Patient Expects to be Discharged to[de-identified] Private residence  Current DME Used/Available at Home: [facility] [address], straight, Walker, rolling, Walker, rollator    EXAMINATION/PRESENTATION/DECISION MAKING:   Critical Behavior:  Neurologic State: Alert  Orientation Level: Oriented X4  Cognition: Appropriate for age attention/concentration     Hearing: Auditory  Auditory Impairment: Hard of hearing, bilateral  Skin:    Edema:   Range Of Motion:  AROM: Within functional limits           PROM: Within functional limits           Strength:    Strength: Within functional limits (5/5 B LE's)                    Tone & Sensation:                                  Coordination:     Vision:      Functional Mobility:  Bed Mobility:     Supine to Sit:  (received up in chair)        Transfers:  Sit to Stand: Modified independent  Stand to Sit: Modified independent  Stand Pivot Transfers: Modified independent                    Balance:   Sitting: Intact  Standing: Intact  Ambulation/Gait Training:  Distance (ft): 350 Feet (ft)  Assistive Device: Cane, straight  Ambulation - Level of Assistance: Modified independent                                                    Stairs:                Therapeutic Exercises:       Functional Measure:  Tavarez Balance Test:    Sitting to Standin  Standing Unsupported: 4  Sitting with Back Unsupported: 4  Standing to Sittin  Transfers: 4  Standing Unsupported with Eyes Closed: 4  Standing Unsupported with Feet Together: 4  Reach Forward with Outstretched Arm: 4   Object: 3  Turn to Look Over Shoulders: 4  Turn 360 Degrees: 1  Alternate Foot on Step/Stool: 0  Standing Unsupported One Foot in Front: 1  Stand on One Le  Total: 42         56=Maximum possible score;   0-20=High fall risk  21-40=Moderate fall risk   41-56=Low fall risk     Gutierrez Balance Test and G-code impairment scale:  Percentage of Impairment CH    0%   CI    1-19% CJ    20-39% CK    40-59% CL    60-79% CM    80-99% CN     100%   Gutierrez   Score 0-56 56 45-55 34-44 23-33 12-22 1-11 0           G codes: In compliance with CMSs Claims Based Outcome Reporting, the following G-code set was chosen for this patient based on their primary functional limitation being treated: The outcome measure chosen to determine the severity of the functional limitation was the GUTIERREZ with a score of 42/56 which was correlated with the impairment scale. ? Mobility - Walking and Moving Around:     - CURRENT STATUS: CJ - 20%-39% impaired, limited or restricted    - GOAL STATUS: CJ - 20%-39% impaired, limited or restricted    - D/C STATUS:  CJ - 20%-39% impaired, limited or restricted            Pain:  Pain Scale 1: Numeric (0 - 10)  Pain Intensity 1: 0     Activity Tolerance:   Good  Please refer to the flowsheet for vital signs taken during this treatment. After treatment:   [x]   Patient left in no apparent distress sitting up in chair  []   Patient left in no apparent distress in bed  [x]   Call bell left within reach  [x]   Nursing notified  []   Caregiver present  []   Bed alarm activated    COMMUNICATION/EDUCATION:   Communication/Collaboration:  [x]   Fall prevention education was provided and the patient/caregiver indicated understanding. [x]   Patient/family have participated as able and agree with findings and recommendations. []   Patient is unable to participate in plan of care at this time.   Findings and recommendations were discussed with: Registered Nurse    Thank you for this referral.  Bertha Rios, PT   Time Calculation: 11 mins

## 2018-06-16 NOTE — H&P
[address]   HISTORY AND PHYSICAL      Matt Turner  MR#: [id number]  : 1932  ACCOUNT #: [de-identified]   ADMIT DATE: 06/15/2018    Observation order was placed at about 2030 hours, and the patient was seen shortly after that. PRIMARY CARE PHYSICIAN:  Dr. Kaitlin Funes. SOURCE OF INFORMATION:  The patient and the ED medical record. CHIEF COMPLAINT:  Dizziness. HISTORY OF PRESENT ILLNESS:  This is an 59-year-old woman with a past medical history significant for hypertension, type 2 diabetes, dyslipidemia, hypothyroidism, memory impairment was in her usual state of health until the day of her presentation at the emergency room when the patient developed dizziness. The dizziness was for a brief moment early in the day. The patient went for a doctor's appointment, was told that everything is fine. She also went shopping with her daughter. When they got home, the daughter said that the patient did not sound like herself, and the patient developed the dizzy spell. Daughter checked the patient's blood pressure; at that time it was 110/43. Her blood sugar was also 127. The daughter noticed that the patient's speech is different. The dizziness is not related to any particular movement. Patient did not describe the sensation of a room spinning around her. No history of fever, no rigors, no chills. Patient was brought to the emergency room for further evaluation. By the time the patient arrived at the emergency room her symptoms had resolved. NIH scale was 0. CT scan of the head was obtained. This was negative for acute pathology. Patient was subsequently referred to the hospitalist service for evaluation for admission. PAST MEDICAL HISTORY:  Type 2 diabetes, hypertension, dyslipidemia, hypothyroidism, memory impairment. ALLERGIES:  PATIENT IS ALLERGIC TO PERCOCET, TRAMADOL, FLEXERIL.     MEDICATIONS:  Aspirin 81 mg daily, Synthroid 50 mcg daily, lisinopril/hydrochlorothiazide 20/25 one tablet daily, Glucophage 500 mg twice daily, Remeron 15 mg daily, Crestor 20 mg daily, MiraLax 17 grams 1 packet daily. FAMILY HISTORY:  This was reviewed. Her mother had pancreatic cancer. Her father had colon cancer. PAST SURGICAL HISTORY:  This is significant for hysterectomy, bilateral knee replacement, surgery for pelvic prolapse. SOCIAL HISTORY:  No history of alcohol or tobacco abuse. REVIEW OF SYSTEMS:  HEAD, EYES, EARS, NOSE AND THROAT:  This is positive for dizziness. No headache, no blurring of vision, no photophobia. RESPIRATORY SYSTEM:  No cough, no shortness of breath, no hemoptysis. CARDIOVASCULAR SYSTEM:  No chest pain, no orthopnea, no palpitations. GASTROINTESTINAL SYSTEM:  No nausea and vomiting, no diarrhea, no constipation. GENITOURINARY SYSTEM:  No dysuria, no urgency, no frequency. All other systems are reviewed, and they are negative. PHYSICAL EXAMINATION:  GENERAL APPEARANCE:  The patient appeared ill, in moderate distress. VITAL SIGNS:  On arrival at the emergency room, temperature not available, pulse 75, respiratory rate 17, blood pressure 154/73, oxygen saturation 98%. HEAD:  Normocephalic, atraumatic. EYES:  Normal eye movements. No redness, no drainage, no discharge. EARS:  Normal external ears with no obvious drainage. NOSE:  No deformity, no drainage. MOUTH AND THROAT:  No visible oral admission. Dry oral mucosa. NECK:  Supple, no JVD, no thyromegaly. CHEST:  Clear breath sounds. No wheezing, no crackles. HEART:  Normal S1 and S2, regular. No clinically appreciable murmur. ABDOMEN:  Soft, nontender, normal bowel sounds. CENTRAL NERVOUS SYSTEM:  Alert, oriented x3. No gross focal neurological deficits. EXTREMITIES:  No edema. Pulses 2+ bilaterally. MUSCULOSKELETAL SYSTEM:  No obvious joint deformity and swelling. SKIN:  No active skin lesions seen in the exposed part of the body.   PSYCHIATRIC: Normal mood and affect. LYMPHATIC SYSTEM:  No cervical lymphadenopathy. DIAGNOSTIC DATA:  Chest x-ray, no acute pathology. EKG shows normal sinus rhythm, left ventricular hypertrophy and nonspecific ST and T-wave abnormalities. CT scan of the head without contrast negative. LABORATORY DATA:  Hematology:  WBC 6.6, hemoglobin 9.4, hematocrit 29.5, platelet 016. Cardiac profile:  Troponin less than 0.05. Chemistry:  Sodium 138, potassium 2.5, chloride 97, CO2 of 28, glucose 110, BUN 28, creatinine 3.21, calcium 8.8, bilirubin total 0.4, ALT 11, AST 21, alkaline phosphatase 67, total protein 7.4, albumin level 3.3, globulin 4.1. Coagulation profile:  INR 1.0, PT 10.4. ASSESSMENT:  1. TIA. 2.  Hypokalemia. 3.  Type 2 diabetes. 4.  Hypertension. 5.  Dyslipidemia. 6.  Hypothyroidism. 7.  Acute renal failure. 8.  Abnormal EKG. 9.  Anemia. PLAN:  1. TIA. We will place the patient on observation. We will continue with aspirin therapy. We will check MRI/MRA of the brain, carotid ultrasound of the neck, as well as an echocardiogram.  Inpatient neurology consult to be requested to assist in evaluation and treatment of a TIA. 2.  Hypokalemia. We will replace potassium. Repeat potassium levels. We will also check magnesium levels. 3.  Type 2 diabetes. Patient will be placed on sliding scale with insulin coverage. We will check hemoglobin A1c level. We will hold oral agents until this time of discharge from the hospital.  4.  Hypertension. We will hold preadmission medications lisinopril/hydrochlorothiazide because of these acute renal failure. Patient will be placed on hydralazine as needed for blood pressure control. 5.  Dyslipidemia. We will continue with home medications. We will check a lipid panel. 6.  Hypothyroidism. We will continue with Synthroid. We will check a TSH level. 7.  Acute renal failure. This is most likely due to volume depletion.   We will carry out hydration with normal saline. We will hold hydrochlorothiazide and lisinopril. We will also hold the Glucophage since this medication could be contributing to the patient's acute renal failure. We will repeat a renal function. If there is no improvement with hydration, patient will require further evaluation such as a renal ultrasound and a nephrology consult. 8.  Abnormal EKG. The patient has no chest pain. We will obtain serial cardiac markers. Patient may require a cardiology consult for further evaluation of the abnormal EKG  9. Anemia. This is most likely due to chronic disease. We will carry out an anemia workup including checking stool guaiac to rule out occult GI bleed. 10.  Other issues. CODE STATUS:  THE PATIENT IS A FULL CODE. We will place the patient on heparin for DVT prophylaxis.       Kristen Hogan MD       RE/MN  D: 06/16/2018 00:59     T: 06/16/2018 03:05  JOB #: [id number]  CC: Ezio Becerril MD

## 2018-06-16 NOTE — ROUTINE PROCESS
Bedside and Verbal shift change report given to Sherita Jones (oncoming nurse) by Tayla Rivers (offgoing nurse). Report included the following information SBAR, Kardex, Intake/Output, MAR, Recent Results, Med Rec Status and Cardiac Rhythm NSR, 1st degree block.

## 2018-06-16 NOTE — PROGRESS NOTES
PT Note:    Orders received, chart reviewed and patient evaluated by physical therapy. Recommend patient to discharge to home without services. Recommend with nursing patient to complete as able in order to maintain strength, endurance and independence: OOB to chair 3x/day with supervision and ambulating with SPC. Thank you for your assistance. Full evaluation to follow.

## 2018-06-16 NOTE — ROUTINE PROCESS
TRANSFER - OUT REPORT:    Verbal report given to Kati Mchugh (name) on Corrinne Lemma  being transferred to [facility] (unit) for routine progression of care  . Report consisted of patients Situation, Background, Assessment and   Recommendations(SBAR). Information from the following report(s) SBAR, Kardex, Recent Results and Cardiac Rhythm NSR c 1 AVB was reviewed with the receiving nurse. Lines:   Peripheral IV 06/15/18 Left Arm (Active)   Site Assessment Clean, dry, & intact 6/16/2018 12:00 PM   Phlebitis Assessment 0 6/16/2018 12:00 PM   Infiltration Assessment 0 6/16/2018 12:00 PM   Dressing Status Clean, dry, & intact 6/16/2018 12:00 PM   Dressing Type Transparent;Tape 6/16/2018 12:00 PM   Hub Color/Line Status Pink; Infusing 6/16/2018 12:00 PM   Action Taken Open ports on tubing capped 6/16/2018 12:00 PM   Alcohol Cap Used Yes 6/16/2018 12:00 PM        Opportunity for questions and clarification was provided.       Patient transported with:   Belongings, in bed

## 2018-06-16 NOTE — CONSULTS
NEUROLOGY  6/16/2018     Consulted by: Chapo Fofana MD        Patient ID:  Vena Figures  [id number]  80 y.o.  2/7/1932    Chief Complaint   Patient presents with    Fatigue   Cc: Decreased responsiveness    HPI    Ms. Dorene Edwards is an 27-year-old woman with multiple risk factors for stroke who was brought into the hospital when her daughter noticed that the patient had some decreased responsiveness possibly some slurred speech. There is reports of dizziness. However, patient tells me that she felt somewhat nauseous and sick yesterday. She had low blood pressure. She since feels back to her baseline. She does admit to some occasional age-related memory loss but she has no difficulty with ADLs she tells me. She also tells me she has been having reduced appetite now for several months. She denies any double vision or acute numbness or weakness. She walks with a cane. Review of Systems   Constitutional: Positive for malaise/fatigue and weight loss. Gastrointestinal: Positive for nausea. All other systems reviewed and are negative. Past Medical History:   Diagnosis Date    Anxiety     Arthritis     Chest pain 2008    Negative stress test and Holter monitor w/Dr Russ Muller.  Chronic back pain     Chronic venous insufficiency     Colon polyp     last scope normal 2007;  Dr. Campos Million Cyst of right kidney     Depression     Diabetes ([facility] [address]     Hypercholesterolemia     Hypertension     Hypothyroidism     Memory disorder     Other ill-defined conditions(799.89)     heart murmur    Painful hip     Urinary frequency     Sees [facility] Donta Nassar with [facility]     Family History   Problem Relation Age of Onset    Cancer Mother      pancreatic    Cancer Father      colon    Cancer Sister      pancreatic     Diabetes Sister     Alzheimer Brother      Social History     Social History    Marital status:      Spouse name: N/A    Number of children: N/A    Years of education: N/A     Occupational History    Not on file. Social History Main Topics    Smoking status: Never Smoker    Smokeless tobacco: Never Used    Alcohol use No      Comment: Rare    Drug use: No    Sexual activity: No     Other Topics Concern    Not on file     Social History Narrative     Current Facility-Administered Medications   Medication Dose Route Frequency    hydrALAZINE (APRESOLINE) 20 mg/mL injection 10 mg  10 mg IntraVENous Q6H PRN    0.9% sodium chloride infusion  100 mL/hr IntraVENous CONTINUOUS    insulin lispro (HUMALOG) injection   SubCUTAneous AC&HS    glucose chewable tablet 16 g  4 Tab Oral PRN    dextrose (D50W) injection syrg 12.5-25 g  12.5-25 g IntraVENous PRN    glucagon (GLUCAGEN) injection 1 mg  1 mg IntraMUSCular PRN    heparin (porcine) injection 5,000 Units  5,000 Units SubCUTAneous Q8H    sodium chloride (NS) flush 5-10 mL  5-10 mL IntraVENous Q8H    sodium chloride (NS) flush 5-10 mL  5-10 mL IntraVENous PRN    ondansetron (ZOFRAN) injection 4 mg  4 mg IntraVENous Q6H PRN    aspirin delayed-release tablet 81 mg  81 mg Oral DAILY    levothyroxine (SYNTHROID) tablet 50 mcg  50 mcg Oral ACB    mirtazapine (REMERON) tablet 15 mg  15 mg Oral QHS    rosuvastatin (CRESTOR) tablet 20 mg  20 mg Oral QHS     Allergies   Allergen Reactions    Flexeril [Cyclobenzaprine] Other (comments)     Caused pt to fall & hallucination    Percocet [Oxycodone-Acetaminophen] Itching    Tramadol Other (comments)     Caused falls        Visit Vitals    /51 (BP 1 Location: Left arm, BP Patient Position: At rest)    Pulse 64    Temp 98.5 °F (36.9 °C)    Resp 17    Wt 74.5 kg (164 lb 3.9 oz)    SpO2 97%    BMI 27.33 kg/m2     Physical Exam   Constitutional: She is oriented to person, place, and time. She appears well-developed and well-nourished. Cardiovascular: Normal rate.     Pulmonary/Chest: Effort normal.   Neurological: She is oriented to person, place, and time.   Skin: Skin is warm and dry. Psychiatric: She has a normal mood and affect. Her behavior is normal.   Vitals reviewed. Neurologic Exam     Mental Status   Oriented to person, place, and time. Cranial Nerves   Cranial nerves II through XII intact. Motor Exam   Muscle bulk: decreased       Globally intact. Upper extremities 5/5, lower extremities seem to be 4+. Sensory Exam   Light touch normal.     Gait, Coordination, and Reflexes     Gait  Gait: (Slightly wide with single-point cane)    Tremor   Resting tremor: absent           Lab Results  Component Value Date/Time   WBC 5.5 06/16/2018 02:14 AM   HGB 8.4 (L) 06/16/2018 02:14 AM   HCT 25.6 (L) 06/16/2018 02:14 AM   PLATELET 666 61/46/3263 02:14 AM   MCV 97.0 06/16/2018 02:14 AM     Lab Results  Component Value Date/Time   Hemoglobin A1c 5.8 06/16/2018 02:14 AM   Hemoglobin A1c 5.7 (H) 06/15/2018 03:00 PM   Hemoglobin A1c 6.9 (H) 12/18/2017 02:20 PM   Glucose 92 06/16/2018 02:14 AM   Glucose (POC) 95 06/16/2018 07:57 AM   Microalb/Creat ratio (ug/mg creat.) 16.6 11/10/2015 02:00 PM   LDL, calculated 39 06/16/2018 02:14 AM   Creatinine 2.94 (H) 06/16/2018 02:14 AM      Lab Results  Component Value Date/Time   Cholesterol, total 120 06/16/2018 02:14 AM   HDL Cholesterol 63 06/16/2018 02:14 AM   LDL, calculated 39 06/16/2018 02:14 AM   Triglyceride 90 06/16/2018 02:14 AM   CHOL/HDL Ratio 1.9 06/16/2018 02:14 AM     Lab Results  Component Value Date/Time   ALT (SGPT) 12 06/16/2018 02:14 AM   AST (SGOT) 18 06/16/2018 02:14 AM   Alk. phosphatase 66 06/16/2018 02:14 AM   Bilirubin, total 0.4 06/16/2018 02:14 AM   Albumin 3.1 (L) 06/16/2018 02:14 AM   Protein, total 6.5 06/16/2018 02:14 AM   INR 1.0 06/15/2018 07:33 PM   Prothrombin time 10.4 06/15/2018 07:33 PM   PLATELET 458 33/06/7782 02:14 AM          CT Results (maximum last 3):     Results from [place] encounter on 06/15/18   CT CODE NEURO HEAD WO CONTRAST   Narrative EXAM:  CT CODE NEURO HEAD WO CONTRAST    INDICATION:   Fatigue    COMPARISON: 1/22/2017. CONTRAST:  None. TECHNIQUE: Unenhanced CT of the head was performed using 5 mm images. Brain and  bone windows were generated. CT dose reduction was achieved through use of a  standardized protocol tailored for this examination and automatic exposure  control for dose modulation. FINDINGS:  There is mild atrophy and compensatory dilatation of the ventricles. There is no  significant white matter disease. There is no intracranial hemorrhage,  extra-axial collection, mass, mass effect or midline shift. The basilar  cisterns are open. No acute infarct is identified. The bone windows demonstrate  no abnormalities. The visualized portions of the paranasal sinuses and mastoid  air cells are clear. Impression IMPRESSION: No evidence of acute process          Results from Hospital Encounter encounter on 05/09/18   CT ABD PELV WO CONT   Narrative EXAM:  CT ABD PELV WO CONT    INDICATION: Abnormal weight loss    COMPARISON: June 29, 2015. CONTRAST:  None. TECHNIQUE:   Thin axial images were obtained through the abdomen and pelvis. Coronal and  sagittal reconstructions were generated. Oral contrast was administered. CT dose  reduction was achieved through use of a standardized protocol tailored for this  examination and automatic exposure control for dose modulation. The absence of intravenous contrast material reduces the sensitivity for  evaluation of the solid parenchymal organs of the abdomen. FINDINGS:   LUNG BASES: Clear. INCIDENTALLY IMAGED HEART AND MEDIASTINUM: Coronary artery calcification. LIVER: No mass or biliary dilatation. GALLBLADDER: Unremarkable. SPLEEN: No mass. PANCREAS: No mass or ductal dilatation. ADRENALS: Unremarkable. KIDNEYS/URETERS: Redemonstrated septated left renal cyst measuring 6.5 cm in  diameter. Small right renal cyst measuring 1.4 cm. STOMACH: Unremarkable.   SMALL BOWEL: No dilatation or wall thickening. COLON: No dilatation or wall thickening. APPENDIX: Unremarkable. PERITONEUM: No ascites or pneumoperitoneum. RETROPERITONEUM: No lymphadenopathy or aortic aneurysm. REPRODUCTIVE ORGANS: Status post hysterectomy. URINARY BLADDER: No mass or calculus. BONES: No destructive bone lesion. ADDITIONAL COMMENTS: N/A         Impression IMPRESSION:  Chronic septated left renal cyst.    Otherwise unremarkable noncontrast contrast examination of the abdomen and  pelvis. MRI Results (maximum last 3): Results from [place] encounter on 06/15/18   MRA BRAIN WO CONT   Narrative INDICATION:   TIA    COMPARISON:  None    TECHNIQUE:    3-D time-of-flight MRA of the brain was performed. Multiplanar reconstructions  were obtained. FINDINGS:  Left vertebral artery is dominant. . The basilar artery and its branches are  normal. The internal carotid, anterior cerebral, and middle cerebral arteries  are patent. There is no flow-limiting intracranial stenosis. There is no  aneurysm. Moderate sized right posterior communicating cerebral artery noted. .         Impression IMPRESSION:  Unremarkable intracranial MR. MRI BRAIN WO CONT   Narrative EXAM:  MRI BRAIN WO CONT    INDICATION:  TIA    COMPARISON: CT 6/15/2018 and MRI 7/10/2015. CONTRAST:  None. TECHNIQUE: Sagittal T1, axial FLAIR, T2,T1 and gradient echo images as well as  coronal T2 weighted images and axial diffusion weighted images of the head were  obtained. FINDINGS: Ventricles and cortical sulci are appropriate in size and shape for  patient's age. This has not changed since the previous studies. There is minimal  periventricular hyperintensity appropriate for patient's age. Remainder the  brain parenchyma is normal signal intensity. There is no evidence of mass,  hemorrhage, acute infarct or abnormal extra-axial fluid collection.   Normal  appearing flow-voids are present in the vertebral, basilar and carotid artery  systems. The craniocervical junction is normal. Small mucous retention cysts  noted in the right maxillary sinus of incidental note. Impression IMPRESSION: No acute finding or significant change since previous examinations. Findings appropriate for patient's age. Results from [place] encounter on 07/10/15   MRI CERV SPINE WO CONT   Narrative **Final Report**      ICD Codes / Adm. Diagnosis: 724.2  723.1 / Lumbago  Cervicalgia  Examination:  MR C SPINE WO CON  - [id number] - Jul 10 2015 12:37PM  Accession No:  [id number]  Reason:  neck pain      REPORT:  EXAM:  MR C SPINE WO CON    INDICATION:  Ataxia     COMPARISON: None    TECHNIQUE: MR imaging of the cervical spine was performed using the   following sequences: sagittal T1, T2, STIR;  axial T2, T1.     CONTRAST:  None. FINDINGS:    Mild straightening of the cervical spine. No subluxation. Vertebral body   heights are maintained. Subtle degenerative bone marrow edema is present   within C4 and C5. Facets are within normal limits for age. Disc desiccation   is greatest at C4-C5. No evidence of discitis. The craniocervical junction is intact. The course, caliber, and signal   intensity of the spinal cord are normal.      Left thyroid lobe is larger than the right. No measurable nodule. C1-C2   articulation is within normal limits. C2-C3:  Posterior disc osteophyte complex. Minimal central spinal canal   stenosis. C3-C4:  Lobulated posterior disc osteophyte complex. Moderate central spinal   canal stenosis. Mild left foraminal stenosis. C4-C5:  Asymmetric left posterior disc osteophyte complex. Moderate central   spinal canal stenosis. Left axillary recess compromise. Moderate left and   mild right foraminal stenosis. C5-C6:  Posterior disc osteophyte complex. Mild central spinal canal   stenosis. Moderate right foraminal stenosis. C6-C7:  Posterior disc osteophyte complex.  Mild left foraminal stenosis. C7-T1:  No herniation or stenosis. IMPRESSION:    1. Moderate central spinal canal stenosis at C3-4 and C4-5.  2. Disc disease may affect the left C5 nerve. 3. Multilevel foraminal stenoses. 4. No cord compression or cord signal abnormality. Signing/Reading Doctor: Angelita Wolff ([id number])    Approved: Angelita Wolff ([id number])  Jul 10 2015  1:39PM                                      VAS/US/Carotid Doppler Results (maximum last 3): Results from [place] encounter on 06/15/18   DUPLEX CAROTID BILATERAL    PET Results (maximum last 3): No results found for this or any previous visit. Assessment and Plan        80year-old woman who presented with various vague symptoms initially concerning for TIA. Patient tells me she was not dizzy but felt very nauseous and somewhat lightheaded. Her blood pressure was low which could have been contributing. I reviewed the MRI and MRA both of which are normal.  I have low suspicion this was a TIA. Maintain aspirin as is. Her LDL is very well-controlled at 39 so I think it is reasonable to stop her statin and minimize excessive medication unnecessarily. Continue medical management from primary service. No further neurologic workup. Signing off. Please call with any questions. During this evaluation, we also discussed stroke education to include signs and symptoms of stroke and TIA.        Carla Steiner,   NEUROLOGIST  Diplomate ABPN  6/16/2018

## 2018-06-16 NOTE — PROGRESS NOTES
PT Note:    Orders received and acknowledged. Chart reviewed and spoke with nursing. Patient currently VIOLET at MRI. Will continue to follow for evaluation. Thank you.

## 2018-06-16 NOTE — PROGRESS NOTES
Primary Nurse Jennifer Gipson RN and Joaquín Layne RN performed a dual skin assessment on this patient No impairment noted  Clint score is 20    Old scars from previous surgeries, no ulcers

## 2018-06-16 NOTE — PROGRESS NOTES
Problem: Pressure Injury - Risk of  Goal: *Prevention of pressure injury  Document Clint Scale and appropriate interventions in the flowsheet. Outcome: Progressing Towards Goal  Pressure Injury Interventions: Activity Interventions: Increase time out of bed, Pressure redistribution bed/mattress(bed type)    Mobility Interventions: HOB 30 degrees or less, Pressure redistribution bed/mattress (bed type)    Nutrition Interventions: Document food/fluid/supplement intake                    Problem: Falls - Risk of  Goal: *Absence of Falls  Document Jennifer Fall Risk and appropriate interventions in the flowsheet.    Outcome: Progressing Towards Goal  Fall Risk Interventions:  Mobility Interventions: Communicate number of staff needed for ambulation/transfer         Medication Interventions: Evaluate medications/consider consulting pharmacy, Patient to call before getting OOB, Teach patient to arise slowly         History of Falls Interventions: Evaluate medications/consider consulting pharmacy

## 2018-06-16 NOTE — PROCEDURES
[facility]  *** FINAL REPORT ***    Name: Marylen Beers  MRN: [id number]    Outpatient  : 1932  HIS Order #: [id number]  [address] [facility] Visit #: [id number]  Date: 2018    TYPE OF TEST: Cerebrovascular Duplex    REASON FOR TEST  Transient ischemic attacks    Right Carotid:-             Proximal               Mid                 Distal  cm/s  Systolic  Diastolic  Systolic  Diastolic  Systolic  Diastolic  CCA:     71.9      15.0                            73.0      18.0  Bulb:  ECA:    121.0  ICA:     96.0      16.0       95.0      27.0      113.0      31.0  ICA/CCA:  1.3       0.9    ICA Stenosis:    Right Vertebral:-  Finding: Antegrade  Sys:       13.0  Gloria:        2.0    Right Subclavian:    Left Carotid:-            Proximal                Mid                 Distal  cm/s  Systolic  Diastolic  Systolic  Diastolic  Systolic  Diastolic  CCA:     01.7      14.0                            88.0      15.0  Bulb:  ECA:     90.0  ICA:     71.0      16.0       92.0      23.0       77.0      21.0  ICA/CCA:  0.8       1.1    ICA Stenosis:    Left Vertebral:-  Finding: Antegrade  Sys:      106.0  Gloria:       20.0    Left Subclavian:    INTERPRETATION/FINDINGS  PROCEDURE:  Color duplex ultrasound imaging of extracranial  cerebrovascular arteries. FINDINGS:       Right:  Internal carotid velocity is within normal limits. There   is narrowing of the internal carotid flow channel on color Doppler  imaging and mixed density plaque on B-mode imaging, consistent with  less than 50 percent stenosis. The common and external carotid  arteries are patent and without evidence of hemodynamically  significant stenosis. Left:   Internal carotid velocity is within normal limits. There   is narrowing of the internal carotid flow channel on color Doppler  imaging and mixed density plaque on B-mode imaging, consistent with  less than 50 percent stenosis.   The common and external carotid  arteries are patent and without evidence of hemodynamically  significant stenosis. The internal carotid is tortuous distally. IMPRESSION:  Consistent with less than 50% stenosis of the right  internal carotid and less than 50% stenosis of the left internal  carotid. Vertebrals are patent with antegrade flow. Minimal to no  flow seen in right internal carotid and an occlusion cannot be  excluded. ADDITIONAL COMMENTS    I have personally reviewed the data relevant to the interpretation of  this  study.     TECHNOLOGIST: Martine Miller RVT  Signed: 06/16/2018 08:50 AM    PHYSICIAN: Yrn Winkler MD  Signed: 06/17/2018 09:31 PM

## 2018-06-16 NOTE — PROGRESS NOTES
Hospitalist Progress Note  Shiv Vizcarra NP  Answering service: [phone] from in house phone  Cell: [phone]      Date of Service:  2018  NAME:  Barbara Mccollum  :  1932  MRN:  [id number]      Admission Summary:   As per Admitting note: This is an 59-year-old woman with a past medical history significant for hypertension, type 2 diabetes, dyslipidemia, hypothyroidism, memory impairment was in her usual state of health until the day of her presentation at the emergency room when the patient developed dizziness per her daughter. The patient actually denies any dizziness. The patient went for a doctor's appointment, was told that everything is fine. She also went shopping with her daughter. When they got home, the daughter said that the patient did not sound like herself, and the patient developed the dizzy spell. Daughter checked the patient's blood pressure; at that time it was 110/43. Her blood sugar was also 127. The daughter noticed that the patient's speech is different. No history of fever, no rigors, no chills. Patient was brought to the emergency room for further evaluation. By the time the patient arrived at the emergency room her symptoms had resolved. NIH scale was 0. CT scan of the head was obtained. This was negative for acute pathology. Patient was subsequently referred to the hospitalist service for evaluation     Interval history / Subjective:    Pt seen and examined this morning after returning from MRI. She reports feeling well and is without complaint. Sitting up in chair in no distress. Patient denies dizziness and further reports she never had dizziness. She reports on date of admission, she was nauseated and generally feeling sick but denies ever having dizziness or difficulty speaking. She reports that she has not been eating well for the past 2-3 mos, and has had approx.  20lbs  weight loss after having surgery for a prolapsed bladder secondary to having no appetite. She also reports having diarrhea associated with metformin prior to admission. Pt reported that she was seen by her PCP on yesterday, and was prescribed a appetite stimulant.      Presently she denies diarrhea, abd pain, N/V/D, dizziness, CP, SOB, or palpitations      Assessment & Plan:       ASSESSMENT:  1. TIA. 2.  Hypokalemia. 3.  Type 2 diabetes. 4.  Hypertension. 5.  Dyslipidemia. 6.  Hypothyroidism. 7.  Acute renal failure. 8.  Abnormal EKG. 9.  Anemia.     PLAN:  1. TIA. -Ruled out  Pt denies ever being dizzy but felt light headed and nauseated. Symptoms likely related to dehydration and hypotension.  -Seen by neurology, Dr. Anastasiya Galaviz. She has low suspicision of TIA. Recs. Stopping statin and continuing aspirin.   -Will stop Crestor. Follow up with PCP upon DC.   -Stroke education including s/s were discussed with patient. 2.  Hypokalemia. -Replace and recheck levels in the morning.  -Mag Ok    3. Type 2 diabetes. -A1C 5.7.   -Continue sliding scale with insulin coverage  -Continue to hold  hold oral agents until this time of discharge secondary to #7    4. Hypertension. We will hold preadmission medications lisinopril/hydrochlorothiazide because of these acute renal failure. Patient will be placed on hydralazine as needed for blood pressure control. 5.  Dyslipidemia. We will continue with home medications. We will check a lipid panel. 6.  Hypothyroidism. -TSH ok  -Continue Synthroid. 7.  Acute renal failure. Likely secondary volume depletion due to poor intake.  -Continue IVF hydration; add appetite stimulant  -Continue to hold hydrochlorothiazide, lisinopril, Glucophage.  - Check renal function in the morning. Renal consult and nephrology consult if no improvement  -Continue to avoid nephrotoxins monitor and replenish electrolytes     8. Abnormal EKG. -Cardiac enzyme negative.    -Repeat The patient has no chest pain. We will obtain serial cardiac markers. Patient may require a cardiology consult for further evaluation of the abnormal EKG    9. Anemia. Secondary to ACD.  -H/H slightly lower today probable secondary to IVF hydration.  -Stool for occult blood pending. Code status: Full  DVT prophylaxis: Heparin    Care Plan discussed with: Patient/Family and Consultant Storm Vick, Neurology  Disposition: Home w/Family     Hospital Problems  Date Reviewed: 6/15/2018          Codes Class Noted POA    * (Principal)TIA (transient ischemic attack) ICD-10-CM: G45.9  ICD-9-CM: 435.9  6/15/2018 Yes                Review of Systems:   A comprehensive ROS was conducted unless mentioned in the subjective data. Vital Signs:    Last 24hrs VS reviewed since prior progress note. Most recent are:  Visit Vitals    /51 (BP 1 Location: Left arm, BP Patient Position: At rest)    Pulse 64    Temp 98.5 °F (36.9 °C)    Resp 17    Wt 74.5 kg (164 lb 3.9 oz)    SpO2 97%    BMI 27.33 kg/m2       No intake or output data in the 24 hours ending 06/16/18 1125     Physical Examination:             Constitutional:  No acute distress, cooperative, pleasant    ENT:  Oral mucous moist, oropharynx benign. Neck supple,    Resp:  CTA bilaterally. No wheezing/rhonchi/rales. No accessory muscle use   CV:  Regular rhythm, normal rate, no murmurs, gallops, rubs    GI:  Soft, non distended, non tender. normoactive bowel sounds, no hepatosplenomegaly     Musculoskeletal:  No edema, warm, 2+ pulses throughout    Neurologic:  Moves all extremities. AAOx3, CN II-XII reviewed            Data Review:    EXAM:  MR BRAIN WO CON     IMPRESSION:  Mild volume loss and chronic microvascular ischemic disease. No acute   infarct or mass effect. EXAM: MRA      FINDINGS:  Left vertebral artery is dominant. . The basilar artery and its branches are  normal. The internal carotid, anterior cerebral, and middle cerebral arteries  are patent. There is no flow-limiting intracranial stenosis. There is no  aneurysm. Moderate sized right posterior communicating cerebral artery noted. .     IMPRESSION  IMPRESSION:  Unremarkable intracranial MR. TYPE OF TEST: Cerebrovascular Duplex     FINDINGS:       Right:  Internal carotid velocity is within normal limits. There   is narrowing of the internal carotid flow channel on color Doppler  imaging and mixed density plaque on B-mode imaging, consistent with  less than 50 percent stenosis. The common and external carotid  arteries are patent and without evidence of hemodynamically  significant stenosis. Left:   Internal carotid velocity is within normal limits. There   is narrowing of the internal carotid flow channel on color Doppler  imaging and mixed density plaque on B-mode imaging, consistent with  less than 50 percent stenosis. The common and external carotid  arteries are patent and without evidence of hemodynamically  significant stenosis. The internal carotid is tortuous distally.     IMPRESSION:  Consistent with less than 50% stenosis of the right  internal carotid and less than 50% stenosis of the left internal  carotid. Vertebrals are patent with antegrade flow. Minimal to no  flow seen in right internal carotid and an occlusion cannot be  excluded. Echo: 4/9/18  SUMMARY:  Left ventricle: Systolic function was normal. Ejection fraction was  estimated to be 65 %. No obvious wall motion abnormalities identified in  the views obtained. Mitral valve: There was trivial regurgitation. Aortic valve: There was no stenosis.         Labs:     Recent Labs      06/16/18   0214  06/15/18   1933   WBC  5.5  6.6   HGB  8.4*  9.4*   HCT  25.6*  29.5*   PLT  276  275     Recent Labs      06/16/18   0214  06/15/18   1933  06/15/18   1500   NA  143  138  143   K  2.4*  2.5*  2.8*   CL  101  97  93*   CO2  32  28  29   BUN  26*  28*  24   CREA  2.94*  3.21*  2.92*   GLU  92  110*  124*   CA 8.5  8.8  9.1   MG  1.7   --    --    PHOS  2.6   --    --      Recent Labs      06/16/18   0214  06/15/18   1933  06/15/18   1500   SGOT  18  21  19   ALT  12  11*  8   AP  66  67  69   TBILI  0.4  0.4  0.4   TP  6.5  7.4  6.9   ALB  3.1*  3.3*  4.1   GLOB  3.4  4.1*   --      Recent Labs      06/15/18   1933   INR  1.0   PTP  10.4      Recent Labs      06/16/18 0214   TIBC  241*   PSAT  15*   FERR  172      Lab Results   Component Value Date/Time    Folate 11.9 06/16/2018 12:45 AM      No results for input(s): PH, PCO2, PO2 in the last 72 hours.   Recent Labs      06/16/18   0615  06/15/18   1933  06/15/18   0214   CPK  108   --   104   CKNDX  Cannot be calculated   --   Cannot be calculated   TROIQ  <0.05  <0.05  <0.05     Lab Results   Component Value Date/Time    Cholesterol, total 120 06/16/2018 02:14 AM    HDL Cholesterol 63 06/16/2018 02:14 AM    LDL, calculated 39 06/16/2018 02:14 AM    Triglyceride 90 06/16/2018 02:14 AM    CHOL/HDL Ratio 1.9 06/16/2018 02:14 AM     Lab Results   Component Value Date/Time    Glucose (POC) 95 06/16/2018 07:57 AM    Glucose (POC) 96 06/15/2018 10:24 PM    Glucose (POC) 137 (H) 06/15/2018 07:10 PM    Glucose (POC) 153 (H) 06/12/2015 11:04 AM    Glucose (POC) 111 (H) 04/27/2011 07:31 AM    Glucose  (A) 02/24/2015 04:38 PM     Lab Results   Component Value Date/Time    Color YELLOW/STRAW 01/22/2017 08:31 AM    Appearance CLEAR 01/22/2017 08:31 AM    Specific gravity 1.013 01/22/2017 08:31 AM    pH (UA) 6.0 01/22/2017 08:31 AM    Protein NEGATIVE  01/22/2017 08:31 AM    Glucose NEGATIVE  01/22/2017 08:31 AM    Ketone NEGATIVE  01/22/2017 08:31 AM    Bilirubin NEGATIVE  01/22/2017 08:31 AM    Urobilinogen 1.0 01/22/2017 08:31 AM    Nitrites NEGATIVE  01/22/2017 08:31 AM    Leukocyte Esterase NEGATIVE  01/22/2017 08:31 AM    Epithelial cells FEW 01/22/2017 08:31 AM    Bacteria NEGATIVE  01/22/2017 08:31 AM    WBC 0-4 01/22/2017 08:31 AM    RBC 0-5 01/22/2017 08:31 AM Medications Reviewed:     Current Facility-Administered Medications   Medication Dose Route Frequency    hydrALAZINE (APRESOLINE) 20 mg/mL injection 10 mg  10 mg IntraVENous Q6H PRN    potassium chloride SR (KLOR-CON 10) tablet 40 mEq  40 mEq Oral Q6H    0.9% sodium chloride infusion  100 mL/hr IntraVENous CONTINUOUS    insulin lispro (HUMALOG) injection   SubCUTAneous AC&HS    glucose chewable tablet 16 g  4 Tab Oral PRN    dextrose (D50W) injection syrg 12.5-25 g  12.5-25 g IntraVENous PRN    glucagon (GLUCAGEN) injection 1 mg  1 mg IntraMUSCular PRN    heparin (porcine) injection 5,000 Units  5,000 Units SubCUTAneous Q8H    sodium chloride (NS) flush 5-10 mL  5-10 mL IntraVENous Q8H    sodium chloride (NS) flush 5-10 mL  5-10 mL IntraVENous PRN    ondansetron (ZOFRAN) injection 4 mg  4 mg IntraVENous Q6H PRN    aspirin delayed-release tablet 81 mg  81 mg Oral DAILY    levothyroxine (SYNTHROID) tablet 50 mcg  50 mcg Oral ACB    mirtazapine (REMERON) tablet 15 mg  15 mg Oral QHS    rosuvastatin (CRESTOR) tablet 20 mg  20 mg Oral QHS     ______________________________________________________________________  EXPECTED LENGTH OF STAY: - - -  ACTUAL LENGTH OF STAY:          0                 Verlecia Self, NP

## 2018-06-16 NOTE — PROGRESS NOTES
TRANSFER - IN REPORT:    Verbal report received from Case Rover (name) on Luiz Rumpf  being received from [facility] (unit) for routine progression of care      Report consisted of patients Situation, Background, Assessment and   Recommendations(SBAR). Information from the following report(s) SBAR, Kardex, [facility] and Recent Results was reviewed with the receiving nurse. Opportunity for questions and clarification was provided. Assessment completed upon patients arrival to unit and care assumed.

## 2018-06-16 NOTE — PROGRESS NOTES
Problem: TIA/CVA Stroke: 0-24 hours  Goal: Diagnostic Test/Procedures  Outcome: Progressing Towards Goal  Carotid, MRI, echo scheduled. Ct scan completed and labs drawn.

## 2018-06-16 NOTE — PROGRESS NOTES
Admission Medication Reconciliation:    Information obtained from: Chart (Dr. Tanna Paris. Jamartta Stefano note from today), patient, RX Query    Significant PMH/Disease States:   Past Medical History:   Diagnosis Date    Anxiety     Arthritis     Chest pain 2008    Negative stress test and Holter monitor w/Dr Jung Brock.  Chronic back pain     Chronic venous insufficiency     Colon polyp     last scope normal 2007; Dr. Tamez Gojaylan Cyst of right kidney     Depression     Diabetes ([facility] [address]     Hypercholesterolemia     Hypertension     Hypothyroidism     Memory disorder     Other ill-defined conditions(799.89)     heart murmur    Painful hip     Urinary frequency     Sees Dr. Julee Welch with [facility]       Chief Complaint for this Admission:  Fatigue    Allergies:  Flexeril [cyclobenzaprine]; Percocet [oxycodone-acetaminophen]; and Tramadol    Prior to Admission Medications:   Prior to Admission Medications   Prescriptions Last Dose Informant Patient Reported? Taking?   aspirin delayed-release 81 mg tablet 6/15/2018 at Unknown time  Yes Yes   Sig: Take 81 mg by mouth daily. levothyroxine (SYNTHROID) 50 mcg tablet 6/15/2018 at Unknown time  No Yes   Sig: TAKE 1 TABLET BY MOUTH DAILY BEFORE BREAKFAST   lisinopril-hydroCHLOROthiazide (PRINZIDE, ZESTORETIC) 20-25 mg per tablet 6/15/2018 at Unknown time  No Yes   Sig: TAKE 1 TABLET BY MOUTH EVERY DAY   metFORMIN (GLUMETZA) 500 mg TG24 24 hour tablet   Yes Yes   Sig: Take 1 Tab by mouth two (2) times a day. Started 6/15/18   mirtazapine (REMERON) 15 mg tablet   Yes Yes   Sig: Take 15 mg by mouth nightly. New script 6/15/18 (has never taken)   polyethylene glycol (MIRALAX) 17 gram packet Unknown at Unknown time  No No   Sig: Take 1 Packet by mouth daily.    rosuvastatin (CRESTOR) 20 mg tablet 6/15/2018 at Unknown time  No Yes   Sig: TAKE 1 TABLET BY MOUTH EVERY DAY      Facility-Administered Medications: None         Comments/Recommendations: Patient is reliable historian, allergies were confirmed. Speaks slowly and thoughtfully but provides thorough answers. Please note:  1. Mirtazapine: has never taken, and based on interview wasn't aware that it was prescribed today. Confirmed with Dr. Zheng Mary note that this is new prescription for her. 2. Metformin: long acting form is new for patient, she last took 1000 mg BID of original formulation, and had a dose this morning. States problems with diarrhea and appetite loss. Deleted:  1. CAM (all--doesn't take anymore)  2. Glucose meter supplies  3. Vasculera (\"I don't know why I stopped but I did\")    Thank you for allowing me to participate in the care of your patient.     Sangeeta Aguiar PharmD, RN #[id number]

## 2018-06-17 PROBLEM — G45.9 TIA (TRANSIENT ISCHEMIC ATTACK): Status: ACTIVE | Noted: 2018-01-01

## 2018-06-17 NOTE — PROGRESS NOTES
Consult received for disposition needs. Met with pt this pm. She signed Observations letters. Pt stated she lives in a first level apartment. Her daughter lives next door. Prior to admission, pt was independent in her ADL's and IADL's. Note PT evaluation. Pt has had outpt therapy in the past but not presently. No identified disposition needs noted at this time. In addition, she expressed no financial concerns noted at this time. Daughter will transport pt home when medical stable. Meng Pike LCSW    Care Management Interventions  Mode of Transport at Discharge:  Other (see comment)  MyChart Signup: No  Discharge Durable Medical Equipment: No  Health Maintenance Reviewed: Yes  Physical Therapy Consult: Yes  Occupational Therapy Consult: Yes  Speech Therapy Consult: Yes  Current Support Network: Lives Alone  Confirm Follow Up Transport: Family  Plan discussed with Pt/Family/Caregiver: Yes  Discharge Location  Discharge Placement: Home

## 2018-06-17 NOTE — PROGRESS NOTES
Bedside shift change report given to [address] (oncoming nurse) by Meera Gupta RN (offgoing nurse). Report included the following information SBAR, Kardex, MAR and Recent Results.

## 2018-06-17 NOTE — PROGRESS NOTES
Hospitalist Progress Note  Amber Li NP  Answering service: [phone] from in house phone  Cell: [phone]      Date of Service:  2018  NAME:  Zuleyma Ponce  :  1932  MRN:  [id number]      Admission Summary:   As per Admitting note: This is an 51-year-old woman with a past medical history significant for hypertension, type 2 diabetes, dyslipidemia, hypothyroidism, memory impairment was in her usual state of health until the day of her presentation at the emergency room when the patient developed dizziness per her daughter. The patient actually denies any dizziness. The patient went for a doctor's appointment, was told that everything is fine. She also went shopping with her daughter. When they got home, the daughter said that the patient did not sound like herself, and the patient developed the dizzy spell. Daughter checked the patient's blood pressure; at that time it was 110/43. Her blood sugar was also 127. The daughter noticed that the patient's speech is different. No history of fever, no rigors, no chills. Patient was brought to the emergency room for further evaluation. By the time the patient arrived at the emergency room her symptoms had resolved. NIH scale was 0. CT scan of the head was obtained. This was negative for acute pathology. Patient was subsequently referred to the hospitalist service for evaluation     Interval history / Subjective:     Ms. Aniceto Queen is doing well today. She is eating at time of exam. She still has significant elevation of her creatinine today as compared to baseline despite fluid resuscitation. Nephrology was consulted.     Presently she denies diarrhea, abd pain, N/V/D, dizziness, CP, SOB, or palpitations      Assessment & Plan:       ASSESSMENT:  1. TIA. 2.  Hypokalemia. 3.  Type 2 diabetes. 4.  Hypertension. 5.  Dyslipidemia. 6.  Hypothyroidism.   7.  Acute renal failure. 8.  Abnormal EKG. 9.  Anemia.     PLAN:  1. TIA. -Ruled out  Pt denies ever being dizzy but felt light headed and nauseated. Symptoms likely related to dehydration and hypotension.  -Seen by neurology, Dr. Aishwarya Krishnan. She has low suspicision of TIA. Recs. Stopping statin and continuing aspirin.   -Will stop Crestor. Follow up with PCP upon DC.   -Stroke education including s/s were discussed with patient. 2.  Hypokalemia. 3.0 today  -Replace with PO and recheck levels in the morning.  -Mag Ok    3. Type 2 diabetes. -A1C 5.7.   -Continue sliding scale with insulin coverage  -Continue to hold  hold oral agents until this time of discharge secondary to #7    4. Hypertension. We will hold preadmission medications lisinopril/hydrochlorothiazide because of these acute renal failure. Patient will be placed on hydralazine as needed for blood pressure control. 5.  Dyslipidemia. We will continue with home medications. We will check a lipid panel. 6.  Hypothyroidism. -TSH ok  -Continue Synthroid. 7.  Acute renal failure. Etiology is unclear   -Continue IVF hydration; add appetite stimulant  -Continue to hold hydrochlorothiazide, lisinopril, Glucophage.  - Check renal function in the morning --> nephrology consult placed  -Continue to avoid nephrotoxins monitor and replenish electrolytes     8. Abnormal EKG. -Cardiac enzyme negative. -Repeat  The patient has no chest pain. - troponins are flat    9. Anemia. Secondary to ACD.  -H/H slightly lower today probable secondary to IVF hydration.     Code status: Full  DVT prophylaxis: Heparin    Care Plan discussed with: Patient/Family and Consultant Aishwarya Krishnan, Neurology  Disposition: Home w/[facility]  Date Reviewed: 6/15/2018          Codes Class Noted POA    TIA (transient ischemic attack) ICD-10-CM: G45.9  ICD-9-CM: 435.9  6/17/2018 Unknown        * (Principal)[facility]F [facility]acute [facility]) [facility] ICD-10-CM: N17.9  ICD-9-CM: 584.9  6/15/2018 Yes                Review of Systems:   A comprehensive ROS was conducted unless mentioned in the subjective data. Vital Signs:    Last 24hrs VS reviewed since prior progress note. Most recent are:  Visit Vitals    /76 (BP 1 Location: Right arm, BP Patient Position: At rest)    Pulse 61    Temp 98.2 °F (36.8 °C)    Resp 18    Wt 74.5 kg (164 lb 3.9 oz)    SpO2 98%    BMI 27.33 kg/m2         Intake/Output Summary (Last 24 hours) at 06/17/18 1428  Last data filed at 06/17/18 1246   Gross per 24 hour   Intake              390 ml   Output                0 ml   Net              390 ml        Physical Examination:             Constitutional:  No acute distress, cooperative, pleasant    ENT:  Oral mucous moist, oropharynx benign. Neck supple,    Resp:  CTA bilaterally. No wheezing/rhonchi/rales. No accessory muscle use   CV:  Regular rhythm, normal rate, no murmurs, gallops, rubs    GI:  Soft, non distended, non tender. normoactive bowel sounds, no hepatosplenomegaly     Musculoskeletal:  No edema, warm, 2+ pulses throughout    Neurologic:  Moves all extremities. AAOx3            Data Review:    EXAM:  MR BRAIN WO CON     IMPRESSION:  Mild volume loss and chronic microvascular ischemic disease. No acute   infarct or mass effect. EXAM: MRA      FINDINGS:  Left vertebral artery is dominant. . The basilar artery and its branches are  normal. The internal carotid, anterior cerebral, and middle cerebral arteries  are patent. There is no flow-limiting intracranial stenosis. There is no  aneurysm. Moderate sized right posterior communicating cerebral artery noted. .     IMPRESSION  IMPRESSION:  Unremarkable intracranial MR. TYPE OF TEST: Cerebrovascular Duplex     FINDINGS:       Right:  Internal carotid velocity is within normal limits.   There   is narrowing of the internal carotid flow channel on color Doppler  imaging and mixed density plaque on B-mode imaging, consistent with  less than 50 percent stenosis. The common and external carotid  arteries are patent and without evidence of hemodynamically  significant stenosis. Left:   Internal carotid velocity is within normal limits. There   is narrowing of the internal carotid flow channel on color Doppler  imaging and mixed density plaque on B-mode imaging, consistent with  less than 50 percent stenosis. The common and external carotid  arteries are patent and without evidence of hemodynamically  significant stenosis. The internal carotid is tortuous distally.     IMPRESSION:  Consistent with less than 50% stenosis of the right  internal carotid and less than 50% stenosis of the left internal  carotid. Vertebrals are patent with antegrade flow. Minimal to no  flow seen in right internal carotid and an occlusion cannot be  excluded. Echo: 4/9/18  SUMMARY:  Left ventricle: Systolic function was normal. Ejection fraction was  estimated to be 65 %. No obvious wall motion abnormalities identified in  the views obtained. Mitral valve: There was trivial regurgitation. Aortic valve: There was no stenosis.         Labs:     Recent Labs      06/17/18 0130 06/16/18 0214   WBC  4.9  5.5   HGB  8.5*  8.4*   HCT  26.2*  25.6*   PLT  285  276     Recent Labs      06/17/18   0130  06/16/18   0214  06/15/18   1933   NA  143  143  138   K  3.0*  2.4*  2.5*   CL  105  101  97   CO2  29  32  28   BUN  24*  26*  28*   CREA  2.85*  2.94*  3.21*   GLU  115*  92  110*   CA  8.3*  8.5  8.8   MG  1.7  1.7   --    PHOS   --   2.6   --      Recent Labs      06/16/18   0214  06/15/18   1933  06/15/18   1500   SGOT  18  21  19   ALT  12  11*  8   AP  66  67  69   TBILI  0.4  0.4  0.4   TP  6.5  7.4  6.9   ALB  3.1*  3.3*  4.1   GLOB  3.4  4.1*   --      Recent Labs      06/15/18   1933   INR  1.0   PTP  10.4      Recent Labs      06/16/18 0214   TIBC  241*   PSAT  15*   FERR  172      Lab Results   Component Value Date/Time    Folate 11.9 06/16/2018 12:45 AM      No results for input(s): PH, PCO2, PO2 in the last 72 hours.   Recent Labs      06/16/18   0615  06/15/18   1933  06/15/18   0214   CPK  108   --   104   CKNDX  Cannot be calculated   --   Cannot be calculated   TROIQ  <0.05  <0.05  <0.05     Lab Results   Component Value Date/Time    Cholesterol, total 120 06/16/2018 02:14 AM    HDL Cholesterol 63 06/16/2018 02:14 AM    LDL, calculated 39 06/16/2018 02:14 AM    Triglyceride 90 06/16/2018 02:14 AM    CHOL/HDL Ratio 1.9 06/16/2018 02:14 AM     Lab Results   Component Value Date/Time    Glucose (POC) 178 (H) 06/17/2018 11:05 AM    Glucose (POC) 106 (H) 06/17/2018 06:40 AM    Glucose (POC) 121 (H) 06/16/2018 09:42 PM    Glucose (POC) 140 (H) 06/16/2018 04:14 PM    Glucose (POC) 113 (H) 06/16/2018 11:42 AM     Lab Results   Component Value Date/Time    Color YELLOW/STRAW 06/16/2018 01:00 PM    Appearance CLEAR 06/16/2018 01:00 PM    Specific gravity 1.008 06/16/2018 01:00 PM    pH (UA) 7.5 06/16/2018 01:00 PM    Protein 30 (A) 06/16/2018 01:00 PM    Glucose NEGATIVE  06/16/2018 01:00 PM    Ketone NEGATIVE  06/16/2018 01:00 PM    Bilirubin NEGATIVE  06/16/2018 01:00 PM    Urobilinogen 1.0 06/16/2018 01:00 PM    Nitrites NEGATIVE  06/16/2018 01:00 PM    Leukocyte Esterase LARGE (A) 06/16/2018 01:00 PM    Epithelial cells MODERATE (A) 06/16/2018 01:00 PM    Bacteria NEGATIVE  06/16/2018 01:00 PM    WBC 20-50 06/16/2018 01:00 PM    RBC 0-5 06/16/2018 01:00 PM         Medications Reviewed:     Current Facility-Administered Medications   Medication Dose Route Frequency    potassium chloride SR (KLOR-CON 10) tablet 40 mEq  40 mEq Oral DAILY    megestrol (MEGACE) tablet 80 mg  80 mg Oral BID WITH MEALS    iron sucrose (VENOFER) 300 mg in 0.9% sodium chloride 100 mL IVPB  300 mg IntraVENous ONCE    [START ON 6/18/2018] iron sucrose (VENOFER) 300 mg in 0.9% sodium chloride 100 mL IVPB  300 mg IntraVENous ONCE    hydrALAZINE (APRESOLINE) 20 mg/mL injection 10 mg 10 mg IntraVENous Q6H PRN    0.9% sodium chloride infusion  100 mL/hr IntraVENous CONTINUOUS    insulin lispro (HUMALOG) injection   SubCUTAneous AC&HS    glucose chewable tablet 16 g  4 Tab Oral PRN    dextrose (D50W) injection syrg 12.5-25 g  12.5-25 g IntraVENous PRN    glucagon (GLUCAGEN) injection 1 mg  1 mg IntraMUSCular PRN    heparin (porcine) injection 5,000 Units  5,000 Units SubCUTAneous Q8H    sodium chloride (NS) flush 5-10 mL  5-10 mL IntraVENous Q8H    sodium chloride (NS) flush 5-10 mL  5-10 mL IntraVENous PRN    ondansetron (ZOFRAN) injection 4 mg  4 mg IntraVENous Q6H PRN    aspirin delayed-release tablet 81 mg  81 mg Oral DAILY    levothyroxine (SYNTHROID) tablet 50 mcg  50 mcg Oral ACB    mirtazapine (REMERON) tablet 15 mg  15 mg Oral QHS     ______________________________________________________________________  EXPECTED LENGTH OF STAY: - - -  ACTUAL LENGTH OF STAY:          0                 Steve West NP

## 2018-06-17 NOTE — CONSULTS
Nephrology Consult Note     [address]     [email or web address]              Phone - [phone]   Patient: Pavan Jordan   YOB: 1932    Date- 6/17/2018  MRN: [id number]   CONSULTING PHYSICIAN:            REASON FOR CONSULTATION: ARF  ADMIT DATE:6/15/2018 PATIENT Carlos Black MD     ASSESSMENT:   · ARF - etiology is unclear  1. Dehydration - hctz use prior to admission  2. Lisinopril use  3. Progression of ckd less likely  4. GN  5. Urinary retention      · CKD 2- cr. 0.85 in dec 2017  · H/o hypertension  · Anorexia  · UTI  · Hypokalemia  · Anemia - iron defi      · Principal Problem:  ·   ARF (acute renal failure) ([address] (6/15/2018)  ·   · Active Problems:  ·   TIA (transient ischemic attack) (6/17/2018)  ·   ·   PLAN:   · Continue ivf  · kcl po  · Renal usg  · Check urine lytes  · Check bladder scan  · Work up for proteinuria  · Hold lisinopril./hctz  · Daily bmp  · Start iv iron    [x] High complexity decision making was performed  [x] Patient is at high-risk of decompensation with multiple organ involvement    Subjective:   HPI: Pavan Jordan is a 80 y.o. AA female. She was admitted with dizziness  She had arf with cr. Of 2.9 and bun 24 on 6-15-18  She has been getting ivf since admission her cr. 2.85 today  She reports poor po intake prior to admission  She has been on lisinopril/hctz at home  No recent antibiotics use  No hypotension since admission  No h/o NSAIDS use recently  No recent iv contrast exposure  Her cr. Was 0.85 in dec 2017  She has h/o dm and htn  He is found to have UTI on admission  She has mild proteinuria on urine analysis  She c/o feeling weak  She has h/o surgery for bladder prolapse in feb 2018  She denies any difficulty urinating. Review of Systems:  A 11 point review of system was performed today. Pertinent positives and negatives are mentioned in the HPI. The reminder of the ROS is negative and noncontributory.     Past Medical History:   Diagnosis Date    Anxiety     Arthritis     Chest pain 2008    Negative stress test and Holter monitor w/Dr Edd Ni.  Chronic back pain     Chronic venous insufficiency     Colon polyp     last scope normal 2007; Dr. Adriel Faye Cyst of right kidney     Depression     Diabetes ([facility]     Hypercholesterolemia     Hypertension     Hypothyroidism     Memory disorder     Other ill-defined conditions(799.89)     heart murmur    Painful hip     Urinary frequency     [facility] Rad[facility]      Past Surgical History:   Procedure Laterality Date    COLONOSCOPY,DIAGNOSTIC  6/12/2015         HX BREAST BIOPSY  2002    HX HYSTERECTOMY      Due to a prolapsed uterus    HX KNEE REPLACEMENT      left    HX KNEE REPLACEMENT  2006    right    HX ORTHOPAEDIC      B knees.  HX OTHER SURGICAL      pelvic prolasp    UPPER GI ENDOSCOPY,BIOPSY  6/12/2015           Prior to Admission medications    Medication Sig Start Date End Date Taking? Authorizing Provider   levothyroxine (SYNTHROID) 50 mcg tablet TAKE 1 TABLET BY MOUTH DAILY BEFORE BREAKFAST 6/15/18  Yes Fidencio Mcgrath MD   metFORMIN (GLUMETZA) 500 mg TG24 24 hour tablet Take 1 Tab by mouth two (2) times a day. Started 6/15/18   Yes Historical Provider   mirtazapine (REMERON) 15 mg tablet Take 15 mg by mouth nightly. New script 6/15/18 (has never taken)   Yes Historical Provider   rosuvastatin (CRESTOR) 20 mg tablet TAKE 1 TABLET BY MOUTH EVERY DAY 5/28/18  Yes Juana Ruelas MD   lisinopril-hydroCHLOROthiazide (PRINZIDE, ZESTORETIC) 20-25 mg per tablet TAKE 1 TABLET BY MOUTH EVERY DAY 3/12/18  Yes Fidencio Mcgrath MD   aspirin delayed-release 81 mg tablet Take 81 mg by mouth daily. Yes Historical Provider   polyethylene glycol (MIRALAX) 17 gram packet Take 1 Packet by mouth daily.  12/18/17   Fidencio Mcgrath MD     Allergies   Allergen Reactions    Flexeril [Cyclobenzaprine] Other (comments)     Caused pt to fall & hallucination    Percocet [Oxycodone-Acetaminophen] Itching    Tramadol Other (comments)     Caused falls       Social History   Substance Use Topics    Smoking status: Never Smoker    Smokeless tobacco: Never Used    Alcohol use No      Comment: Rare      Family History   Problem Relation Age of Onset    Cancer Mother      pancreatic    Cancer Father      colon    Cancer Sister      pancreatic     Diabetes Sister     Alzheimer Brother         Objective:    Vitals:    Vitals:    06/16/18 1421 06/16/18 2100 06/17/18 0205 06/17/18 0859   BP: 128/81 143/75 156/79 160/76   Pulse: 67 63 67 61   Resp: 20 20 20 18   Temp: 98.7 °F (37.1 °C) 98.6 °F (37 °C) 98.6 °F (37 °C) 98.2 °F (36.8 °C)   SpO2: 97% 97% 98% 98%   Weight:         I&O's:     Physical Exam:  General:Alert, No distress,   Eyes:No scleral icterus, No conjunctival pallor  Neck:Supple,no mass palpable,no thyromegaly  Lungs:Clears to auscultation Bilaterally, normal respiratory effort  CVS:RRR, S1 S2 normal,  No rub,  Abdomen:Soft, Non tender, No hepatosplenomegaly  Extremities: no LE edema  Skin:No rash or lesions, Warm and DRY   Psych: appropriate affect    :  No duran  Musculoskeletal : no redness, no joint tenderness  NEURO: Normal Speech, Non focal    CODE STATUS:  full  Care Plan discussed with:  Pt , son     Chart reviewed.      TOTAL TIME: 76 Minutes   y Reviewed previous records   y Discussion with patient and/or family and questions answered   y >50% of visit spent in counseling and coordination of care        ECG[de-identified] Rev:yes  Xray/CT/US/MRI REV:=yes  Lab Data Personally Reviewed: (see below)    CBC: 6/15/2018: HCT 31.0 %* (Ref range: 34.0 - 46.6 %); HCT 29.5 %* (Ref range: 35.0 - 47.0 %); HGB 9.7 g/dL* (Ref range: 11.1 - 15.9 g/dL);  HGB 9.4 g/dL* (Ref range: 11.5 - 16.0 g/dL); PLATELET 674 X11F5/JZ (Ref range: 150 - 379 x10E3/uL); PLATELET 285 K/uL (Ref range: 150 - 400 K/uL); RBC 3.26 x10E6/uL* (Ref range: 3.77 - 5.28 x10E6/uL); RBC 3.04 M/uL* (Ref range: 3.80 - 5.20 M/uL); WBC 6.0 x10E3/uL (Ref range: 3.4 - 10.8 x10E3/uL); WBC 6.6 K/uL (Ref range: 3.6 - 11.0 K/uL)  6/16/2018: HCT 25.6 %* (Ref range: 35.0 - 47.0 %); HGB 8.4 g/dL* (Ref range: 11.5 - 16.0 g/dL); PLATELET 665 K/uL (Ref range: 150 - 400 K/uL); RBC 2.64 M/uL* (Ref range: 3.80 - 5.20 M/uL); WBC 5.5 K/uL (Ref range: 3.6 - 11.0 K/uL)   BMP: 6/15/2018: BUN 24 mg/dL (Ref range: 8 - 27 mg/dL); BUN 28 MG/DL* (Ref range: 6 - 20 MG/DL); Calcium 9.1 mg/dL (Ref range: 8.7 - 10.3 mg/dL); Calcium 8.8 MG/DL (Ref range: 8.5 - 10.1 MG/DL); Chloride 93 mmol/L* (Ref range: 96 - 106 mmol/L); Chloride 97 mmol/L (Ref range: 97 - 108 mmol/L); CO2 29 mmol/L (Ref range: 20 - 29 mmol/L); CO2 28 mmol/L (Ref range: 21 - 32 mmol/L); Creatinine 2.92 mg/dL* (Ref range: 0.57 - 1.00 mg/dL); Creatinine 3.21 MG/DL* (Ref range: 0.55 - 1.02 MG/DL); Glucose 124 mg/dL* (Ref range: 65 - 99 mg/dL); Glucose 110 mg/dL* (Ref range: 65 - 100 mg/dL); Potassium 2.8 mmol/L* (Ref range: 3.5 - 5.2 mmol/L); Potassium 2.5 mmol/L* (Ref range: 3.5 - 5.1 mmol/L); Sodium 143 mmol/L (Ref range: 134 - 144 mmol/L); Sodium 138 mmol/L (Ref range: 136 - 145 mmol/L)  6/16/2018: BUN 26 MG/DL* (Ref range: 6 - 20 MG/DL); Calcium 8.5 MG/DL (Ref range: 8.5 - 10.1 MG/DL); Chloride 101 mmol/L (Ref range: 97 - 108 mmol/L); CO2 32 mmol/L (Ref range: 21 - 32 mmol/L); Creatinine 2.94 MG/DL* (Ref range: 0.55 - 1.02 MG/DL); Glucose 92 mg/dL (Ref range: 65 - 100 mg/dL); Potassium 2.4 mmol/L* (Ref range: 3.5 - 5.1 mmol/L);  Sodium 143 mmol/L (Ref range: 136 - 145 mmol/L)  Recent Labs      06/17/18   0130  06/16/18   0214  06/15/18   1933  06/15/18   1500   NA  143  143  138  143   K  3.0*  2.4*  2.5*  2.8*   CL  105  101  97  93*   CO2  29  32  28  29   GLU  115*  92  110*  124*   BUN  24*  26*  28*  24   CREA  2.85*  2.94*  3.21*  2.92*   CA  8.3*  8.5  8.8  9.1   MG  1.7  1.7   --    --    PHOS   --   2.6   --    --    ALB   --   3.1*  3.3*  4.1   SGOT   --   18 21  19   ALT   --   12  11*  8   INR   --    --   1.0   --      Recent Labs      06/17/18   0130  06/16/18   0214  06/15/18   1933   WBC  4.9  5.5  6.6   HGB  8.5*  8.4*  9.4*   HCT  26.2*  25.6*  29.5*   PLT  285  276  275     Lab Results   Component Value Date/Time    Color YELLOW/STRAW 06/16/2018 01:00 PM    Appearance CLEAR 06/16/2018 01:00 PM    Specific gravity 1.008 06/16/2018 01:00 PM    pH (UA) 7.5 06/16/2018 01:00 PM    Protein 30 (A) 06/16/2018 01:00 PM    Glucose NEGATIVE  06/16/2018 01:00 PM    Ketone NEGATIVE  06/16/2018 01:00 PM    Bilirubin NEGATIVE  06/16/2018 01:00 PM    Urobilinogen 1.0 06/16/2018 01:00 PM    Nitrites NEGATIVE  06/16/2018 01:00 PM    Leukocyte Esterase LARGE (A) 06/16/2018 01:00 PM    Epithelial cells MODERATE (A) 06/16/2018 01:00 PM    Bacteria NEGATIVE  06/16/2018 01:00 PM    WBC 20-50 06/16/2018 01:00 PM    RBC 0-5 06/16/2018 01:00 PM     Lab Results   Component Value Date/Time    AST (SGOT) 18 06/16/2018 02:14 AM    Alk. phosphatase 66 06/16/2018 02:14 AM    Protein, total 6.5 06/16/2018 02:14 AM    Albumin 3.1 (L) 06/16/2018 02:14 AM    Globulin 3.4 06/16/2018 02:14 AM     Lab Results   Component Value Date/Time    INR 1.0 06/15/2018 07:33 PM    Prothrombin time 10.4 06/15/2018 07:33 PM      Lab Results   Component Value Date/Time    Iron 37 06/16/2018 02:14 AM    TIBC 241 (L) 06/16/2018 02:14 AM    Iron % saturation 15 (L) 06/16/2018 02:14 AM    Ferritin 172 06/16/2018 02:14 AM     Lab Results   Component Value Date/Time    Culture result: KLEBSIELLA PNEUMONIAE 06/12/2015 06:35 PM    Culture result: ESCHERICHIA COLI 06/12/2015 06:35 PM     Prior to Admission Medications   Prescriptions Last Dose Informant Patient Reported? Taking?   aspirin delayed-release 81 mg tablet 6/15/2018 at Unknown time  Yes Yes   Sig: Take 81 mg by mouth daily.    levothyroxine (SYNTHROID) 50 mcg tablet 6/15/2018 at Unknown time  No Yes   Sig: TAKE 1 TABLET BY MOUTH DAILY BEFORE BREAKFAST lisinopril-hydroCHLOROthiazide (PRINZIDE, ZESTORETIC) 20-25 mg per tablet 6/15/2018 at Unknown time  No Yes   Sig: TAKE 1 TABLET BY MOUTH EVERY DAY   metFORMIN (GLUMETZA) 500 mg TG24 24 hour tablet   Yes Yes   Sig: Take 1 Tab by mouth two (2) times a day. Started 6/15/18   mirtazapine (REMERON) 15 mg tablet   Yes Yes   Sig: Take 15 mg by mouth nightly. New script 6/15/18 (has never taken)   polyethylene glycol (MIRALAX) 17 gram packet Unknown at Unknown time  No No   Sig: Take 1 Packet by mouth daily. rosuvastatin (CRESTOR) 20 mg tablet 6/15/2018 at Unknown time  No Yes   Sig: TAKE 1 TABLET BY MOUTH EVERY DAY      Facility-Administered Medications: None     Imaging:    Medications list Personally Reviewed   [x]      Yes     []               No    Thank you for allowing us to participate in the care this patient. We will follow patient with you. Signed By: Charly Torres MD  [facility]  [facility] [address], [address] [place], [address]  Phone - [phone]         Fax - [phone] [facility] Office  [address] [place] [address]  Phone - [phone]        Fax - [phone]     [email or web address]

## 2018-06-18 NOTE — PROGRESS NOTES
Nephrology Progress Note  Keerthi Santiago  Date of Admission : 6/15/2018    CC: Follow up for KATIE       Assessment and Plan     KATIE:  - 2/2 volume depletion vs ATN from hypotension in the setting of thiazide/ace use  - cont IVF, change to 1/2 NS + KCl today  - serologic w/u pending  - labs in AM    Hypernatremia:  - start 1/2 NS today    Hypokalemia:  - oral repletion ordered    Volume Depletion:  - improving  - cont IVF    Hypotension:  - resolved  - hold off on ace/arb    Anemia:  - iron deficiency  - receiving venofer    Hypothyroidism:  - on synthroid    DM2:  - per hospitalist       Interval History:  Seen and examined. Cr improving,  Na up. Not eating much,  Appetite ok per patient. No cp, sob, n/v/d. Anxious and wants to go home. Current Medications: all current  Medications have been eviewed in EPIC  Review of Systems: Pertinent items are noted in HPI. Objective:  Vitals:    Vitals:    06/17/18 1705 06/17/18 2037 06/18/18 0212 06/18/18 0819   BP: 164/80 145/86 168/89 172/80   Pulse: 64 67 69 66   Resp:  16 16 16   Temp:  99.1 °F (37.3 °C) 99.3 °F (37.4 °C) 98 °F (36.7 °C)   SpO2:  95% 97% 97%   Weight:         Intake and Output:     06/16 1901 - 06/18 0700  In: 390 [P.O.:390]  Out: -     Physical Examination:    General: NAD,Conversant   Neck:  Supple, no mass  Resp:  Lungs CTA B/L, no wheezing , normal respiratory effort  CV:  RRR,  no murmur or rub, no LE edema  GI:  Soft, NT, + Bowel sounds, no hepatosplenomegaly  Neurologic:  Non focal  Psych:             AAO x 3 appropriate affect   Skin:  No Rash  :  No duran    []    High complexity decision making was performed  []    Patient is at high-risk of decompensation with multiple organ involvement    Lab Data Personally Reviewed: I have reviewed all the pertinent labs, microbiology data and radiology studies during assessment.     Recent Labs      06/18/18   0031  06/17/18   0130  06/16/18   0214  06/15/18   1933  06/15/18   1500   NA  146* 143  143  138  143   K  2.8*  3.0*  2.4*  2.5*  2.8*   CL  110*  105  101  97  93*   CO2  25  29  32  28  29   GLU  82  115*  92  110*  124*   BUN  19  24*  26*  28*  24   CREA  2.59*  2.85*  2.94*  3.21*  2.92*   CA  8.0*  8.3*  8.5  8.8  9.1   MG  1.7  1.7  1.7   --    --    PHOS  3.1   --   2.6   --    --    ALB  2.7*   --   3.1*  3.3*  4.1   SGOT   --    --   18  21  19   ALT   --    --   12  11*  8   INR   --    --    --   1.0   --      Recent Labs      06/17/18   0130  06/16/18   0214  06/15/18   1933   WBC  4.9  5.5  6.6   HGB  8.5*  8.4*  9.4*   HCT  26.2*  25.6*  29.5*   PLT  285  276  275     No results found for: [facility], [place], operated by [facility]  Lab Results   Component Value Date/Time    Culture result: KLEBSIELLA PNEUMONIAE 06/12/2015 06:35 PM    Culture result: ESCHERICHIA COLI 06/12/2015 06:35 PM     Recent Results (from the past 24 hour(s))   GLUCOSE, POC    Collection Time: 06/17/18  4:00 PM   Result Value Ref Range    Glucose (POC) 137 (H) 65 - 100 mg/dL    Performed by Brandfolder, POC    Collection Time: 06/17/18  9:11 PM   Result Value Ref Range    Glucose (POC) 216 (H) 65 - 100 mg/dL    Performed by ROXANNA SAPNA    CK    Collection Time: 06/18/18 12:31 AM   Result Value Ref Range    CK 87 26 - 192 U/L   HEPATITIS PANEL, ACUTE    Collection Time: 06/18/18 12:31 AM   Result Value Ref Range    Hepatitis A, IgM NONREACTIVE NR      __          Hepatitis B surface Ag <0.10 Index    Hep B surface Ag Interp. NEGATIVE  NEG      __          Hepatitis B core, IgM NONREACTIVE NR      __          Hep C  virus Ab Interp.  NONREACTIVE NR      Hep C  virus Ab comment Method used is Siemens [employer]     MAGNESIUM    Collection Time: 06/18/18 12:31 AM   Result Value Ref Range    Magnesium 1.7 1.6 - 2.4 mg/dL   RENAL FUNCTION PANEL    Collection Time: 06/18/18 12:31 AM   Result Value Ref Range    Sodium 146 (H) 136 - 145 mmol/L    Potassium 2.8 (L) 3.5 - 5.1 mmol/L    Chloride 110 (H) 97 - 108 mmol/L    CO2 25 21 - 32 mmol/L    Anion gap 11 5 - 15 mmol/L    Glucose 82 65 - 100 mg/dL    BUN 19 6 - 20 MG/DL    Creatinine 2.59 (H) 0.55 - 1.02 MG/DL    BUN/Creatinine ratio 7 (L) 12 - 20      GFR est AA 21 (L) >60 ml/min/1.73m2    GFR est non-AA 18 (L) >60 ml/min/1.73m2    Calcium 8.0 (L) 8.5 - 10.1 MG/DL    Phosphorus 3.1 2.6 - 4.7 MG/DL    Albumin 2.7 (L) 3.5 - 5.0 g/dL   GLUCOSE, POC    Collection Time: 06/18/18  6:43 AM   Result Value Ref Range    Glucose (POC) 85 65 - 100 mg/dL    Performed by ROXANNA ALEJO            Total time spent with patient:  xxx   min. Care Plan discussed with:  Patient     Family      RN      Consulting Physician [id number],         I have reviewed the flowsheets. Chart and Pertinent Notes have been reviewed. No change in PMH ,family and social history from Consult note. Shivam Hernadez MD  [address]  Phone - [phone]   Fax - [phone]  [email or web address]

## 2018-06-18 NOTE — PROGRESS NOTES
Hospitalist Progress Note  Bita Booth MD  Answering service: [phone] from in house phone        Date of Service:  2018  NAME:  Barbara Mccollum  :  1932  MRN:  [id number]      Admission Summary: This is an 51-year-old woman with a past medical history significant for hypertension, type 2 diabetes, dyslipidemia, hypothyroidism, memory impairment was in her usual state of health until the day of her presentation at the emergency room when the patient developed dizziness. The dizziness was for a brief moment early in the day. The patient went for a doctor's appointment, was told that everything is fine. She also went shopping with her daughter. When they got home, the daughter said that the patient did not sound like herself, and the patient developed the dizzy spell. Daughter checked the patient's blood pressure; at that time it was 110/43. Her blood sugar was also 127. The daughter noticed that the patient's speech is different. The dizziness is not related to any particular movement. Patient did not describe the sensation of a room spinning around her. No history of fever, no rigors, no chills. Patient was brought to the emergency room for further evaluation. By the time the patient arrived at the emergency room her symptoms had resolved. NIH scale was 0. CT scan of the head was obtained. This was negative for acute pathology. Patient was subsequently referred to the hospitalist service for evaluation for admission. Interval history / Subjective:     Feeling better, Dizziness is resolved. Assessment & Plan:     .   Dizziness, etiology? CVA is ruled out. BPPV? We will continue with aspirin therapy. MRI/MRA of the brain, carotid ultrasound of the neck, as well as an echocardiogram is negative and reviewed. Inpatient neurology consulted. Check for orthostatic hypotension and dizziness is better. Gerry Newby Severe hypokalemia and hypomagnessemia. Replace potassium and Mag.  .  Acute renal failure on CKD stage 2,  This is most likely due to volume depletion. We will carry out hydration with normal saline. We will hold hydrochlorothiazide and lisinopril. We will also hold the Glucophage. Renal ultrasound is negative and a nephrology consultation appreciated. .  Type 2 diabetes. Stable, Patient will be placed on sliding scale with insulin coverage. We will check hemoglobin A1c level. We will hold oral agents until this time of discharge from the hospital.  .  Hypertension, not well controlled. We will hold preadmission medications lisinopril and hydrochlorothiazide because of these acute renal failure. Patient will be placed on hydralazine as needed for blood pressure control. Also add norvasc. Larraine Piles Dyslipidemia. We will continue with home medications. We will check a lipid panel. .  Hypothyroidism, stable. We will continue with Synthroid. .  Abnormal EKG. The patient has no chest pain. Ruled out ACS. Started on aspirin. Echo is reviewed. .  Normocytic, chronic anemia. This is most likely due to chronic disease. We will carry out an anemia workup including checking stool guaiac to rule out occult GI bleed. Given iron transfusion. Code status: Full  DVT prophylaxis: Heparin    Care Plan discussed with: Patient/Family  Disposition: Home w/Family and TBD     Hospital Problems  Date Reviewed: 6/15/2018          Codes Class Noted POA    TIA (transient ischemic attack) ICD-10-CM: G45.9  ICD-9-CM: 435.9  6/17/2018 Unknown        * (Principal)ARF (acute renal failure) (HCC) ICD-10-CM: N17.9  ICD-9-CM: 584.9  6/15/2018 Yes                Review of Systems:   A comprehensive review of systems was negative except for that written in the HPI. Vital Signs:    Last 24hrs VS reviewed since prior progress note.  Most recent are:  Visit Vitals    /89 (BP 1 Location: Left arm, BP Patient Position: At rest)  Pulse 69    Temp 99.3 °F (37.4 °C)    Resp 16    Wt 74.5 kg (164 lb 3.9 oz)    SpO2 97%    BMI 27.33 kg/m2         Intake/Output Summary (Last 24 hours) at 06/18/18 2954  Last data filed at 06/17/18 1246   Gross per 24 hour   Intake              390 ml   Output                0 ml   Net              390 ml        Physical Examination:             Constitutional:  No acute distress, cooperative, pleasant    ENT:  Oral mucous moist, oropharynx benign. Neck supple,    Resp:  CTA bilaterally. No wheezing/rhonchi/rales. No accessory muscle use   CV:  Regular rhythm, normal rate, no murmurs, gallops, rubs    GI:  Soft, non distended, non tender. normoactive bowel sounds, no hepatosplenomegaly     Musculoskeletal:  No edema, warm, 2+ pulses throughout    Neurologic:  Moves all extremities. AAOx3, CN II-XII reviewed     Skin:  Good turgor, no rashes or ulcers       Data Review:    Review and/or order of clinical lab test      Labs:     Recent Labs      06/17/18 0130 06/16/18 0214   WBC  4.9  5.5   HGB  8.5*  8.4*   HCT  26.2*  25.6*   PLT  285  276     Recent Labs      06/18/18   0031  06/17/18 0130  06/16/18 0214   NA  146*  143  143   K  2.8*  3.0*  2.4*   CL  110*  105  101   CO2  25  29  32   BUN  19  24*  26*   CREA  2.59*  2.85*  2.94*   GLU  82  115*  92   CA  8.0*  8.3*  8.5   MG  1.7  1.7  1.7   PHOS  3.1   --   2.6     Recent Labs      06/18/18   0031  06/16/18   0214  06/15/18   1933  06/15/18   1500   SGOT   --   18  21  19   ALT   --   12  11*  8   AP   --   66  67  69   TBILI   --   0.4  0.4  0.4   TP   --   6.5  7.4  6.9   ALB  2.7*  3.1*  3.3*  4.1   GLOB   --   3.4  4.1*   --      Recent Labs      06/15/18   1933   INR  1.0   PTP  10.4      Recent Labs      06/16/18 0214   TIBC  241*   PSAT  15*   FERR  172      Lab Results   Component Value Date/Time    Folate 11.9 06/16/2018 12:45 AM      No results for input(s): PH, PCO2, PO2 in the last 72 hours.   Recent Labs      06/18/18   0031 06/16/18   0615  06/15/18   1933   CPK  87  108   --    CKNDX   --   Cannot be calculated   --    TROIQ   --   <0.05  <0.05     Lab Results   Component Value Date/Time    Cholesterol, total 120 06/16/2018 02:14 AM    HDL Cholesterol 63 06/16/2018 02:14 AM    LDL, calculated 39 06/16/2018 02:14 AM    Triglyceride 90 06/16/2018 02:14 AM    CHOL/HDL Ratio 1.9 06/16/2018 02:14 AM     Lab Results   Component Value Date/Time    Glucose (POC) 85 06/18/2018 06:43 AM    Glucose (POC) 216 (H) 06/17/2018 09:11 PM    Glucose (POC) 137 (H) 06/17/2018 04:00 PM    Glucose (POC) 178 (H) 06/17/2018 11:05 AM    Glucose (POC) 106 (H) 06/17/2018 06:40 AM     Lab Results   Component Value Date/Time    Color YELLOW/STRAW 06/16/2018 01:00 PM    Appearance CLEAR 06/16/2018 01:00 PM    Specific gravity 1.008 06/16/2018 01:00 PM    pH (UA) 7.5 06/16/2018 01:00 PM    Protein 30 (A) 06/16/2018 01:00 PM    Glucose NEGATIVE  06/16/2018 01:00 PM    Ketone NEGATIVE  06/16/2018 01:00 PM    Bilirubin NEGATIVE  06/16/2018 01:00 PM    Urobilinogen 1.0 06/16/2018 01:00 PM    Nitrites NEGATIVE  06/16/2018 01:00 PM    Leukocyte Esterase LARGE (A) 06/16/2018 01:00 PM    Epithelial cells MODERATE (A) 06/16/2018 01:00 PM    Bacteria NEGATIVE  06/16/2018 01:00 PM    WBC 20-50 06/16/2018 01:00 PM    RBC 0-5 06/16/2018 01:00 PM         Medications Reviewed:     Current Facility-Administered Medications   Medication Dose Route Frequency    potassium chloride SR (KLOR-CON 10) tablet 40 mEq  40 mEq Oral DAILY    megestrol (MEGACE) tablet 80 mg  80 mg Oral BID WITH MEALS    iron sucrose (VENOFER) 300 mg in 0.9% sodium chloride 100 mL IVPB  300 mg IntraVENous ONCE    hydrALAZINE (APRESOLINE) 20 mg/mL injection 10 mg  10 mg IntraVENous Q6H PRN    0.9% sodium chloride infusion  100 mL/hr IntraVENous CONTINUOUS    insulin lispro (HUMALOG) injection   SubCUTAneous AC&HS    glucose chewable tablet 16 g  4 Tab Oral PRN    dextrose (D50W) injection syrg 12.5-25 g 12.5-25 g IntraVENous PRN    glucagon (GLUCAGEN) injection 1 mg  1 mg IntraMUSCular PRN    heparin (porcine) injection 5,000 Units  5,000 Units SubCUTAneous Q8H    sodium chloride (NS) flush 5-10 mL  5-10 mL IntraVENous Q8H    sodium chloride (NS) flush 5-10 mL  5-10 mL IntraVENous PRN    ondansetron (ZOFRAN) injection 4 mg  4 mg IntraVENous Q6H PRN    aspirin delayed-release tablet 81 mg  81 mg Oral DAILY    levothyroxine (SYNTHROID) tablet 50 mcg  50 mcg Oral ACB    mirtazapine (REMERON) tablet 15 mg  15 mg Oral QHS     ______________________________________________________________________  EXPECTED LENGTH OF STAY: - - -  ACTUAL LENGTH OF STAY:          1                 Sascha Flynn MD

## 2018-06-18 NOTE — PROGRESS NOTES
Reason for Admission:   TIA                  RRAT Score:    18              Do you (patient/family) have any concerns for transition/discharge? no              Plan for utilizing home health:    No     Likelihood of readmission?   low            Transition of Care Plan:    Pt is independent with ADLs and IADLs. Pt lives alone in an apartment. Pt uses a cane to assist with ambulation. Pt has supportive family nearby. Family will provide transport home. Plan is to return home. No needs identified at this time, Cm will continue to be available incase any needs arise. Care Management Interventions  PCP Verified by CM: Yes  Mode of Transport at Discharge:  Other (see comment) (private vehicle)  MyChart Signup: No  Discharge Durable Medical Equipment: No  Health Maintenance Reviewed: Yes  Physical Therapy Consult: Yes  Occupational Therapy Consult: Yes  Speech Therapy Consult: Yes  Current Support Network: Lives Alone (independent with ADLs)  Confirm Follow Up Transport: Family  Plan discussed with Pt/Family/Caregiver: Yes  Freedom of Choice Offered: Yes   Resource Information Provided?: No  Discharge Location  Discharge Placement: Home

## 2018-06-18 NOTE — PROGRESS NOTES
Speech Pathology bedside swallow evaluation/discharge  Patient: Cindi Garsia (76 y.o. female)  Date: 6/18/2018  Primary Diagnosis: TIA (transient ischemic attack)  TIA (transient ischemic attack)        Precautions:   Fall    ASSESSMENT :  Based on the objective data described below, the patient presents with intact oropharyngeal swallow. Patient tolerated all PO trials without difficulty and without s/s aspiration. Patient reports no difficulty. Speech is clear and intelligible in conversation. Skilled therapy provided by a speech-language pathologist is not indicated at this time. PLAN :  Recommendations:  Continue diet per MD.  Discharge Recommendations: None     SUBJECTIVE:   Patient stated I'm eating okay. OBJECTIVE:     Past Medical History:   Diagnosis Date    Anxiety     Arthritis     Chest pain 2008    Negative stress test and Holter monitor w/Dr Jazmyn Solano.  Chronic back pain     Chronic venous insufficiency     Colon polyp     last scope normal 2007; Dr. Joselyn Kay Cyst of right kidney     Depression     Diabetes ([facility] [address]     Hypercholesterolemia     Hypertension     Hypothyroidism     Memory disorder     Other ill-defined conditions(799.89)     heart murmur    Painful hip     Urinary frequency     Sees Dr[facility] Fareed Finney [facility]     Past Surgical History:   Procedure Laterality Date    COLONOSCOPY,DIAGNOSTIC  6/12/2015         HX BREAST BIOPSY  2002    HX HYSTERECTOMY      Due to a prolapsed uterus    HX KNEE REPLACEMENT      left    HX KNEE REPLACEMENT  2006    right    HX ORTHOPAEDIC      B knees.      HX OTHER SURGICAL      pelvic prolasp    UPPER GI ENDOSCOPY,BIOPSY  6/12/2015          Prior Level of Function/Home Situation:   Home Situation  Home Environment: Apartment  # Steps to Enter: 0  One/Two Story Residence: One story  Living Alone: Yes  Support Systems: Child(nicanor)  Patient Expects to be Discharged to[de-identified] Private residence  Current DME Used/Available at Home: Grab bars, Shower chair, Raised toilet seat  Tub or Shower Type: Tub/Shower combination  Diet prior to admission: Regular  Current Diet:  Regular   Cognitive and Communication Status:  Neurologic State: Alert  Orientation Level: Oriented X4  Cognition: Follows commands  Perception: Appears intact  Perseveration: No perseveration noted  Safety/Judgement: Awareness of environment, Fall prevention, Insight into deficits  Oral Assessment:  Oral Assessment  Labial: No impairment  Dentition: Upper & lower dentures  Oral Hygiene: WNL  Lingual: No impairment  Velum: No impairment  Mandible: No impairment  P.O. Trials:  Patient Position: Sitting edge of bed. Vocal quality prior to P.O.: No impairment  Consistency Presented: Solid; Thin liquid  How Presented: Self-fed/presented;Straw     Bolus Acceptance: No impairment  Bolus Formation/Control: No impairment     Propulsion: No impairment  Oral Residue: None     Laryngeal Elevation: Functional  Aspiration Signs/Symptoms: None  Pharyngeal Phase Characteristics: No impairment, issues, or problems              Oral Phase Severity: No impairment  Pharyngeal Phase Severity : No impairment  NOMS:   The NOMS functional outcome measure was used to quantify this patient's level of swallowing impairment. Based on the NOMS, the patient was determined to be at level 7 for swallow function     G Codes: In compliance with CMSs Claims Based Outcome Reporting, the following G-code set was chosen for this patient based the use of the NOMS functional outcome to quantify this patient's level of swallowing impairment. Using the NOMS, the patient was determined to be at level 7 for swallow function which correlates with the CH= 0% level of severity.     Based on the objective assessment provided within this note, the current, goal, and discharge g-codes are as follows:    Swallow  Swallowing:   Swallow Current Status CH= 0%   Swallow Goal Status CH= 0%   Swallow D/C Status CH= 0%      NOMS Swallowing Levels:  Level 1 (CN): NPO  Level 2 (CM): NPO but takes consistency in therapy  Level 3 (CL): Takes less than 50% of nutrition p.o. and continues with nonoral feedings; and/or safe with mod cues; and/or max diet restriction  Level 4 (CK): Safe swallow but needs mod cues; and/or mod diet restriction; and/or still requires some nonoral feeding/supplements  Level 5 (CJ): Safe swallow with min diet restriction; and/or needs min cues  Level 6 (CI): Independent with p.o.; rare cues; usually self cues; may need to avoid some foods or needs extra time  Level 7 ([address] [place] [address]): Independent for all p.o.  GISELA. (2003). National Outcomes Measurement System (NOMS): Adult Speech-Language Pathology User's Guide. Pain:           After treatment:   [] Patient left in no apparent distress sitting up in chair  [x] Patient left in no apparent distress in bed  [x] Call bell left within reach  [x] Nursing notified  [] Caregiver present  [] Bed alarm activated    COMMUNICATION/EDUCATION:   The patients plan of care including findings, recommendations, and recommended diet changes were discussed with: Occupational Therapist.  Patient was educated regarding role of SLP  [x] Patient/family have participated as able and agree with findings and recommendations. [] Patient is unable to participate in plan of care at this time.     Thank you for this referral.  Chica Saldana SLP  Time Calculation: 15 mins

## 2018-06-18 NOTE — PROGRESS NOTES
Occupational Therapy EVALUATION/discharge  Patient: Skyler Kyle (24 y.o. female)  Date: 6/18/2018  Primary Diagnosis: TIA (transient ischemic attack)  TIA (transient ischemic attack)        Precautions:  Fall    ASSESSMENT:   Based on the objective data described below, the patient presents with overall supervision for functional mobility and ADLs s/p admission for TIA workup (CT and MRI both negative for acute process). PTA, patient living alone in 1 level apartment and was mod I for all self care and functional mobility. Patient daughter lives next door and is able to support patient PRN. Patient completed ADL routine bathing, toileting and dressing with supervision 2* unfamiliar environment and IV line management. Patient requiring no further skilled OT services at this time. Will complete orders. Please re-consult if patient status changes. Further skilled acute occupational therapy is not indicated at this time. Discharge Recommendations: None  Further Equipment Recommendations for Discharge: none       SUBJECTIVE:   Patient stated I can take care of myself and my daughter calls me every day.     OBJECTIVE DATA SUMMARY:   HISTORY:   Past Medical History:   Diagnosis Date    Anxiety     Arthritis     Chest pain 2008    Negative stress test and Holter monitor w/Dr Barrett Abdullahi.  Chronic back pain     Chronic venous insufficiency     Colon polyp     last scope normal 2007;  Dr. Velez Faster Cyst of right kidney     Depression     Diabetes ([facility]     Hypercholesterolemia     Hypertension     Hypothyroidism     Memory disorder     Other ill-defined conditions(799.89)     heart murmur    Painful hip     Urinary frequency     Se[facility] Tyra Wolf [facility]     Past Surgical History:   Procedure Laterality Date    COLONOSCOPY,DIAGNOSTIC  6/12/2015         HX BREAST BIOPSY  2002    HX HYSTERECTOMY      Due to a prolapsed uterus    HX KNEE REPLACEMENT      left    HX KNEE REPLACEMENT  2006    right    HX ORTHOPAEDIC      B knees.  HX OTHER SURGICAL      pelvic prolasp    UPPER GI ENDOSCOPY,BIOPSY  6/12/2015            Prior Level of Function/Environment/Context: Patient mod I PTA for self care and functional mobility and has all needed DME/AE. Patient daughter lives next door and can assist patient PRN. Home Situation  Home Environment: Apartment  # Steps to Enter: 0  One/Two Story Residence: One story  Living Alone: Yes  Support Systems: Child(nicanor)  Patient Expects to be Discharged to[de-identified] Private residence  Current DME Used/Available at Home: Grab bars, Shower chair, Raised toilet seat  Tub or Shower Type: Tub/Shower combination    Hand dominance: Right    EXAMINATION OF PERFORMANCE DEFICITS:  Cognitive/Behavioral Status:  Neurologic State: Alert  Orientation Level: Oriented X4  Cognition: Follows commands  Perception: Appears intact  Perseveration: No perseveration noted  Safety/Judgement: Awareness of environment; Fall prevention; Insight into deficits    Skin: Appears Grossly intact    Edema: None noted in BUEs    Hearing: Auditory  Auditory Impairment: Hard of hearing, bilateral    Vision/Perceptual:    Tracking: Able to track stimulus in all quadrants w/o difficulty    Diplopia: No    Acuity: Impaired near vision; Impaired far vision    Corrective Lenses: Glasses    Range of Motion:  In BUEs  AROM: Within functional limits    Strength: In BUEs  Strength: Within functional limits    Coordination:  Coordination: Within functional limits  Fine Motor Skills-Upper: Left Intact; Right Intact    Gross Motor Skills-Upper: Left Intact; Right Intact    Tone & Sensation:  In BUEs  Tone: Normal  Sensation: Intact    Balance:  Sitting: Intact  Standing: Intact    Functional Mobility and Transfers for ADLs:  Bed Mobility:  Supine to Sit:  (NT - recieved in chair)    Transfers:  Sit to Stand: Supervision  Stand to Sit: Supervision  Toilet Transfer : Supervision    ADL Assessment:  Self Feeding: Independent    Oral Facial Hygiene/Grooming: Supervision    Bathing: Supervision    Upper Body Dressing: Supervision    Lower Body Dressing: Supervision    Toileting: Supervision    ADL Intervention and task modifications:    Grooming  Grooming Assistance: Supervision/set up  Washing Face: Supervision/set-up  Washing Hands: Supervision/set-up    Upper Body Bathing  Bathing Assistance: Supervision/set-up  Position Performed: Other (comment) (seated at sink)    Lower Body Bathing  Bathing Assistance: Supervision/set-up  Perineal  : Supervision/set-up  Position Performed: Other (seated at sink)  Lower Body : Supervision/set-up  Position Performed: Other (comment) (seated at sink)    Upper Body [address]: Supervision/ set-up    Lower Body Dressing Assistance  Underpants: Supervision/set-up  Pants With Elastic Waist: Supervision/set-up  Antiembolitic Stockings: Supervision/set-up  Leg Crossed Method Used: Yes  Position Performed: Seated in chair    Toileting  Toileting Assistance: Supervision/set up  Bladder Hygiene: Supervision/set-up  Clothing Management: Supervision/set-up    Cognitive Retraining  Safety/Judgement: Awareness of environment; Fall prevention; Insight into deficits  . Functional Measure:  Barthel Index:    Bathin  Bladder: 10  Bowels: 10  Groomin  Dressing: 10  Feeding: 10  Mobility: 10  Stairs: 0  Toilet Use: 10  Transfer (Bed to Chair and Back): 10  Total: 80       Barthel and G-code impairment scale:  Percentage of impairment CH  0% CI  1-19% CJ  20-39% CK  40-59% CL  60-79% CM  80-99% CN  100%   Barthel Score 0-100 100 99-80 79-60 59-40 20-39 1-19   0   Barthel Score 0-20 20 17-19 13-16 9-12 5-8 1-4 0      The Barthel ADL Index: Guidelines  1. The index should be used as a record of what a patient does, not as a record of what a patient could do.   2. The main aim is to establish degree of independence from any help, physical or verbal, however minor and for whatever reason. 3. The need for supervision renders the patient not independent. 4. A patient's performance should be established using the best available evidence. Asking the patient, friends/relatives and nurses are the usual sources, but direct observation and common sense are also important. However direct testing is not needed. 5. Usually the patient's performance over the preceding 24-48 hours is important, but occasionally longer periods will be relevant. 6. Middle categories imply that the patient supplies over 50 per cent of the effort. 7. Use of aids to be independent is allowed. Huong Rodriguez., Barthel, D.W. (8432). Functional evaluation: the Barthel Index. [facility] (14)2. Liana Nolasco malu GOSIA TerryF, Ulisses Harrison., Richard Arredondo., Luther Worley, [facility] [facility] (1999). Measuring the change indisability after inpatient rehabilitation; comparison of the responsiveness of the Barthel Index and Functional [place] Measure. Journal of Neurology, Neurosurgery, and Psychiatry, 66(4), 714-213. Ignacio Gordon NArnoldoJArnoldoA, GLENN Gonzalez, & Jayna Olsen MDAMARIS. (2004.) Assessment of post-stroke quality of life in cost-effectiveness studies: The usefulness of the Barthel Index and the EuroQoL-5D. Quality of Life Research, 13, 853-15     G codes: In compliance with CMSs Claims Based Outcome Reporting, the following G-code set was chosen for this patient based on their primary functional limitation being treated: The outcome measure chosen to determine the severity of the functional limitation was the Barthel Index with a score of 80/100 which was correlated with the impairment scale. ?  Self Care:     - CURRENT STATUS: CI - 1%-19% impaired, limited or restricted    - GOAL STATUS: CI - 1%-19% impaired, limited or restricted    - D/C STATUS:  CI - 1%-19% impaired, limited or restricted     Occupational Therapy Evaluation Charge Determination   History Examination Decision-Making   LOW Complexity : Brief history review  LOW Complexity : 1-3 performance deficits relating to physical, cognitive , or psychosocial skils that result in activity limitations and / or participation restrictions  LOW Complexity : No comorbidities that affect functional and no verbal or physical assistance needed to complete eval tasks       Based on the above components, the patient evaluation is determined to be of the following complexity level: LOW   Pain:  Pain Scale 1: Numeric (0 - 10)  Pain Intensity 1: 0    Activity Tolerance:   Good, VSS  Please refer to the flowsheet for vital signs taken during this treatment. After treatment:   [x]  Patient left in no apparent distress sitting up in bathroom with PCT to complete oral care  []  Patient left in no apparent distress in bed  [x]  Call bell left within reach  [x]  Nursing notified  [x]  PCT present  []  Bed alarm activated    COMMUNICATION/EDUCATION:   Communication/Collaboration:  [x]      Home safety education was provided and the patient/caregiver indicated understanding. [x]      Patient/family have participated as able and agree with findings and recommendations. []      Patient is unable to participate in plan of care at this time.   Findings and recommendations were discussed with: Speech Therapist and Registered Nurse    Chris Liu OT  Time Calculation: 36 mins

## 2018-06-19 NOTE — PROGRESS NOTES
Bedside shift change report given to Vito Williamson (oncoming nurse) by Angela Salazar (offgoing nurse). Report included the following information SBAR, Kardex and MAR.

## 2018-06-19 NOTE — DISCHARGE INSTRUCTIONS
Discharge Instructions       PATIENT ID: Solange Cantrell  MRN: [id number]   YOB: 1932    DATE OF ADMISSION: 6/15/2018  7:15 PM    DATE OF DISCHARGE: 6/19/2018    PRIMARY CARE PROVIDER: Juan Stout MD       DISCHARGING PHYSICIAN: Vipin Barajas MD    To contact this individual call [phone] and ask the  to page. If unavailable ask to be transferred the Adult Hospitalist Department. DISCHARGE DIAGNOSES: Acute kidney Injury, Hypokalemia,     CONSULTATIONS: IP CONSULT TO NEUROLOGY  IP CONSULT TO NEPHROLOGY    PROCEDURES/SURGERIES: * No surgery found *    PENDING TEST RESULTS:   At the time of discharge the following test results are still pending: Acute Kidney Kidney Injury, Hypokalemia    FOLLOW UP APPOINTMENTS:   Follow-up Information     Follow up With Details Comments Contact Info    Juan Stout MD In 1 week  [address] [facility]  [address] [facility]      Jennifer Hallman MD In 2 weeks  [address] [address]  [phone]             ADDITIONAL CARE RECOMMENDATIONS: repeat BMP in 6/26/18    DIET: Cardiac Diet    ACTIVITY: Activity as tolerated    WOUND CARE: none    EQUIPMENT needed:       DISCHARGE MEDICATIONS:   See Medication Reconciliation Form    · It is important that you take the medication exactly as they are prescribed. · Keep your medication in the bottles provided by the pharmacist and keep a list of the medication names, dosages, and times to be taken in your wallet. · Do not take other medications without consulting your doctor. NOTIFY YOUR PHYSICIAN FOR ANY OF THE FOLLOWING:   Fever over 101 degrees for 24 hours. Chest pain, shortness of breath, fever, chills, nausea, vomiting, diarrhea, change in mentation, falling, weakness, bleeding. Severe pain or pain not relieved by medications. Or, any other signs or symptoms that you may have questions about.       DISPOSITION:  x  Home With:   OT  PT  HH  RN       SNF/Inpatient Rehab/LTAC    Independent/assisted living    Hospice    Other:     CDMP Checked:    Yes X       Signed:   Gary Slater MD  6/19/2018  2:11 PM

## 2018-06-19 NOTE — TELEPHONE ENCOUNTER
Patient's daughter, Elizabeth Gandhi, states she needs a call back to get a Hospital Follow up appt no later than this Friday, 6/22/18 for [facility] Discharge of today, 6/19/18. Please call.  Thank you

## 2018-06-19 NOTE — TELEPHONE ENCOUNTER
Message  Was left for patient' daughter Judyth Peers about Hospital follow up on 6/22/18 at 4:15pm with Dr. Dot Smalls

## 2018-06-19 NOTE — PROGRESS NOTES
Problem: Falls - Risk of  Goal: *Absence of Falls  Document Jennifer Fall Risk and appropriate interventions in the flowsheet.    Outcome: Progressing Towards Goal  Fall Risk Interventions:  Mobility Interventions: Patient to call before getting OOB         Medication Interventions: Patient to call before getting OOB         History of Falls Interventions: Door open when patient unattended

## 2018-06-19 NOTE — DISCHARGE SUMMARY
Discharge Summary       PATIENT ID: Evan Nieves  MRN: [id number]   YOB: 1932    DATE OF ADMISSION: 6/15/2018  7:15 PM    DATE OF DISCHARGE: 6/19/2018   PRIMARY CARE PROVIDER: Keely Colon MD       DISCHARGING PHYSICIAN: Ramses Nettles MD    To contact this individual call [phone] and ask the  to page. If unavailable ask to be transferred the Adult Hospitalist Department. CONSULTATIONS: IP CONSULT TO NEUROLOGY  IP CONSULT TO NEPHROLOGY    PROCEDURES/SURGERIES: * No surgery found *    [address] COURSE:     This is an 44-year-old woman with a past medical history significant for hypertension, type 2 diabetes, dyslipidemia, hypothyroidism, memory impairment was in her usual state of health until the day of her presentation at the emergency room when the patient developed dizziness.    Patient was initially admitted for TIA. Brain imaging including MRI, MRA, was negative for acute stroke. Neurology was consulted and recommended medical management with aspirin. Regarding acute kidney injury, her creatinine on admission was 2.92 with baseline around 0.8. KATIE was thought to be due to volume depletion in the setting of HCTZ and ACEI. Her home ACEI and   And lisinopril was held and she was hydrated with normal saline IVF. Renal ultrasound was negative for hydronephrosis but there were bilateral renal cyst and septated left renal cyst seen. Her creatinine trended down to 2.53 on IVF and she was told to repeat renal function in one week and to follow up with nephrology on an out-patient basis. DM type 2; He home metformin was held and patient was placed on insulin sliding scale. Her HbA1c is 5.8. She will continue on diabetic diet. Hypokalemia requiring kcl supplementation. Her serum potassium is stable. Patient was told to follow up with pcp within one week of discharge. DISCHARGE DIAGNOSES / PLAN:       Dizziness, etiology? CVA is ruled out. BPPV? We will continue with aspirin therapy. MRI/MRA of the brain, carotid ultrasound of the neck, as well as an echocardiogram is negative. Dizziness likely due to dehydration. Now resolved. Severe hypokalemia and hypomagnessemia.  Replace potassium and Mag. Acute renal failure on CKD stage 2,  This is most likely due to volume depletion.  We will carry out hydration with normal saline.  We will hold hydrochlorothiazide and lisinopril.  We will also hold the Glucophage. Renal ultrasound is negative and a nephrology consultation appreciated. F/u with renal OP  Type 2 diabetes. Stable, Patient will be placed on sliding scale with insulin coverage.  We will check hemoglobin A1c level.  We will hold oral agents until this time of discharge from the hospital.  Hypertension, not well controlled. We will hold preadmission medications lisinopril and hydrochlorothiazide because of these acute renal failure.    Continue norvasc but increase to 10 mg po daily. F/u with pcp    . Lea Cox will continue with home medications. .  Hypothyroidism, stable. We will continue with Synthroid. Dryden Mis EKG.  The patient has no chest pain. Ruled out ACS. Started on aspirin. Echo is reviewed. .  Normocytic, chronic anemia.  Suspect anemia of kidney disease. H/H stable    Disposition prior to discharge: patient is hemodynamically stable and has improved.        PENDING TEST RESULTS:   At the time of discharge the following test results are still pending:     FOLLOW UP APPOINTMENTS:    Follow-up Information     Follow up With Details Janessa Gardner MD In 1 week  [address] [facility]  [address] [id number]      Hayde Cruz MD In 2 weeks  Critical access hospital  [phone]             ADDITIONAL CARE RECOMMENDATIONS: Repeat BMP in one week    DIET: Cardiac Diet    ACTIVITY: Activity as tolerated    WOUND CARE: none    EQUIPMENT needed:       DISCHARGE MEDICATIONS:  Current Discharge Medication List      START taking these medications    Details   amLODIPine (NORVASC) 5 mg tablet Take 1 Tab by mouth daily. Indications: hypertension  Qty: 30 Tab, Refills: 2         CONTINUE these medications which have NOT CHANGED    Details   levothyroxine (SYNTHROID) 50 mcg tablet TAKE 1 TABLET BY MOUTH DAILY BEFORE BREAKFAST  Qty: 90 Tab, Refills: 3    Comments: **Patient requests 90 days supply**  Associated Diagnoses: Hypothyroidism due to acquired atrophy of thyroid      mirtazapine (REMERON) 15 mg tablet Take 15 mg by mouth nightly. New script 6/15/18 (has never taken)      rosuvastatin (CRESTOR) 20 mg tablet TAKE 1 TABLET BY MOUTH EVERY DAY  Qty: 90 Tab, Refills: 0    Comments: **Patient requests 90 days supply**      aspirin delayed-release 81 mg tablet Take 81 mg by mouth daily. polyethylene glycol (MIRALAX) 17 gram packet Take 1 Packet by mouth daily. Qty: 30 Packet, Refills: 11         STOP taking these medications       metFORMIN (GLUMETZA) 500 mg TG24 24 hour tablet Comments:   Reason for Stopping:         lisinopril-hydroCHLOROthiazide (PRINZIDE, ZESTORETIC) 20-25 mg per tablet Comments:   Reason for Stopping:                 NOTIFY YOUR PHYSICIAN FOR ANY OF THE FOLLOWING:   Fever over 101 degrees for 24 hours. Chest pain, shortness of breath, fever, chills, nausea, vomiting, diarrhea, change in mentation, falling, weakness, bleeding. Severe pain or pain not relieved by medications. Or, any other signs or symptoms that you may have questions about.     DISPOSITION:    Home With:   OT  PT  HH  RN       Long term SNF/Inpatient Rehab    Independent/assisted living    Hospice    Other:       PATIENT CONDITION AT DISCHARGE:     Functional status    Poor     Deconditioned     Independent      Cognition     Lucid     Forgetful     Dementia      Catheters/lines (plus indication)    Rivers     PICC     PEG     None      Code status     Full code     DNR      PHYSICAL EXAMINATION AT DISCHARGE:   Refer to Progress Note    Constitutional:  No acute distress, cooperative, pleasant    ENT:  Oral mucous moist, oropharynx benign. Neck supple,    Resp:  CTA bilaterally. No wheezing/rhonchi/rales. No accessory muscle use   CV:  Regular rhythm, normal rate, no murmurs, gallops, rubs    GI:  Soft, non distended, non tender. normoactive bowel sounds, no hepatosplenomegaly     Musculoskeletal:  No edema, warm, 2+ pulses throughout    Neurologic:  Moves all extremities.   AAOx3, CN II-XII reviewed                                             Skin:  Good turgor, no rashes or ulcers        CHRONIC MEDICAL DIAGNOSES:  Problem List as of 6/19/2018  Date Reviewed: 6/15/2018          Codes Class Noted - Resolved    TIA (transient ischemic attack) ICD-10-CM: G45.9  ICD-9-CM: 435.9  6/17/2018 - Present        * (Principal)ARF (acute renal failure) ([facility] ICD-10-CM: N17.9  ICD-9-CM: 584.9  6/15/2018 - Present        Postmenopausal atrophic vaginitis ICD-10-CM: N95.2  ICD-9-CM: 627.3  1/31/2018 - Present        History of hysterectomy including cervix ICD-10-CM: Z90.710  ICD-9-CM: V88.01  1/31/2018 - Present        Prolapse of vaginal vault after hysterectomy ICD-10-CM: N99.3  ICD-9-CM: 618.5  1/31/2018 - Present        Rectocele ICD-10-CM: N81.6  ICD-9-CM: 618.04  1/31/2018 - Present        Somatization disorder ICD-10-CM: F45.0  ICD-9-CM: 300.81  8/18/2015 - Present        ADD (attention deficit disorder) ICD-10-CM: F98.8  ICD-9-CM: 314.00  8/18/2015 - Present    Overview Signed 8/18/2015  6:35 PM by Hilario Medrano PsyD     chronic and moderate (6 out of 10)             Diabetes mellitus ([facility] ICD-10-CM: E11.9  ICD-9-CM: 250.00  1/19/2012 - Present        Syncope and collapse ICD-10-CM: R55  ICD-9-CM: 780.2  4/24/2011 - Present        Hypertension ICD-10-CM: I10  ICD-9-CM: 401.9  Unknown - Present        Hypercholesterolemia ICD-10-CM: E78.00  ICD-9-CM: 272.0  Unknown - Present        Hypothyroidism ICD-10-CM: E03.9  ICD-9-CM: 244.9  Unknown - Present        Depression ICD-10-CM: F32.9  ICD-9-CM: 311  Unknown - Present        Anxiety ICD-10-CM: F41.9  ICD-9-CM: 300.00  Unknown - Present        Urinary frequency ICD-10-CM: R35.0  ICD-9-CM: 788.41  Unknown - Present        Chronic venous insufficiency ICD-10-CM: I87.2  ICD-9-CM: 459.81  Unknown - Present        Chronic back pain ICD-10-CM: M54.9, G89.29  ICD-9-CM: 724.5, 338.29  Unknown - Present        Painful hip ICD-10-CM: M25.559  ICD-9-CM: 719.45  Unknown - Present              Greater than 30 minutes were spent with the patient on counseling and coordination of care    Signed:   Darron Bahena MD  6/19/2018  2:14 PM

## 2018-06-19 NOTE — PROGRESS NOTES
Nephrology Progress Note  Onetha Furth  Date of Admission : 6/15/2018    CC: Follow up for KATIE       Assessment and Plan     KATIE:  - 2/2 volume depletion vs ATN from hypotension in the setting of thiazide/ace use  - drawing labs now  - will f/u  - can d/c home with f/u in 1-2 weeks if Cr stable or improving  - will f/u serologic w/u    Hypernatremia:  - Na pending    Hypokalemia:  - replete PRN    Volume Depletion:  - improving  - cont IVF for now    Hypotension:  - resolved  - hold off on ace/arb    Anemia:  - iron deficiency  - receiving venofer    Hypothyroidism:  - on synthroid    DM2:  - per hospitalist       Interval History:  Seen and examined. Refused AM labs. Trying to draw labs now. No complaints. Anxious to go home. No cp, sob,n /v/d. Current Medications: all current  Medications have been eviewed in EPIC  Review of Systems: Pertinent items are noted in HPI. Objective:  Vitals:    Vitals:    06/18/18 1454 06/18/18 2116 06/19/18 0345 06/19/18 0822   BP: 153/77 157/67 170/88 152/81   Pulse: 69 79 71 69   Resp: 16 16 16 16   Temp: 98.6 °F (37 °C) 99 °F (37.2 °C) 98.3 °F (36.8 °C) 98.5 °F (36.9 °C)   SpO2: 98% 96% 98% 96%   Weight:         Intake and Output:     06/17 1901 - 06/19 0700  In: 1385 [P.O.:1320; I.V.:5855]  Out: -     Physical Examination:    General: NAD,Conversant   Neck:  Supple, no mass  Resp:  Lungs CTA B/L, no wheezing , normal respiratory effort  CV:  RRR,  no murmur or rub, no LE edema  GI:  Soft, NT, + Bowel sounds, no hepatosplenomegaly  Neurologic:  Non focal  Psych:             AAO x 3 appropriate affect   Skin:  No Rash  :  No duran    []    High complexity decision making was performed  []    Patient is at high-risk of decompensation with multiple organ involvement    Lab Data Personally Reviewed: I have reviewed all the pertinent labs, microbiology data and radiology studies during assessment.     Recent Labs      06/18/18   0031  06/17/18   0130   NA  146*  143 K  2.8*  3.0*   CL  110*  105   CO2  25  29   GLU  82  115*   BUN  19  24*   CREA  2.59*  2.85*   CA  8.0*  8.3*   MG  1.7  1.7   PHOS  3.1   --    ALB  2.7*   --      Recent Labs      06/17/18   0130   WBC  4.9   HGB  8.5*   HCT  26.2*   PLT  285     No results found for: SDES  Lab Results   Component Value Date/Time    Culture result: KLEBSIELLA PNEUMONIAE 06/12/2015 06:35 PM    Culture result: ESCHERICHIA COLI 06/12/2015 06:35 PM     Recent Results (from the past 24 hour(s))   GLUCOSE, POC    Collection Time: 06/18/18 11:33 AM   Result Value Ref Range    Glucose (POC) 144 (H) 65 - 100 mg/dL    Performed by Cynthia Price    GLUCOSE, POC    Collection Time: 06/18/18  4:31 PM   Result Value Ref Range    Glucose (POC) 155 (H) 65 - 100 mg/dL    Performed by Stormy Beasley, POC    Collection Time: 06/18/18  9:37 PM   Result Value Ref Range    Glucose (POC) 147 (H) 65 - 100 mg/dL    Performed by Gloria Cooper 80, POC    Collection Time: 06/19/18  6:31 AM   Result Value Ref Range    Glucose (POC) 99 65 - 100 mg/dL    Performed by Tyrese Median            Total time spent with patient:  xxx   min. Care Plan discussed with:  Patient     Family      RN      Consulting Physician [facility],         I have reviewed the flowsheets. Chart and Pertinent Notes have been reviewed. No change in PMH ,family and social history from Consult note. Audrey Garcia MD  [facility]   [address] [address]  Phone - [phone]   Fax - [phone]  www. [facility]

## 2018-06-19 NOTE — PROGRESS NOTES
Charted on 6.19.18    Spiritual Care Partner Volunteer visited patient in room 217/01 on 6.18.18. Documented by: : Rev. Kishor Welch.  Jose David Melendrez; [facility], to contact [phone] [address] call: [phone]

## 2018-06-19 NOTE — PROGRESS NOTES
Bedside and Verbal shift change report given to [address] [place] [address] 20 (oncoming nurse) by Francine Saxena RN (offgoing nurse). Report included the following information SBAR, Kardex, Intake/Output, MAR and Recent Results.

## 2018-06-20 NOTE — PROGRESS NOTES
Hospital Discharge Follow-Up      Date/Time:  2018 12:47 PM    Patient was admitted to [facility] on 6/15/18 and discharged on 18 for acute renal failure/TIA. The physician discharge summary was available at the time of outreach. Patient was contacted within 1 business days of discharge. Top Challenges reviewed with the provider   none         Method of communication with provider :none    Inpatient RRAT score: 25  Was this a readmission? no   Patient stated reason for the readmission: N/A    Nurse Navigator (NN) contacted the patient by telephone to perform post hospital discharge assessment. Verified name and  with patient as identifiers. Provided introduction to self, and explanation of the Nurse Navigator role. Reviewed discharge instructions and red flags with patient who verbalized understanding. Patient given an opportunity to ask questions and does not have any further questions or concerns at this time. The patient agrees to contact the PCP office for questions related to their healthcare. NN provided contact information for future reference. Disease Specific:   N/A    Summary of patient's top problems:  1. Uncontrolled HTN started on Norvasc at discharge, not checking bloodpressure but has machine at home    [address] orders at discharge: [address] [facility] 635: N/A  Date of initial visit: N/A    Durable Medical Equipment ordered/company: N/A  Durable Medical Equipment received: N/A    Barriers to care? age    Advance Care Planning:   Does patient have an Advance Directive:  not on file will discuss at next week outreach. Medication(s):     New Medications at Discharge: Norvasc   Changed Medications at Discharge: none  Discontinued Medications at Discharge: John De Los Santos    Medication reconciliation was performed with patient, who verbalizes understanding of administration of home medications.   There were no barriers to obtaining medications identified at this time. Referral to Pharm D needed: no     Current Outpatient Prescriptions   Medication Sig    amLODIPine (NORVASC) 10 mg tablet Take 1 Tab by mouth daily. Indications: hypertension    levothyroxine (SYNTHROID) 50 mcg tablet TAKE 1 TABLET BY MOUTH DAILY BEFORE BREAKFAST    mirtazapine (REMERON) 15 mg tablet Take 15 mg by mouth nightly. New script 6/15/18 (has never taken)    rosuvastatin (CRESTOR) 20 mg tablet TAKE 1 TABLET BY MOUTH EVERY DAY    aspirin delayed-release 81 mg tablet Take 81 mg by mouth daily.  polyethylene glycol (MIRALAX) 17 gram packet Take 1 Packet by mouth daily. No current facility-administered medications for this visit. There are no discontinued medications. PCP/Specialist follow up:   Future Appointments  Date Time Provider Ashleigh Michaud   6/22/2018 4:15 PM Kan Conawy MD [address]   12/14/2018 1:30 PM Kan Conway MD [facility] 634 Advance Care Plan            6/20/18 deferred to next week outreach. DAKOTA       Prevent complications post hospitalization. 6/20/18 Educated patient on importance of taking BP medication same time every day and to monitor. Patient will monitor BP, record, and take to PCP appointment on 6/22/18.  DAKOTA

## 2018-06-22 NOTE — MR AVS SNAPSHOT
[address] [facility] 
[phone] Patient: Keerthi Heart MRN:  [id number] Visit Information Date & Time Provider Department Dept. Phone Encounter #  
 6/22/2018  4:15 PM Lindsey Rubin, [address] [facility] [phone] [id number] Follow-up Instructions Return in about 3 months (around 9/22/2018) for DM, follow up kidney failure. Your Appointments 12/14/2018  1:30 PM  
ROUTINE CARE with Lindsey Rubin MD  
[facility]) Appt Note: 6 month follow up  
 [facility] [address] [address]  
[address] [facility] Upcoming Health Maintenance Date Due MICROALBUMIN Q1 12/16/2017 Influenza Age 5 to Adult 8/1/2018 EYE EXAM RETINAL OR DILATED Q1 11/15/2018 HEMOGLOBIN A1C Q6M 12/16/2018 FOOT EXAM Q1 6/15/2019 MEDICARE YEARLY EXAM 6/16/2019 LIPID PANEL Q1 6/16/2019 GLAUCOMA SCREENING Q2Y 11/15/2019 DTaP/Tdap/Td series (2 - Td) 11/10/2025 Allergies as of 6/22/2018  Review Complete On: 6/20/2018 By: Bri Alejandro RN Severity Noted Reaction Type Reactions Flexeril [Cyclobenzaprine]  02/27/2017    Other (comments) Caused pt to fall & hallucination Percocet [Oxycodone-acetaminophen]  10/16/2009    Itching Remeron [Mirtazapine]  06/22/2018    Diarrhea Tramadol  02/27/2017    Other (comments) Caused falls Current Immunizations  Reviewed on 12/16/2016 Name Date Influenza Vaccine 9/16/2016, 10/15/2015, 9/2/2014 Pneumococcal Conjugate (PCV-13) 6/16/2017  2:45 PM  
 Pneumococcal Polysaccharide (PPSV-23) 11/10/2015 Tdap 11/10/2015 ZZZ-RETIRED (DO NOT USE) Pneumococcal Vaccine (Unspecified Type)  Deferred (Patient Refused) Not reviewed this visit You Were Diagnosed With   
  
 Codes Comments Acute renal failure, unspecified [facility] renal [facility] ([facility]    -  Primary ICD-10-CM: N17.9 ICD-9-CM: 584.9 Hypokalemia     ICD-10-CM: E87.6 ICD-9-CM: 276.8 Right flank pain     ICD-10-CM: R10.9 ICD-9-CM: 789.09 Type 2 diabetes mellitus without complication, unspecified whether long term insulin use (HCC)     ICD-10-CM: E11.9 ICD-9-CM: 250.00 Superficial thrombophlebitis of right upper extremity     ICD-10-CM: I80.8 ICD-9-CM: 451.82 Vitals BP Pulse Temp Resp Height(growth percentile) Weight(growth percentile) 142/74 (BP 1 Location: Left arm, BP Patient Position: Sitting) 76 98.9 °F (37.2 °C) (Oral) 20 5' 5\" (1.651 m) 158 lb 9.6 oz (71.9 kg) SpO2 BMI OB Status Smoking Status 99% 26.39 kg/m2 Hysterectomy Never Smoker Vitals History BMI and BSA Data Body Mass Index Body Surface Area  
 26.39 kg/m 2 1.82 m 2 Preferred Pharmacy Pharmacy Name Phone [facility] [phone] Your Updated Medication List  
  
   
This list is accurate as of 6/22/18  5:48 PM.  Always use your most recent med list. amLODIPine 10 mg tablet Commonly known as:  Congers Bars Take 1 Tab by mouth daily. Indications: hypertension  
  
 aspirin delayed-release 81 mg tablet Take 81 mg by mouth daily. dicloxacillin 250 mg capsule Commonly known as:  Donnita Glassing Take 1 Cap by mouth Before breakfast, lunch, dinner and at bedtime for 7 days. glimepiride 4 mg tablet Commonly known as:  AMARYL Take 1 Tab by mouth every morning. levothyroxine 50 mcg tablet Commonly known as:  SYNTHROID  
TAKE 1 TABLET BY MOUTH DAILY BEFORE BREAKFAST polyethylene glycol 17 gram packet Commonly known as:  Marlane Blinks Take 1 Packet by mouth daily. rosuvastatin 20 mg tablet Commonly known as:  CRESTOR  
TAKE 1 TABLET BY MOUTH EVERY DAY  
  
  
  
  
 Prescriptions Sent to Pharmacy Refills  
 glimepiride (AMARYL) 4 mg tablet 11 Sig: Take 1 Tab by mouth every morning. Class: Normal  
 [facility] #: [phone] Route: Oral  
 dicloxacillin (DYNAPEN) 250 mg capsule 0 Sig: Take 1 Cap by mouth Before breakfast, lunch, dinner and at bedtime for 7 days. Class: Normal  
 [facility] Ph #: [phone] Route: Oral  
  
We Performed the Following CBC WITH AUTOMATED DIFF [43775 CPT(R)] METABOLIC PANEL, BASIC [39554 CPT(R)] URINALYSIS W/ RFLX MICROSCOPIC [60746 CPT(R)] Follow-up Instructions Return in about 3 months (around 9/22/2018) for DM, follow up kidney failure. Patient Instructions Superficial Thrombophlebitis: Care Instructions Your Care Instructions Superficial thrombophlebitis is inflammation in a vein where a blood clot has formed close to the surface of the skin. You may be able to feel the clot as a firm lump under the skin. The skin over the clot can become red, tender, and warm to the touch. Blood clots in veins close to the skin's surface usually are not serious and often can be treated at home. Sometimes superficial thrombophlebitis spreads to a deeper vein (deep vein thrombosis, or DVT). These deeper clots can be serious, even life-threatening. It is very important that you follow your doctor's instructions, keep all follow-up appointments, and watch for new or worsening symptoms of a clot. Follow-up care is a key part of your treatment and safety. Be sure to make and go to all appointments, and call your doctor if you are having problems. It's also a good idea to know your test results and keep a list of the medicines you take. How can you care for yourself at home? · Take your medicines exactly as prescribed. Call your doctor if you think you are having a problem with your medicine. You will get more details on the specific medicines your doctor prescribes. · Prop up the sore leg or arm on a pillow anytime you sit or lie down. Try to keep it above the level of your heart. To prevent thrombophlebitis · Exercise. Keep blood moving in your legs to keep new clots from forming. · Get up out of bed as soon as possible after an illness or surgery. · Do not smoke. If you need help quitting, talk to your doctor about stop-smoking programs and medicines. These can increase your chances of quitting for good. · Ask your doctor about compression stockings. These may help prevent blood clots from forming in your legs. But there are different types of stockings, and they need to fit right. So your doctor will recommend what you need. When should you call for help? Call 911 anytime you think you may need emergency care. For example, call if: 
? · You have sudden chest pain and shortness of breath, or you cough up blood. ? · You vomit blood or what looks like coffee grounds. ? · You pass maroon or very bloody stools. ? · You passed out (lost consciousness). ?Call your doctor now or seek immediate medical care if: 
? · You have signs of a blood clot, such as: 
¨ Pain in your calf, back of the knee, thigh, or groin. ¨ Redness and swelling in your leg or groin. ? · You notice a new hard, red, or tender area in your leg. ? · Your stools are black and tarlike or have streaks of blood. ? Watch closely for changes in your health, and be sure to contact your doctor if: 
? · You do not get better as expected. Where can you learn more? Go to http://jeremi-travis.info/. Enter [phone] in the search box to learn more about \"Superficial Thrombophlebitis: Care Instructions. \" Current as of: March 20, 2017 Content Version: 11.4 © 5832-5348 Healthwise, Incorporated. Care instructions adapted under license by [employer] (which disclaims liability or warranty for this information). If you have questions about a medical condition or this instruction, always ask your healthcare professional. [address] any warranty or liability for your use of this information. Introducing [facility]! [facility] introduces Walkabout patient portal. Now you can access parts of your medical record, email your doctor's office, and request medication refills online. 1. In your internet browser, go to https://[employer]. [employer] 2. Click on the First Time User? Click Here link in the Sign In box. You will see the New Member Sign Up page. 3. Enter your Walkabout Access Code exactly as it appears below. You will not need to use this code after youve completed the sign-up process. If you do not sign up before the expiration date, you must request a new code. · Walkabout Access Code: [email or web address] Expires: 6/25/2018  3:22 PM 
 
4. Enter the last four digits of your Social Security Number (xxxx) and Date of Birth (mm/dd/yyyy) as indicated and click Submit. You will be taken to the next sign-up page. 5. Create a Walkabout ID. This will be your Walkabout login ID and cannot be changed, so think of one that is secure and easy to remember. 6. Create a Walkabout password. You can change your password at any time. 7. Enter your Password Reset Question and Answer. This can be used at a later time if you forget your password. 8. Enter your e-mail address. You will receive e-mail notification when new information is available in [address] 9. Click Sign Up. You can now view and download portions of your medical record. 10. Click the Download Summary menu link to download a portable copy of your medical information.  
 
If you have questions, please visit the Frequently Asked Questions section of the Groupiter. Remember, [employer] is NOT to be used for urgent needs. For medical emergencies, dial 911. Now available from your iPhone and Android! Please provide this summary of care documentation to your next provider. Your primary care clinician is listed as [place]. If you have any questions after today's visit, please call [phone].

## 2018-06-22 NOTE — PROGRESS NOTES
1. Have you been to the ER, urgent care clinic since your last visit? Hospitalized since your last visit? yes  [facility]    2. Have you seen or consulted any other health care providers outside of the [facility] since your last visit? Include any pap smears or colon screening.  no

## 2018-06-22 NOTE — PATIENT INSTRUCTIONS
Superficial Thrombophlebitis: Care Instructions  Your Care Instructions  Superficial thrombophlebitis is inflammation in a vein where a blood clot has formed close to the surface of the skin. You may be able to feel the clot as a firm lump under the skin. The skin over the clot can become red, tender, and warm to the touch. Blood clots in veins close to the skin's surface usually are not serious and often can be treated at home. Sometimes superficial thrombophlebitis spreads to a deeper vein (deep vein thrombosis, or DVT). These deeper clots can be serious, even life-threatening. It is very important that you follow your doctor's instructions, keep all follow-up appointments, and watch for new or worsening symptoms of a clot. Follow-up care is a key part of your treatment and safety. Be sure to make and go to all appointments, and call your doctor if you are having problems. It's also a good idea to know your test results and keep a list of the medicines you take. How can you care for yourself at home? · Take your medicines exactly as prescribed. Call your doctor if you think you are having a problem with your medicine. You will get more details on the specific medicines your doctor prescribes. · Prop up the sore leg or arm on a pillow anytime you sit or lie down. Try to keep it above the level of your heart. To prevent thrombophlebitis  · Exercise. Keep blood moving in your legs to keep new clots from forming. · Get up out of bed as soon as possible after an illness or surgery. · Do not smoke. If you need help quitting, talk to your doctor about stop-smoking programs and medicines. These can increase your chances of quitting for good. · Ask your doctor about compression stockings. These may help prevent blood clots from forming in your legs. But there are different types of stockings, and they need to fit right. So your doctor will recommend what you need. When should you call for help?   Call 46 anytime you think you may need emergency care. For example, call if:  ? · You have sudden chest pain and shortness of breath, or you cough up blood. ? · You vomit blood or what looks like coffee grounds. ? · You pass maroon or very bloody stools. ? · You passed out (lost consciousness). ?Call your doctor now or seek immediate medical care if:  ? · You have signs of a blood clot, such as:  ¨ Pain in your calf, back of the knee, thigh, or groin. ¨ Redness and swelling in your leg or groin. ? · You notice a new hard, red, or tender area in your leg. ? · Your stools are black and tarlike or have streaks of blood. ? Watch closely for changes in your health, and be sure to contact your doctor if:  ? · You do not get better as expected. Where can you learn more? Go to http://jeremi-travis.info/. Enter [phone] in the search box to learn more about \"Superficial Thrombophlebitis: Care Instructions. \"  Current as of: March 20, 2017  Content Version: 11.4  © 2771-9672 [employer]. Care instructions adapted under license by [employer] (which disclaims liability or warranty for this information). If you have questions about a medical condition or this instruction, always ask your healthcare professional. [address] any warranty or liability for your use of this information.

## 2018-06-22 NOTE — PROGRESS NOTES
SUBJECTIVE  Ms. Evan Nieves presents today acutely for     Chief Complaint   Patient presents with   [facility] Follow Up     pt here today for hospital f.u from [facility] - pt states that she felt washed out; pt b.p 110/43-pt states that she didnt feel good so she just went to ER at [facility]; while at ER she was told her potassium was low and dehydrated ; pt also told she had cyst on R kidney; pt hemoglobin was low and low blood count; pt was taken off metformin and lisinopril        She was hospitalized 6/15 - 6/19:     [address] [facility] COURSE:      This is an 14-year-old woman with a past medical history significant for hypertension, type 2 diabetes, dyslipidemia, hypothyroidism, memory impairment was in her usual state of health until the day of her presentation at the emergency room when the patient developed dizziness.    Patient was initially admitted for TIA. Brain imaging including MRI, MRA, was negative for acute stroke. Neurology was consulted and recommended medical management with aspirin.      Regarding acute kidney injury, her creatinine on admission was 2.92 with baseline around 0.8. KATIE was thought to be due to volume depletion in the setting of HCTZ and ACEI. Her home ACEI and   And lisinopril was held and she was hydrated with normal saline IVF. Renal ultrasound was negative for hydronephrosis but there were bilateral renal cyst and septated left renal cyst seen. Her creatinine trended down to 2.53 on IVF and she was told to repeat renal function in one week and to follow up with nephrology on an out-patient basis.     DM type 2; He home metformin was held and patient was placed on insulin sliding scale. Her HbA1c is 5.8. She will continue on diabetic diet.      Hypokalemia requiring kcl supplementation. Her serum potassium is stable. Now, she is complaining of R abdominal pain, radiating to back.      She has appointment coming up with Nephrologist, to discuss dialysis. Dr. Nakia Fitzgerald, urologist, to see her soon. Glucose has been high--274 yesterday. They remain concerned about weight loss. They are wondering about megace--she was given this in the hospital, and \"it really worked--she ate like a horse. \"  We note that her BMI is actually still above normal.  Her weight loss has been only 10 lb in 6 months. We had given mirtazapine. She took a dose last night, and then felt like it caused diarrhea. Past Medical History:   Diagnosis Date    Anxiety     Arthritis     Chest pain 2008    Negative stress test and Holter monitor w/Dr Ruben Tatum.  Chronic back pain     Chronic venous insufficiency     Colon polyp     last scope normal 2007; Dr. Alston Latkiran Cyst of right kidney     Depression     Diabetes ([facility]     Hypercholesterolemia     Hypertension     Hypothyroidism     Memory disorder     Other ill-defined conditions(799.89)     heart murmur    Painful hip     Urinary frequency     Sees Dr. Sen Gaxiola with [facility]       Current Outpatient Prescriptions on File Prior to Visit   Medication Sig Dispense Refill    amLODIPine (NORVASC) 10 mg tablet Take 1 Tab by mouth daily. Indications: hypertension 30 Tab 3    levothyroxine (SYNTHROID) 50 mcg tablet TAKE 1 TABLET BY MOUTH DAILY BEFORE BREAKFAST 90 Tab 3    mirtazapine (REMERON) 15 mg tablet Take 15 mg by mouth nightly. New script 6/15/18 (has never taken)      rosuvastatin (CRESTOR) 20 mg tablet TAKE 1 TABLET BY MOUTH EVERY DAY 90 Tab 0    polyethylene glycol (MIRALAX) 17 gram packet Take 1 Packet by mouth daily. 30 Packet 11    aspirin delayed-release 81 mg tablet Take 81 mg by mouth daily. No current facility-administered medications on file prior to visit. SH: She reports that she has never smoked. She has never used smokeless tobacco. She reports that she does not drink alcohol or use illicit drugs.      ROS: Complete review of systems was performed and is otherwise unremarkable except as noted elsewhere. OBJECTIVE  Visit Vitals    /74 (BP 1 Location: Left arm, BP Patient Position: Sitting)    Pulse 76    Temp 98.9 °F (37.2 °C) (Oral)    Resp 20    Ht 5' 5\" (1.651 m)    Wt 158 lb 9.6 oz (71.9 kg)    SpO2 99%    BMI 26.39 kg/m2     Gen: Pleasant 80 y.o.  female in NAD.   HEENT: PERRLA. EOMI. OP moist and pink.  Neck: Supple.  No LAD.  HEART: RRR, No M/G/R.   LUNGS: CTAB No W/R.   ABDOMEN: S, NT, ND, BS+.   EXTREMITIES: Warm. No C/C/E. MUSCULOSKELETAL: Normal ROM, muscle strength 5/5 all groups. NEURO: Alert and oriented x 3.  Cranial nerves grossly intact.  No focal sensory or motor deficits noted. SKIN: Warm. Dry. She has warming, redness, swelling, tenderness dorsal R hand. MRI brain: No acute finding or significant change since previous examinations. Findings appropriate for patient's age. MRA: Unremarkable intracranial MRI. CT Code Neuro: No acute process. ECHO:   Left ventricle: Systolic function was normal. Ejection fraction was estimated in the range of 55 % to 60 %. There were no regional wall motion  abnormalities. Wall thickness was mildly to moderately increased. Left atrium: The atrium was mildly dilated. Atrial septum: Contrast injection was performed. There was no right-to-left shunt, with provocative maneuvers to increase right atrial  pressure. U/S Left:  No hydronephrosis. There are bilateral renal cysts. There is a 8 x  6.9 x 5.1 cm septated left renal cyst.    ASSESSMENT / PLAN  1. Dizziness: Resolved. CVA was ruled out. There was also question of BPPV. We will continue with aspirin therapy. MRI/MRA of the brain, carotid ultrasound of the neck, as well as an echocardiogram were all negative. Dizziness likely was due to dehydration. 2. R flank pain: Check UA. 3. Diarrhea: She feels it was due to mirtazapine. This seems unlikely, but agree with holding it. 4. Weight loss:  She is still overweight.   I don't want her to drop a lot, but also would prefer she not gain any from this point. Avoid megace for now. 5. Superficial thrombophlebitis R hand, due to venipuncture:  Warm compresses, tylenol (avoid other NSAIDs). 6. Severe hypokalemia and hypomagnessemia.  Replaced potassium and Mag. Recheck. 7. Acute renal failure on CKD stage 2,  This is most likely due to volume depletion.  Improved with hydration. Now she is off hydrochlorothiazide and lisinopril.  We will also hold the Glucophage. Renal ultrasound is negative. Followup outpatient with nephrologist, Dr. Evette Colby. 8. Type 2 diabetes. Stay off metformin (renal failure). We'll start glimepiride. 9. Hypertension, not well controlled. Continue to hold preadmission medications lisinopril and hydrochlorothiazide because of these acute renal failure.  F/u nephrology. Continue norvasc at increased dose of 10 mg po daily. F/u with pcp    10. Dyslipidemia.  continue usual meds. 11. Hypothyroidism, stable. We will continue with Synthroid. 12. Abnormal EKG.  The patient has no chest pain. Ruled out ACS. Started on aspirin. Echo is reviewed. 13. Normocytic, chronic anemia.  Suspect anemia of kidney disease. H/H stable    I have reviewed with the patient details of the assessment and plan and all questions were answered. Relevant patient education was performed. Follow-up Disposition:  Return if symptoms worsen or fail to improve.

## 2018-06-29 NOTE — PROGRESS NOTES
Spoke with patient after verifying name and . Goals      Advance Care Plan            18 deferred to next week outreach. DAKOTA    18 Spoke with patient regarding Advanced Care planning. Patient educated on what an advance care directive is, importance of having advanced directive, filling out the [employer] form. Your Right to Decide booklet, Advance Directive Tool Kit, and [facility] form, and my contact information declined at this time. Patient given an opportunity to ask questions, repeated information, and verbalized understanding. DAKOTA         Prevent complications post hospitalization. 18 Educated patient on importance of taking BP medication same time every day and to monitor. Patient will monitor BP, record, and take to PCP appointment on 18. DAKOTA    18 Patient reports taking all medications as prescribed. She will monitor BP, record, and report at next week outreach. Farzaneh Orozco

## 2018-06-29 NOTE — ACP (ADVANCE CARE PLANNING)
Spoke with patient regarding Advanced Care planning. Patient educated on what an advance care directive is, importance of having advanced directive, filling out the [employer] form. Your Right to Decide booklet, Advance Directive Tool Kit, and [facility] form, and my contact information declined at this time. Patient given an opportunity to ask questions, repeated information, and verbalized understanding.

## 2018-07-06 NOTE — PROGRESS NOTES
Goals      Advance Care Plan            6/20/18 deferred to next week outreach. DAKOTA    6/29/18 Spoke with patient regarding Advanced Care planning. Patient educated on what an advance care directive is, importance of having advanced directive, filling out the [employer] form. Your Right to Decide booklet, Advance Directive Tool Kit, and [facility] form, and my contact information declined at this time. Patient given an opportunity to ask questions, repeated information, and verbalized understanding. DAKOTA         Prevent complications post hospitalization. 6/20/18 Educated patient on importance of taking BP medication same time every day and to monitor. Patient will monitor BP, record, and take to PCP appointment on 6/22/18. [facility]    6/29/18 Patient reports taking all medications as prescribed. She will monitor BP, record, and report at next week outreach. Lucio Sandor [facility]    7/6/18 Patient reports she takes BP every other day. BP on 7/3/18 was  132/64 and on 7/5/18 was 128/57. She is complaint with medications. She will continue to monitor and will report to this writer at next week outreach.  DAKOTA

## 2018-07-10 PROBLEM — E11.21 TYPE 2 DIABETES WITH NEPHROPATHY (HCC): Status: ACTIVE | Noted: 2018-01-01

## 2018-07-10 NOTE — MR AVS SNAPSHOT
[address] [facility] 
[phone] Patient: Rah Boston MRN:  [id number] Visit Information Date & Time Provider Department Dept. Phone Encounter #  
 7/10/2018  1:30 PM Elnita Councilman, [address] [facility] [phone] [id number] Follow-up Instructions Return in about 2 weeks (around 7/24/2018) for HTN; nurse visit. Your Appointments 12/14/2018  1:30 PM  
ROUTINE CARE with Elnita Councilman, MD  
[facility] [address]) Appt Note: 6 month follow up  
 [address] [facility] [address] [phone]  
[address] [place] [address] [address] [facility] Upcoming Health Maintenance Date Due MICROALBUMIN Q1 12/16/2017 Influenza Age 5 to Adult 8/1/2018 EYE EXAM RETINAL OR DILATED Q1 11/15/2018 HEMOGLOBIN A1C Q6M 12/16/2018 FOOT EXAM Q1 6/15/2019 MEDICARE YEARLY EXAM 6/16/2019 LIPID PANEL Q1 6/16/2019 GLAUCOMA SCREENING Q2Y 11/15/2019 DTaP/Tdap/Td series (2 - Td) 11/10/2025 Allergies as of 7/10/2018  Review Complete On: 7/10/2018 By: Elnita Councilman, MD  
  
 Severity Noted Reaction Type Reactions Flexeril [Cyclobenzaprine]  02/27/2017    Other (comments) Caused pt to fall & hallucination Norvasc [Amlodipine]  07/10/2018    Swelling Percocet [Oxycodone-acetaminophen]  10/16/2009    Itching Remeron [Mirtazapine]  06/22/2018    Diarrhea Tramadol  02/27/2017    Other (comments) Caused falls Current Immunizations  Reviewed on 12/16/2016 Name Date Influenza Vaccine 9/16/2016, 10/15/2015, 9/2/2014 Pneumococcal Conjugate (PCV-13) 6/16/2017  2:45 PM  
 Pneumococcal Polysaccharide (PPSV-23) 11/10/2015 Tdap 11/10/2015 ZZZ-RETIRED (DO NOT USE) Pneumococcal Vaccine (Unspecified Type)  Deferred (Patient Refused) Not reviewed this visit You Were Diagnosed With   
  
 Codes Comments Acute renal failure, unspecified acute renal failure type ([facility]    -  Primary ICD-10-CM: N17.9 ICD-9-CM: 584.9 Essential hypertension     ICD-10-CM: I10 
ICD-9-CM: 401.9 Edema, unspecified type     ICD-10-CM: R60.9 ICD-9-CM: 158. 3 Vitals BP Pulse Temp Resp Height(growth percentile) Weight(growth percentile) 142/83 (BP 1 Location: Left arm, BP Patient Position: Sitting) 77 98.5 °F (36.9 °C) (Oral) 14 5' 5\" (1.651 m) 163 lb 12.8 oz (74.3 kg) SpO2 BMI OB Status Smoking Status 96% 27.26 kg/m2 Hysterectomy Never Smoker Vitals History BMI and BSA Data Body Mass Index Body Surface Area  
 27.26 kg/m 2 1.85 m 2 Preferred Pharmacy Pharmacy Name Phone [facility] [phone] Your Updated Medication List  
  
   
This list is accurate as of 7/10/18  2:32 PM.  Always use your most recent med list.  
  
  
  
  
 aspirin delayed-release 81 mg tablet Take 81 mg by mouth daily. glimepiride 4 mg tablet Commonly known as:  AMARYL  
TAKE 1 TABLET BY MOUTH EVERY MORNING  
  
 levothyroxine 50 mcg tablet Commonly known as:  SYNTHROID  
TAKE 1 TABLET BY MOUTH DAILY BEFORE BREAKFAST polyethylene glycol 17 gram packet Commonly known as:  Pino Saturnino Take 1 Packet by mouth daily. rosuvastatin 20 mg tablet Commonly known as:  CRESTOR  
TAKE 1 TABLET BY MOUTH EVERY DAY We Performed the Following REFERRAL TO NEPHROLOGY [IQS26 Custom] Follow-up Instructions Return in about 2 weeks (around 7/24/2018) for HTN; nurse visit. Referral Information Referral ID Referred By Referred To  
  
 [id number] Tyesha PORTILLO MD   
   [address] Phone: [phone] Fax: [phone] Visits Status Start Date End Date 1 New Request 7/10/18 7/10/19 If your referral has a status of pending review or denied, additional information will be sent to support the outcome of this decision. Introducing [facility]! [employer] introduces Satiety patient portal. Now you can access parts of your medical record, email your doctor's office, and request medication refills online. 1. In your internet browser, go to [email or web address] 2. Click on the First Time User? Click Here link in the Sign In box. You will see the New Member Sign Up page. 3. Enter your Satiety Access Code exactly as it appears below. You will not need to use this code after youve completed the sign-up process. If you do not sign up before the expiration date, you must request a new code. · Satiety Access Code: [email or web address] Expires: 10/8/2018  2:32 PM 
 
4. Enter the last four digits of your Social Security Number (xxxx) and Date of Birth (mm/dd/yyyy) as indicated and click Submit. You will be taken to the next sign-up page. 5. Create a Satiety ID. This will be your Satiety login ID and cannot be changed, so think of one that is secure and easy to remember. 6. Create a Satiety password. You can change your password at any time. 7. Enter your Password Reset Question and Answer. This can be used at a later time if you forget your password. 8. Enter your e-mail address. You will receive e-mail notification when new information is available in [address] 9. Click Sign Up. You can now view and download portions of your medical record. 10. Click the Download Summary menu link to download a portable copy of your medical information. If you have questions, please visit the Frequently Asked Questions section of the Satiety website. Remember, Satiety is NOT to be used for urgent needs. For medical emergencies, dial 911. Now available from your iPhone and Android! Please provide this summary of care documentation to your next provider. Your primary care clinician is listed as [place]. If you have any questions after today's visit, please call [phone].

## 2018-07-10 NOTE — PROGRESS NOTES
SUBJECTIVE  Ms. Rick Avalos presents today acutely for     Chief Complaint   Patient presents with    Swelling     pt here today c.o swelling in both legs and feet x 2 weeks-- pt has burning sensation when she has swelling    Medication Evaluation     pt wants to discuss amlodipine       We saw her in June for follow up of hospitalization for KATIE. ACEI and metformin on hold. She is now on amlodipine, as of 6/19. BP has been labile, sometimes dropping into 18Q systolic, to as high as 400. She has since developed a lot of swelling, that is unpleasant to her. Metformin on hold. She was switched to glimepiride. Mostly gluc has been in low to mid 100s. She spiked to 213 once. Has been referred to nephrologist, Dr. Ramila Zurita. OBJECTIVE  Visit Vitals    /83 (BP 1 Location: Left arm, BP Patient Position: Sitting)    Pulse 77    Temp 98.5 °F (36.9 °C) (Oral)    Resp 14    Ht 5' 5\" (1.651 m)    Wt 163 lb 12.8 oz (74.3 kg)    SpO2 96%    BMI 27.26 kg/m2     Gen: Pleasant 80 y.o.  female in NAD.   HEENT: PERRLA. EOMI. OP moist and pink.  Neck: Supple.  No LAD.  HEART: RRR, No M/G/R.   LUNGS: CTAB No W/R.   ABDOMEN: S, NT, ND, BS+.   EXTREMITIES: Warm. No C/C/E. SKIN: Warm. Dry. No rashes or other lesions noted. ASSESSMENT / PLAN    ICD-10-CM ICD-9-CM    1. Acute renal failure, unspecified acute renal failure type ([facility] N17.9 584.9 REFERRAL TO NEPHROLOGY   2. Essential hypertension I10 401.9 REFERRAL TO NEPHROLOGY   3. Edema, unspecified type R60.9 782.3        We'll stop amlodipine for now; recheck BP soon. Defer to nephrologist whether to use ACEI / ARB. I have reviewed with the patient details of the assessment and plan and all questions were answered. Relevant patient education was performed. Follow-up Disposition:  Return in about 2 weeks (around 7/24/2018) for HTN; nurse visit.

## 2018-07-10 NOTE — PROGRESS NOTES
1. Have you been to the ER, urgent care clinic since your last visit? Hospitalized since your last visit?no    2. Have you seen or consulted any other health care providers outside of the [address] [place] [address] since your last visit? Include any pap smears or colon screening.  no

## 2018-07-11 NOTE — TELEPHONE ENCOUNTER
----- Message from Liborio Garcia sent at 7/11/2018 10:05 AM EDT -----  Regarding: Dr. Jefry Villanueva  Pt stated that she was referred to a Nephrologist Dr. Arlin Sinha. She stated that the information needs to be faxed to their office (f) [phone] ). Pt best contact [phone].          Message copied/pasted from [facility]

## 2018-07-18 NOTE — PROGRESS NOTES
Patient has graduated from the Transitions of Care Coordination  program on 7/18/2018. Patient's symptoms are stable at this time. Patient/family has the ability to self-manage. Care management goals have been completed at this time- except patient does not have an Advanced Directive. No further nurse navigator follow up scheduled. Goals Addressed      Advance Care Plan                  6/20/18 deferred to next week outreach. [facility]    6/29/18 Spoke with patient regarding Advanced Care planning. Patient educated on what an advance care directive is, importance of having advanced directive, filling out the [employer] form. Your Right to Decide booklet, Advance Directive Tool Kit, and [facility] form, and my contact information declined at this time. Patient given an opportunity to ask questions, repeated information, and verbalized understanding. [facility]    7/18/2018   11:39 am  Spoke to patient daughter, she did not know anything about the Advanced Directive paperwork sent, patient states she did get something. Daughter is going to look at her mothers for the paperwork and if she can't find it will call me on my cell. [facility]         COMPLETED: Prevent complications post hospitalization. 6/20/18 Educated patient on importance of taking BP medication same time every day and to monitor. Patient will monitor BP, record, and take to PCP appointment on 6/22/18. [facility]    6/29/18 Patient reports taking all medications as prescribed. She will monitor BP, record, and report at next week outreach. Barb Meyer [facility]    7/6/18 Patient reports she takes BP every other day. BP on 7/3/18 was  132/64 and on 7/5/18 was 128/57. She is complaint with medications. She will continue to monitor and will report to this writer at next week outreach.  [facility]    7/18/2018  11:37 am  Called and spoke to patient daughter, (on HIPAA) and was sitting next to patient who was getting her nails done. Patient blood glucose was 96 this am and B/P was 149/70-80. Can't remember exact readings. Is taking her medication as prescribed. [address] [facility]                   Pt has nurse navigator's contact information for any further questions, concerns, or needs.   Patients upcoming visits:  Future Appointments  Date Time Provider Ashleigh Michaud   7/24/2018 2:00 PM MD LUCIANA [address]   12/14/2018 1:30 PM MD Johana [address]

## 2018-07-30 NOTE — TELEPHONE ENCOUNTER
Identified patient 2 identifiers verified. Patient given contact number to Dr. Nargis Gutierrez at [phone].

## 2018-07-30 NOTE — TELEPHONE ENCOUNTER
Pt needs to speak with Dr. Dai Sibley or the nurse regarding a previous visit to another doctor that Dr. Dai Sibley referred her to dealing with her circulation. Pt does not remember doctor's name, only that it was a few years back.     Call pt at [phone]

## 2018-08-14 NOTE — TELEPHONE ENCOUNTER
Patient states she needs a call back in reference to getting a Glucose kit for her purse. Patient states when EMT's came the other day that had something she would like to get to carry with her for emergencies like they had. Please call to discuss.  Thank you

## 2018-08-14 NOTE — TELEPHONE ENCOUNTER
Call completed to patient, two identifiers verified. Patient advised Rx was approved and is available at [place]. Understanding verbalized.

## 2018-08-14 NOTE — TELEPHONE ENCOUNTER
Call completed to patient, two identifiers verified. Patient is requesting glucose meter and testing supplies. Patient is also requesting some form of Dextrose. Patient advised that this is available OTC. Understanding verbalized.  Orders pended to MD.

## 2018-08-23 NOTE — TELEPHONE ENCOUNTER
Faxed patients last office note, labs, demographics and face sheet to [facility]ay[facility]., confirmation received. Call completed to patient, two identifiers verified. Patient advised requested documents were faxed.

## 2018-08-23 NOTE — TELEPHONE ENCOUNTER
----- Message from Kayleigh Scott sent at 8/22/2018  4:35 PM EDT -----  Regarding: Dr. Baltazar Kathleen / Telephone  Pt stated she is scheduled to see Endocrinologist, Dr. Charlie Perez ([phone]) on August 28, 2018 and would like for the practice to send her medical information over to them.    Fax: pt unsure  Pt's best contact: [phone]      Message copied/pasted from [facility]

## 2018-09-20 NOTE — TELEPHONE ENCOUNTER
Surindercadence Catkins, pts daughter is requesting a call back to try to get home health physical therapy for her mom. She states that her mom is having trouble, and pain with her legs.  Best contact number: [phone]       Message received & copied from [facility]

## 2018-10-05 NOTE — TELEPHONE ENCOUNTER
Spoke blayne Melissa at [facility] patient is getting PT and will start Monday.

## 2018-10-05 NOTE — MR AVS SNAPSHOT
[address] 
[phone] Patient: Rema Hickman MRN:  [id number] Visit Information Date & Time Provider Department Dept. Phone Encounter #  
 10/5/2018  1:45 PM Quinn Bowser, [address] [place] [address],[address] [phone] [id number] Your Appointments 12/14/2018  1:30 PM  
ROUTINE CARE with Quinn Bowser MD  
[facility] [address] [facility]) Appt Note: 6 month follow up  
 [facility] [address] [id number]  
[address] [facility] [address] Upcoming Health Maintenance Date Due Shingrix Vaccine Age 50> (1 of 2) 2/7/1982 MICROALBUMIN Q1 12/16/2017 Influenza Age 5 to Adult 8/1/2018 EYE EXAM RETINAL OR DILATED Q1 11/15/2018 HEMOGLOBIN A1C Q6M 12/16/2018 FOOT EXAM Q1 6/15/2019 MEDICARE YEARLY EXAM 6/16/2019 LIPID PANEL Q1 6/16/2019 GLAUCOMA SCREENING Q2Y 11/15/2019 DTaP/Tdap/Td series (2 - Td) 11/10/2025 Allergies as of 10/5/2018  Review Complete On: 10/5/2018 By: Quinn Bowser MD  
  
 Severity Noted Reaction Type Reactions Flexeril [Cyclobenzaprine]  02/27/2017    Other (comments) Caused pt to fall & hallucination Norvasc [Amlodipine]  07/10/2018    Swelling Percocet [Oxycodone-acetaminophen]  10/16/2009    Itching Remeron [Mirtazapine]  06/22/2018    Diarrhea Tramadol  02/27/2017    Other (comments) Caused falls Current Immunizations  Reviewed on 12/16/2016 Name Date Influenza Vaccine 9/16/2016, 10/15/2015, 9/2/2014 Pneumococcal Conjugate (PCV-13) 6/16/2017  2:45 PM  
 Pneumococcal Polysaccharide (PPSV-23) 11/10/2015 Tdap 11/10/2015 ZZZ-RETIRED (DO NOT USE) Pneumococcal Vaccine (Unspecified Type)  Deferred (Patient Refused) Not reviewed this visit You Were Diagnosed With   
  
 Codes Comments Ataxia    -  Primary ICD-10-CM: R27.0 ICD-9-CM: 776. 3 Vitals BP Pulse Temp Resp Height(growth percentile) Weight(growth percentile) 143/77 (BP 1 Location: Left arm, BP Patient Position: Sitting) 73 97.4 °F (36.3 °C) (Oral) 14 5' 5\" (1.651 m) 155 lb 3.2 oz (70.4 kg) SpO2 BMI OB Status Smoking Status 96% 25.83 kg/m2 Hysterectomy Never Smoker Vitals History BMI and BSA Data Body Mass Index Body Surface Area  
 25.83 kg/m 2 1.8 m 2 Preferred Pharmacy Pharmacy Name Phone [facility] [phone] Your Updated Medication List  
  
   
This list is accurate as of 10/5/18  2:29 PM.  Always use your most recent med list.  
  
  
  
  
 aspirin delayed-release 81 mg tablet Take 81 mg by mouth daily. Blood-Glucose Meter monitoring kit Check Blood Glucose Once Daily. Dx Code: E11.9  
  
 glimepiride 4 mg tablet Commonly known as:  AMARYL  
TAKE 1 TABLET BY MOUTH EVERY MORNING  
  
 * glucose blood VI test strips strip Commonly known as:  ONETOUCH ULTRA TEST Check fsbs up to bid E11.9  
  
 * glucose blood VI test strips strip Commonly known as:  ASCENSIA AUTODISC VI, ONE TOUCH ULTRA TEST VI Check Blood Glucose Once Daily. Dx Code: E11.9 JANUVIA 25 mg tablet Generic drug:  SITagliptin  
  
 levothyroxine 50 mcg tablet Commonly known as:  SYNTHROID  
TAKE 1 TABLET BY MOUTH DAILY BEFORE BREAKFAST  
  
 lisinopril 40 mg tablet Commonly known as:  [place] TK 1 T PO QD  
  
 polyethylene glycol 17 gram packet Commonly known as:  Aman Scale Take 1 Packet by mouth daily. * rosuvastatin 20 mg tablet Commonly known as:  CRESTOR  
TAKE 1 TABLET BY MOUTH EVERY DAY  
  
 * rosuvastatin 20 mg tablet Commonly known as:  CRESTOR  
TAKE 1 TABLET BY MOUTH EVERY DAY  
  
 VITAMIN B-12 PO Take  by mouth. * Notice: This list has 4 medication(s) that are the same as other medications prescribed for you. Read the directions carefully, and ask your doctor or other care provider to review them with you. We Performed the Following [address] [facility] [address] REFERRAL TO NEUROLOGY [QXF27 Custom] Referral Information Referral ID Referred By Referred To  
  
 [id number] LINDSEY, [facility]   
   [address]   
   Naomy Figueroa, [address] Phone: [phone] Fax: [phone] Visits Status Start Date End Date 1 New Request 10/5/18 10/5/19 If your referral has a status of pending review or denied, additional information will be sent to support the outcome of this decision. Referral ID Referred By Referred To  
 [id number] Jacky PORTILLO MD  
   [facility] [address] [place], [address] [facility] Phone: [phone] Fax: [phone] Visits Status Start Date End Date 1 New Request 10/5/18 10/5/19 If your referral has a status of pending review or denied, additional information will be sent to support the outcome of this decision. Introducing [facility]! [facility] introduces [employer] patient portal. Now you can access parts of your medical record, email your doctor's office, and request medication refills online. 1. In your internet browser, go to [email or web address] 2. Click on the First Time User? Click Here link in the Sign In box. You will see the New Member Sign Up page. 3. Enter your Sq[employer] Access Code exactly as it appears below. You will not need to use this code after youve completed the sign-up process. If you do not sign up before the expiration date, you must request a new code. · Sqrl Access Code: [email or web address] Expires: 10/8/2018  2:32 PM 
 
4.  Enter the last four digits of your Social Security Number (xxxx) and Date of Birth (mm/dd/yyyy) as indicated and click Submit. You will be taken to the next sign-up page. 5. Create a [employer] ID. This will be your [employer] login ID and cannot be changed, so think of one that is secure and easy to remember. 6. Create a [employer] password. You can change your password at any time. 7. Enter your Password Reset Question and Answer. This can be used at a later time if you forget your password. 8. Enter your e-mail address. You will receive e-mail notification when new information is available in [address] 9. Click Sign Up. You can now view and download portions of your medical record. 10. Click the Download Summary menu link to download a portable copy of your medical information. If you have questions, please visit the Frequently Asked Questions section of the [employer] website. Remember, [employer] is NOT to be used for urgent needs. For medical emergencies, dial 911. Now available from your iPhone and Android! Please provide this summary of care documentation to your next provider. Your primary care clinician is listed as [place]. If you have any questions after today's visit, please call [phone].

## 2018-10-05 NOTE — PROGRESS NOTES
SUBJECTIVE  Ms. Loving December presents today acutely for     Chief Complaint   Patient presents with    Gait Problem     pt c.o feeling off balance       She has difficulty walking \"for a good while, but getting a little bit worse. \"  Daughter says it has been getting progressively worse over the past two years. \"Her nutritionist, who is also a PT, is concerned and says she needs PT or she might not walk any more in a year's time. \"    OBJECTIVE  Visit Vitals    /77 (BP 1 Location: Left arm, BP Patient Position: Sitting)    Pulse 73    Temp 97.4 °F (36.3 °C) (Oral)    Resp 14    Ht 5' 5\" (1.651 m)    Wt 155 lb 3.2 oz (70.4 kg)    SpO2 96%    BMI 25.83 kg/m2     Gen: Pleasant 80 y.o.  female in NAD.    Ambulates with walker.   HEART: RRR, No M/G/R.   LUNGS: CTAB No W/R.   ABDOMEN: S, NT, ND, BS+.   EXTREMITIES: Warm. No C/C/E. MUSCULOSKELETAL: Normal ROM, muscle strength 5/5 all groups. NEURO: Alert and oriented x 3.  Cranial nerves grossly intact.  No focal sensory or motor deficits noted. SKIN: Warm. Dry. No rashes or other lesions noted. ASSESSMENT / PLAN    ICD-10-CM ICD-9-CM    1. Ataxia R27.0 781.3 REFERRAL TO HOME HEALTH      REFERRAL TO NEUROLOGY       I have reviewed with the patient details of the assessment and plan and all questions were answered. Relevant patient education was performed. Follow-up Disposition: As planned.

## 2018-10-05 NOTE — TELEPHONE ENCOUNTER
Judy//Edgardo Beckford  states she needs to be advised what Type of services are being ordered for patient as that was not indicated on order received. Please call or send New Order.  Thank you

## 2018-10-05 NOTE — PROGRESS NOTES
1. Have you been to the ER, urgent care clinic since your last visit? Hospitalized since your last visit?no    2. Have you seen or consulted any other health care providers outside of the [address] [place] [address] since your last visit? Include any pap smears or colon screening.  no

## 2018-10-23 NOTE — PATIENT INSTRUCTIONS
1.  [facility] at [facility]  2. Follow-up in 2 months  Office Policies  · Phone calls/patient messages:  Please allow up to 24 hours for someone in the office to contact you about your call or message. Be mindful your provider may be out of the office or your message may require further review. We encourage you to use [employer] for your messages as this is a faster, more efficient way to communicate with our office  · Medication Refills:  Prescription medications require up to 48 business hours to process. We encourage you to use [employer] for your refills. For controlled medications: Please allow up to 72 business hours to process. Certain medications may require you to  a written prescription at our office. NO narcotic/controlled medications will be prescribed after 4pm Monday through Friday or on weekends  · Form/Paperwork Completion:  Please note there is a $25 fee for all paperwork completed by our providers. We ask that you allow 7-14 business days. Pre-payment is due prior to picking up/faxing the completed form. You may also download your forms to [employer] to have your doctor print off.

## 2018-10-23 NOTE — PROGRESS NOTES
Neurology Note    Chief Complaint   Patient presents with    New Patient     unstable gait       HPI/Subjective  Reema Gamez is a 80 y.o. female who presented to the neurology office for Management of imbalance. The patient has been referred here for further management. The patient started having problems around 2015 and it has been gradually getting worse. It has gotten specifically worse in the last 7-8 months. The patient has been having frequent falls. There is no change in her sense of smell, constipation and there is no tremor. She does have bradykinesia and problems getting in and out of bed. Current Outpatient Medications   Medication Sig    iron,carbonyl-vitamin C (VITRON-C) 65 mg iron- 125 mg TbEC Take  by mouth.  JANUVIA 25 mg tablet Take 1 Tab by mouth daily.  lisinopril (PRINIVIL, ZESTRIL) 40 mg tablet Take 1 Tab by mouth daily.  rosuvastatin (CRESTOR) 20 mg tablet TAKE 1 TABLET BY MOUTH EVERY DAY    glucose blood VI test strips (ONETOUCH ULTRA TEST) strip Check fsbs up to bid E11.9    Blood-Glucose Meter monitoring kit Check Blood Glucose Once Daily. Dx Code: E11.9    glucose blood VI test strips (ASCENSIA AUTODISC VI, ONE TOUCH ULTRA TEST VI) strip Check Blood Glucose Once Daily. Dx Code: E11.9    levothyroxine (SYNTHROID) 50 mcg tablet TAKE 1 TABLET BY MOUTH DAILY BEFORE BREAKFAST    polyethylene glycol (MIRALAX) 17 gram packet Take 1 Packet by mouth daily.  aspirin delayed-release 81 mg tablet Take 81 mg by mouth daily. No current facility-administered medications for this visit.       Allergies   Allergen Reactions    Flexeril [Cyclobenzaprine] Other (comments)     Caused pt to fall & hallucination    Norvasc [Amlodipine] Swelling    Percocet [Oxycodone-Acetaminophen] Itching    Remeron [Mirtazapine] Diarrhea    Tramadol Other (comments)     Caused falls      Past Medical History:   Diagnosis Date    Anxiety     Arthritis     Chest pain 2008    Negative stress test and Holter monitor w/Dr Luo Blinks.  Chronic back pain     Chronic venous insufficiency     Colon polyp     last scope normal 2007; Dr. Sidra Gipson Cyst of right kidney     Depression     Diabetes ([address]     Hypercholesterolemia     Hypertension     Hypothyroidism     Memory disorder     Other ill-defined conditions(799.89)     heart murmur    Painful hip     Urinary frequency     [facility] V[facility]     Past Surgical History:   Procedure Laterality Date    COLONOSCOPY,DIAGNOSTIC  6/12/2015         HX BREAST BIOPSY  2002    HX HYSTERECTOMY      Due to a prolapsed uterus    HX KNEE REPLACEMENT      left    HX KNEE REPLACEMENT  2006    right    HX ORTHOPAEDIC      B knees.  HX OTHER SURGICAL      pelvic prolasp    UPPER GI ENDOSCOPY,BIOPSY  6/12/2015          Family History   Problem Relation Age of Onset    Cancer Mother         pancreatic    Cancer Father         colon    Cancer Sister         pancreatic     Diabetes Sister     Alzheimer Brother      Social History     Tobacco Use    Smoking status: Never Smoker    Smokeless tobacco: Never Used   Substance Use Topics    Alcohol use: No     Alcohol/week: 0.0 oz     Comment: Rare    Drug use: No       REVIEW OF SYSTEMS:   A ten system review of constitutional, cardiovascular, respiratory, musculoskeletal, endocrine, skin, SHEENT, genitourinary, psychiatric and neurologic systems was obtained and is unremarkable with the exception of fainting, falls, hearing loss, leg swelling, memory loss, muscle weakness, poor appetite and weight change    EXAMINATION:   Visit Vitals  /88   Pulse 75   Ht 5' 5\" (1.651 m)   Wt 154 lb (69.9 kg)   SpO2 98%   BMI 25.63 kg/m²        General:   General appearance: Pt is in no acute distress   Distal pulses are preserved  Fundoscopic Exam: Attempted    Neurological Examination:   Mental Status: AAO x3. Speech is fluent.  Follows commands, has normal fund of knowledge, attention, short term recall, comprehension and insight. Cranial Nerves: Visual fields are full. PERRL, Extraocular movements are full. Facial sensation intact. Facial movement intact. Hearing intact to conversation. Palate elevates symmetrically. Shoulder shrug symmetric. Tongue midline. Motor: Strength is 5/5 in all 4 ext. No atrophy. Tone: Cogwheeling rigidity, more on the right in comparison to the left    Sensation: Normal to light touch    Reflexes: DTRs 2+ in upper extremities and absent in lower extremities. Coordination/Cerebellar: Intact to finger-nose-finger     Gait: Uses a wheeled walker to walk    Skin: No significant bruising or lacerations. Laboratory review:   Results for orders placed or performed during the hospital encounter of 06/15/18   URINE CULTURE HOLD SAMPLE   Result Value Ref Range    Urine culture hold        URINE ON HOLD IN MICROBIOLOGY DEPT FOR 3 DAYS. IF UNPRESERVED URINE IS SUBMITTED, IT CANNOT BE USED FOR ADDITIONAL TESTING AFTER 24 HRS, RECOLLECTION WILL BE REQUIRED. CBC WITH AUTOMATED DIFF   Result Value Ref Range    WBC 6.6 3.6 - 11.0 K/uL    RBC 3.04 (L) 3.80 - 5.20 M/uL    HGB 9.4 (L) 11.5 - 16.0 g/dL    HCT 29.5 (L) 35.0 - 47.0 %    MCV 97.0 80.0 - 99.0 FL    MCH 30.9 26.0 - 34.0 PG    MCHC 31.9 30.0 - 36.5 g/dL    RDW 13.5 11.5 - 14.5 %    PLATELET 175 517 - 522 K/uL    MPV 10.7 8.9 - 12.9 FL    NRBC 0.0 0  WBC    ABSOLUTE NRBC 0.00 0.00 - 0.01 K/uL    NEUTROPHILS 62 32 - 75 %    LYMPHOCYTES 27 12 - 49 %    MONOCYTES 10 5 - 13 %    EOSINOPHILS 1 0 - 7 %    BASOPHILS 0 0 - 1 %    IMMATURE GRANULOCYTES 0 0.0 - 0.5 %    ABS. NEUTROPHILS 4.1 1.8 - 8.0 K/UL    ABS. LYMPHOCYTES 1.8 0.8 - 3.5 K/UL    ABS. MONOCYTES 0.6 0.0 - 1.0 K/UL    ABS. EOSINOPHILS 0.1 0.0 - 0.4 K/UL    ABS. BASOPHILS 0.0 0.0 - 0.1 K/UL    ABS. IMM.  GRANS. 0.0 0.00 - 0.04 K/UL    DF AUTOMATED     METABOLIC PANEL, COMPREHENSIVE   Result Value Ref Range    Sodium 138 136 - 145 mmol/L    Potassium 2.5 (LL) 3.5 - 5.1 mmol/L    Chloride 97 97 - 108 mmol/L    CO2 28 21 - 32 mmol/L    Anion gap 13 5 - 15 mmol/L    Glucose 110 (H) 65 - 100 mg/dL    BUN 28 (H) 6 - 20 MG/DL    Creatinine 3.21 (H) 0.55 - 1.02 MG/DL    BUN/Creatinine ratio 9 (L) 12 - 20      GFR est AA 17 (L) >60 ml/min/1.73m2    GFR est non-AA 14 (L) >60 ml/min/1.73m2    Calcium 8.8 8.5 - 10.1 MG/DL    Bilirubin, total 0.4 0.2 - 1.0 MG/DL    ALT (SGPT) 11 (L) 12 - 78 U/L    AST (SGOT) 21 15 - 37 U/L    Alk.  phosphatase 67 45 - 117 U/L    Protein, total 7.4 6.4 - 8.2 g/dL    Albumin 3.3 (L) 3.5 - 5.0 g/dL    Globulin 4.1 (H) 2.0 - 4.0 g/dL    A-G Ratio 0.8 (L) 1.1 - 2.2     TROPONIN I   Result Value Ref Range    Troponin-I, Qt. <0.05 <0.05 ng/mL   PROTHROMBIN TIME + INR   Result Value Ref Range    INR 1.0 0.9 - 1.1      Prothrombin time 10.4 9.0 - 11.1 sec   PHOSPHORUS   Result Value Ref Range    Phosphorus 2.6 2.6 - 4.7 MG/DL   MAGNESIUM   Result Value Ref Range    Magnesium 1.7 1.6 - 2.4 mg/dL   CK W/ CKMB & INDEX   Result Value Ref Range     26 - 192 U/L    CK - MB <1.0 <3.6 NG/ML    CK-MB Index Cannot be calculated 0 - 2.5     TSH 3RD GENERATION   Result Value Ref Range    TSH 0.97 0.36 - 3.74 uIU/mL   HEMOGLOBIN A1C WITH EAG   Result Value Ref Range    Hemoglobin A1c 5.8 4.2 - 6.3 %    Est. average glucose 120 mg/dL   TROPONIN I   Result Value Ref Range    Troponin-I, Qt. <0.05 <0.05 ng/mL   LIPID PANEL   Result Value Ref Range    LIPID PROFILE          Cholesterol, total 120 <200 MG/DL    Triglyceride 90 <150 MG/DL    HDL Cholesterol 63 MG/DL    LDL, calculated 39 0 - 100 MG/DL    VLDL, calculated 18 MG/DL    CHOL/HDL Ratio 1.9 0 - 5.0     CBC WITH AUTOMATED DIFF   Result Value Ref Range    WBC 5.5 3.6 - 11.0 K/uL    RBC 2.64 (L) 3.80 - 5.20 M/uL    HGB 8.4 (L) 11.5 - 16.0 g/dL    HCT 25.6 (L) 35.0 - 47.0 %    MCV 97.0 80.0 - 99.0 FL    MCH 31.8 26.0 - 34.0 PG    MCHC 32.8 30.0 - 36.5 g/dL    RDW 13.2 11.5 - 14.5 % PLATELET 532 251 - 373 K/uL    MPV 10.5 8.9 - 12.9 FL    NRBC 0.0 0  WBC    ABSOLUTE NRBC 0.00 0.00 - 0.01 K/uL    NEUTROPHILS 55 32 - 75 %    LYMPHOCYTES 34 12 - 49 %    MONOCYTES 9 5 - 13 %    EOSINOPHILS 2 0 - 7 %    BASOPHILS 0 0 - 1 %    IMMATURE GRANULOCYTES 0 0.0 - 0.5 %    ABS. NEUTROPHILS 3.0 1.8 - 8.0 K/UL    ABS. LYMPHOCYTES 1.9 0.8 - 3.5 K/UL    ABS. MONOCYTES 0.5 0.0 - 1.0 K/UL    ABS. EOSINOPHILS 0.1 0.0 - 0.4 K/UL    ABS. BASOPHILS 0.0 0.0 - 0.1 K/UL    ABS. IMM. GRANS. 0.0 0.00 - 0.04 K/UL    DF AUTOMATED     METABOLIC PANEL, COMPREHENSIVE   Result Value Ref Range    Sodium 143 136 - 145 mmol/L    Potassium 2.4 (LL) 3.5 - 5.1 mmol/L    Chloride 101 97 - 108 mmol/L    CO2 32 21 - 32 mmol/L    Anion gap 10 5 - 15 mmol/L    Glucose 92 65 - 100 mg/dL    BUN 26 (H) 6 - 20 MG/DL    Creatinine 2.94 (H) 0.55 - 1.02 MG/DL    BUN/Creatinine ratio 9 (L) 12 - 20      GFR est AA 18 (L) >60 ml/min/1.73m2    GFR est non-AA 15 (L) >60 ml/min/1.73m2    Calcium 8.5 8.5 - 10.1 MG/DL    Bilirubin, total 0.4 0.2 - 1.0 MG/DL    ALT (SGPT) 12 12 - 78 U/L    AST (SGOT) 18 15 - 37 U/L    Alk.  phosphatase 66 45 - 117 U/L    Protein, total 6.5 6.4 - 8.2 g/dL    Albumin 3.1 (L) 3.5 - 5.0 g/dL    Globulin 3.4 2.0 - 4.0 g/dL    A-G Ratio 0.9 (L) 1.1 - 2.2     FOLATE   Result Value Ref Range    Folate 11.9 5.0 - 21.0 ng/mL   VITAMIN B12   Result Value Ref Range    Vitamin B12 202 193 - 986 pg/mL   IRON PROFILE   Result Value Ref Range    Iron 37 35 - 150 ug/dL    TIBC 241 (L) 250 - 450 ug/dL    Iron % saturation 15 (L) 20 - 50 %   FERRITIN   Result Value Ref Range    Ferritin 172 8 - 252 NG/ML   CK W/ CKMB & INDEX   Result Value Ref Range     26 - 192 U/L    CK - MB <1.0 <3.6 NG/ML    CK-MB Index Cannot be calculated 0 - 2.5     TROPONIN I   Result Value Ref Range    Troponin-I, Qt. <0.05 <0.05 ng/mL   URINALYSIS W/MICROSCOPIC   Result Value Ref Range    Color YELLOW/STRAW      Appearance CLEAR CLEAR      Specific gravity 1.008 1.003 - 1.030      pH (UA) 7.5 5.0 - 8.0      Protein 30 (A) NEG mg/dL    Glucose NEGATIVE  NEG mg/dL    Ketone NEGATIVE  NEG mg/dL    Bilirubin NEGATIVE  NEG      Blood SMALL (A) NEG      Urobilinogen 1.0 0.2 - 1.0 EU/dL    Nitrites NEGATIVE  NEG      Leukocyte Esterase LARGE (A) NEG      WBC 20-50 0 - 4 /hpf    RBC 0-5 0 - 5 /hpf    Epithelial cells MODERATE (A) FEW /lpf    Bacteria NEGATIVE  NEG /hpf    Hyaline cast 0-2 0 - 5 /lpf   CBC WITH AUTOMATED DIFF   Result Value Ref Range    WBC 4.9 3.6 - 11.0 K/uL    RBC 2.71 (L) 3.80 - 5.20 M/uL    HGB 8.5 (L) 11.5 - 16.0 g/dL    HCT 26.2 (L) 35.0 - 47.0 %    MCV 96.7 80.0 - 99.0 FL    MCH 31.4 26.0 - 34.0 PG    MCHC 32.4 30.0 - 36.5 g/dL    RDW 13.3 11.5 - 14.5 %    PLATELET 593 247 - 344 K/uL    MPV 10.3 8.9 - 12.9 FL    NRBC 0.0 0  WBC    ABSOLUTE NRBC 0.00 0.00 - 0.01 K/uL    NEUTROPHILS 46 32 - 75 %    LYMPHOCYTES 43 12 - 49 %    MONOCYTES 9 5 - 13 %    EOSINOPHILS 2 0 - 7 %    BASOPHILS 0 0 - 1 %    IMMATURE GRANULOCYTES 0 0.0 - 0.5 %    ABS. NEUTROPHILS 2.2 1.8 - 8.0 K/UL    ABS. LYMPHOCYTES 2.1 0.8 - 3.5 K/UL    ABS. MONOCYTES 0.4 0.0 - 1.0 K/UL    ABS. EOSINOPHILS 0.1 0.0 - 0.4 K/UL    ABS. BASOPHILS 0.0 0.0 - 0.1 K/UL    ABS. IMM.  GRANS. 0.0 0.00 - 0.04 K/UL    DF AUTOMATED     MAGNESIUM   Result Value Ref Range    Magnesium 1.7 1.6 - 2.4 mg/dL   METABOLIC PANEL, BASIC   Result Value Ref Range    Sodium 143 136 - 145 mmol/L    Potassium 3.0 (L) 3.5 - 5.1 mmol/L    Chloride 105 97 - 108 mmol/L    CO2 29 21 - 32 mmol/L    Anion gap 9 5 - 15 mmol/L    Glucose 115 (H) 65 - 100 mg/dL    BUN 24 (H) 6 - 20 MG/DL    Creatinine 2.85 (H) 0.55 - 1.02 MG/DL    BUN/Creatinine ratio 8 (L) 12 - 20      GFR est AA 19 (L) >60 ml/min/1.73m2    GFR est non-AA 16 (L) >60 ml/min/1.73m2    Calcium 8.3 (L) 8.5 - 10.1 MG/DL   AMPARO QL, W/REFLEX CASCADE   Result Value Ref Range    AMPARO Direct NEGATIVE  NEGATIVE      See below Comment     ANCA PANEL   Result Value Ref Range    Myeloperoxidase (MPO) Ab <9.0 0.0 - 9.0 U/mL    Proteinase 3 (WA-3) Ab <3.5 0.0 - 3.5 U/mL    Cytoplasmic (C-ANCA) Ab <1:20 Neg:<1:20 titer    Perinuclear (P-ANCA) <1:20 Neg:<1:20 titer    Atypical pANCA <1:20 Neg:<1:20 titer   CK   Result Value Ref Range    CK 87 26 - 192 U/L   COMPLEMENT, C3   Result Value Ref Range    Complement C3 112 82 - 167 mg/dL   COMPLEMENT, C4   Result Value Ref Range    Complement C4 35 14 - 44 mg/dL   FREE LIGHT CHAINS, KAPPA/LAMBDA, QT   Result Value Ref Range    Free Kappa Lt Chains, serum 43.6 (H) 3.3 - 19.4 mg/L    Free Lambda Lt Chains, serum 40.3 (H) 5.7 - 26.3 mg/L    Kappa/Lambda ratio, serum 1.08 0.26 - 1.65     HEPATITIS PANEL, ACUTE   Result Value Ref Range    Hepatitis A, IgM NONREACTIVE NR      __          Hepatitis B surface Ag <0.10 Index    Hep B surface Ag Interp. NEGATIVE  NEG      __          Hepatitis B core, IgM NONREACTIVE NR      __          Hep C  virus Ab Interp.  NONREACTIVE NR      Hep C  virus Ab comment Method used is Siemens Advia Centaur     IMMUNOELECTROPHORESIS [facility].)   Result Value Ref Range    Immunofixation, serum Comment      Immunoglobulin G, Qt. 801 700 - 1,600 mg/dL    Immunoglobulin A, Qt. 173 64 - 422 mg/dL    Immunoglobulin M, Qt. 37 26 - 217 mg/dL   MAGNESIUM   Result Value Ref Range    Magnesium 1.7 1.6 - 2.4 mg/dL   RENAL FUNCTION PANEL   Result Value Ref Range    Sodium 146 (H) 136 - 145 mmol/L    Potassium 2.8 (L) 3.5 - 5.1 mmol/L    Chloride 110 (H) 97 - 108 mmol/L    CO2 25 21 - 32 mmol/L    Anion gap 11 5 - 15 mmol/L    Glucose 82 65 - 100 mg/dL    BUN 19 6 - 20 MG/DL    Creatinine 2.59 (H) 0.55 - 1.02 MG/DL    BUN/Creatinine ratio 7 (L) 12 - 20      GFR est AA 21 (L) >60 ml/min/1.73m2    GFR est non-AA 18 (L) >60 ml/min/1.73m2    Calcium 8.0 (L) 8.5 - 10.1 MG/DL    Phosphorus 3.1 2.6 - 4.7 MG/DL    Albumin 2.7 (L) 3.5 - 5.0 g/dL   METABOLIC PANEL, BASIC   Result Value Ref Range    Sodium 145 136 - 145 mmol/L Potassium 3.6 3.5 - 5.1 mmol/L    Chloride 110 (H) 97 - 108 mmol/L    CO2 26 21 - 32 mmol/L    Anion gap 9 5 - 15 mmol/L    Glucose 150 (H) 65 - 100 mg/dL    BUN 18 6 - 20 MG/DL    Creatinine 2.53 (H) 0.55 - 1.02 MG/DL    BUN/Creatinine ratio 7 (L) 12 - 20      GFR est AA 22 (L) >60 ml/min/1.73m2    GFR est non-AA 18 (L) >60 ml/min/1.73m2    Calcium 8.8 8.5 - 10.1 MG/DL   MAGNESIUM   Result Value Ref Range    Magnesium 1.9 1.6 - 2.4 mg/dL   CBC W/O DIFF   Result Value Ref Range    WBC 8.0 3.6 - 11.0 K/uL    RBC 2.79 (L) 3.80 - 5.20 M/uL    HGB 8.6 (L) 11.5 - 16.0 g/dL    HCT 26.6 (L) 35.0 - 47.0 %    MCV 95.3 80.0 - 99.0 FL    MCH 30.8 26.0 - 34.0 PG    MCHC 32.3 30.0 - 36.5 g/dL    RDW 13.6 11.5 - 14.5 %    PLATELET 520 060 - 531 K/uL    MPV 10.7 8.9 - 12.9 FL    NRBC 0.0 0  WBC    ABSOLUTE NRBC 0.00 0.00 - 0.01 K/uL   GLUCOSE, POC   Result Value Ref Range    Glucose (POC) 137 (H) 65 - 100 mg/dL    Performed by Mendy Castellanos    GLUCOSE, POC   Result Value Ref Range    Glucose (POC) 96 65 - 100 mg/dL    Performed by Dellia Ormond    GLUCOSE, POC   Result Value Ref Range    Glucose (POC) 95 65 - 100 mg/dL    Performed by [facility](CON)    GLUCOSE, POC   Result Value Ref Range    Glucose (POC) 113 (H) 65 - 100 mg/dL    Performed by [facility](CON)    GLUCOSE, POC   Result Value Ref Range    Glucose (POC) 140 (H) 65 - 100 mg/dL    Performed by Bayron Sheets    GLUCOSE, POC   Result Value Ref Range    Glucose (POC) 121 (H) 65 - 100 mg/dL    Performed by Isaias Escobedoci    GLUCOSE, POC   Result Value Ref Range    Glucose (POC) 106 (H) 65 - 100 mg/dL    Performed by [facility]    GLUCOSE, POC   Result Value Ref Range    Glucose (POC) 178 (H) 65 - 100 mg/dL    Performed by Bayron "[employer]"    GLUCOSE, POC   Result Value Ref Range    Glucose (POC) 137 (H) 65 - 100 mg/dL    Performed by Manbeverly "[employer]"    GLUCOSE, POC   Result Value Ref Range    Glucose (POC) 216 (H) 65 - 100 mg/dL Performed by ROXANNA SAPNA    GLUCOSE, POC   Result Value Ref Range    Glucose (POC) 85 65 - 100 mg/dL    Performed by ROXANNA SAPNA    GLUCOSE, POC   Result Value Ref Range    Glucose (POC) 144 (H) 65 - 100 mg/dL    Performed by Jacob López    GLUCOSE, POC   Result Value Ref Range    Glucose (POC) 155 (H) 65 - 100 mg/dL    Performed by Baudilio Sheth    GLUCOSE, POC   Result Value Ref Range    Glucose (POC) 147 (H) 65 - 100 mg/dL    Performed by [facility]    GLUCOSE, POC   Result Value Ref Range    Glucose (POC) 99 65 - 100 mg/dL    Performed by [facility]    GLUCOSE, POC   Result Value Ref Range    Glucose (POC) 180 (H) 65 - 100 mg/dL    Performed by JOHNNA ZAMORA    EKG, 12 LEAD, INITIAL   Result Value Ref Range    Ventricular Rate 75 BPM    Atrial Rate 75 BPM    P-R Interval 200 ms    QRS Duration 156 ms    Q-T Interval 442 ms    QTC Calculation (Bezet) 493 ms    Calculated P Axis 36 degrees    Calculated R Axis -29 degrees    Calculated T Axis -16 degrees    Diagnosis       Normal sinus rhythm  Incomplete right bundle branch block  Left ventricular hypertrophy with QRS widening  T wave abnormality, consider anterior ischemia  When compared with ECG of 22-JAN-2017 06:58,  premature atrial complexes are no longer present  Confirmed by Trino Gottlieb MD, Jayden Moran ([id number]) on 6/18/2018 10:00:34 AM         Imaging review:  None    Documentation review:  None    Assessment/Plan:   Rema Hickman is a 80 y.o. female who presented to the neurology office for management of falls. The patient has been having gradually worsening problems for the last 3 years. On examination she does have cogwheel rigidity and bradykinesia and fast finger tapping. There is a possibility of Parkinson's disease. I am going to send the patient to [facility] to get a DaTscan. Continue with physical therapy.     Follow-up in 2 months    PHQ over the last two weeks 6/16/2017   Little interest or pleasure in doing things Not at all   Feeling down, depressed, irritable, or hopeless Not at all   Total Score PHQ 2 0     Primary care to address possible depression if PHQ-9 score is more than 9. ICD-10-CM ICD-9-CM    1. PD (Parkinson's disease) ([facility] G20 332.0 NM BRAIN SCAN OCTAVIO SPECT      Thank you for allowing me to participate in the care of Ms. Maria Esther Colmenares. Please feel free to contact me if you have any questions. Yun Raman MD  Neurologist    CC: Luc Starks MD  Fax: [phone]    This note was created using voice recognition software. Despite editing, there may be syntax errors.

## 2018-11-19 NOTE — PROGRESS NOTES
After Visit Summary   11/19/2018    Brittney Moon    MRN: [id number]           Patient Information     Date Of Birth          1973        Visit Information        Provider Department      11/19/2018 9:40 AM Yolanda Pérez MD [facility]        Today's Diagnoses     Ganglion cyst    -  1      Care Instructions      Ganglion Cyst: Hand    A ganglion cyst is a firm, fluid-filled lump that can suddenly appear on the front or back of the wrist or at the base of a finger. These cysts grow from normal tissue in the wrist and fingers, and range in size from a pea to a peach pit. Although ganglion cysts are common, they don t spread, and they don t become cancerous. They can occur after an injury, but many times it isn t known why they grow. Ganglion cysts can change in size, and may go away on their own.  What are the symptoms of a ganglion cyst?  A ganglion cyst is sometimes painful, especially when it first occurs. Constantly using your hand or wrist can make the cyst enlarge and hurt more. Some hand and wrist movements, such as grasping things, may also be difficult.  How does a ganglion cyst develop?  Your wrist and hand are made up of many small bones that meet at joints. Tendons attach muscles to the bones at the joints. The tendons allow the joints to bend and straighten. Both tendons and joints are lined with tissue called synovium. This tissue makes a thick fluid that keeps the joints and tendons moving easily. Sometimes the tissue balloons out from the joint or tendons and forms a cyst. As the cyst fills with fluid and grows, it appears as a lump you can feel.  Where do ganglion cysts occur?  A ganglion cyst can occur anywhere on the hand near a joint. Cysts most commonly appear on the back or palm side of the wrist, or on the palm at the base of a finger. Your doctor can usually diagnose a cyst by examining the lump. He or she may draw off a little fluid or order an X-ray  Orders received, chart reviewed and patient evaluated by occupational therapy. Recommend patient to discharge home with assist from family pending progression with skilled acute occupational therapy. Recommend with nursing patient to complete as able in order to maintain strength, endurance and independence: OOB to chair 3x/day, ADLs with supervision/setup and mobilizing to the bathroom for toileting with 1 assist. Thank you for your assistance. Full evaluation to follow.        Bandar Jackson, OT to rule out other problems.  How is a ganglion cyst treated?  Your healthcare provider may just watch your ganglion cyst. Many shrink and become painless without treatment. Some disappear altogether. If the cyst is unsightly or painful, or makes it hard for you to use your hand, your healthcare provider can treat it or, if needed, remove it surgically.  Nonsurgical treatment  To shrink the cyst, your provider may remove (aspirate) the fluid with a needle. If the cyst hurts, your provider may also give you an injection of an anti-inflammatory, such as cortisone, to relieve the irritation. Your hand may then be wrapped to help keep the cyst from recurring.  Surgery  If the cyst reappears after treatment, your healthcare provider may remove it surgically. A section of the tissue that lines the joint or tendon is removed along with the cyst. This helps prevent another cyst from forming, although recurrence of the cyst is still possible after surgery. Usually, only your hand or arm is numbed, and you can go home a few hours after surgery. Your hand may be in a splint for several days.  Date Last Reviewed: 9/1/2017 2000-2018 [employer]. [address] [place] [address]. All rights reserved. This information is not intended as a substitute for professional medical care. Always follow your healthcare professional's instructions.                Follow-ups after your visit        Who to contact     If you have questions or need follow up information about today's clinic visit or your schedule please contact [facility] directly at [phone].  Normal or non-critical lab and imaging results will be communicated to you by MyChart, letter or phone within 4 business days after the clinic has received the results. If you do not hear from us within 7 days, please contact the clinic through MyChart or phone. If you have a critical or abnormal lab result, we will notify you by phone as soon  "as possible.  Submit refill requests through [employer] or call your pharmacy and they will forward the refill request to us. Please allow 3 business days for your refill to be completed.          Additional Information About Your Visit        [employer] Information     [employer] gives you secure access to your electronic health record. If you see a primary care provider, you can also send messages to your care team and make appointments. If you have questions, please call your primary care clinic.  If you do not have a primary care provider, please call [phone] and they will assist you.        Care EveryWhere ID     This is your Care EveryWhere ID. This could be used by other organizations to access your [place] medical records  [id number]        Your Vitals Were     Height BMI (Body Mass Index)                1.499 m (4' 11\") 63.62 kg/m2           Blood Pressure from Last 3 Encounters:   11/19/18 132/80   11/16/18 134/76   10/19/18 136/80    Weight from Last 3 Encounters:   11/19/18 142.9 kg (315 lb)   11/16/18 142.9 kg (315 lb)   10/19/18 143.9 kg (317 lb 3.2 oz)               Primary Care Provider Fax #    Physician No Ref-Primary [phone]       No address on file        Equal Access to Services     JER BYERS : Hadii aad ku hadasho Sosalbadorali, waaxda luqadaha, qaybta kaalmada adeegyada, darshan edgar. So [facility] [phone].    ATENCIÓN: Si habla español, tiene a zacarias disposición servicios gratuitos de asistencia lingüística. Llame al [phone].    We comply with applicable federal civil rights laws and [place] laws. We do not discriminate on the basis of race, color, national origin, age, disability, sex, sexual orientation, or gender identity.            Thank you!     Thank you for choosing [facility]  for your care. Our goal is always to provide you with excellent care. Hearing back from our patients is one way we can continue to improve our services. Please " take a few minutes to complete the written survey that you may receive in the mail after your visit with us. Thank you!             Your Updated Medication List - Protect others around you: Learn how to safely use, store and throw away your medicines at www.disposemymeds.org.          This list is accurate as of 11/19/18 10:38 AM.  Always use your most recent med list.                   Brand Name Dispense Instructions for use Diagnosis    alcohol swab prep pads     100 each    Use to swab area of injection/tez as directed    Type 2 diabetes mellitus without complication, without long-term current use of insulin (H)       blood glucose calibration solution    NO BRAND SPECIFIED    1 Bottle    Use to calibrate blood glucose monitor as needed as directed. To accompany: Blood Glucose Monitor Brands: per insurance.    Type 2 diabetes mellitus without complication, without long-term current use of insulin (H)       blood glucose monitoring meter device kit    no brand specified    1 kit    Use to test blood sugar 1-2 times daily or as directed.    Type 2 diabetes mellitus without complication, without long-term current use of insulin (H)       blood glucose monitoring test strip    no brand specified    100 strip    Use to test blood sugar 1-2 times daily or as directed. To accompany: Blood Glucose Monitor Brands: per insurance.    Type 2 diabetes mellitus without complication, without long-term current use of insulin (H)       gabapentin 300 MG capsule    NEURONTIN    90 capsule    Take 1 tablet (300 mg) every night for 1-3 days, then 1 tablet twice daily for 1-3 days, then 1 tablet three times daily    Neuropathy       glyBURIDE 5 MG tablet    DIABETA /MICRONASE    90 tablet    Take 1 tablet (5 mg) by mouth 2 times daily (with meals)    Type 2 diabetes mellitus without complication, without long-term current use of insulin (H)       metFORMIN 500 MG 24 hr tablet    GLUCOPHAGE-XR    120 tablet    Take 2 tablets (1,000  mg) by mouth 2 times daily (with meals)    Type 2 diabetes mellitus without complication, without long-term current use of insulin (H)       methocarbamol 500 MG tablet    ROBAXIN    120 tablet    Take 1 tablet (500 mg) by mouth 4 times daily as needed for muscle spasms 1-3 tablets daily    Spasm of back muscles       STATIN NOT PRESCRIBED (INTENTIONAL)      1 each daily Please choose reason not prescribed, below    Type 2 diabetes mellitus without complication, without long-term current use of insulin (H)       thin lancets    NO BRAND SPECIFIED    100 each    Use to test blood sugar 1-2 times daily or as directed. To accompany: Blood Glucose Monitor Brands: per insurance.    Type 2 diabetes mellitus without complication, without long-term current use of insulin (H)

## 2018-12-04 NOTE — TELEPHONE ENCOUNTER
Patient's daughter, Kirsten Ng, states she needs a call back to discuss patient's Bone Density test appt. Wolfgang Brunson also would like to discuss Dr. Muna Moncada prescribing something that the patient can take for appetite or does she need to wait for her appt on 12/14/18. Please call.  Thank you

## 2018-12-05 NOTE — TELEPHONE ENCOUNTER
Called and spoke to pt's daughter, Rodo Quick ([facility]). Rodo Quick stated that she is worried about her mother because she has not had an appetite in quite some time. Rodo Quick stated that her mother is lactose intolerant so it is hard for her to find meal supplement shakes to replace eating. Rodo Quick was wondering if there is a medication she can take to give her an appetite. Rodo Quick stated her relative told her about a pill that increases appetite. Notified Dom Lee would send message to Dr. Dianne Vasquez and get back with her with his recommendations.
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
## 2018-12-14 NOTE — PROGRESS NOTES
1. Have you been to the ER, urgent care clinic since your last visit? Hospitalized since your last visit?no    2. Have you seen or consulted any other health care providers outside of the [address] [place] [address] since your last visit? Include any pap smears or colon screening.  no

## 2018-12-17 NOTE — PATIENT INSTRUCTIONS

## 2018-12-17 NOTE — LETTER
12/17/2018 3:35 PM 
 
Patient:    Reema Gamez YOB: 1932 Date of Visit:    12/17/2018 Dear Dasia Virk MD 
 
Thank you for referring Ms. Jaya Mittal to me for evaluation/treatment. Below are the relevant portions of my assessment and plan of care. Neurology Note Chief Complaint Patient presents with  Follow-up  
  unstable gait HPI/Subjective Reema Gamez is a 80 y.o. female who presented to the neurology office for Management of imbalance. The patient has been referred here for further management. The patient started having problems around 2015 and it has been gradually getting worse. It has gotten specifically worse in the last 7-8 months. The patient has been having frequent falls. There is no change in her sense of smell, constipation and there is no tremor. She does have bradykinesia and problems getting in and out of bed. Since the last visit, Pat Darling was done which is positive for Parkinson's disease patient is now on Sinemet 25/100 mg p.o. 3 times daily. Current Outpatient Medications Medication Sig  
 mirtazapine (REMERON) 15 mg tablet TAKE 1 TABLET BY MOUTH EVERY NIGHT AT BEDTIME  SITagliptin-metFORMIN (JANUMET)  mg per tablet Take 1 Tab by mouth two (2) times daily (with meals).  carbidopa-levodopa (SINEMET)  mg per tablet TAKE 1 TABLET BY MOUTH THREE TIMES DAILY  iron,carbonyl-vitamin C (VITRON-C) 65 mg iron- 125 mg TbEC Take  by mouth.  lisinopril (PRINIVIL, ZESTRIL) 40 mg tablet Take 1 Tab by mouth daily.  rosuvastatin (CRESTOR) 20 mg tablet TAKE 1 TABLET BY MOUTH EVERY DAY  glucose blood VI test strips (ONETOUCH ULTRA TEST) strip Check fsbs up to bid E11.9  Blood-Glucose Meter monitoring kit Check Blood Glucose Once Daily. Dx Code: E11.9  
 glucose blood VI test strips (ASCENSIA AUTODISC VI, ONE TOUCH ULTRA TEST VI) strip Check Blood Glucose Once Daily. Dx Code: E11.9  levothyroxine (SYNTHROID) 50 mcg tablet TAKE 1 TABLET BY MOUTH DAILY BEFORE BREAKFAST  aspirin delayed-release 81 mg tablet Take 81 mg by mouth daily. No current facility-administered medications for this visit. Allergies Allergen Reactions  Flexeril [Cyclobenzaprine] Other (comments) Caused pt to fall & hallucination  Norvasc [Amlodipine] Swelling  Percocet [Oxycodone-Acetaminophen] Itching  Remeron [Mirtazapine] Diarrhea  Tramadol Other (comments) Caused falls Past Medical History:  
Diagnosis Date  Anxiety  Arthritis  Chest pain 2008 Negative stress test and Holter monitor w/Dr Givens Sadie.  Chronic back pain  Chronic venous insufficiency  Colon polyp   
 last scope normal 2007; Dr. Tulio Maldonado  Cyst of right kidney  Depression  Diabetes ([address]  Hypercholesterolemia  Hypertension  Hypothyroidism  Memory disorder  Other ill-defined conditions(799.89)   
 heart murmur  Painful hip  Parkinson disease ([address]  Urinary frequency Sees Dr. Louise Loja with [address] Past Surgical History:  
Procedure Laterality Date  COLONOSCOPY,DIAGNOSTIC  6/12/2015  HX BREAST BIOPSY  2002  HX HYSTERECTOMY Due to a prolapsed uterus  HX KNEE REPLACEMENT    
 left  HX KNEE REPLACEMENT  2006  
 right  HX ORTHOPAEDIC B knees.  HX OTHER SURGICAL    
 pelvic prolasp  UPPER GI ENDOSCOPY,BIOPSY  6/12/2015 Family History Problem Relation Age of Onset  Cancer Mother   
     pancreatic  Cancer Father   
     colon  Cancer Sister   
     pancreatic  Diabetes Sister  Alzheimer Brother Social History Tobacco Use  Smoking status: Never Smoker  Smokeless tobacco: Never Used Substance Use Topics  Alcohol use: No  
  Alcohol/week: 0.0 oz  
  Comment: Rare  Drug use: No  
 
 
REVIEW OF SYSTEMS:  
 A ten system review of constitutional, cardiovascular, respiratory, musculoskeletal, endocrine, skin, SHEENT, genitourinary, psychiatric and neurologic systems was obtained and is unremarkable with the exception of fainting, falls, hearing loss, leg swelling, memory loss, muscle weakness, poor appetite and weight change EXAMINATION:  
Visit Vitals Ht 5' 5\" (1.651 m) BMI 24.46 kg/m² General:  
General appearance: Pt is in no acute distress Distal pulses are preserved Neurological Examination:  
Mental Status: AAO x3. Speech is fluent. Follows commands, has normal fund of knowledge, attention, short term recall, comprehension and insight. Cranial Nerves: Visual fields are full. PERRL, Extraocular movements are full. Facial sensation intact. Facial movement intact. Hearing intact to conversation. Palate elevates symmetrically. Shoulder shrug symmetric. Tongue midline. Motor: Strength is 5/5 in all 4 ext. No atrophy. Tone: Cogwheeling rigidity, more on the right in comparison to the left Sensation: Normal to light touch Coordination/Cerebellar: Intact to finger-nose-finger Gait: Uses a wheeled walker to walk Skin: No significant bruising or lacerations. Laboratory review:  
Results for orders placed or performed during the hospital encounter of 06/15/18 URINE CULTURE HOLD SAMPLE Result Value Ref Range Urine culture hold URINE ON HOLD IN MICROBIOLOGY DEPT FOR 3 DAYS. IF UNPRESERVED URINE IS SUBMITTED, IT CANNOT BE USED FOR ADDITIONAL TESTING AFTER 24 HRS, RECOLLECTION WILL BE REQUIRED. CBC WITH AUTOMATED DIFF Result Value Ref Range WBC 6.6 3.6 - 11.0 K/uL  
 RBC 3.04 (L) 3.80 - 5.20 M/uL HGB 9.4 (L) 11.5 - 16.0 g/dL HCT 29.5 (L) 35.0 - 47.0 % MCV 97.0 80.0 - 99.0 FL  
 MCH 30.9 26.0 - 34.0 PG  
 MCHC 31.9 30.0 - 36.5 g/dL  
 RDW 13.5 11.5 - 14.5 % PLATELET 999 805 - 672 K/uL MPV 10.7 8.9 - 12.9 FL  
 NRBC 0.0 0  WBC ABSOLUTE NRBC 0.00 0.00 - 0.01 K/uL NEUTROPHILS 62 32 - 75 % LYMPHOCYTES 27 12 - 49 % MONOCYTES 10 5 - 13 % EOSINOPHILS 1 0 - 7 % BASOPHILS 0 0 - 1 % IMMATURE GRANULOCYTES 0 0.0 - 0.5 % ABS. NEUTROPHILS 4.1 1.8 - 8.0 K/UL  
 ABS. LYMPHOCYTES 1.8 0.8 - 3.5 K/UL  
 ABS. MONOCYTES 0.6 0.0 - 1.0 K/UL  
 ABS. EOSINOPHILS 0.1 0.0 - 0.4 K/UL  
 ABS. BASOPHILS 0.0 0.0 - 0.1 K/UL  
 ABS. IMM. GRANS. 0.0 0.00 - 0.04 K/UL  
 DF AUTOMATED METABOLIC PANEL, COMPREHENSIVE Result Value Ref Range Sodium 138 136 - 145 mmol/L Potassium 2.5 (LL) 3.5 - 5.1 mmol/L Chloride 97 97 - 108 mmol/L  
 CO2 28 21 - 32 mmol/L Anion gap 13 5 - 15 mmol/L Glucose 110 (H) 65 - 100 mg/dL BUN 28 (H) 6 - 20 MG/DL Creatinine 3.21 (H) 0.55 - 1.02 MG/DL  
 BUN/Creatinine ratio 9 (L) 12 - 20 GFR est AA 17 (L) >60 ml/min/1.73m2 GFR est non-AA 14 (L) >60 ml/min/1.73m2 Calcium 8.8 8.5 - 10.1 MG/DL Bilirubin, total 0.4 0.2 - 1.0 MG/DL  
 ALT (SGPT) 11 (L) 12 - 78 U/L  
 AST (SGOT) 21 15 - 37 U/L Alk. phosphatase 67 45 - 117 U/L Protein, total 7.4 6.4 - 8.2 g/dL Albumin 3.3 (L) 3.5 - 5.0 g/dL Globulin 4.1 (H) 2.0 - 4.0 g/dL A-G Ratio 0.8 (L) 1.1 - 2.2    
TROPONIN I Result Value Ref Range Troponin-I, Qt. <0.05 <0.05 ng/mL PROTHROMBIN TIME + INR Result Value Ref Range INR 1.0 0.9 - 1.1 Prothrombin time 10.4 9.0 - 11.1 sec PHOSPHORUS Result Value Ref Range Phosphorus 2.6 2.6 - 4.7 MG/DL MAGNESIUM Result Value Ref Range Magnesium 1.7 1.6 - 2.4 mg/dL CK W/ CKMB & INDEX Result Value Ref Range  26 - 192 U/L  
 CK - MB <1.0 <3.6 NG/ML  
 CK-MB Index Cannot be calculated 0 - 2.5 TSH 3RD GENERATION Result Value Ref Range TSH 0.97 0.36 - 3.74 uIU/mL HEMOGLOBIN A1C WITH EAG Result Value Ref Range Hemoglobin A1c 5.8 4.2 - 6.3 % Est. average glucose 120 mg/dL TROPONIN I Result Value Ref Range Troponin-I, Qt. <0.05 <0.05 ng/mL LIPID PANEL Result Value Ref Range LIPID PROFILE Cholesterol, total 120 <200 MG/DL Triglyceride 90 <150 MG/DL  
 HDL Cholesterol 63 MG/DL  
 LDL, calculated 39 0 - 100 MG/DL VLDL, calculated 18 MG/DL  
 CHOL/HDL Ratio 1.9 0 - 5.0    
CBC WITH AUTOMATED DIFF Result Value Ref Range WBC 5.5 3.6 - 11.0 K/uL  
 RBC 2.64 (L) 3.80 - 5.20 M/uL HGB 8.4 (L) 11.5 - 16.0 g/dL HCT 25.6 (L) 35.0 - 47.0 % MCV 97.0 80.0 - 99.0 FL  
 MCH 31.8 26.0 - 34.0 PG  
 MCHC 32.8 30.0 - 36.5 g/dL  
 RDW 13.2 11.5 - 14.5 % PLATELET 299 345 - 170 K/uL MPV 10.5 8.9 - 12.9 FL  
 NRBC 0.0 0  WBC ABSOLUTE NRBC 0.00 0.00 - 0.01 K/uL NEUTROPHILS 55 32 - 75 % LYMPHOCYTES 34 12 - 49 % MONOCYTES 9 5 - 13 % EOSINOPHILS 2 0 - 7 % BASOPHILS 0 0 - 1 % IMMATURE GRANULOCYTES 0 0.0 - 0.5 % ABS. NEUTROPHILS 3.0 1.8 - 8.0 K/UL  
 ABS. LYMPHOCYTES 1.9 0.8 - 3.5 K/UL  
 ABS. MONOCYTES 0.5 0.0 - 1.0 K/UL  
 ABS. EOSINOPHILS 0.1 0.0 - 0.4 K/UL  
 ABS. BASOPHILS 0.0 0.0 - 0.1 K/UL  
 ABS. IMM. GRANS. 0.0 0.00 - 0.04 K/UL  
 DF AUTOMATED METABOLIC PANEL, COMPREHENSIVE Result Value Ref Range Sodium 143 136 - 145 mmol/L Potassium 2.4 (LL) 3.5 - 5.1 mmol/L Chloride 101 97 - 108 mmol/L  
 CO2 32 21 - 32 mmol/L Anion gap 10 5 - 15 mmol/L Glucose 92 65 - 100 mg/dL BUN 26 (H) 6 - 20 MG/DL Creatinine 2.94 (H) 0.55 - 1.02 MG/DL  
 BUN/Creatinine ratio 9 (L) 12 - 20 GFR est AA 18 (L) >60 ml/min/1.73m2 GFR est non-AA 15 (L) >60 ml/min/1.73m2 Calcium 8.5 8.5 - 10.1 MG/DL Bilirubin, total 0.4 0.2 - 1.0 MG/DL  
 ALT (SGPT) 12 12 - 78 U/L  
 AST (SGOT) 18 15 - 37 U/L Alk. phosphatase 66 45 - 117 U/L Protein, total 6.5 6.4 - 8.2 g/dL Albumin 3.1 (L) 3.5 - 5.0 g/dL Globulin 3.4 2.0 - 4.0 g/dL A-G Ratio 0.9 (L) 1.1 - 2.2 FOLATE Result Value Ref Range Folate 11.9 5.0 - 21.0 ng/mL VITAMIN B12 Result Value Ref Range Vitamin B12 202 193 - 986 pg/mL IRON PROFILE Result Value Ref Range Iron 37 35 - 150 ug/dL TIBC 241 (L) 250 - 450 ug/dL Iron % saturation 15 (L) 20 - 50 % FERRITIN Result Value Ref Range Ferritin 172 8 - 252 NG/ML  
CK W/ CKMB & INDEX Result Value Ref Range  26 - 192 U/L  
 CK - MB <1.0 <3.6 NG/ML  
 CK-MB Index Cannot be calculated 0 - 2.5    
TROPONIN I Result Value Ref Range Troponin-I, Qt. <0.05 <0.05 ng/mL URINALYSIS W/MICROSCOPIC Result Value Ref Range Color YELLOW/STRAW Appearance CLEAR CLEAR Specific gravity 1.008 1.003 - 1.030    
 pH (UA) 7.5 5.0 - 8.0 Protein 30 (A) NEG mg/dL Glucose NEGATIVE  NEG mg/dL Ketone NEGATIVE  NEG mg/dL Bilirubin NEGATIVE  NEG Blood SMALL (A) NEG Urobilinogen 1.0 0.2 - 1.0 EU/dL Nitrites NEGATIVE  NEG Leukocyte Esterase LARGE (A) NEG    
 WBC 20-50 0 - 4 /hpf  
 RBC 0-5 0 - 5 /hpf Epithelial cells MODERATE (A) FEW /lpf Bacteria NEGATIVE  NEG /hpf Hyaline cast 0-2 0 - 5 /lpf  
CBC WITH AUTOMATED DIFF Result Value Ref Range WBC 4.9 3.6 - 11.0 K/uL  
 RBC 2.71 (L) 3.80 - 5.20 M/uL HGB 8.5 (L) 11.5 - 16.0 g/dL HCT 26.2 (L) 35.0 - 47.0 % MCV 96.7 80.0 - 99.0 FL  
 MCH 31.4 26.0 - 34.0 PG  
 MCHC 32.4 30.0 - 36.5 g/dL  
 RDW 13.3 11.5 - 14.5 % PLATELET 140 714 - 365 K/uL MPV 10.3 8.9 - 12.9 FL  
 NRBC 0.0 0  WBC ABSOLUTE NRBC 0.00 0.00 - 0.01 K/uL NEUTROPHILS 46 32 - 75 % LYMPHOCYTES 43 12 - 49 % MONOCYTES 9 5 - 13 % EOSINOPHILS 2 0 - 7 % BASOPHILS 0 0 - 1 % IMMATURE GRANULOCYTES 0 0.0 - 0.5 % ABS. NEUTROPHILS 2.2 1.8 - 8.0 K/UL  
 ABS. LYMPHOCYTES 2.1 0.8 - 3.5 K/UL  
 ABS. MONOCYTES 0.4 0.0 - 1.0 K/UL  
 ABS. EOSINOPHILS 0.1 0.0 - 0.4 K/UL  
 ABS. BASOPHILS 0.0 0.0 - 0.1 K/UL  
 ABS. IMM. GRANS. 0.0 0.00 - 0.04 K/UL  
 DF AUTOMATED MAGNESIUM Result Value Ref Range Magnesium 1.7 1.6 - 2.4 mg/dL METABOLIC PANEL, BASIC  
 Result Value Ref Range Sodium 143 136 - 145 mmol/L Potassium 3.0 (L) 3.5 - 5.1 mmol/L Chloride 105 97 - 108 mmol/L  
 CO2 29 21 - 32 mmol/L Anion gap 9 5 - 15 mmol/L Glucose 115 (H) 65 - 100 mg/dL BUN 24 (H) 6 - 20 MG/DL Creatinine 2.85 (H) 0.55 - 1.02 MG/DL  
 BUN/Creatinine ratio 8 (L) 12 - 20 GFR est AA 19 (L) >60 ml/min/1.73m2 GFR est non-AA 16 (L) >60 ml/min/1.73m2 Calcium 8.3 (L) 8.5 - 10.1 MG/DL  
AMPARO QL, W/REFLEX CASCADE Result Value Ref Range AMPARO Direct NEGATIVE  NEGATIVE See below Comment ANCA PANEL Result Value Ref Range Myeloperoxidase (MPO) Ab <9.0 0.0 - 9.0 U/mL Proteinase 3 (TN-3) Ab <3.5 0.0 - 3.5 U/mL Cytoplasmic (C-ANCA) Ab <1:20 Neg:<1:20 titer Perinuclear (P-ANCA) <1:20 Neg:<1:20 titer Atypical pANCA <1:20 Neg:<1:20 titer CK Result Value Ref Range CK 87 26 - 192 U/L  
COMPLEMENT, C3 Result Value Ref Range Complement C3 112 82 - 167 mg/dL COMPLEMENT, C4 Result Value Ref Range Complement C4 35 14 - 44 mg/dL FREE LIGHT CHAINS, KAPPA/LAMBDA, QT Result Value Ref Range Free Kappa Lt Chains, serum 43.6 (H) 3.3 - 19.4 mg/L Free Lambda Lt Chains, serum 40.3 (H) 5.7 - 26.3 mg/L Kappa/Lambda ratio, serum 1.08 0.26 - 1.65 HEPATITIS PANEL, ACUTE Result Value Ref Range Hepatitis A, IgM NONREACTIVE NR    
 __ Hepatitis B surface Ag <0.10 Index Hep B surface Ag Interp. NEGATIVE  NEG    
 __ Hepatitis B core, IgM NONREACTIVE NR    
 __ Hep C  virus Ab Interp. NONREACTIVE NR Hep C  virus Ab comment Method used is [facility] IMMUNOELECTROPHORESIS (IMMUNOFIX.) Result Value Ref Range Immunofixation, serum Comment Immunoglobulin G, Qt. 801 700 - 1,600 mg/dL Immunoglobulin A, Qt. 173 64 - 422 mg/dL Immunoglobulin M, Qt. 37 26 - 217 mg/dL MAGNESIUM Result Value Ref Range Magnesium 1.7 1.6 - 2.4 mg/dL RENAL FUNCTION PANEL  
 Result Value Ref Range Sodium 146 (H) 136 - 145 mmol/L Potassium 2.8 (L) 3.5 - 5.1 mmol/L Chloride 110 (H) 97 - 108 mmol/L  
 CO2 25 21 - 32 mmol/L Anion gap 11 5 - 15 mmol/L Glucose 82 65 - 100 mg/dL BUN 19 6 - 20 MG/DL Creatinine 2.59 (H) 0.55 - 1.02 MG/DL  
 BUN/Creatinine ratio 7 (L) 12 - 20 GFR est AA 21 (L) >60 ml/min/1.73m2 GFR est non-AA 18 (L) >60 ml/min/1.73m2 Calcium 8.0 (L) 8.5 - 10.1 MG/DL Phosphorus 3.1 2.6 - 4.7 MG/DL Albumin 2.7 (L) 3.5 - 5.0 g/dL METABOLIC PANEL, BASIC Result Value Ref Range Sodium 145 136 - 145 mmol/L Potassium 3.6 3.5 - 5.1 mmol/L Chloride 110 (H) 97 - 108 mmol/L  
 CO2 26 21 - 32 mmol/L Anion gap 9 5 - 15 mmol/L Glucose 150 (H) 65 - 100 mg/dL BUN 18 6 - 20 MG/DL Creatinine 2.53 (H) 0.55 - 1.02 MG/DL  
 BUN/Creatinine ratio 7 (L) 12 - 20 GFR est AA 22 (L) >60 ml/min/1.73m2 GFR est non-AA 18 (L) >60 ml/min/1.73m2 Calcium 8.8 8.5 - 10.1 MG/DL MAGNESIUM Result Value Ref Range Magnesium 1.9 1.6 - 2.4 mg/dL CBC W/O DIFF Result Value Ref Range WBC 8.0 3.6 - 11.0 K/uL  
 RBC 2.79 (L) 3.80 - 5.20 M/uL HGB 8.6 (L) 11.5 - 16.0 g/dL HCT 26.6 (L) 35.0 - 47.0 % MCV 95.3 80.0 - 99.0 FL  
 MCH 30.8 26.0 - 34.0 PG  
 MCHC 32.3 30.0 - 36.5 g/dL  
 RDW 13.6 11.5 - 14.5 % PLATELET 199 768 - 988 K/uL MPV 10.7 8.9 - 12.9 FL  
 NRBC 0.0 0  WBC ABSOLUTE NRBC 0.00 0.00 - 0.01 K/uL GLUCOSE, POC Result Value Ref Range Glucose (POC) 137 (H) 65 - 100 mg/dL Performed by Izzy French GLUCOSE, POC Result Value Ref Range Glucose (POC) 96 65 - 100 mg/dL Performed by Omkar Rehman GLUCOSE, POC Result Value Ref Range Glucose (POC) 95 65 - 100 mg/dL Performed by [facility] GAGE(CON) GLUCOSE, POC Result Value Ref Range Glucose (POC) 113 (H) 65 - 100 mg/dL Performed by [facility] GAGE(CON) GLUCOSE, POC Result Value Ref Range Glucose (POC) 140 (H) 65 - 100 mg/dL Performed by [employer] GLUCOSE, POC Result Value Ref Range Glucose (POC) 121 (H) 65 - 100 mg/dL Performed by [employer] GLUCOSE, POC Result Value Ref Range Glucose (POC) 106 (H) 65 - 100 mg/dL Performed by [employer] GLUCOSE, POC Result Value Ref Range Glucose (POC) 178 (H) 65 - 100 mg/dL Performed by [employer] GLUCOSE, POC Result Value Ref Range Glucose (POC) 137 (H) 65 - 100 mg/dL Performed by [employer] GLUCOSE, POC Result Value Ref Range Glucose (POC) 216 (H) 65 - 100 mg/dL Performed by [employer]   
GLUCOSE, POC Result Value Ref Range Glucose (POC) 85 65 - 100 mg/dL Performed by [employer]   
GLUCOSE, POC Result Value Ref Range Glucose (POC) 144 (H) 65 - 100 mg/dL Performed by Jay Ybarra GLUCOSE, POC Result Value Ref Range Glucose (POC) 155 (H) 65 - 100 mg/dL Performed by Mike Jeong GLUCOSE, POC Result Value Ref Range Glucose (POC) 147 (H) 65 - 100 mg/dL Performed by [employer], POC Result Value Ref Range Glucose (POC) 99 65 - 100 mg/dL Performed by [employer], POC Result Value Ref Range Glucose (POC) 180 (H) 65 - 100 mg/dL Performed by JOHNNA ZAMORA   
EKG, 12 LEAD, INITIAL Result Value Ref Range Ventricular Rate 75 BPM  
 Atrial Rate 75 BPM  
 P-R Interval 200 ms QRS Duration 156 ms  
 Q-T Interval 442 ms QTC Calculation (Bezet) 493 ms Calculated P Axis 36 degrees Calculated R Axis -29 degrees Calculated T Axis -16 degrees Diagnosis Normal sinus rhythm Incomplete right bundle branch block Left ventricular hypertrophy with QRS widening T wave abnormality, consider anterior ischemia When compared with ECG of 22-JAN-2017 06:58, 
premature atrial complexes are no longer present Confirmed by Rubio Castrejon MD, Edgardo Tomlinson ([id number]) on 6/18/2018 10:00:34 AM 
  
 
 
Imaging review: 11/9/2018 DaTscan Likely abnormal study revealing asymmetric tracer reduction in the stride greater on the left than the right, consistent with presynaptic Parkinson's disease Documentation review: 
None Assessment/Plan:  
Anastasia Varma is a 80 y.o. female who presented to the neurology office for management of falls. The patient has been having gradually worsening problems for the last 3 years. On examination she does have cogwheel rigidity and bradykinesia and fast finger tapping. DaTscan is positive for Parkinson's disease. Patient has been on Sinemet 25/100 mg p.o. 3 times daily. Continue the same. Follow-up in 6 months Over 25 minutes was spent with the patient of which > 50% of the visit was spent counseling on diagnosis, management, and treatment of the diagnosis. I did discuss about medications, disease progression PHQ over the last two weeks 6/16/2017 Little interest or pleasure in doing things Not at all Feeling down, depressed, irritable, or hopeless Not at all Total Score PHQ 2 0 Primary care to address possible depression if PHQ-9 score is more than 9. ICD-10-CM ICD-9-CM 1. PD (Park[facility]'s [facility]) ([facility] G20 332.0 Thank you for allowing me to participate in the care of Ms. Tereso Teague. Please feel free to contact me if you have any questions. Shawna Rousseau MD 
Neurologist 
 
CC: Jocelin Soto MD 
Fax: [phone] This note was created using voice recognition software. Despite editing, there may be syntax errors. If you have questions, please do not hesitate to call me. I look forward to following Ms. Tereso Teague along with you. Sincerely, Shawna Rousseau MD

## 2018-12-17 NOTE — PROGRESS NOTES
Neurology Note    Chief Complaint   Patient presents with    Follow-up     unstable gait       HPI/Subjective  Ramu Pinedo is a 80 y.o. female who presented to the neurology office for Management of imbalance. The patient has been referred here for further management. The patient started having problems around 2015 and it has been gradually getting worse. It has gotten specifically worse in the last 7-8 months. The patient has been having frequent falls. There is no change in her sense of smell, constipation and there is no tremor. She does have bradykinesia and problems getting in and out of bed. Since the last visit, Tayla Clamp was done which is positive for Parkinson's disease patient is now on Sinemet 25/100 mg p.o. 3 times daily. Current Outpatient Medications   Medication Sig    mirtazapine (REMERON) 15 mg tablet TAKE 1 TABLET BY MOUTH EVERY NIGHT AT BEDTIME    SITagliptin-metFORMIN (JANUMET)  mg per tablet Take 1 Tab by mouth two (2) times daily (with meals).  carbidopa-levodopa (SINEMET)  mg per tablet TAKE 1 TABLET BY MOUTH THREE TIMES DAILY    iron,carbonyl-vitamin C (VITRON-C) 65 mg iron- 125 mg TbEC Take  by mouth.  lisinopril (PRINIVIL, ZESTRIL) 40 mg tablet Take 1 Tab by mouth daily.  rosuvastatin (CRESTOR) 20 mg tablet TAKE 1 TABLET BY MOUTH EVERY DAY    glucose blood VI test strips (ONETOUCH ULTRA TEST) strip Check fsbs up to bid E11.9    Blood-Glucose Meter monitoring kit Check Blood Glucose Once Daily. Dx Code: E11.9    glucose blood VI test strips (ASCENSIA AUTODISC VI, ONE TOUCH ULTRA TEST VI) strip Check Blood Glucose Once Daily. Dx Code: E11.9    levothyroxine (SYNTHROID) 50 mcg tablet TAKE 1 TABLET BY MOUTH DAILY BEFORE BREAKFAST    aspirin delayed-release 81 mg tablet Take 81 mg by mouth daily. No current facility-administered medications for this visit.       Allergies   Allergen Reactions    Flexeril [Cyclobenzaprine] Other (comments) Caused pt to fall & hallucination    Norvasc [Amlodipine] Swelling    Percocet [Oxycodone-Acetaminophen] Itching    Remeron [Mirtazapine] Diarrhea    Tramadol Other (comments)     Caused falls      Past Medical History:   Diagnosis Date    Anxiety     Arthritis     Chest pain 2008    Negative stress test and Holter monitor w/Dr Roger Fabian.  Chronic back pain     Chronic venous insufficiency     Colon polyp     last scope normal 2007; Dr. Magdy Andrew Cyst of right kidney     Depression     Diabetes ([facility]     Hypercholesterolemia     Hypertension     Hypothyroidism     Memory disorder     Other ill-defined conditions(799.89)     heart murmur    Painful hip     Parkinson disease ([facility]     Urinary frequency     Se[facility] Dr[facility] Jelly Bo [facility]     Past Surgical History:   Procedure Laterality Date    COLONOSCOPY,DIAGNOSTIC  6/12/2015         HX BREAST BIOPSY  2002    HX HYSTERECTOMY      Due to a prolapsed uterus    HX KNEE REPLACEMENT      left    HX KNEE REPLACEMENT  2006    right    HX ORTHOPAEDIC      B knees.      HX OTHER SURGICAL      pelvic prolasp    UPPER GI ENDOSCOPY,BIOPSY  6/12/2015          Family History   Problem Relation Age of Onset    Cancer Mother         pancreatic    Cancer Father         colon    Cancer Sister         pancreatic     Diabetes Sister     Alzheimer Brother      Social History     Tobacco Use    Smoking status: Never Smoker    Smokeless tobacco: Never Used   Substance Use Topics    Alcohol use: No     Alcohol/week: 0.0 oz     Comment: Rare    Drug use: No       REVIEW OF SYSTEMS:   A ten system review of constitutional, cardiovascular, respiratory, musculoskeletal, endocrine, skin, SHEENT, genitourinary, psychiatric and neurologic systems was obtained and is unremarkable with the exception of fainting, falls, hearing loss, leg swelling, memory loss, muscle weakness, poor appetite and weight change    EXAMINATION:   Visit Vitals  Ht 5' 5\" (1.651 m)   BMI 24.46 kg/m²        General:   General appearance: Pt is in no acute distress   Distal pulses are preserved      Neurological Examination:   Mental Status: AAO x3. Speech is fluent. Follows commands, has normal fund of knowledge, attention, short term recall, comprehension and insight. Cranial Nerves: Visual fields are full. PERRL, Extraocular movements are full. Facial sensation intact. Facial movement intact. Hearing intact to conversation. Palate elevates symmetrically. Shoulder shrug symmetric. Tongue midline. Motor: Strength is 5/5 in all 4 ext. No atrophy. Tone: Cogwheeling rigidity, more on the right in comparison to the left    Sensation: Normal to light touch    Coordination/Cerebellar: Intact to finger-nose-finger     Gait: Uses a wheeled walker to walk    Skin: No significant bruising or lacerations. Laboratory review:   Results for orders placed or performed during the hospital encounter of 06/15/18   URINE CULTURE HOLD SAMPLE   Result Value Ref Range    Urine culture hold        URINE ON HOLD IN MICROBIOLOGY DEPT FOR 3 DAYS. IF UNPRESERVED URINE IS SUBMITTED, IT CANNOT BE USED FOR ADDITIONAL TESTING AFTER 24 HRS, RECOLLECTION WILL BE REQUIRED. CBC WITH AUTOMATED DIFF   Result Value Ref Range    WBC 6.6 3.6 - 11.0 K/uL    RBC 3.04 (L) 3.80 - 5.20 M/uL    HGB 9.4 (L) 11.5 - 16.0 g/dL    HCT 29.5 (L) 35.0 - 47.0 %    MCV 97.0 80.0 - 99.0 FL    MCH 30.9 26.0 - 34.0 PG    MCHC 31.9 30.0 - 36.5 g/dL    RDW 13.5 11.5 - 14.5 %    PLATELET 850 355 - 889 K/uL    MPV 10.7 8.9 - 12.9 FL    NRBC 0.0 0  WBC    ABSOLUTE NRBC 0.00 0.00 - 0.01 K/uL    NEUTROPHILS 62 32 - 75 %    LYMPHOCYTES 27 12 - 49 %    MONOCYTES 10 5 - 13 %    EOSINOPHILS 1 0 - 7 %    BASOPHILS 0 0 - 1 %    IMMATURE GRANULOCYTES 0 0.0 - 0.5 %    ABS. NEUTROPHILS 4.1 1.8 - 8.0 K/UL    ABS. LYMPHOCYTES 1.8 0.8 - 3.5 K/UL    ABS. MONOCYTES 0.6 0.0 - 1.0 K/UL    ABS. EOSINOPHILS 0.1 0.0 - 0.4 K/UL    ABS. BASOPHILS 0.0 0.0 - 0.1 K/UL    ABS. IMM. GRANS. 0.0 0.00 - 0.04 K/UL    DF AUTOMATED     METABOLIC PANEL, COMPREHENSIVE   Result Value Ref Range    Sodium 138 136 - 145 mmol/L    Potassium 2.5 (LL) 3.5 - 5.1 mmol/L    Chloride 97 97 - 108 mmol/L    CO2 28 21 - 32 mmol/L    Anion gap 13 5 - 15 mmol/L    Glucose 110 (H) 65 - 100 mg/dL    BUN 28 (H) 6 - 20 MG/DL    Creatinine 3.21 (H) 0.55 - 1.02 MG/DL    BUN/Creatinine ratio 9 (L) 12 - 20      GFR est AA 17 (L) >60 ml/min/1.73m2    GFR est non-AA 14 (L) >60 ml/min/1.73m2    Calcium 8.8 8.5 - 10.1 MG/DL    Bilirubin, total 0.4 0.2 - 1.0 MG/DL    ALT (SGPT) 11 (L) 12 - 78 U/L    AST (SGOT) 21 15 - 37 U/L    Alk.  phosphatase 67 45 - 117 U/L    Protein, total 7.4 6.4 - 8.2 g/dL    Albumin 3.3 (L) 3.5 - 5.0 g/dL    Globulin 4.1 (H) 2.0 - 4.0 g/dL    A-G Ratio 0.8 (L) 1.1 - 2.2     TROPONIN I   Result Value Ref Range    Troponin-I, Qt. <0.05 <0.05 ng/mL   PROTHROMBIN TIME + INR   Result Value Ref Range    INR 1.0 0.9 - 1.1      Prothrombin time 10.4 9.0 - 11.1 sec   PHOSPHORUS   Result Value Ref Range    Phosphorus 2.6 2.6 - 4.7 MG/DL   MAGNESIUM   Result Value Ref Range    Magnesium 1.7 1.6 - 2.4 mg/dL   CK W/ CKMB & INDEX   Result Value Ref Range     26 - 192 U/L    CK - MB <1.0 <3.6 NG/ML    CK-MB Index Cannot be calculated 0 - 2.5     TSH 3RD GENERATION   Result Value Ref Range    TSH 0.97 0.36 - 3.74 uIU/mL   HEMOGLOBIN A1C WITH EAG   Result Value Ref Range    Hemoglobin A1c 5.8 4.2 - 6.3 %    Est. average glucose 120 mg/dL   TROPONIN I   Result Value Ref Range    Troponin-I, Qt. <0.05 <0.05 ng/mL   LIPID PANEL   Result Value Ref Range    LIPID PROFILE          Cholesterol, total 120 <200 MG/DL    Triglyceride 90 <150 MG/DL    HDL Cholesterol 63 MG/DL    LDL, calculated 39 0 - 100 MG/DL    VLDL, calculated 18 MG/DL    CHOL/HDL Ratio 1.9 0 - 5.0     CBC WITH AUTOMATED DIFF   Result Value Ref Range    WBC 5.5 3.6 - 11.0 K/uL    RBC 2.64 (L) 3.80 - 5.20 M/uL    HGB 8.4 (L) 11.5 - 16.0 g/dL    HCT 25.6 (L) 35.0 - 47.0 %    MCV 97.0 80.0 - 99.0 FL    MCH 31.8 26.0 - 34.0 PG    MCHC 32.8 30.0 - 36.5 g/dL    RDW 13.2 11.5 - 14.5 %    PLATELET 983 598 - 326 K/uL    MPV 10.5 8.9 - 12.9 FL    NRBC 0.0 0  WBC    ABSOLUTE NRBC 0.00 0.00 - 0.01 K/uL    NEUTROPHILS 55 32 - 75 %    LYMPHOCYTES 34 12 - 49 %    MONOCYTES 9 5 - 13 %    EOSINOPHILS 2 0 - 7 %    BASOPHILS 0 0 - 1 %    IMMATURE GRANULOCYTES 0 0.0 - 0.5 %    ABS. NEUTROPHILS 3.0 1.8 - 8.0 K/UL    ABS. LYMPHOCYTES 1.9 0.8 - 3.5 K/UL    ABS. MONOCYTES 0.5 0.0 - 1.0 K/UL    ABS. EOSINOPHILS 0.1 0.0 - 0.4 K/UL    ABS. BASOPHILS 0.0 0.0 - 0.1 K/UL    ABS. IMM. GRANS. 0.0 0.00 - 0.04 K/UL    DF AUTOMATED     METABOLIC PANEL, COMPREHENSIVE   Result Value Ref Range    Sodium 143 136 - 145 mmol/L    Potassium 2.4 (LL) 3.5 - 5.1 mmol/L    Chloride 101 97 - 108 mmol/L    CO2 32 21 - 32 mmol/L    Anion gap 10 5 - 15 mmol/L    Glucose 92 65 - 100 mg/dL    BUN 26 (H) 6 - 20 MG/DL    Creatinine 2.94 (H) 0.55 - 1.02 MG/DL    BUN/Creatinine ratio 9 (L) 12 - 20      GFR est AA 18 (L) >60 ml/min/1.73m2    GFR est non-AA 15 (L) >60 ml/min/1.73m2    Calcium 8.5 8.5 - 10.1 MG/DL    Bilirubin, total 0.4 0.2 - 1.0 MG/DL    ALT (SGPT) 12 12 - 78 U/L    AST (SGOT) 18 15 - 37 U/L    Alk.  phosphatase 66 45 - 117 U/L    Protein, total 6.5 6.4 - 8.2 g/dL    Albumin 3.1 (L) 3.5 - 5.0 g/dL    Globulin 3.4 2.0 - 4.0 g/dL    A-G Ratio 0.9 (L) 1.1 - 2.2     FOLATE   Result Value Ref Range    Folate 11.9 5.0 - 21.0 ng/mL   VITAMIN B12   Result Value Ref Range    Vitamin B12 202 193 - 986 pg/mL   IRON PROFILE   Result Value Ref Range    Iron 37 35 - 150 ug/dL    TIBC 241 (L) 250 - 450 ug/dL    Iron % saturation 15 (L) 20 - 50 %   FERRITIN   Result Value Ref Range    Ferritin 172 8 - 252 NG/ML   CK W/ CKMB & INDEX   Result Value Ref Range     26 - 192 U/L    CK - MB <1.0 <3.6 NG/ML    CK-MB Index Cannot be calculated 0 - 2.5     TROPONIN I   Result Value Ref Range    Troponin-I, Qt. <0.05 <0.05 ng/mL   URINALYSIS W/MICROSCOPIC   Result Value Ref Range    Color YELLOW/STRAW      Appearance CLEAR CLEAR      Specific gravity 1.008 1.003 - 1.030      pH (UA) 7.5 5.0 - 8.0      Protein 30 (A) NEG mg/dL    Glucose NEGATIVE  NEG mg/dL    Ketone NEGATIVE  NEG mg/dL    Bilirubin NEGATIVE  NEG      Blood SMALL (A) NEG      Urobilinogen 1.0 0.2 - 1.0 EU/dL    Nitrites NEGATIVE  NEG      Leukocyte Esterase LARGE (A) NEG      WBC 20-50 0 - 4 /hpf    RBC 0-5 0 - 5 /hpf    Epithelial cells MODERATE (A) FEW /lpf    Bacteria NEGATIVE  NEG /hpf    Hyaline cast 0-2 0 - 5 /lpf   CBC WITH AUTOMATED DIFF   Result Value Ref Range    WBC 4.9 3.6 - 11.0 K/uL    RBC 2.71 (L) 3.80 - 5.20 M/uL    HGB 8.5 (L) 11.5 - 16.0 g/dL    HCT 26.2 (L) 35.0 - 47.0 %    MCV 96.7 80.0 - 99.0 FL    MCH 31.4 26.0 - 34.0 PG    MCHC 32.4 30.0 - 36.5 g/dL    RDW 13.3 11.5 - 14.5 %    PLATELET 355 905 - 003 K/uL    MPV 10.3 8.9 - 12.9 FL    NRBC 0.0 0  WBC    ABSOLUTE NRBC 0.00 0.00 - 0.01 K/uL    NEUTROPHILS 46 32 - 75 %    LYMPHOCYTES 43 12 - 49 %    MONOCYTES 9 5 - 13 %    EOSINOPHILS 2 0 - 7 %    BASOPHILS 0 0 - 1 %    IMMATURE GRANULOCYTES 0 0.0 - 0.5 %    ABS. NEUTROPHILS 2.2 1.8 - 8.0 K/UL    ABS. LYMPHOCYTES 2.1 0.8 - 3.5 K/UL    ABS. MONOCYTES 0.4 0.0 - 1.0 K/UL    ABS. EOSINOPHILS 0.1 0.0 - 0.4 K/UL    ABS. BASOPHILS 0.0 0.0 - 0.1 K/UL    ABS. IMM.  GRANS. 0.0 0.00 - 0.04 K/UL    DF AUTOMATED     MAGNESIUM   Result Value Ref Range    Magnesium 1.7 1.6 - 2.4 mg/dL   METABOLIC PANEL, BASIC   Result Value Ref Range    Sodium 143 136 - 145 mmol/L    Potassium 3.0 (L) 3.5 - 5.1 mmol/L    Chloride 105 97 - 108 mmol/L    CO2 29 21 - 32 mmol/L    Anion gap 9 5 - 15 mmol/L    Glucose 115 (H) 65 - 100 mg/dL    BUN 24 (H) 6 - 20 MG/DL    Creatinine 2.85 (H) 0.55 - 1.02 MG/DL    BUN/Creatinine ratio 8 (L) 12 - 20      GFR est AA 19 (L) >60 ml/min/1.73m2    GFR est non-AA 16 (L) >60 ml/min/1.73m2 Calcium 8.3 (L) 8.5 - 10.1 MG/DL   AMPARO QL, W/REFLEX CASCADE   Result Value Ref Range    AMPARO Direct NEGATIVE  NEGATIVE      See below Comment     ANCA PANEL   Result Value Ref Range    Myeloperoxidase (MPO) Ab <9.0 0.0 - 9.0 U/mL    Proteinase 3 ([place]-3) Ab <3.5 0.0 - 3.5 U/mL    Cytoplasmic (C-ANCA) Ab <1:20 Neg:<1:20 titer    Perinuclear (P-ANCA) <1:20 Neg:<1:20 titer    Atypical pANCA <1:20 Neg:<1:20 titer   CK   Result Value Ref Range    CK 87 26 - 192 U/L   COMPLEMENT, C3   Result Value Ref Range    Complement C3 112 82 - 167 mg/dL   COMPLEMENT, C4   Result Value Ref Range    Complement C4 35 14 - 44 mg/dL   FREE LIGHT CHAINS, KAPPA/LAMBDA, QT   Result Value Ref Range    Free Kappa Lt Chains, serum 43.6 (H) 3.3 - 19.4 mg/L    Free Lambda Lt Chains, serum 40.3 (H) 5.7 - 26.3 mg/L    Kappa/Lambda ratio, serum 1.08 0.26 - 1.65     HEPATITIS PANEL, ACUTE   Result Value Ref Range    Hepatitis A, IgM NONREACTIVE NR      __          Hepatitis B surface Ag <0.10 Index    Hep B surface Ag Interp. NEGATIVE  NEG      __          Hepatitis B core, IgM NONREACTIVE NR      __          Hep C  virus Ab Interp.  NONREACTIVE NR      Hep C  virus Ab comment Method used is Siemens Advia Centaur     IMMUNOELECTROPHORESIS [facility]   Result Value Ref Range    Immunofixation, serum Comment      Immunoglobulin G, Qt. 801 700 - 1,600 mg/dL    Immunoglobulin A, Qt. 173 64 - 422 mg/dL    Immunoglobulin M, Qt. 37 26 - 217 mg/dL   MAGNESIUM   Result Value Ref Range    Magnesium 1.7 1.6 - 2.4 mg/dL   RENAL FUNCTION PANEL   Result Value Ref Range    Sodium 146 (H) 136 - 145 mmol/L    Potassium 2.8 (L) 3.5 - 5.1 mmol/L    Chloride 110 (H) 97 - 108 mmol/L    CO2 25 21 - 32 mmol/L    Anion gap 11 5 - 15 mmol/L    Glucose 82 65 - 100 mg/dL    BUN 19 6 - 20 MG/DL    Creatinine 2.59 (H) 0.55 - 1.02 MG/DL    BUN/Creatinine ratio 7 (L) 12 - 20      GFR est AA 21 (L) >60 ml/min/1.73m2    GFR est non-AA 18 (L) >60 ml/min/1.73m2    Calcium 8.0 (L) 8.5 - 10.1 MG/DL    Phosphorus 3.1 2.6 - 4.7 MG/DL    Albumin 2.7 (L) 3.5 - 5.0 g/dL   METABOLIC PANEL, BASIC   Result Value Ref Range    Sodium 145 136 - 145 mmol/L    Potassium 3.6 3.5 - 5.1 mmol/L    Chloride 110 (H) 97 - 108 mmol/L    CO2 26 21 - 32 mmol/L    Anion gap 9 5 - 15 mmol/L    Glucose 150 (H) 65 - 100 mg/dL    BUN 18 6 - 20 MG/DL    Creatinine 2.53 (H) 0.55 - 1.02 MG/DL    BUN/Creatinine ratio 7 (L) 12 - 20      GFR est AA 22 (L) >60 ml/min/1.73m2    GFR est non-AA 18 (L) >60 ml/min/1.73m2    Calcium 8.8 8.5 - 10.1 MG/DL   MAGNESIUM   Result Value Ref Range    Magnesium 1.9 1.6 - 2.4 mg/dL   CBC W/O DIFF   Result Value Ref Range    WBC 8.0 3.6 - 11.0 K/uL    RBC 2.79 (L) 3.80 - 5.20 M/uL    HGB 8.6 (L) 11.5 - 16.0 g/dL    HCT 26.6 (L) 35.0 - 47.0 %    MCV 95.3 80.0 - 99.0 FL    MCH 30.8 26.0 - 34.0 PG    MCHC 32.3 30.0 - 36.5 g/dL    RDW 13.6 11.5 - 14.5 %    PLATELET 915 916 - 659 K/uL    MPV 10.7 8.9 - 12.9 FL    NRBC 0.0 0  WBC    ABSOLUTE NRBC 0.00 0.00 - 0.01 K/uL   GLUCOSE, POC   Result Value Ref Range    Glucose (POC) 137 (H) 65 - 100 mg/dL    Performed by Izzy French    GLUCOSE, POC   Result Value Ref Range    Glucose (POC) 96 65 - 100 mg/dL    Performed by Omkar Rehman    GLUCOSE, POC   Result Value Ref Range    Glucose (POC) 95 65 - 100 mg/dL    Performed by [facility] GAGE(CON)    GLUCOSE, POC   Result Value Ref Range    Glucose (POC) 113 (H) 65 - 100 mg/dL    Performed by [facility] GAGE(CON)    GLUCOSE, POC   Result Value Ref Range    Glucose (POC) 140 (H) 65 - 100 mg/dL    Performed by Avril Riddle    GLUCOSE, POC   Result Value Ref Range    Glucose (POC) 121 (H) 65 - 100 mg/dL    Performed by Rachel Villalta    GLUCOSE, POC   Result Value Ref Range    Glucose (POC) 106 (H) 65 - 100 mg/dL    Performed by Rachel Villalta    GLUCOSE, POC   Result Value Ref Range    Glucose (POC) 178 (H) 65 - 100 mg/dL    Performed by Avril Riddle    GLUCOSE, POC   Result Value Ref Range Glucose (POC) 137 (H) 65 - 100 mg/dL    Performed by Sen Dumont    GLUCOSE, POC   Result Value Ref Range    Glucose (POC) 216 (H) 65 - 100 mg/dL    Performed by ROXANNA ALEJO    GLUCOSE, POC   Result Value Ref Range    Glucose (POC) 85 65 - 100 mg/dL    Performed by ROXANNA ALEJO    GLUCOSE, POC   Result Value Ref Range    Glucose (POC) 144 (H) 65 - 100 mg/dL    Performed by Tino Metcalf    GLUCOSE, POC   Result Value Ref Range    Glucose (POC) 155 (H) 65 - 100 mg/dL    Performed by Sammy Bradford    GLUCOSE, POC   Result Value Ref Range    Glucose (POC) 147 (H) 65 - 100 mg/dL    Performed by [facility]    GLUCOSE, POC   Result Value Ref Range    Glucose (POC) 99 65 - 100 mg/dL    Performed by [facility]    GLUCOSE, POC   Result Value Ref Range    Glucose (POC) 180 (H) 65 - 100 mg/dL    Performed by JOHNNA ZAMORA    EKG, 12 LEAD, INITIAL   Result Value Ref Range    Ventricular Rate 75 BPM    Atrial Rate 75 BPM    P-R Interval 200 ms    QRS Duration 156 ms    Q-T Interval 442 ms    QTC Calculation (Bezet) 493 ms    Calculated P Axis 36 degrees    Calculated R Axis -29 degrees    Calculated T Axis -16 degrees    Diagnosis       Normal sinus rhythm  Incomplete right bundle branch block  Left ventricular hypertrophy with QRS widening  T wave abnormality, consider anterior ischemia  When compared with ECG of 22-JAN-2017 06:58,  premature atrial complexes are no longer present  Confirmed by Bertha Moreno MD, Sarah Nieves ([id number]) on 6/18/2018 10:00:34 AM         Imaging review:  11/9/2018  DaTscan  Likely abnormal study revealing asymmetric tracer reduction in the stride greater on the left than the right, consistent with presynaptic Parkinson's disease    Documentation review:  None    Assessment/Plan:   Cristi Suárez is a 80 y.o. female who presented to the neurology office for management of falls. The patient has been having gradually worsening problems for the last 3 years.   On examination she does have cogwheel rigidity and bradykinesia and fast finger tapping. DaTscan is positive for Parkinson's disease. Patient has been on Sinemet 25/100 mg p.o. 3 times daily. Continue the same. Follow-up in 6 months     Over 25 minutes was spent with the patient of which > 50% of the visit was spent counseling on diagnosis, management, and treatment of the diagnosis. I did discuss about medications, disease progression        PHQ over the last two weeks 6/16/2017   Little interest or pleasure in doing things Not at all   Feeling down, depressed, irritable, or hopeless Not at all   Total Score PHQ 2 0     Primary care to address possible depression if PHQ-9 score is more than 9. ICD-10-CM ICD-9-CM    1. PD (Parkinson's disease) ([facility] Jamila Ulloa 332.0       Thank you for allowing me to participate in the care of Ms. Tomer Garces. Please feel free to contact me if you have any questions. Juan Pablo Vela MD  Neurologist    CC: Orlin Hayes MD  Fax: [phone]    This note was created using voice recognition software. Despite editing, there may be syntax errors.

## 2019-01-01 ENCOUNTER — PATIENT OUTREACH (OUTPATIENT)
Dept: CASE MANAGEMENT | Age: 84
End: 2019-01-01

## 2019-01-01 ENCOUNTER — APPOINTMENT (OUTPATIENT)
Dept: CT IMAGING | Age: 84
DRG: 378 | End: 2019-01-01
Attending: EMERGENCY MEDICINE
Payer: MEDICARE

## 2019-01-01 ENCOUNTER — ANESTHESIA (OUTPATIENT)
Dept: INTERVENTIONAL RADIOLOGY/VASCULAR | Age: 84
DRG: 023 | End: 2019-01-01
Payer: MEDICARE

## 2019-01-01 ENCOUNTER — TELEPHONE (OUTPATIENT)
Dept: CARDIOLOGY CLINIC | Age: 84
End: 2019-01-01

## 2019-01-01 ENCOUNTER — PATIENT OUTREACH (OUTPATIENT)
Dept: INTERNAL MEDICINE CLINIC | Age: 84
End: 2019-01-01

## 2019-01-01 ENCOUNTER — APPOINTMENT (OUTPATIENT)
Dept: MRI IMAGING | Age: 84
DRG: 023 | End: 2019-01-01
Attending: FAMILY MEDICINE
Payer: MEDICARE

## 2019-01-01 ENCOUNTER — APPOINTMENT (OUTPATIENT)
Dept: CT IMAGING | Age: 84
DRG: 023 | End: 2019-01-01
Attending: RADIOLOGY
Payer: MEDICARE

## 2019-01-01 ENCOUNTER — HOSPITAL ENCOUNTER (INPATIENT)
Age: 84
LOS: 3 days | Discharge: NURSING, REHAB OR LONG-TERM CARE | DRG: 378 | End: 2019-01-26
Attending: EMERGENCY MEDICINE | Admitting: INTERNAL MEDICINE
Payer: MEDICARE

## 2019-01-01 ENCOUNTER — APPOINTMENT (OUTPATIENT)
Dept: CT IMAGING | Age: 84
DRG: 023 | End: 2019-01-01
Attending: FAMILY MEDICINE
Payer: MEDICARE

## 2019-01-01 ENCOUNTER — APPOINTMENT (OUTPATIENT)
Dept: CT IMAGING | Age: 84
DRG: 023 | End: 2019-01-01
Attending: EMERGENCY MEDICINE
Payer: MEDICARE

## 2019-01-01 ENCOUNTER — HOSPITAL ENCOUNTER (INPATIENT)
Age: 84
LOS: 9 days | Discharge: NURSING, REHAB OR LONG-TERM CARE | DRG: 023 | End: 2019-01-17
Attending: EMERGENCY MEDICINE | Admitting: FAMILY MEDICINE
Payer: MEDICARE

## 2019-01-01 ENCOUNTER — DOCUMENTATION ONLY (OUTPATIENT)
Dept: CARDIOLOGY CLINIC | Age: 84
End: 2019-01-01

## 2019-01-01 ENCOUNTER — APPOINTMENT (OUTPATIENT)
Dept: GENERAL RADIOLOGY | Age: 84
DRG: 378 | End: 2019-01-01
Attending: EMERGENCY MEDICINE
Payer: MEDICARE

## 2019-01-01 ENCOUNTER — APPOINTMENT (OUTPATIENT)
Dept: MRI IMAGING | Age: 84
DRG: 023 | End: 2019-01-01
Attending: PSYCHIATRY & NEUROLOGY
Payer: MEDICARE

## 2019-01-01 ENCOUNTER — TELEPHONE (OUTPATIENT)
Dept: INTERNAL MEDICINE CLINIC | Age: 84
End: 2019-01-01

## 2019-01-01 ENCOUNTER — APPOINTMENT (OUTPATIENT)
Dept: INTERVENTIONAL RADIOLOGY/VASCULAR | Age: 84
DRG: 023 | End: 2019-01-01
Attending: RADIOLOGY
Payer: MEDICARE

## 2019-01-01 ENCOUNTER — CLINICAL SUPPORT (OUTPATIENT)
Dept: CARDIOLOGY CLINIC | Age: 84
End: 2019-01-01

## 2019-01-01 ENCOUNTER — OFFICE VISIT (OUTPATIENT)
Dept: CARDIOLOGY CLINIC | Age: 84
End: 2019-01-01

## 2019-01-01 ENCOUNTER — APPOINTMENT (OUTPATIENT)
Dept: CT IMAGING | Age: 84
DRG: 023 | End: 2019-01-01
Attending: HOSPITALIST
Payer: MEDICARE

## 2019-01-01 ENCOUNTER — ANESTHESIA EVENT (OUTPATIENT)
Dept: INTERVENTIONAL RADIOLOGY/VASCULAR | Age: 84
DRG: 023 | End: 2019-01-01
Payer: MEDICARE

## 2019-01-01 ENCOUNTER — HOSPITAL ENCOUNTER (EMERGENCY)
Age: 84
Discharge: HOME | DRG: 378 | End: 2019-01-20
Attending: EMERGENCY MEDICINE
Payer: MEDICARE

## 2019-01-01 ENCOUNTER — APPOINTMENT (OUTPATIENT)
Dept: CT IMAGING | Age: 84
DRG: 023 | End: 2019-01-01
Attending: NURSE PRACTITIONER
Payer: MEDICARE

## 2019-01-01 ENCOUNTER — APPOINTMENT (OUTPATIENT)
Dept: GENERAL RADIOLOGY | Age: 84
DRG: 023 | End: 2019-01-01
Attending: NURSE PRACTITIONER
Payer: MEDICARE

## 2019-01-01 ENCOUNTER — APPOINTMENT (OUTPATIENT)
Dept: CT IMAGING | Age: 84
DRG: 023 | End: 2019-01-01
Attending: PSYCHIATRY & NEUROLOGY
Payer: MEDICARE

## 2019-01-01 VITALS
OXYGEN SATURATION: 100 % | BODY MASS INDEX: 25.05 KG/M2 | SYSTOLIC BLOOD PRESSURE: 150 MMHG | TEMPERATURE: 99.2 F | HEART RATE: 67 BPM | HEIGHT: 65 IN | WEIGHT: 150.35 LBS | DIASTOLIC BLOOD PRESSURE: 51 MMHG | RESPIRATION RATE: 16 BRPM

## 2019-01-01 VITALS
SYSTOLIC BLOOD PRESSURE: 130 MMHG | DIASTOLIC BLOOD PRESSURE: 81 MMHG | TEMPERATURE: 98 F | HEIGHT: 64 IN | OXYGEN SATURATION: 93 % | HEART RATE: 70 BPM | RESPIRATION RATE: 18 BRPM | BODY MASS INDEX: 25.97 KG/M2 | WEIGHT: 152.12 LBS

## 2019-01-01 VITALS
SYSTOLIC BLOOD PRESSURE: 139 MMHG | DIASTOLIC BLOOD PRESSURE: 54 MMHG | WEIGHT: 156.53 LBS | TEMPERATURE: 98.1 F | BODY MASS INDEX: 24.57 KG/M2 | HEIGHT: 67 IN | HEART RATE: 63 BPM | OXYGEN SATURATION: 95 % | RESPIRATION RATE: 16 BRPM

## 2019-01-01 DIAGNOSIS — I63.9 ACUTE ISCHEMIC STROKE (HCC): Primary | ICD-10-CM

## 2019-01-01 DIAGNOSIS — R63.0 DECREASED APPETITE: ICD-10-CM

## 2019-01-01 DIAGNOSIS — Z71.89 GOALS OF CARE, COUNSELING/DISCUSSION: ICD-10-CM

## 2019-01-01 DIAGNOSIS — Z51.89 VISIT FOR WOUND CHECK: ICD-10-CM

## 2019-01-01 DIAGNOSIS — I63.10 CEREBROVASCULAR ACCIDENT (CVA) DUE TO EMBOLISM OF PRECEREBRAL ARTERY (HCC): ICD-10-CM

## 2019-01-01 DIAGNOSIS — Z95.810 PRE-OPERATIVE CARDIOVASCULAR EXAMINATION, ICD IN PLACE: Primary | ICD-10-CM

## 2019-01-01 DIAGNOSIS — R53.81 PHYSICAL DEBILITY: ICD-10-CM

## 2019-01-01 DIAGNOSIS — E16.2 HYPOGLYCEMIA: Primary | ICD-10-CM

## 2019-01-01 DIAGNOSIS — Z95.0 CARDIAC PACEMAKER IN SITU: Primary | ICD-10-CM

## 2019-01-01 DIAGNOSIS — D62 ACUTE ON CHRONIC BLOOD LOSS ANEMIA: Primary | ICD-10-CM

## 2019-01-01 DIAGNOSIS — K92.2 GASTROINTESTINAL HEMORRHAGE, UNSPECIFIED GASTROINTESTINAL HEMORRHAGE TYPE: ICD-10-CM

## 2019-01-01 DIAGNOSIS — Z71.89 COUNSELING REGARDING ADVANCED CARE PLANNING AND GOALS OF CARE: ICD-10-CM

## 2019-01-01 DIAGNOSIS — D68.9 COAGULOPATHY (HCC): ICD-10-CM

## 2019-01-01 DIAGNOSIS — R29.898 RIGHT LEG WEAKNESS: ICD-10-CM

## 2019-01-01 DIAGNOSIS — R53.83 OTHER FATIGUE: ICD-10-CM

## 2019-01-01 DIAGNOSIS — K62.5 RECTAL BLEEDING: ICD-10-CM

## 2019-01-01 DIAGNOSIS — I63.9 CEREBROVASCULAR ACCIDENT (CVA), UNSPECIFIED MECHANISM (HCC): ICD-10-CM

## 2019-01-01 DIAGNOSIS — Z01.810 PRE-OPERATIVE CARDIOVASCULAR EXAMINATION, ICD IN PLACE: Primary | ICD-10-CM

## 2019-01-01 LAB
ABO + RH BLD: NORMAL
ALBUMIN SERPL-MCNC: 2.2 G/DL (ref 3.5–5)
ALBUMIN SERPL-MCNC: 2.3 G/DL (ref 3.5–5)
ALBUMIN SERPL-MCNC: 2.5 G/DL (ref 3.5–5)
ALBUMIN SERPL-MCNC: 2.5 G/DL (ref 3.5–5)
ALBUMIN SERPL-MCNC: 2.9 G/DL (ref 3.5–5)
ALBUMIN SERPL-MCNC: 3.3 G/DL (ref 3.5–5)
ALBUMIN/GLOB SERPL: 0.6 {RATIO} (ref 1.1–2.2)
ALBUMIN/GLOB SERPL: 0.8 {RATIO} (ref 1.1–2.2)
ALBUMIN/GLOB SERPL: 0.8 {RATIO} (ref 1.1–2.2)
ALBUMIN/GLOB SERPL: 0.9 {RATIO} (ref 1.1–2.2)
ALP SERPL-CCNC: 55 U/L (ref 45–117)
ALP SERPL-CCNC: 60 U/L (ref 45–117)
ALP SERPL-CCNC: 64 U/L (ref 45–117)
ALP SERPL-CCNC: 64 U/L (ref 45–117)
ALP SERPL-CCNC: 74 U/L (ref 45–117)
ALP SERPL-CCNC: 76 U/L (ref 45–117)
ALT SERPL-CCNC: 11 U/L (ref 12–78)
ALT SERPL-CCNC: 11 U/L (ref 12–78)
ALT SERPL-CCNC: 14 U/L (ref 12–78)
ALT SERPL-CCNC: 14 U/L (ref 12–78)
ALT SERPL-CCNC: <6 U/L (ref 12–78)
ALT SERPL-CCNC: <6 U/L (ref 12–78)
ANION GAP SERPL CALC-SCNC: 11 MMOL/L (ref 5–15)
ANION GAP SERPL CALC-SCNC: 4 MMOL/L (ref 5–15)
ANION GAP SERPL CALC-SCNC: 6 MMOL/L (ref 5–15)
ANION GAP SERPL CALC-SCNC: 7 MMOL/L (ref 5–15)
ANION GAP SERPL CALC-SCNC: 7 MMOL/L (ref 5–15)
ANION GAP SERPL CALC-SCNC: 8 MMOL/L (ref 5–15)
ANION GAP SERPL CALC-SCNC: 9 MMOL/L (ref 5–15)
ANION GAP SERPL CALC-SCNC: 9 MMOL/L (ref 5–15)
APPEARANCE UR: ABNORMAL
APTT PPP: 23.6 SEC (ref 22.1–32)
APTT PPP: 31.3 SEC (ref 22.1–32)
AST SERPL-CCNC: 18 U/L (ref 15–37)
AST SERPL-CCNC: 21 U/L (ref 15–37)
AST SERPL-CCNC: 23 U/L (ref 15–37)
AST SERPL-CCNC: 25 U/L (ref 15–37)
AST SERPL-CCNC: 27 U/L (ref 15–37)
AST SERPL-CCNC: 30 U/L (ref 15–37)
ATRIAL RATE: 100 BPM
ATRIAL RATE: 84 BPM
ATRIAL RATE: 95 BPM
BACTERIA URNS QL MICRO: ABNORMAL /HPF
BASOPHILS # BLD: 0 K/UL (ref 0–0.1)
BASOPHILS NFR BLD: 0 % (ref 0–1)
BILIRUB SERPL-MCNC: 0.3 MG/DL (ref 0.2–1)
BILIRUB SERPL-MCNC: 0.4 MG/DL (ref 0.2–1)
BILIRUB UR QL: NEGATIVE
BLD PROD TYP BPU: NORMAL
BLOOD GROUP ANTIBODIES SERPL: NORMAL
BPU ID: NORMAL
BUN SERPL-MCNC: 10 MG/DL (ref 6–20)
BUN SERPL-MCNC: 11 MG/DL (ref 6–20)
BUN SERPL-MCNC: 12 MG/DL (ref 6–20)
BUN SERPL-MCNC: 12 MG/DL (ref 6–20)
BUN SERPL-MCNC: 13 MG/DL (ref 6–20)
BUN SERPL-MCNC: 16 MG/DL (ref 6–20)
BUN SERPL-MCNC: 16 MG/DL (ref 6–20)
BUN SERPL-MCNC: 18 MG/DL (ref 6–20)
BUN SERPL-MCNC: 19 MG/DL (ref 6–20)
BUN SERPL-MCNC: 19 MG/DL (ref 6–20)
BUN SERPL-MCNC: 20 MG/DL (ref 6–20)
BUN SERPL-MCNC: 6 MG/DL (ref 6–20)
BUN SERPL-MCNC: 6 MG/DL (ref 6–20)
BUN SERPL-MCNC: 9 MG/DL (ref 6–20)
BUN/CREAT SERPL: 13 (ref 12–20)
BUN/CREAT SERPL: 14 (ref 12–20)
BUN/CREAT SERPL: 16 (ref 12–20)
BUN/CREAT SERPL: 16 (ref 12–20)
BUN/CREAT SERPL: 18 (ref 12–20)
BUN/CREAT SERPL: 20 (ref 12–20)
BUN/CREAT SERPL: 21 (ref 12–20)
BUN/CREAT SERPL: 21 (ref 12–20)
BUN/CREAT SERPL: 23 (ref 12–20)
BUN/CREAT SERPL: 24 (ref 12–20)
BUN/CREAT SERPL: 25 (ref 12–20)
BUN/CREAT SERPL: 26 (ref 12–20)
BUN/CREAT SERPL: 7 (ref 12–20)
BUN/CREAT SERPL: 9 (ref 12–20)
CALCIUM SERPL-MCNC: 7.9 MG/DL (ref 8.5–10.1)
CALCIUM SERPL-MCNC: 8.2 MG/DL (ref 8.5–10.1)
CALCIUM SERPL-MCNC: 8.2 MG/DL (ref 8.5–10.1)
CALCIUM SERPL-MCNC: 8.3 MG/DL (ref 8.5–10.1)
CALCIUM SERPL-MCNC: 8.3 MG/DL (ref 8.5–10.1)
CALCIUM SERPL-MCNC: 8.4 MG/DL (ref 8.5–10.1)
CALCIUM SERPL-MCNC: 8.4 MG/DL (ref 8.5–10.1)
CALCIUM SERPL-MCNC: 8.5 MG/DL (ref 8.5–10.1)
CALCIUM SERPL-MCNC: 8.6 MG/DL (ref 8.5–10.1)
CALCIUM SERPL-MCNC: 8.6 MG/DL (ref 8.5–10.1)
CALCIUM SERPL-MCNC: 8.7 MG/DL (ref 8.5–10.1)
CALCIUM SERPL-MCNC: 8.7 MG/DL (ref 8.5–10.1)
CALCIUM SERPL-MCNC: 8.8 MG/DL (ref 8.5–10.1)
CALCIUM SERPL-MCNC: 9 MG/DL (ref 8.5–10.1)
CALCULATED P AXIS, ECG09: 42 DEGREES
CALCULATED P AXIS, ECG09: 52 DEGREES
CALCULATED R AXIS, ECG10: -32 DEGREES
CALCULATED R AXIS, ECG10: -41 DEGREES
CALCULATED R AXIS, ECG10: -48 DEGREES
CALCULATED T AXIS, ECG11: -5 DEGREES
CALCULATED T AXIS, ECG11: 108 DEGREES
CALCULATED T AXIS, ECG11: 6 DEGREES
CHLORIDE SERPL-SCNC: 101 MMOL/L (ref 97–108)
CHLORIDE SERPL-SCNC: 103 MMOL/L (ref 97–108)
CHLORIDE SERPL-SCNC: 105 MMOL/L (ref 97–108)
CHLORIDE SERPL-SCNC: 107 MMOL/L (ref 97–108)
CHLORIDE SERPL-SCNC: 110 MMOL/L (ref 97–108)
CHLORIDE SERPL-SCNC: 111 MMOL/L (ref 97–108)
CHLORIDE SERPL-SCNC: 112 MMOL/L (ref 97–108)
CHLORIDE SERPL-SCNC: 112 MMOL/L (ref 97–108)
CHLORIDE SERPL-SCNC: 115 MMOL/L (ref 97–108)
CHLORIDE SERPL-SCNC: 99 MMOL/L (ref 97–108)
CHOLEST SERPL-MCNC: 132 MG/DL
CO2 SERPL-SCNC: 20 MMOL/L (ref 21–32)
CO2 SERPL-SCNC: 21 MMOL/L (ref 21–32)
CO2 SERPL-SCNC: 23 MMOL/L (ref 21–32)
CO2 SERPL-SCNC: 25 MMOL/L (ref 21–32)
CO2 SERPL-SCNC: 25 MMOL/L (ref 21–32)
CO2 SERPL-SCNC: 26 MMOL/L (ref 21–32)
CO2 SERPL-SCNC: 26 MMOL/L (ref 21–32)
CO2 SERPL-SCNC: 27 MMOL/L (ref 21–32)
CO2 SERPL-SCNC: 28 MMOL/L (ref 21–32)
CO2 SERPL-SCNC: 29 MMOL/L (ref 21–32)
CO2 SERPL-SCNC: 30 MMOL/L (ref 21–32)
CO2 SERPL-SCNC: 33 MMOL/L (ref 21–32)
COLOR UR: ABNORMAL
COMMENT, HOLDF: NORMAL
CREAT SERPL-MCNC: 0.62 MG/DL (ref 0.55–1.02)
CREAT SERPL-MCNC: 0.66 MG/DL (ref 0.55–1.02)
CREAT SERPL-MCNC: 0.66 MG/DL (ref 0.55–1.02)
CREAT SERPL-MCNC: 0.67 MG/DL (ref 0.55–1.02)
CREAT SERPL-MCNC: 0.72 MG/DL (ref 0.55–1.02)
CREAT SERPL-MCNC: 0.73 MG/DL (ref 0.55–1.02)
CREAT SERPL-MCNC: 0.74 MG/DL (ref 0.55–1.02)
CREAT SERPL-MCNC: 0.74 MG/DL (ref 0.55–1.02)
CREAT SERPL-MCNC: 0.78 MG/DL (ref 0.55–1.02)
CREAT SERPL-MCNC: 0.84 MG/DL (ref 0.55–1.02)
CREAT SERPL-MCNC: 0.88 MG/DL (ref 0.55–1.02)
CREAT SERPL-MCNC: 0.95 MG/DL (ref 0.55–1.02)
CROSSMATCH RESULT,%XM: NORMAL
DIAGNOSIS, 93000: NORMAL
DIFFERENTIAL METHOD BLD: ABNORMAL
EOSINOPHIL # BLD: 0 K/UL (ref 0–0.4)
EOSINOPHIL # BLD: 0.1 K/UL (ref 0–0.4)
EOSINOPHIL NFR BLD: 0 % (ref 0–7)
EOSINOPHIL NFR BLD: 1 % (ref 0–7)
EOSINOPHIL NFR BLD: 2 % (ref 0–7)
EPITH CASTS URNS QL MICRO: ABNORMAL /LPF
ERYTHROCYTE [DISTWIDTH] IN BLOOD BY AUTOMATED COUNT: 13.5 % (ref 11.5–14.5)
ERYTHROCYTE [DISTWIDTH] IN BLOOD BY AUTOMATED COUNT: 13.7 % (ref 11.5–14.5)
ERYTHROCYTE [DISTWIDTH] IN BLOOD BY AUTOMATED COUNT: 13.7 % (ref 11.5–14.5)
ERYTHROCYTE [DISTWIDTH] IN BLOOD BY AUTOMATED COUNT: 13.9 % (ref 11.5–14.5)
ERYTHROCYTE [DISTWIDTH] IN BLOOD BY AUTOMATED COUNT: 14.1 % (ref 11.5–14.5)
ERYTHROCYTE [DISTWIDTH] IN BLOOD BY AUTOMATED COUNT: 14.2 % (ref 11.5–14.5)
ERYTHROCYTE [DISTWIDTH] IN BLOOD BY AUTOMATED COUNT: 14.3 % (ref 11.5–14.5)
ERYTHROCYTE [DISTWIDTH] IN BLOOD BY AUTOMATED COUNT: 14.3 % (ref 11.5–14.5)
ERYTHROCYTE [DISTWIDTH] IN BLOOD BY AUTOMATED COUNT: 14.5 % (ref 11.5–14.5)
ERYTHROCYTE [DISTWIDTH] IN BLOOD BY AUTOMATED COUNT: 14.6 % (ref 11.5–14.5)
ERYTHROCYTE [DISTWIDTH] IN BLOOD BY AUTOMATED COUNT: 14.7 % (ref 11.5–14.5)
ERYTHROCYTE [DISTWIDTH] IN BLOOD BY AUTOMATED COUNT: 14.8 % (ref 11.5–14.5)
EST. AVERAGE GLUCOSE BLD GHB EST-MCNC: 111 MG/DL
FERRITIN SERPL-MCNC: 394 NG/ML (ref 8–252)
FERRITIN SERPL-MCNC: 634 NG/ML (ref 8–252)
FOLATE SERPL-MCNC: 14.4 NG/ML (ref 5–21)
GLOBULIN SER CALC-MCNC: 3.1 G/DL (ref 2–4)
GLOBULIN SER CALC-MCNC: 3.1 G/DL (ref 2–4)
GLOBULIN SER CALC-MCNC: 3.6 G/DL (ref 2–4)
GLOBULIN SER CALC-MCNC: 3.7 G/DL (ref 2–4)
GLOBULIN SER CALC-MCNC: 4 G/DL (ref 2–4)
GLOBULIN SER CALC-MCNC: 4.5 G/DL (ref 2–4)
GLUCOSE BLD STRIP.AUTO-MCNC: 102 MG/DL (ref 65–100)
GLUCOSE BLD STRIP.AUTO-MCNC: 104 MG/DL (ref 65–100)
GLUCOSE BLD STRIP.AUTO-MCNC: 105 MG/DL (ref 65–100)
GLUCOSE BLD STRIP.AUTO-MCNC: 109 MG/DL (ref 65–100)
GLUCOSE BLD STRIP.AUTO-MCNC: 109 MG/DL (ref 65–100)
GLUCOSE BLD STRIP.AUTO-MCNC: 111 MG/DL (ref 65–100)
GLUCOSE BLD STRIP.AUTO-MCNC: 111 MG/DL (ref 65–100)
GLUCOSE BLD STRIP.AUTO-MCNC: 113 MG/DL (ref 65–100)
GLUCOSE BLD STRIP.AUTO-MCNC: 115 MG/DL (ref 65–100)
GLUCOSE BLD STRIP.AUTO-MCNC: 117 MG/DL (ref 65–100)
GLUCOSE BLD STRIP.AUTO-MCNC: 118 MG/DL (ref 65–100)
GLUCOSE BLD STRIP.AUTO-MCNC: 121 MG/DL (ref 65–100)
GLUCOSE BLD STRIP.AUTO-MCNC: 122 MG/DL (ref 65–100)
GLUCOSE BLD STRIP.AUTO-MCNC: 124 MG/DL (ref 65–100)
GLUCOSE BLD STRIP.AUTO-MCNC: 127 MG/DL (ref 65–100)
GLUCOSE BLD STRIP.AUTO-MCNC: 129 MG/DL (ref 65–100)
GLUCOSE BLD STRIP.AUTO-MCNC: 131 MG/DL (ref 65–100)
GLUCOSE BLD STRIP.AUTO-MCNC: 133 MG/DL (ref 65–100)
GLUCOSE BLD STRIP.AUTO-MCNC: 134 MG/DL (ref 65–100)
GLUCOSE BLD STRIP.AUTO-MCNC: 137 MG/DL (ref 65–100)
GLUCOSE BLD STRIP.AUTO-MCNC: 139 MG/DL (ref 65–100)
GLUCOSE BLD STRIP.AUTO-MCNC: 140 MG/DL (ref 65–100)
GLUCOSE BLD STRIP.AUTO-MCNC: 141 MG/DL (ref 65–100)
GLUCOSE BLD STRIP.AUTO-MCNC: 141 MG/DL (ref 65–100)
GLUCOSE BLD STRIP.AUTO-MCNC: 143 MG/DL (ref 65–100)
GLUCOSE BLD STRIP.AUTO-MCNC: 145 MG/DL (ref 65–100)
GLUCOSE BLD STRIP.AUTO-MCNC: 150 MG/DL (ref 65–100)
GLUCOSE BLD STRIP.AUTO-MCNC: 150 MG/DL (ref 65–100)
GLUCOSE BLD STRIP.AUTO-MCNC: 151 MG/DL (ref 65–100)
GLUCOSE BLD STRIP.AUTO-MCNC: 152 MG/DL (ref 65–100)
GLUCOSE BLD STRIP.AUTO-MCNC: 157 MG/DL (ref 65–100)
GLUCOSE BLD STRIP.AUTO-MCNC: 159 MG/DL (ref 65–100)
GLUCOSE BLD STRIP.AUTO-MCNC: 160 MG/DL (ref 65–100)
GLUCOSE BLD STRIP.AUTO-MCNC: 161 MG/DL (ref 65–100)
GLUCOSE BLD STRIP.AUTO-MCNC: 163 MG/DL (ref 65–100)
GLUCOSE BLD STRIP.AUTO-MCNC: 165 MG/DL (ref 65–100)
GLUCOSE BLD STRIP.AUTO-MCNC: 168 MG/DL (ref 65–100)
GLUCOSE BLD STRIP.AUTO-MCNC: 174 MG/DL (ref 65–100)
GLUCOSE BLD STRIP.AUTO-MCNC: 177 MG/DL (ref 65–100)
GLUCOSE BLD STRIP.AUTO-MCNC: 185 MG/DL (ref 65–100)
GLUCOSE BLD STRIP.AUTO-MCNC: 188 MG/DL (ref 65–100)
GLUCOSE BLD STRIP.AUTO-MCNC: 189 MG/DL (ref 65–100)
GLUCOSE BLD STRIP.AUTO-MCNC: 198 MG/DL (ref 65–100)
GLUCOSE BLD STRIP.AUTO-MCNC: 223 MG/DL (ref 65–100)
GLUCOSE BLD STRIP.AUTO-MCNC: 225 MG/DL (ref 65–100)
GLUCOSE BLD STRIP.AUTO-MCNC: 300 MG/DL (ref 65–100)
GLUCOSE BLD STRIP.AUTO-MCNC: 87 MG/DL (ref 65–100)
GLUCOSE BLD STRIP.AUTO-MCNC: 92 MG/DL (ref 65–100)
GLUCOSE BLD STRIP.AUTO-MCNC: 93 MG/DL (ref 65–100)
GLUCOSE BLD STRIP.AUTO-MCNC: 96 MG/DL (ref 65–100)
GLUCOSE BLD STRIP.AUTO-MCNC: 98 MG/DL (ref 65–100)
GLUCOSE BLD STRIP.AUTO-MCNC: 99 MG/DL (ref 65–100)
GLUCOSE SERPL-MCNC: 101 MG/DL (ref 65–100)
GLUCOSE SERPL-MCNC: 107 MG/DL (ref 65–100)
GLUCOSE SERPL-MCNC: 114 MG/DL (ref 65–100)
GLUCOSE SERPL-MCNC: 127 MG/DL (ref 65–100)
GLUCOSE SERPL-MCNC: 134 MG/DL (ref 65–100)
GLUCOSE SERPL-MCNC: 149 MG/DL (ref 65–100)
GLUCOSE SERPL-MCNC: 151 MG/DL (ref 65–100)
GLUCOSE SERPL-MCNC: 176 MG/DL (ref 65–100)
GLUCOSE SERPL-MCNC: 205 MG/DL (ref 65–100)
GLUCOSE SERPL-MCNC: 214 MG/DL (ref 65–100)
GLUCOSE SERPL-MCNC: 226 MG/DL (ref 65–100)
GLUCOSE SERPL-MCNC: 80 MG/DL (ref 65–100)
GLUCOSE SERPL-MCNC: 93 MG/DL (ref 65–100)
GLUCOSE SERPL-MCNC: 95 MG/DL (ref 65–100)
GLUCOSE UR STRIP.AUTO-MCNC: NEGATIVE MG/DL
HBA1C MFR BLD: 5.5 % (ref 4.2–6.3)
HCT VFR BLD AUTO: 18.5 % (ref 35–47)
HCT VFR BLD AUTO: 21.3 % (ref 35–47)
HCT VFR BLD AUTO: 23.9 % (ref 35–47)
HCT VFR BLD AUTO: 24 % (ref 35–47)
HCT VFR BLD AUTO: 24.8 % (ref 35–47)
HCT VFR BLD AUTO: 25 % (ref 35–47)
HCT VFR BLD AUTO: 25.1 % (ref 35–47)
HCT VFR BLD AUTO: 25.2 % (ref 35–47)
HCT VFR BLD AUTO: 25.3 % (ref 35–47)
HCT VFR BLD AUTO: 25.6 % (ref 35–47)
HCT VFR BLD AUTO: 26.4 % (ref 35–47)
HCT VFR BLD AUTO: 27 % (ref 35–47)
HCT VFR BLD AUTO: 27.1 % (ref 35–47)
HCT VFR BLD AUTO: 27.2 % (ref 35–47)
HCT VFR BLD AUTO: 27.5 % (ref 35–47)
HCT VFR BLD AUTO: 28.4 % (ref 35–47)
HCT VFR BLD AUTO: 29.4 % (ref 35–47)
HCT VFR BLD AUTO: 33.3 % (ref 35–47)
HDLC SERPL-MCNC: 73 MG/DL
HDLC SERPL: 1.8 {RATIO} (ref 0–5)
HEMOCCULT STL QL: POSITIVE
HGB BLD-MCNC: 10.3 G/DL (ref 11.5–16)
HGB BLD-MCNC: 6 G/DL (ref 11.5–16)
HGB BLD-MCNC: 6.8 G/DL (ref 11.5–16)
HGB BLD-MCNC: 7.3 G/DL (ref 11.5–16)
HGB BLD-MCNC: 7.6 G/DL (ref 11.5–16)
HGB BLD-MCNC: 8 G/DL (ref 11.5–16)
HGB BLD-MCNC: 8.1 G/DL (ref 11.5–16)
HGB BLD-MCNC: 8.1 G/DL (ref 11.5–16)
HGB BLD-MCNC: 8.2 G/DL (ref 11.5–16)
HGB BLD-MCNC: 8.3 G/DL (ref 11.5–16)
HGB BLD-MCNC: 8.4 G/DL (ref 11.5–16)
HGB BLD-MCNC: 8.6 G/DL (ref 11.5–16)
HGB BLD-MCNC: 8.8 G/DL (ref 11.5–16)
HGB BLD-MCNC: 8.8 G/DL (ref 11.5–16)
HGB BLD-MCNC: 8.9 G/DL (ref 11.5–16)
HGB BLD-MCNC: 9.3 G/DL (ref 11.5–16)
HGB UR QL STRIP: NEGATIVE
IMM GRANULOCYTES # BLD AUTO: 0 K/UL (ref 0–0.04)
IMM GRANULOCYTES # BLD AUTO: 0.1 K/UL (ref 0–0.04)
IMM GRANULOCYTES # BLD: 0 K/UL (ref 0–0.04)
IMM GRANULOCYTES NFR BLD AUTO: 0 % (ref 0–0.5)
IMM GRANULOCYTES NFR BLD AUTO: 0 % (ref 0–0.5)
IMM GRANULOCYTES NFR BLD AUTO: 1 % (ref 0–0.5)
INR PPP: 1 (ref 0.9–1.1)
INR PPP: 1.1 (ref 0.9–1.1)
IRON SATN MFR SERPL: 14 % (ref 20–50)
IRON SATN MFR SERPL: 19 % (ref 20–50)
IRON SERPL-MCNC: 25 UG/DL (ref 35–150)
IRON SERPL-MCNC: 33 UG/DL (ref 35–150)
KETONES UR QL STRIP.AUTO: NEGATIVE MG/DL
LDLC SERPL CALC-MCNC: 50.2 MG/DL (ref 0–100)
LEUKOCYTE ESTERASE UR QL STRIP.AUTO: ABNORMAL
LIPID PROFILE,FLP: NORMAL
LYMPHOCYTES # BLD: 0.7 K/UL (ref 0.8–3.5)
LYMPHOCYTES # BLD: 0.8 K/UL (ref 0.8–3.5)
LYMPHOCYTES # BLD: 0.9 K/UL (ref 0.8–3.5)
LYMPHOCYTES # BLD: 1 K/UL (ref 0.8–3.5)
LYMPHOCYTES # BLD: 1.1 K/UL (ref 0.8–3.5)
LYMPHOCYTES # BLD: 1.2 K/UL (ref 0.8–3.5)
LYMPHOCYTES # BLD: 1.2 K/UL (ref 0.8–3.5)
LYMPHOCYTES # BLD: 1.3 K/UL (ref 0.8–3.5)
LYMPHOCYTES # BLD: 1.4 K/UL (ref 0.8–3.5)
LYMPHOCYTES # BLD: 1.4 K/UL (ref 0.8–3.5)
LYMPHOCYTES # BLD: 1.6 K/UL (ref 0.8–3.5)
LYMPHOCYTES # BLD: 1.6 K/UL (ref 0.8–3.5)
LYMPHOCYTES # BLD: 1.9 K/UL (ref 0.8–3.5)
LYMPHOCYTES NFR BLD: 11 % (ref 12–49)
LYMPHOCYTES NFR BLD: 14 % (ref 12–49)
LYMPHOCYTES NFR BLD: 17 % (ref 12–49)
LYMPHOCYTES NFR BLD: 17 % (ref 12–49)
LYMPHOCYTES NFR BLD: 18 % (ref 12–49)
LYMPHOCYTES NFR BLD: 18 % (ref 12–49)
LYMPHOCYTES NFR BLD: 20 % (ref 12–49)
LYMPHOCYTES NFR BLD: 20 % (ref 12–49)
LYMPHOCYTES NFR BLD: 21 % (ref 12–49)
LYMPHOCYTES NFR BLD: 22 % (ref 12–49)
LYMPHOCYTES NFR BLD: 26 % (ref 12–49)
LYMPHOCYTES NFR BLD: 30 % (ref 12–49)
LYMPHOCYTES NFR BLD: 6 % (ref 12–49)
LYMPHOCYTES NFR BLD: 8 % (ref 12–49)
MAGNESIUM SERPL-MCNC: 1.8 MG/DL (ref 1.6–2.4)
MAGNESIUM SERPL-MCNC: 1.8 MG/DL (ref 1.6–2.4)
MAGNESIUM SERPL-MCNC: 1.9 MG/DL (ref 1.6–2.4)
MAGNESIUM SERPL-MCNC: 2 MG/DL (ref 1.6–2.4)
MCH RBC QN AUTO: 29.8 PG (ref 26–34)
MCH RBC QN AUTO: 30.2 PG (ref 26–34)
MCH RBC QN AUTO: 30.4 PG (ref 26–34)
MCH RBC QN AUTO: 30.5 PG (ref 26–34)
MCH RBC QN AUTO: 30.5 PG (ref 26–34)
MCH RBC QN AUTO: 30.6 PG (ref 26–34)
MCH RBC QN AUTO: 30.6 PG (ref 26–34)
MCH RBC QN AUTO: 30.7 PG (ref 26–34)
MCH RBC QN AUTO: 30.7 PG (ref 26–34)
MCH RBC QN AUTO: 30.9 PG (ref 26–34)
MCH RBC QN AUTO: 30.9 PG (ref 26–34)
MCH RBC QN AUTO: 31.1 PG (ref 26–34)
MCH RBC QN AUTO: 31.2 PG (ref 26–34)
MCH RBC QN AUTO: 31.2 PG (ref 26–34)
MCH RBC QN AUTO: 31.4 PG (ref 26–34)
MCH RBC QN AUTO: 31.5 PG (ref 26–34)
MCHC RBC AUTO-ENTMCNC: 29.8 G/DL (ref 30–36.5)
MCHC RBC AUTO-ENTMCNC: 30.4 G/DL (ref 30–36.5)
MCHC RBC AUTO-ENTMCNC: 30.5 G/DL (ref 30–36.5)
MCHC RBC AUTO-ENTMCNC: 30.7 G/DL (ref 30–36.5)
MCHC RBC AUTO-ENTMCNC: 30.9 G/DL (ref 30–36.5)
MCHC RBC AUTO-ENTMCNC: 31.3 G/DL (ref 30–36.5)
MCHC RBC AUTO-ENTMCNC: 31.6 G/DL (ref 30–36.5)
MCHC RBC AUTO-ENTMCNC: 31.6 G/DL (ref 30–36.5)
MCHC RBC AUTO-ENTMCNC: 31.9 G/DL (ref 30–36.5)
MCHC RBC AUTO-ENTMCNC: 32.4 G/DL (ref 30–36.5)
MCHC RBC AUTO-ENTMCNC: 32.4 G/DL (ref 30–36.5)
MCHC RBC AUTO-ENTMCNC: 32.5 G/DL (ref 30–36.5)
MCHC RBC AUTO-ENTMCNC: 32.6 G/DL (ref 30–36.5)
MCHC RBC AUTO-ENTMCNC: 32.7 G/DL (ref 30–36.5)
MCHC RBC AUTO-ENTMCNC: 32.9 G/DL (ref 30–36.5)
MCHC RBC AUTO-ENTMCNC: 33.1 G/DL (ref 30–36.5)
MCHC RBC AUTO-ENTMCNC: 33.2 G/DL (ref 30–36.5)
MCHC RBC AUTO-ENTMCNC: 33.3 G/DL (ref 30–36.5)
MCV RBC AUTO: 100 FL (ref 80–99)
MCV RBC AUTO: 100.4 FL (ref 80–99)
MCV RBC AUTO: 93.6 FL (ref 80–99)
MCV RBC AUTO: 94 FL (ref 80–99)
MCV RBC AUTO: 94.1 FL (ref 80–99)
MCV RBC AUTO: 94.4 FL (ref 80–99)
MCV RBC AUTO: 94.8 FL (ref 80–99)
MCV RBC AUTO: 95.4 FL (ref 80–99)
MCV RBC AUTO: 95.5 FL (ref 80–99)
MCV RBC AUTO: 95.7 FL (ref 80–99)
MCV RBC AUTO: 95.8 FL (ref 80–99)
MCV RBC AUTO: 96.2 FL (ref 80–99)
MCV RBC AUTO: 97 FL (ref 80–99)
MCV RBC AUTO: 97.8 FL (ref 80–99)
MCV RBC AUTO: 97.9 FL (ref 80–99)
MCV RBC AUTO: 98.5 FL (ref 80–99)
MCV RBC AUTO: 98.8 FL (ref 80–99)
MCV RBC AUTO: 99.6 FL (ref 80–99)
MONOCYTES # BLD: 0.4 K/UL (ref 0–1)
MONOCYTES # BLD: 0.5 K/UL (ref 0–1)
MONOCYTES # BLD: 0.6 K/UL (ref 0–1)
MONOCYTES # BLD: 0.6 K/UL (ref 0–1)
MONOCYTES # BLD: 0.7 K/UL (ref 0–1)
MONOCYTES # BLD: 0.8 K/UL (ref 0–1)
MONOCYTES # BLD: 0.8 K/UL (ref 0–1)
MONOCYTES # BLD: 0.9 K/UL (ref 0–1)
MONOCYTES # BLD: 1 K/UL (ref 0–1)
MONOCYTES NFR BLD: 10 % (ref 5–13)
MONOCYTES NFR BLD: 10 % (ref 5–13)
MONOCYTES NFR BLD: 11 % (ref 5–13)
MONOCYTES NFR BLD: 12 % (ref 5–13)
MONOCYTES NFR BLD: 13 % (ref 5–13)
MONOCYTES NFR BLD: 13 % (ref 5–13)
MONOCYTES NFR BLD: 7 % (ref 5–13)
MONOCYTES NFR BLD: 8 % (ref 5–13)
MONOCYTES NFR BLD: 8 % (ref 5–13)
MONOCYTES NFR BLD: 9 % (ref 5–13)
MONOCYTES NFR BLD: 9 % (ref 5–13)
NEUTS SEG # BLD: 11.9 K/UL (ref 1.8–8)
NEUTS SEG # BLD: 2.9 K/UL (ref 1.8–8)
NEUTS SEG # BLD: 3 K/UL (ref 1.8–8)
NEUTS SEG # BLD: 3.1 K/UL (ref 1.8–8)
NEUTS SEG # BLD: 3.6 K/UL (ref 1.8–8)
NEUTS SEG # BLD: 3.7 K/UL (ref 1.8–8)
NEUTS SEG # BLD: 3.9 K/UL (ref 1.8–8)
NEUTS SEG # BLD: 4 K/UL (ref 1.8–8)
NEUTS SEG # BLD: 4.1 K/UL (ref 1.8–8)
NEUTS SEG # BLD: 4.1 K/UL (ref 1.8–8)
NEUTS SEG # BLD: 4.2 K/UL (ref 1.8–8)
NEUTS SEG # BLD: 4.9 K/UL (ref 1.8–8)
NEUTS SEG # BLD: 5 K/UL (ref 1.8–8)
NEUTS SEG # BLD: 6 K/UL (ref 1.8–8)
NEUTS SEG # BLD: 6.4 K/UL (ref 1.8–8)
NEUTS SEG # BLD: 6.6 K/UL (ref 1.8–8)
NEUTS SEG # BLD: 7 K/UL (ref 1.8–8)
NEUTS SEG NFR BLD: 56 % (ref 32–75)
NEUTS SEG NFR BLD: 59 % (ref 32–75)
NEUTS SEG NFR BLD: 64 % (ref 32–75)
NEUTS SEG NFR BLD: 65 % (ref 32–75)
NEUTS SEG NFR BLD: 65 % (ref 32–75)
NEUTS SEG NFR BLD: 67 % (ref 32–75)
NEUTS SEG NFR BLD: 67 % (ref 32–75)
NEUTS SEG NFR BLD: 70 % (ref 32–75)
NEUTS SEG NFR BLD: 70 % (ref 32–75)
NEUTS SEG NFR BLD: 72 % (ref 32–75)
NEUTS SEG NFR BLD: 73 % (ref 32–75)
NEUTS SEG NFR BLD: 74 % (ref 32–75)
NEUTS SEG NFR BLD: 80 % (ref 32–75)
NEUTS SEG NFR BLD: 82 % (ref 32–75)
NEUTS SEG NFR BLD: 86 % (ref 32–75)
NITRITE UR QL STRIP.AUTO: POSITIVE
NRBC # BLD: 0 K/UL (ref 0–0.01)
NRBC BLD-RTO: 0 PER 100 WBC
P-R INTERVAL, ECG05: 164 MS
P-R INTERVAL, ECG05: 172 MS
P-R INTERVAL, ECG05: 198 MS
PH UR STRIP: 6.5 [PH] (ref 5–8)
PHOSPHATE SERPL-MCNC: 2.4 MG/DL (ref 2.6–4.7)
PHOSPHATE SERPL-MCNC: 2.6 MG/DL (ref 2.6–4.7)
PLATELET # BLD AUTO: 211 K/UL (ref 150–400)
PLATELET # BLD AUTO: 230 K/UL (ref 150–400)
PLATELET # BLD AUTO: 243 K/UL (ref 150–400)
PLATELET # BLD AUTO: 261 K/UL (ref 150–400)
PLATELET # BLD AUTO: 264 K/UL (ref 150–400)
PLATELET # BLD AUTO: 267 K/UL (ref 150–400)
PLATELET # BLD AUTO: 312 K/UL (ref 150–400)
PLATELET # BLD AUTO: 361 K/UL (ref 150–400)
PLATELET # BLD AUTO: 362 K/UL (ref 150–400)
PLATELET # BLD AUTO: 368 K/UL (ref 150–400)
PLATELET # BLD AUTO: 371 K/UL (ref 150–400)
PLATELET # BLD AUTO: 373 K/UL (ref 150–400)
PLATELET # BLD AUTO: 385 K/UL (ref 150–400)
PLATELET # BLD AUTO: 395 K/UL (ref 150–400)
PLATELET # BLD AUTO: 397 K/UL (ref 150–400)
PLATELET # BLD AUTO: 397 K/UL (ref 150–400)
PLATELET # BLD AUTO: 398 K/UL (ref 150–400)
PLATELET # BLD AUTO: 476 K/UL (ref 150–400)
PMV BLD AUTO: 10.2 FL (ref 8.9–12.9)
PMV BLD AUTO: 10.2 FL (ref 8.9–12.9)
PMV BLD AUTO: 10.3 FL (ref 8.9–12.9)
PMV BLD AUTO: 10.5 FL (ref 8.9–12.9)
PMV BLD AUTO: 10.6 FL (ref 8.9–12.9)
PMV BLD AUTO: 10.8 FL (ref 8.9–12.9)
PMV BLD AUTO: 8.6 FL (ref 8.9–12.9)
PMV BLD AUTO: 8.7 FL (ref 8.9–12.9)
PMV BLD AUTO: 8.8 FL (ref 8.9–12.9)
PMV BLD AUTO: 8.9 FL (ref 8.9–12.9)
PMV BLD AUTO: 9 FL (ref 8.9–12.9)
PMV BLD AUTO: 9.3 FL (ref 8.9–12.9)
POTASSIUM SERPL-SCNC: 2.9 MMOL/L (ref 3.5–5.1)
POTASSIUM SERPL-SCNC: 3 MMOL/L (ref 3.5–5.1)
POTASSIUM SERPL-SCNC: 3.1 MMOL/L (ref 3.5–5.1)
POTASSIUM SERPL-SCNC: 3.1 MMOL/L (ref 3.5–5.1)
POTASSIUM SERPL-SCNC: 3.2 MMOL/L (ref 3.5–5.1)
POTASSIUM SERPL-SCNC: 3.2 MMOL/L (ref 3.5–5.1)
POTASSIUM SERPL-SCNC: 3.3 MMOL/L (ref 3.5–5.1)
POTASSIUM SERPL-SCNC: 3.4 MMOL/L (ref 3.5–5.1)
POTASSIUM SERPL-SCNC: 3.6 MMOL/L (ref 3.5–5.1)
POTASSIUM SERPL-SCNC: 3.7 MMOL/L (ref 3.5–5.1)
POTASSIUM SERPL-SCNC: 4.1 MMOL/L (ref 3.5–5.1)
POTASSIUM SERPL-SCNC: 4.3 MMOL/L (ref 3.5–5.1)
PROT SERPL-MCNC: 5.6 G/DL (ref 6.4–8.2)
PROT SERPL-MCNC: 5.9 G/DL (ref 6.4–8.2)
PROT SERPL-MCNC: 5.9 G/DL (ref 6.4–8.2)
PROT SERPL-MCNC: 6 G/DL (ref 6.4–8.2)
PROT SERPL-MCNC: 7 G/DL (ref 6.4–8.2)
PROT SERPL-MCNC: 7.3 G/DL (ref 6.4–8.2)
PROT UR STRIP-MCNC: ABNORMAL MG/DL
PROTHROMBIN TIME: 10.3 SEC (ref 9–11.1)
PROTHROMBIN TIME: 11.1 SEC (ref 9–11.1)
Q-T INTERVAL, ECG07: 368 MS
Q-T INTERVAL, ECG07: 384 MS
Q-T INTERVAL, ECG07: 386 MS
QRS DURATION, ECG06: 108 MS
QRS DURATION, ECG06: 108 MS
QRS DURATION, ECG06: 124 MS
QTC CALCULATION (BEZET), ECG08: 456 MS
QTC CALCULATION (BEZET), ECG08: 474 MS
QTC CALCULATION (BEZET), ECG08: 482 MS
RBC # BLD AUTO: 1.94 M/UL (ref 3.8–5.2)
RBC # BLD AUTO: 2.23 M/UL (ref 3.8–5.2)
RBC # BLD AUTO: 2.4 M/UL (ref 3.8–5.2)
RBC # BLD AUTO: 2.49 M/UL (ref 3.8–5.2)
RBC # BLD AUTO: 2.55 M/UL (ref 3.8–5.2)
RBC # BLD AUTO: 2.65 M/UL (ref 3.8–5.2)
RBC # BLD AUTO: 2.66 M/UL (ref 3.8–5.2)
RBC # BLD AUTO: 2.67 M/UL (ref 3.8–5.2)
RBC # BLD AUTO: 2.68 M/UL (ref 3.8–5.2)
RBC # BLD AUTO: 2.75 M/UL (ref 3.8–5.2)
RBC # BLD AUTO: 2.78 M/UL (ref 3.8–5.2)
RBC # BLD AUTO: 2.82 M/UL (ref 3.8–5.2)
RBC # BLD AUTO: 2.83 M/UL (ref 3.8–5.2)
RBC # BLD AUTO: 2.9 M/UL (ref 3.8–5.2)
RBC # BLD AUTO: 3.03 M/UL (ref 3.8–5.2)
RBC # BLD AUTO: 3.37 M/UL (ref 3.8–5.2)
RBC #/AREA URNS HPF: ABNORMAL /HPF (ref 0–5)
SAMPLES BEING HELD,HOLD: NORMAL
SERVICE CMNT-IMP: ABNORMAL
SERVICE CMNT-IMP: NORMAL
SODIUM SERPL-SCNC: 136 MMOL/L (ref 136–145)
SODIUM SERPL-SCNC: 137 MMOL/L (ref 136–145)
SODIUM SERPL-SCNC: 137 MMOL/L (ref 136–145)
SODIUM SERPL-SCNC: 138 MMOL/L (ref 136–145)
SODIUM SERPL-SCNC: 140 MMOL/L (ref 136–145)
SODIUM SERPL-SCNC: 141 MMOL/L (ref 136–145)
SODIUM SERPL-SCNC: 142 MMOL/L (ref 136–145)
SODIUM SERPL-SCNC: 144 MMOL/L (ref 136–145)
SODIUM SERPL-SCNC: 145 MMOL/L (ref 136–145)
SP GR UR REFRACTOMETRY: 1.01 (ref 1–1.03)
SPECIMEN EXP DATE BLD: NORMAL
STATUS OF UNIT,%ST: NORMAL
THERAPEUTIC RANGE,PTTT: NORMAL SECS (ref 58–77)
THERAPEUTIC RANGE,PTTT: NORMAL SECS (ref 58–77)
TIBC SERPL-MCNC: 178 UG/DL (ref 250–450)
TIBC SERPL-MCNC: 179 UG/DL (ref 250–450)
TRIGL SERPL-MCNC: 44 MG/DL (ref ?–150)
TROPONIN I SERPL-MCNC: 0.37 NG/ML
TROPONIN I SERPL-MCNC: 0.51 NG/ML
TROPONIN I SERPL-MCNC: 0.76 NG/ML
UNIT DIVISION, %UDIV: 0
UROBILINOGEN UR QL STRIP.AUTO: 1 EU/DL (ref 0.2–1)
VENTRICULAR RATE, ECG03: 100 BPM
VENTRICULAR RATE, ECG03: 84 BPM
VENTRICULAR RATE, ECG03: 95 BPM
VIT B12 SERPL-MCNC: 1876 PG/ML (ref 193–986)
VLDLC SERPL CALC-MCNC: 8.8 MG/DL
WBC # BLD AUTO: 13.8 K/UL (ref 3.6–11)
WBC # BLD AUTO: 15.7 K/UL (ref 3.6–11)
WBC # BLD AUTO: 4.5 K/UL (ref 3.6–11)
WBC # BLD AUTO: 5.3 K/UL (ref 3.6–11)
WBC # BLD AUTO: 5.3 K/UL (ref 3.6–11)
WBC # BLD AUTO: 5.6 K/UL (ref 3.6–11)
WBC # BLD AUTO: 5.6 K/UL (ref 3.6–11)
WBC # BLD AUTO: 5.7 K/UL (ref 3.6–11)
WBC # BLD AUTO: 5.9 K/UL (ref 3.6–11)
WBC # BLD AUTO: 6 K/UL (ref 3.6–11)
WBC # BLD AUTO: 6.1 K/UL (ref 3.6–11)
WBC # BLD AUTO: 6.4 K/UL (ref 3.6–11)
WBC # BLD AUTO: 6.8 K/UL (ref 3.6–11)
WBC # BLD AUTO: 7.4 K/UL (ref 3.6–11)
WBC # BLD AUTO: 8.2 K/UL (ref 3.6–11)
WBC # BLD AUTO: 8.2 K/UL (ref 3.6–11)
WBC # BLD AUTO: 8.5 K/UL (ref 3.6–11)
WBC # BLD AUTO: 9.2 K/UL (ref 3.6–11)
WBC URNS QL MICRO: ABNORMAL /HPF (ref 0–4)

## 2019-01-01 PROCEDURE — 74011250637 HC RX REV CODE- 250/637: Performed by: NURSE PRACTITIONER

## 2019-01-01 PROCEDURE — 51798 US URINE CAPACITY MEASURE: CPT

## 2019-01-01 PROCEDURE — 74011000258 HC RX REV CODE- 258: Performed by: FAMILY MEDICINE

## 2019-01-01 PROCEDURE — 82962 GLUCOSE BLOOD TEST: CPT

## 2019-01-01 PROCEDURE — 74011250637 HC RX REV CODE- 250/637: Performed by: HOSPITALIST

## 2019-01-01 PROCEDURE — 80048 BASIC METABOLIC PNL TOTAL CA: CPT

## 2019-01-01 PROCEDURE — 77030038269 HC DRN EXT URIN PURWCK BARD -A

## 2019-01-01 PROCEDURE — 92526 ORAL FUNCTION THERAPY: CPT | Performed by: SPEECH-LANGUAGE PATHOLOGIST

## 2019-01-01 PROCEDURE — 85027 COMPLETE CBC AUTOMATED: CPT

## 2019-01-01 PROCEDURE — 80053 COMPREHEN METABOLIC PANEL: CPT

## 2019-01-01 PROCEDURE — 97165 OT EVAL LOW COMPLEX 30 MIN: CPT

## 2019-01-01 PROCEDURE — 74011636637 HC RX REV CODE- 636/637: Performed by: HOSPITALIST

## 2019-01-01 PROCEDURE — 74011250636 HC RX REV CODE- 250/636: Performed by: HOSPITALIST

## 2019-01-01 PROCEDURE — 84100 ASSAY OF PHOSPHORUS: CPT

## 2019-01-01 PROCEDURE — 97112 NEUROMUSCULAR REEDUCATION: CPT

## 2019-01-01 PROCEDURE — 74011250637 HC RX REV CODE- 250/637: Performed by: PSYCHIATRY & NEUROLOGY

## 2019-01-01 PROCEDURE — C1769 GUIDE WIRE: HCPCS

## 2019-01-01 PROCEDURE — 74011250636 HC RX REV CODE- 250/636: Performed by: NURSE PRACTITIONER

## 2019-01-01 PROCEDURE — 74011250637 HC RX REV CODE- 250/637: Performed by: INTERNAL MEDICINE

## 2019-01-01 PROCEDURE — 82607 VITAMIN B-12: CPT

## 2019-01-01 PROCEDURE — 82272 OCCULT BLD FECES 1-3 TESTS: CPT

## 2019-01-01 PROCEDURE — 85025 COMPLETE CBC W/AUTO DIFF WBC: CPT

## 2019-01-01 PROCEDURE — 65660000000 HC RM CCU STEPDOWN

## 2019-01-01 PROCEDURE — 77030005402 HC CATH RAD ART LN KT TELE -B

## 2019-01-01 PROCEDURE — 77030019563 HC DEV ATTCH FEED HOLL -A

## 2019-01-01 PROCEDURE — 70450 CT HEAD/BRAIN W/O DYE: CPT

## 2019-01-01 PROCEDURE — 74011000250 HC RX REV CODE- 250: Performed by: NURSE ANESTHETIST, CERTIFIED REGISTERED

## 2019-01-01 PROCEDURE — 77010033678 HC OXYGEN DAILY

## 2019-01-01 PROCEDURE — 82728 ASSAY OF FERRITIN: CPT

## 2019-01-01 PROCEDURE — 99152 MOD SED SAME PHYS/QHP 5/>YRS: CPT

## 2019-01-01 PROCEDURE — 97110 THERAPEUTIC EXERCISES: CPT

## 2019-01-01 PROCEDURE — 36415 COLL VENOUS BLD VENIPUNCTURE: CPT

## 2019-01-01 PROCEDURE — C1894 INTRO/SHEATH, NON-LASER: HCPCS

## 2019-01-01 PROCEDURE — 74011636637 HC RX REV CODE- 636/637: Performed by: FAMILY MEDICINE

## 2019-01-01 PROCEDURE — 74011250636 HC RX REV CODE- 250/636: Performed by: FAMILY MEDICINE

## 2019-01-01 PROCEDURE — 74011250636 HC RX REV CODE- 250/636: Performed by: INTERNAL MEDICINE

## 2019-01-01 PROCEDURE — 97535 SELF CARE MNGMENT TRAINING: CPT

## 2019-01-01 PROCEDURE — 77030013169 SET IV BLD ICUM -A

## 2019-01-01 PROCEDURE — 83735 ASSAY OF MAGNESIUM: CPT

## 2019-01-01 PROCEDURE — 77030008771 HC TU NG SALEM SUMP -A

## 2019-01-01 PROCEDURE — 97530 THERAPEUTIC ACTIVITIES: CPT

## 2019-01-01 PROCEDURE — 92610 EVALUATE SWALLOWING FUNCTION: CPT

## 2019-01-01 PROCEDURE — 74177 CT ABD & PELVIS W/CONTRAST: CPT

## 2019-01-01 PROCEDURE — 74011250636 HC RX REV CODE- 250/636

## 2019-01-01 PROCEDURE — 0JH606Z INSERTION OF PACEMAKER, DUAL CHAMBER INTO CHEST SUBCUTANEOUS TISSUE AND FASCIA, OPEN APPROACH: ICD-10-PCS | Performed by: INTERNAL MEDICINE

## 2019-01-01 PROCEDURE — 30233N1 TRANSFUSION OF NONAUTOLOGOUS RED BLOOD CELLS INTO PERIPHERAL VEIN, PERCUTANEOUS APPROACH: ICD-10-PCS | Performed by: EMERGENCY MEDICINE

## 2019-01-01 PROCEDURE — C1757 CATH, THROMBECTOMY/EMBOLECT: HCPCS

## 2019-01-01 PROCEDURE — 93306 TTE W/DOPPLER COMPLETE: CPT

## 2019-01-01 PROCEDURE — 77030034850

## 2019-01-01 PROCEDURE — 82746 ASSAY OF FOLIC ACID SERUM: CPT

## 2019-01-01 PROCEDURE — 85610 PROTHROMBIN TIME: CPT

## 2019-01-01 PROCEDURE — 93005 ELECTROCARDIOGRAM TRACING: CPT

## 2019-01-01 PROCEDURE — C1785 PMKR, DUAL, RATE-RESP: HCPCS

## 2019-01-01 PROCEDURE — A4565 SLINGS: HCPCS

## 2019-01-01 PROCEDURE — 74011000250 HC RX REV CODE- 250: Performed by: RADIOLOGY

## 2019-01-01 PROCEDURE — C1887 CATHETER, GUIDING: HCPCS

## 2019-01-01 PROCEDURE — 77030008638 HC TU CONN COOK -A

## 2019-01-01 PROCEDURE — 74011250636 HC RX REV CODE- 250/636: Performed by: EMERGENCY MEDICINE

## 2019-01-01 PROCEDURE — 65610000006 HC RM INTENSIVE CARE

## 2019-01-01 PROCEDURE — 74011000258 HC RX REV CODE- 258: Performed by: EMERGENCY MEDICINE

## 2019-01-01 PROCEDURE — 99285 EMERGENCY DEPT VISIT HI MDM: CPT

## 2019-01-01 PROCEDURE — 77030032490 HC SLV COMPR SCD KNE COVD -B

## 2019-01-01 PROCEDURE — 74011250636 HC RX REV CODE- 250/636: Performed by: SPECIALIST

## 2019-01-01 PROCEDURE — 93270 REMOTE 30 DAY ECG REV/REPORT: CPT | Performed by: NURSE PRACTITIONER

## 2019-01-01 PROCEDURE — 86923 COMPATIBILITY TEST ELECTRIC: CPT

## 2019-01-01 PROCEDURE — 83036 HEMOGLOBIN GLYCOSYLATED A1C: CPT

## 2019-01-01 PROCEDURE — 80061 LIPID PANEL: CPT

## 2019-01-01 PROCEDURE — 74011000250 HC RX REV CODE- 250: Performed by: HOSPITALIST

## 2019-01-01 PROCEDURE — P9016 RBC LEUKOCYTES REDUCED: HCPCS

## 2019-01-01 PROCEDURE — 83540 ASSAY OF IRON: CPT

## 2019-01-01 PROCEDURE — 92610 EVALUATE SWALLOWING FUNCTION: CPT | Performed by: SPEECH-LANGUAGE PATHOLOGIST

## 2019-01-01 PROCEDURE — 3E0102A INTRODUCTION OF ANTI-INFECTIVE ENVELOPE INTO SUBCUTANEOUS TISSUE, OPEN APPROACH: ICD-10-PCS | Performed by: INTERNAL MEDICINE

## 2019-01-01 PROCEDURE — 86900 BLOOD TYPING SEROLOGIC ABO: CPT

## 2019-01-01 PROCEDURE — 77030005518 HC CATH URETH FOL 2W BARD -B

## 2019-01-01 PROCEDURE — 71045 X-RAY EXAM CHEST 1 VIEW: CPT

## 2019-01-01 PROCEDURE — C1893 INTRO/SHEATH, FIXED,NON-PEEL: HCPCS

## 2019-01-01 PROCEDURE — 94762 N-INVAS EAR/PLS OXIMTRY CONT: CPT

## 2019-01-01 PROCEDURE — 74011000272 HC RX REV CODE- 272: Performed by: INTERNAL MEDICINE

## 2019-01-01 PROCEDURE — 0042T CT CODE NEURO PERF W CBF: CPT

## 2019-01-01 PROCEDURE — 99284 EMERGENCY DEPT VISIT MOD MDM: CPT

## 2019-01-01 PROCEDURE — 74011000250 HC RX REV CODE- 250: Performed by: SPECIALIST

## 2019-01-01 PROCEDURE — C9113 INJ PANTOPRAZOLE SODIUM, VIA: HCPCS | Performed by: EMERGENCY MEDICINE

## 2019-01-01 PROCEDURE — 97168 OT RE-EVAL EST PLAN CARE: CPT

## 2019-01-01 PROCEDURE — 74011000250 HC RX REV CODE- 250: Performed by: FAMILY MEDICINE

## 2019-01-01 PROCEDURE — 76937 US GUIDE VASCULAR ACCESS: CPT

## 2019-01-01 PROCEDURE — 70551 MRI BRAIN STEM W/O DYE: CPT

## 2019-01-01 PROCEDURE — 33208 INSRT HEART PM ATRIAL & VENT: CPT

## 2019-01-01 PROCEDURE — 77030018673

## 2019-01-01 PROCEDURE — B24BZZ4 ULTRASONOGRAPHY OF HEART WITH AORTA, TRANSESOPHAGEAL: ICD-10-PCS | Performed by: SPECIALIST

## 2019-01-01 PROCEDURE — 74011636637 HC RX REV CODE- 636/637: Performed by: INTERNAL MEDICINE

## 2019-01-01 PROCEDURE — 77030005401 HC CATH RAD ARRO -A

## 2019-01-01 PROCEDURE — 36600 WITHDRAWAL OF ARTERIAL BLOOD: CPT

## 2019-01-01 PROCEDURE — 77030035268

## 2019-01-01 PROCEDURE — 3E05317 INTRODUCTION OF OTHER THROMBOLYTIC INTO PERIPHERAL ARTERY, PERCUTANEOUS APPROACH: ICD-10-PCS | Performed by: RADIOLOGY

## 2019-01-01 PROCEDURE — 77030037390 HC CATH ANGI PERF HIFLW ASPR PENU -K1

## 2019-01-01 PROCEDURE — 74011636320 HC RX REV CODE- 636/320

## 2019-01-01 PROCEDURE — 77030018846 HC SOL IRR STRL H20 ICUM -A

## 2019-01-01 PROCEDURE — 70496 CT ANGIOGRAPHY HEAD: CPT

## 2019-01-01 PROCEDURE — 74011000258 HC RX REV CODE- 258: Performed by: NURSE PRACTITIONER

## 2019-01-01 PROCEDURE — 76450000000

## 2019-01-01 PROCEDURE — 93312 ECHO TRANSESOPHAGEAL: CPT

## 2019-01-01 PROCEDURE — 84484 ASSAY OF TROPONIN QUANT: CPT

## 2019-01-01 PROCEDURE — 77030011943

## 2019-01-01 PROCEDURE — 74011250636 HC RX REV CODE- 250/636: Performed by: RADIOLOGY

## 2019-01-01 PROCEDURE — C1898 LEAD, PMKR, OTHER THAN TRANS: HCPCS

## 2019-01-01 PROCEDURE — 97530 THERAPEUTIC ACTIVITIES: CPT | Performed by: PHYSICAL THERAPIST

## 2019-01-01 PROCEDURE — 85730 THROMBOPLASTIN TIME PARTIAL: CPT

## 2019-01-01 PROCEDURE — 92526 ORAL FUNCTION THERAPY: CPT

## 2019-01-01 PROCEDURE — 74011636320 HC RX REV CODE- 636/320: Performed by: EMERGENCY MEDICINE

## 2019-01-01 PROCEDURE — 02H63JZ INSERTION OF PACEMAKER LEAD INTO RIGHT ATRIUM, PERCUTANEOUS APPROACH: ICD-10-PCS | Performed by: INTERNAL MEDICINE

## 2019-01-01 PROCEDURE — 96365 THER/PROPH/DIAG IV INF INIT: CPT

## 2019-01-01 PROCEDURE — 74011000250 HC RX REV CODE- 250: Performed by: INTERNAL MEDICINE

## 2019-01-01 PROCEDURE — 77030031139 HC SUT VCRL2 J&J -A

## 2019-01-01 PROCEDURE — 81001 URINALYSIS AUTO W/SCOPE: CPT

## 2019-01-01 PROCEDURE — 77030031515

## 2019-01-01 PROCEDURE — 36430 TRANSFUSION BLD/BLD COMPNT: CPT

## 2019-01-01 PROCEDURE — 77030013797 HC KT TRNSDUC PRSSR EDWD -A

## 2019-01-01 PROCEDURE — 74011250636 HC RX REV CODE- 250/636: Performed by: NURSE ANESTHETIST, CERTIFIED REGISTERED

## 2019-01-01 PROCEDURE — 02HK3JZ INSERTION OF PACEMAKER LEAD INTO RIGHT VENTRICLE, PERCUTANEOUS APPROACH: ICD-10-PCS | Performed by: INTERNAL MEDICINE

## 2019-01-01 PROCEDURE — 77030032060 HC PWDR HEMSTAT ARISTA ASRB 3GM BARD -C

## 2019-01-01 PROCEDURE — 96374 THER/PROPH/DIAG INJ IV PUSH: CPT

## 2019-01-01 PROCEDURE — 03CG3Z7 EXTIRPATION OF MATTER FROM INTRACRANIAL ARTERY USING STENT RETRIEVER, PERCUTANEOUS APPROACH: ICD-10-PCS | Performed by: RADIOLOGY

## 2019-01-01 PROCEDURE — 74011000250 HC RX REV CODE- 250

## 2019-01-01 PROCEDURE — 77030029527 HC CATH MIC DEL VELOC PENU -G

## 2019-01-01 PROCEDURE — 77030010547 HC BG URIN W/UMETER -A

## 2019-01-01 PROCEDURE — 77030008584 HC TOOL GDWRE DEV TERU -A

## 2019-01-01 PROCEDURE — 94002 VENT MGMT INPAT INIT DAY: CPT

## 2019-01-01 PROCEDURE — 97161 PT EVAL LOW COMPLEX 20 MIN: CPT | Performed by: PHYSICAL THERAPIST

## 2019-01-01 PROCEDURE — 97161 PT EVAL LOW COMPLEX 20 MIN: CPT

## 2019-01-01 PROCEDURE — 77030018547 HC SUT ETHBND1 J&J -B

## 2019-01-01 PROCEDURE — 77030039046 HC PAD DEFIB RADIOTRNSPNT CNMD -B

## 2019-01-01 PROCEDURE — 76060000037 HC ANESTHESIA 3 TO 3.5 HR

## 2019-01-01 RX ORDER — FENTANYL CITRATE 50 UG/ML
INJECTION, SOLUTION INTRAMUSCULAR; INTRAVENOUS AS NEEDED
Status: DISCONTINUED | OUTPATIENT
Start: 2019-01-01 | End: 2019-01-01 | Stop reason: HOSPADM

## 2019-01-01 RX ORDER — LEVOTHYROXINE SODIUM 50 UG/1
50 TABLET ORAL
Status: DISCONTINUED | OUTPATIENT
Start: 2019-01-01 | End: 2019-01-01 | Stop reason: HOSPADM

## 2019-01-01 RX ORDER — SODIUM CHLORIDE 0.9 % (FLUSH) 0.9 %
5-40 SYRINGE (ML) INJECTION AS NEEDED
Status: DISCONTINUED | OUTPATIENT
Start: 2019-01-01 | End: 2019-01-01 | Stop reason: HOSPADM

## 2019-01-01 RX ORDER — CEPHALEXIN 250 MG/1
250 CAPSULE ORAL 3 TIMES DAILY
Qty: 12 CAP | Refills: 0 | Status: SHIPPED | OUTPATIENT
Start: 2019-01-01 | End: 2019-01-01

## 2019-01-01 RX ORDER — ROSUVASTATIN CALCIUM 10 MG/1
20 TABLET, COATED ORAL
Status: DISCONTINUED | OUTPATIENT
Start: 2019-01-01 | End: 2019-01-01 | Stop reason: HOSPADM

## 2019-01-01 RX ORDER — PROPOFOL 10 MG/ML
INJECTION, EMULSION INTRAVENOUS AS NEEDED
Status: DISCONTINUED | OUTPATIENT
Start: 2019-01-01 | End: 2019-01-01 | Stop reason: HOSPADM

## 2019-01-01 RX ORDER — FENTANYL CITRATE 50 UG/ML
25-100 INJECTION, SOLUTION INTRAMUSCULAR; INTRAVENOUS
Status: DISCONTINUED | OUTPATIENT
Start: 2019-01-01 | End: 2019-01-01

## 2019-01-01 RX ORDER — POTASSIUM CHLORIDE 14.9 MG/ML
10 INJECTION INTRAVENOUS
Status: COMPLETED | OUTPATIENT
Start: 2019-01-01 | End: 2019-01-01

## 2019-01-01 RX ORDER — ATROPINE SULFATE 0.1 MG/ML
1 INJECTION INTRAVENOUS AS NEEDED
Status: DISCONTINUED | OUTPATIENT
Start: 2019-01-01 | End: 2019-01-01

## 2019-01-01 RX ORDER — SODIUM CHLORIDE 0.9 % (FLUSH) 0.9 %
5-40 SYRINGE (ML) INJECTION EVERY 8 HOURS
Status: DISCONTINUED | OUTPATIENT
Start: 2019-01-01 | End: 2019-01-01 | Stop reason: HOSPADM

## 2019-01-01 RX ORDER — PANTOPRAZOLE SODIUM 40 MG/1
40 TABLET, DELAYED RELEASE ORAL
Qty: 1 TAB | Refills: 0 | Status: SHIPPED
Start: 2019-01-01

## 2019-01-01 RX ORDER — ATROPINE SULFATE 0.4 MG/ML
INJECTION, SOLUTION ENDOTRACHEAL; INTRAMEDULLARY; INTRAMUSCULAR; INTRAVENOUS; SUBCUTANEOUS AS NEEDED
Status: DISCONTINUED | OUTPATIENT
Start: 2019-01-01 | End: 2019-01-01 | Stop reason: HOSPADM

## 2019-01-01 RX ORDER — LIDOCAINE HYDROCHLORIDE 20 MG/ML
20 INJECTION, SOLUTION INFILTRATION; PERINEURAL ONCE
Status: COMPLETED | OUTPATIENT
Start: 2019-01-01 | End: 2019-01-01

## 2019-01-01 RX ORDER — ACETAMINOPHEN 325 MG/1
650 TABLET ORAL
Status: DISCONTINUED | OUTPATIENT
Start: 2019-01-01 | End: 2019-01-01 | Stop reason: HOSPADM

## 2019-01-01 RX ORDER — METRONIDAZOLE 500 MG/100ML
500 INJECTION, SOLUTION INTRAVENOUS EVERY 8 HOURS
Status: DISCONTINUED | OUTPATIENT
Start: 2019-01-01 | End: 2019-01-01 | Stop reason: ALTCHOICE

## 2019-01-01 RX ORDER — PHENYLEPHRINE HCL IN 0.9% NACL 0.4MG/10ML
SYRINGE (ML) INTRAVENOUS AS NEEDED
Status: DISCONTINUED | OUTPATIENT
Start: 2019-01-01 | End: 2019-01-01 | Stop reason: HOSPADM

## 2019-01-01 RX ORDER — FENTANYL CITRATE 50 UG/ML
25-200 INJECTION, SOLUTION INTRAMUSCULAR; INTRAVENOUS
Status: DISCONTINUED | OUTPATIENT
Start: 2019-01-01 | End: 2019-01-01

## 2019-01-01 RX ORDER — METOPROLOL SUCCINATE 25 MG/1
25 TABLET, EXTENDED RELEASE ORAL DAILY
Status: DISCONTINUED | OUTPATIENT
Start: 2019-01-01 | End: 2019-01-01 | Stop reason: HOSPADM

## 2019-01-01 RX ORDER — MAGNESIUM SULFATE HEPTAHYDRATE 40 MG/ML
2 INJECTION, SOLUTION INTRAVENOUS ONCE
Status: COMPLETED | OUTPATIENT
Start: 2019-01-01 | End: 2019-01-01

## 2019-01-01 RX ORDER — CARBIDOPA AND LEVODOPA 25; 100 MG/1; MG/1
1 TABLET ORAL 3 TIMES DAILY
Status: DISCONTINUED | OUTPATIENT
Start: 2019-01-01 | End: 2019-01-01 | Stop reason: HOSPADM

## 2019-01-01 RX ORDER — SODIUM CHLORIDE 0.9 % (FLUSH) 0.9 %
10 SYRINGE (ML) INJECTION
Status: COMPLETED | OUTPATIENT
Start: 2019-01-01 | End: 2019-01-01

## 2019-01-01 RX ORDER — SODIUM CHLORIDE 0.9 % (FLUSH) 0.9 %
10 SYRINGE (ML) INJECTION
Status: ACTIVE | OUTPATIENT
Start: 2019-01-01 | End: 2019-01-01

## 2019-01-01 RX ORDER — PANTOPRAZOLE SODIUM 40 MG/1
40 TABLET, DELAYED RELEASE ORAL
Status: DISCONTINUED | OUTPATIENT
Start: 2019-01-01 | End: 2019-01-01 | Stop reason: HOSPADM

## 2019-01-01 RX ORDER — ATORVASTATIN CALCIUM 20 MG/1
20 TABLET, FILM COATED ORAL
Status: DISCONTINUED | OUTPATIENT
Start: 2019-01-01 | End: 2019-01-01 | Stop reason: HOSPADM

## 2019-01-01 RX ORDER — POTASSIUM CHLORIDE 750 MG/1
40 TABLET, FILM COATED, EXTENDED RELEASE ORAL
Status: COMPLETED | OUTPATIENT
Start: 2019-01-01 | End: 2019-01-01

## 2019-01-01 RX ORDER — LISINOPRIL 40 MG/1
40 TABLET ORAL DAILY
COMMUNITY

## 2019-01-01 RX ORDER — INSULIN LISPRO 100 [IU]/ML
INJECTION, SOLUTION INTRAVENOUS; SUBCUTANEOUS EVERY 6 HOURS
Status: DISCONTINUED | OUTPATIENT
Start: 2019-01-01 | End: 2019-01-01

## 2019-01-01 RX ORDER — LABETALOL HCL 20 MG/4 ML
10 SYRINGE (ML) INTRAVENOUS
Status: DISCONTINUED | OUTPATIENT
Start: 2019-01-01 | End: 2019-01-01

## 2019-01-01 RX ORDER — SODIUM CHLORIDE 9 MG/ML
50 INJECTION, SOLUTION INTRAVENOUS ONCE
Status: COMPLETED | OUTPATIENT
Start: 2019-01-01 | End: 2019-01-01

## 2019-01-01 RX ORDER — VANCOMYCIN HYDROCHLORIDE 1 G/20ML
1 INJECTION, POWDER, LYOPHILIZED, FOR SOLUTION INTRAVENOUS ONCE
Status: COMPLETED | OUTPATIENT
Start: 2019-01-01 | End: 2019-01-01

## 2019-01-01 RX ORDER — ONDANSETRON 2 MG/ML
4 INJECTION INTRAMUSCULAR; INTRAVENOUS
Status: DISCONTINUED | OUTPATIENT
Start: 2019-01-01 | End: 2019-01-01 | Stop reason: HOSPADM

## 2019-01-01 RX ORDER — PANTOPRAZOLE SODIUM 40 MG/10ML
40 INJECTION, POWDER, LYOPHILIZED, FOR SOLUTION INTRAVENOUS
Status: COMPLETED | OUTPATIENT
Start: 2019-01-01 | End: 2019-01-01

## 2019-01-01 RX ORDER — HYDRALAZINE HYDROCHLORIDE 20 MG/ML
10 INJECTION INTRAMUSCULAR; INTRAVENOUS
Status: DISCONTINUED | OUTPATIENT
Start: 2019-01-01 | End: 2019-01-01 | Stop reason: HOSPADM

## 2019-01-01 RX ORDER — HYDRALAZINE HYDROCHLORIDE 20 MG/ML
10 INJECTION INTRAMUSCULAR; INTRAVENOUS
Status: DISCONTINUED | OUTPATIENT
Start: 2019-01-01 | End: 2019-01-01

## 2019-01-01 RX ORDER — LIDOCAINE HYDROCHLORIDE 10 MG/ML
10-30 INJECTION INFILTRATION; PERINEURAL
Status: DISCONTINUED | OUTPATIENT
Start: 2019-01-01 | End: 2019-01-01

## 2019-01-01 RX ORDER — LEVOFLOXACIN 5 MG/ML
750 INJECTION, SOLUTION INTRAVENOUS ONCE
Status: COMPLETED | OUTPATIENT
Start: 2019-01-01 | End: 2019-01-01

## 2019-01-01 RX ORDER — ASPIRIN 81 MG/1
81 TABLET ORAL DAILY
Status: DISCONTINUED | OUTPATIENT
Start: 2019-01-01 | End: 2019-01-01 | Stop reason: HOSPADM

## 2019-01-01 RX ORDER — DEXTROSE 50 % IN WATER (D50W) INTRAVENOUS SYRINGE
25-50 AS NEEDED
Status: DISCONTINUED | OUTPATIENT
Start: 2019-01-01 | End: 2019-01-01 | Stop reason: HOSPADM

## 2019-01-01 RX ORDER — NALOXONE HYDROCHLORIDE 0.4 MG/ML
0.4 INJECTION, SOLUTION INTRAMUSCULAR; INTRAVENOUS; SUBCUTANEOUS AS NEEDED
Status: DISCONTINUED | OUTPATIENT
Start: 2019-01-01 | End: 2019-01-01 | Stop reason: HOSPADM

## 2019-01-01 RX ORDER — SODIUM CHLORIDE 0.9 % (FLUSH) 0.9 %
5-10 SYRINGE (ML) INJECTION AS NEEDED
Status: DISCONTINUED | OUTPATIENT
Start: 2019-01-01 | End: 2019-01-01

## 2019-01-01 RX ORDER — MAGNESIUM SULFATE 100 %
4 CRYSTALS MISCELLANEOUS AS NEEDED
Status: DISCONTINUED | OUTPATIENT
Start: 2019-01-01 | End: 2019-01-01 | Stop reason: HOSPADM

## 2019-01-01 RX ORDER — MIDAZOLAM HYDROCHLORIDE 1 MG/ML
.5-5 INJECTION, SOLUTION INTRAMUSCULAR; INTRAVENOUS
Status: DISCONTINUED | OUTPATIENT
Start: 2019-01-01 | End: 2019-01-01

## 2019-01-01 RX ORDER — INSULIN LISPRO 100 [IU]/ML
INJECTION, SOLUTION INTRAVENOUS; SUBCUTANEOUS
Status: DISCONTINUED | OUTPATIENT
Start: 2019-01-01 | End: 2019-01-01 | Stop reason: HOSPADM

## 2019-01-01 RX ORDER — LIDOCAINE HYDROCHLORIDE 20 MG/ML
INJECTION, SOLUTION INFILTRATION; PERINEURAL
Status: COMPLETED
Start: 2019-01-01 | End: 2019-01-01

## 2019-01-01 RX ORDER — HYDRALAZINE HYDROCHLORIDE 25 MG/1
25 TABLET, FILM COATED ORAL 3 TIMES DAILY
Qty: 30 TAB | Refills: 3 | Status: SHIPPED | OUTPATIENT
Start: 2019-01-01 | End: 2019-01-01

## 2019-01-01 RX ORDER — SODIUM CHLORIDE 0.9 % (FLUSH) 0.9 %
5-10 SYRINGE (ML) INJECTION AS NEEDED
Status: DISCONTINUED | OUTPATIENT
Start: 2019-01-01 | End: 2019-01-01 | Stop reason: HOSPADM

## 2019-01-01 RX ORDER — MIRTAZAPINE 15 MG/1
15 TABLET, FILM COATED ORAL
Status: DISCONTINUED | OUTPATIENT
Start: 2019-01-01 | End: 2019-01-01 | Stop reason: HOSPADM

## 2019-01-01 RX ORDER — MIRTAZAPINE 15 MG/1
15 TABLET, FILM COATED ORAL
COMMUNITY

## 2019-01-01 RX ORDER — INSULIN LISPRO 100 [IU]/ML
INJECTION, SOLUTION INTRAVENOUS; SUBCUTANEOUS EVERY 4 HOURS
Status: DISCONTINUED | OUTPATIENT
Start: 2019-01-01 | End: 2019-01-01

## 2019-01-01 RX ORDER — INSULIN LISPRO 100 [IU]/ML
INJECTION, SOLUTION INTRAVENOUS; SUBCUTANEOUS EVERY 6 HOURS
Status: DISCONTINUED | OUTPATIENT
Start: 2019-01-01 | End: 2019-01-01 | Stop reason: HOSPADM

## 2019-01-01 RX ORDER — LISINOPRIL 20 MG/1
40 TABLET ORAL DAILY
Status: DISCONTINUED | OUTPATIENT
Start: 2019-01-01 | End: 2019-01-01 | Stop reason: HOSPADM

## 2019-01-01 RX ORDER — CEPHALEXIN 250 MG/1
250 CAPSULE ORAL 3 TIMES DAILY
Status: DISCONTINUED | OUTPATIENT
Start: 2019-01-01 | End: 2019-01-01

## 2019-01-01 RX ORDER — CEPHALEXIN 250 MG/1
250 CAPSULE ORAL 3 TIMES DAILY
Status: DISCONTINUED | OUTPATIENT
Start: 2019-01-01 | End: 2019-01-01 | Stop reason: HOSPADM

## 2019-01-01 RX ORDER — METOPROLOL SUCCINATE 25 MG/1
25 TABLET, EXTENDED RELEASE ORAL DAILY
Qty: 1 TAB | Refills: 0 | Status: SHIPPED
Start: 2019-01-01

## 2019-01-01 RX ORDER — AMIODARONE HYDROCHLORIDE 200 MG/1
200 TABLET ORAL DAILY
Status: DISCONTINUED | OUTPATIENT
Start: 2019-01-01 | End: 2019-01-01 | Stop reason: HOSPADM

## 2019-01-01 RX ORDER — LIDOCAINE HYDROCHLORIDE 20 MG/ML
INJECTION, SOLUTION INFILTRATION; PERINEURAL
Status: DISPENSED
Start: 2019-01-01 | End: 2019-01-01

## 2019-01-01 RX ORDER — HYDRALAZINE HYDROCHLORIDE 20 MG/ML
5 INJECTION INTRAMUSCULAR; INTRAVENOUS
Status: DISCONTINUED | OUTPATIENT
Start: 2019-01-01 | End: 2019-01-01

## 2019-01-01 RX ORDER — POTASSIUM CHLORIDE 1.5 G/1.77G
40 POWDER, FOR SOLUTION ORAL
Status: COMPLETED | OUTPATIENT
Start: 2019-01-01 | End: 2019-01-01

## 2019-01-01 RX ORDER — MIDAZOLAM HYDROCHLORIDE 1 MG/ML
1-10 INJECTION, SOLUTION INTRAMUSCULAR; INTRAVENOUS
Status: DISCONTINUED | OUTPATIENT
Start: 2019-01-01 | End: 2019-01-01

## 2019-01-01 RX ORDER — FENTANYL CITRATE 50 UG/ML
INJECTION, SOLUTION INTRAMUSCULAR; INTRAVENOUS
Status: COMPLETED
Start: 2019-01-01 | End: 2019-01-01

## 2019-01-01 RX ORDER — ROCURONIUM BROMIDE 10 MG/ML
INJECTION, SOLUTION INTRAVENOUS AS NEEDED
Status: DISCONTINUED | OUTPATIENT
Start: 2019-01-01 | End: 2019-01-01 | Stop reason: HOSPADM

## 2019-01-01 RX ORDER — SUCCINYLCHOLINE CHLORIDE 20 MG/ML
INJECTION INTRAMUSCULAR; INTRAVENOUS AS NEEDED
Status: DISCONTINUED | OUTPATIENT
Start: 2019-01-01 | End: 2019-01-01 | Stop reason: HOSPADM

## 2019-01-01 RX ORDER — LABETALOL HYDROCHLORIDE 5 MG/ML
20 INJECTION, SOLUTION INTRAVENOUS
Status: DISCONTINUED | OUTPATIENT
Start: 2019-01-01 | End: 2019-01-01

## 2019-01-01 RX ORDER — SODIUM CHLORIDE 9 MG/ML
50 INJECTION, SOLUTION INTRAVENOUS CONTINUOUS
Status: DISCONTINUED | OUTPATIENT
Start: 2019-01-01 | End: 2019-01-01

## 2019-01-01 RX ORDER — SODIUM CHLORIDE 9 MG/ML
75 INJECTION, SOLUTION INTRAVENOUS CONTINUOUS
Status: DISPENSED | OUTPATIENT
Start: 2019-01-01 | End: 2019-01-01

## 2019-01-01 RX ORDER — METOPROLOL SUCCINATE 25 MG/1
25 TABLET, EXTENDED RELEASE ORAL DAILY
Qty: 30 TAB | Refills: 3 | Status: ON HOLD | OUTPATIENT
Start: 2019-01-01 | End: 2019-01-01

## 2019-01-01 RX ORDER — FACIAL-BODY WIPES
10 EACH TOPICAL DAILY PRN
Status: DISCONTINUED | OUTPATIENT
Start: 2019-01-01 | End: 2019-01-01 | Stop reason: HOSPADM

## 2019-01-01 RX ORDER — POTASSIUM CHLORIDE 750 MG/1
40 TABLET, FILM COATED, EXTENDED RELEASE ORAL
Status: DISCONTINUED | OUTPATIENT
Start: 2019-01-01 | End: 2019-01-01

## 2019-01-01 RX ORDER — HYDROMORPHONE HYDROCHLORIDE 2 MG/ML
0.5 INJECTION, SOLUTION INTRAMUSCULAR; INTRAVENOUS; SUBCUTANEOUS
Status: DISCONTINUED | OUTPATIENT
Start: 2019-01-01 | End: 2019-01-01 | Stop reason: HOSPADM

## 2019-01-01 RX ORDER — POTASSIUM CHLORIDE 1.5 G/1.77G
20 POWDER, FOR SOLUTION ORAL
Status: COMPLETED | OUTPATIENT
Start: 2019-01-01 | End: 2019-01-01

## 2019-01-01 RX ORDER — CEFAZOLIN SODIUM/WATER 2 G/20 ML
2 SYRINGE (ML) INTRAVENOUS ONCE
Status: COMPLETED | OUTPATIENT
Start: 2019-01-01 | End: 2019-01-01

## 2019-01-01 RX ORDER — HYDRALAZINE HYDROCHLORIDE 10 MG/1
10 TABLET, FILM COATED ORAL 2 TIMES DAILY
Status: DISCONTINUED | OUTPATIENT
Start: 2019-01-01 | End: 2019-01-01

## 2019-01-01 RX ORDER — FAMOTIDINE 20 MG/1
20 TABLET, FILM COATED ORAL 2 TIMES DAILY
Status: DISCONTINUED | OUTPATIENT
Start: 2019-01-01 | End: 2019-01-01 | Stop reason: HOSPADM

## 2019-01-01 RX ORDER — ROSUVASTATIN CALCIUM 20 MG/1
20 TABLET, COATED ORAL
COMMUNITY

## 2019-01-01 RX ORDER — LORAZEPAM 2 MG/ML
1 INJECTION INTRAMUSCULAR
Status: DISCONTINUED | OUTPATIENT
Start: 2019-01-01 | End: 2019-01-01 | Stop reason: HOSPADM

## 2019-01-01 RX ORDER — SODIUM CHLORIDE 9 MG/ML
250 INJECTION, SOLUTION INTRAVENOUS AS NEEDED
Status: DISCONTINUED | OUTPATIENT
Start: 2019-01-01 | End: 2019-01-01 | Stop reason: HOSPADM

## 2019-01-01 RX ORDER — ACETAMINOPHEN 650 MG/1
650 SUPPOSITORY RECTAL
Status: DISCONTINUED | OUTPATIENT
Start: 2019-01-01 | End: 2019-01-01 | Stop reason: HOSPADM

## 2019-01-01 RX ORDER — HYDRALAZINE HYDROCHLORIDE 25 MG/1
25 TABLET, FILM COATED ORAL 3 TIMES DAILY
Status: DISCONTINUED | OUTPATIENT
Start: 2019-01-01 | End: 2019-01-01 | Stop reason: HOSPADM

## 2019-01-01 RX ORDER — LIDOCAINE HYDROCHLORIDE 20 MG/ML
INJECTION, SOLUTION EPIDURAL; INFILTRATION; INTRACAUDAL; PERINEURAL AS NEEDED
Status: DISCONTINUED | OUTPATIENT
Start: 2019-01-01 | End: 2019-01-01 | Stop reason: HOSPADM

## 2019-01-01 RX ORDER — ONDANSETRON 2 MG/ML
INJECTION INTRAMUSCULAR; INTRAVENOUS AS NEEDED
Status: DISCONTINUED | OUTPATIENT
Start: 2019-01-01 | End: 2019-01-01 | Stop reason: HOSPADM

## 2019-01-01 RX ORDER — VERAPAMIL HYDROCHLORIDE 2.5 MG/ML
40 INJECTION, SOLUTION INTRAVENOUS
Status: COMPLETED | OUTPATIENT
Start: 2019-01-01 | End: 2019-01-01

## 2019-01-01 RX ADMIN — CARBIDOPA AND LEVODOPA 1 TABLET: 25; 100 TABLET ORAL at 22:25

## 2019-01-01 RX ADMIN — INSULIN LISPRO 2 UNITS: 100 INJECTION, SOLUTION INTRAVENOUS; SUBCUTANEOUS at 23:23

## 2019-01-01 RX ADMIN — Medication 10 ML: at 23:36

## 2019-01-01 RX ADMIN — Medication 10 ML: at 14:44

## 2019-01-01 RX ADMIN — METOPROLOL SUCCINATE 25 MG: 25 TABLET, EXTENDED RELEASE ORAL at 08:56

## 2019-01-01 RX ADMIN — ASPIRIN 81 MG: 81 TABLET, COATED ORAL at 11:11

## 2019-01-01 RX ADMIN — HYDRALAZINE HYDROCHLORIDE 10 MG: 20 INJECTION INTRAMUSCULAR; INTRAVENOUS at 01:54

## 2019-01-01 RX ADMIN — FAMOTIDINE 20 MG: 20 TABLET ORAL at 11:11

## 2019-01-01 RX ADMIN — Medication 10 ML: at 21:37

## 2019-01-01 RX ADMIN — APIXABAN 2.5 MG: 2.5 TABLET, FILM COATED ORAL at 17:24

## 2019-01-01 RX ADMIN — Medication 10 ML: at 18:44

## 2019-01-01 RX ADMIN — Medication 10 ML: at 12:18

## 2019-01-01 RX ADMIN — HYDRALAZINE HYDROCHLORIDE 25 MG: 25 TABLET, FILM COATED ORAL at 08:56

## 2019-01-01 RX ADMIN — ATORVASTATIN CALCIUM 20 MG: 20 TABLET, FILM COATED ORAL at 22:01

## 2019-01-01 RX ADMIN — HYDRALAZINE HYDROCHLORIDE 25 MG: 25 TABLET, FILM COATED ORAL at 11:11

## 2019-01-01 RX ADMIN — APIXABAN 2.5 MG: 2.5 TABLET, FILM COATED ORAL at 11:43

## 2019-01-01 RX ADMIN — POTASSIUM CHLORIDE 40 MEQ: 1.5 POWDER, FOR SOLUTION ORAL at 12:04

## 2019-01-01 RX ADMIN — SODIUM CHLORIDE 75 ML/HR: 900 INJECTION, SOLUTION INTRAVENOUS at 00:20

## 2019-01-01 RX ADMIN — FAMOTIDINE 20 MG: 20 TABLET ORAL at 08:55

## 2019-01-01 RX ADMIN — CARBIDOPA AND LEVODOPA 1 TABLET: 25; 100 TABLET ORAL at 11:43

## 2019-01-01 RX ADMIN — SODIUM CHLORIDE 10 MG/HR: 900 INJECTION, SOLUTION INTRAVENOUS at 02:47

## 2019-01-01 RX ADMIN — Medication 10 ML: at 21:23

## 2019-01-01 RX ADMIN — SODIUM CHLORIDE 50000 UNITS: 900 IRRIGANT IRRIGATION at 08:21

## 2019-01-01 RX ADMIN — CARBIDOPA AND LEVODOPA 1 TABLET: 25; 100 TABLET ORAL at 08:13

## 2019-01-01 RX ADMIN — Medication 10 ML: at 22:00

## 2019-01-01 RX ADMIN — HYDRALAZINE HYDROCHLORIDE 10 MG: 20 INJECTION INTRAMUSCULAR; INTRAVENOUS at 14:43

## 2019-01-01 RX ADMIN — ROSUVASTATIN CALCIUM 20 MG: 10 TABLET, FILM COATED ORAL at 22:25

## 2019-01-01 RX ADMIN — MULTIPLE VITAMINS W/ MINERALS TAB 1 TABLET: TAB at 08:52

## 2019-01-01 RX ADMIN — Medication 10 ML: at 06:07

## 2019-01-01 RX ADMIN — INSULIN LISPRO 2 UNITS: 100 INJECTION, SOLUTION INTRAVENOUS; SUBCUTANEOUS at 09:04

## 2019-01-01 RX ADMIN — ACETAMINOPHEN 650 MG: 650 SUPPOSITORY RECTAL at 18:19

## 2019-01-01 RX ADMIN — Medication 10 ML: at 05:51

## 2019-01-01 RX ADMIN — IOPAMIDOL 90 ML: 612 INJECTION, SOLUTION INTRAVENOUS at 16:30

## 2019-01-01 RX ADMIN — ONDANSETRON 4 MG: 2 INJECTION INTRAMUSCULAR; INTRAVENOUS at 14:43

## 2019-01-01 RX ADMIN — FAMOTIDINE 20 MG: 20 TABLET ORAL at 17:12

## 2019-01-01 RX ADMIN — Medication 10 ML: at 15:49

## 2019-01-01 RX ADMIN — Medication 10 ML: at 06:08

## 2019-01-01 RX ADMIN — CARBIDOPA AND LEVODOPA 1 TABLET: 25; 100 TABLET ORAL at 16:17

## 2019-01-01 RX ADMIN — LEVOTHYROXINE SODIUM 50 MCG: 50 TABLET ORAL at 07:02

## 2019-01-01 RX ADMIN — ONDANSETRON 4 MG: 2 INJECTION INTRAMUSCULAR; INTRAVENOUS at 04:26

## 2019-01-01 RX ADMIN — Medication 10 ML: at 14:00

## 2019-01-01 RX ADMIN — FAMOTIDINE 20 MG: 20 TABLET ORAL at 18:20

## 2019-01-01 RX ADMIN — Medication 10 ML: at 17:35

## 2019-01-01 RX ADMIN — CARBIDOPA AND LEVODOPA 1 TABLET: 25; 100 TABLET ORAL at 16:59

## 2019-01-01 RX ADMIN — VERAPAMIL HYDROCHLORIDE 5 MG: 2.5 INJECTION, SOLUTION INTRAVENOUS at 15:26

## 2019-01-01 RX ADMIN — LEVOTHYROXINE SODIUM 50 MCG: 50 TABLET ORAL at 05:37

## 2019-01-01 RX ADMIN — INSULIN LISPRO 2 UNITS: 100 INJECTION, SOLUTION INTRAVENOUS; SUBCUTANEOUS at 05:31

## 2019-01-01 RX ADMIN — HYDRALAZINE HYDROCHLORIDE 10 MG: 20 INJECTION INTRAMUSCULAR; INTRAVENOUS at 18:43

## 2019-01-01 RX ADMIN — POTASSIUM CHLORIDE 10 MEQ: 200 INJECTION, SOLUTION INTRAVENOUS at 11:56

## 2019-01-01 RX ADMIN — Medication 10 ML: at 06:03

## 2019-01-01 RX ADMIN — Medication 10 ML: at 14:34

## 2019-01-01 RX ADMIN — HYDRALAZINE HYDROCHLORIDE 25 MG: 25 TABLET, FILM COATED ORAL at 17:00

## 2019-01-01 RX ADMIN — FAMOTIDINE 20 MG: 10 INJECTION, SOLUTION INTRAVENOUS at 21:41

## 2019-01-01 RX ADMIN — ASPIRIN 81 MG: 81 TABLET, COATED ORAL at 08:58

## 2019-01-01 RX ADMIN — AMIODARONE HYDROCHLORIDE 200 MG: 200 TABLET ORAL at 12:05

## 2019-01-01 RX ADMIN — SODIUM CHLORIDE 75 ML/HR: 900 INJECTION, SOLUTION INTRAVENOUS at 02:06

## 2019-01-01 RX ADMIN — CARBIDOPA AND LEVODOPA 1 TABLET: 25; 100 TABLET ORAL at 17:35

## 2019-01-01 RX ADMIN — Medication 10 ML: at 22:20

## 2019-01-01 RX ADMIN — Medication 10 ML: at 15:59

## 2019-01-01 RX ADMIN — PANTOPRAZOLE SODIUM 40 MG: 40 TABLET, DELAYED RELEASE ORAL at 09:50

## 2019-01-01 RX ADMIN — ROSUVASTATIN CALCIUM 20 MG: 10 TABLET, FILM COATED ORAL at 22:38

## 2019-01-01 RX ADMIN — Medication 120 MCG: at 15:34

## 2019-01-01 RX ADMIN — Medication 10 ML: at 23:41

## 2019-01-01 RX ADMIN — Medication 10 ML: at 13:45

## 2019-01-01 RX ADMIN — SODIUM CHLORIDE 2.5 MG/HR: 900 INJECTION, SOLUTION INTRAVENOUS at 15:44

## 2019-01-01 RX ADMIN — INSULIN LISPRO 3 UNITS: 100 INJECTION, SOLUTION INTRAVENOUS; SUBCUTANEOUS at 15:47

## 2019-01-01 RX ADMIN — SODIUM CHLORIDE 5 MG/HR: 900 INJECTION, SOLUTION INTRAVENOUS at 14:34

## 2019-01-01 RX ADMIN — CARBIDOPA AND LEVODOPA 1 TABLET: 25; 100 TABLET ORAL at 09:50

## 2019-01-01 RX ADMIN — METOPROLOL SUCCINATE 25 MG: 25 TABLET, EXTENDED RELEASE ORAL at 11:43

## 2019-01-01 RX ADMIN — CARBIDOPA AND LEVODOPA 1 TABLET: 25; 100 TABLET ORAL at 16:26

## 2019-01-01 RX ADMIN — ACETAMINOPHEN 650 MG: 325 TABLET ORAL at 20:53

## 2019-01-01 RX ADMIN — CARBIDOPA AND LEVODOPA 1 TABLET: 25; 100 TABLET ORAL at 08:53

## 2019-01-01 RX ADMIN — FAMOTIDINE 20 MG: 20 TABLET ORAL at 08:22

## 2019-01-01 RX ADMIN — SODIUM CHLORIDE 1 MG: 900 INJECTION, SOLUTION INTRAVENOUS at 14:29

## 2019-01-01 RX ADMIN — FAMOTIDINE 20 MG: 10 INJECTION, SOLUTION INTRAVENOUS at 08:25

## 2019-01-01 RX ADMIN — PANTOPRAZOLE SODIUM 40 MG: 40 INJECTION, POWDER, FOR SOLUTION INTRAVENOUS at 23:41

## 2019-01-01 RX ADMIN — HEPARIN SODIUM 4000 UNITS: 5000 INJECTION, SOLUTION INTRAVENOUS; SUBCUTANEOUS at 14:24

## 2019-01-01 RX ADMIN — Medication 10 ML: at 05:38

## 2019-01-01 RX ADMIN — ROSUVASTATIN CALCIUM 20 MG: 10 TABLET, FILM COATED ORAL at 21:33

## 2019-01-01 RX ADMIN — PROPOFOL 100 MG: 10 INJECTION, EMULSION INTRAVENOUS at 14:19

## 2019-01-01 RX ADMIN — CEPHALEXIN 250 MG: 250 CAPSULE ORAL at 08:56

## 2019-01-01 RX ADMIN — Medication 10 ML: at 06:05

## 2019-01-01 RX ADMIN — CARBIDOPA AND LEVODOPA 1 TABLET: 25; 100 TABLET ORAL at 18:20

## 2019-01-01 RX ADMIN — BENZOCAINE, BUTAMBEN, AND TETRACAINE HYDROCHLORIDE 1 SPRAY: .028; .004; .004 AEROSOL, SPRAY TOPICAL at 14:03

## 2019-01-01 RX ADMIN — Medication 10 ML: at 21:34

## 2019-01-01 RX ADMIN — MULTIPLE VITAMINS W/ MINERALS TAB 1 TABLET: TAB at 08:50

## 2019-01-01 RX ADMIN — APIXABAN 2.5 MG: 2.5 TABLET, FILM COATED ORAL at 08:58

## 2019-01-01 RX ADMIN — APIXABAN 2.5 MG: 2.5 TABLET, FILM COATED ORAL at 11:12

## 2019-01-01 RX ADMIN — SODIUM CHLORIDE 75 ML/HR: 900 INJECTION, SOLUTION INTRAVENOUS at 18:42

## 2019-01-01 RX ADMIN — HYDRALAZINE HYDROCHLORIDE 25 MG: 25 TABLET, FILM COATED ORAL at 22:37

## 2019-01-01 RX ADMIN — Medication 10 ML: at 05:21

## 2019-01-01 RX ADMIN — AMIODARONE HYDROCHLORIDE 200 MG: 200 TABLET ORAL at 11:43

## 2019-01-01 RX ADMIN — CARBIDOPA AND LEVODOPA 1 TABLET: 25; 100 TABLET ORAL at 08:52

## 2019-01-01 RX ADMIN — ATORVASTATIN CALCIUM 20 MG: 20 TABLET, FILM COATED ORAL at 22:15

## 2019-01-01 RX ADMIN — SODIUM CHLORIDE 500 MG: 900 INJECTION, SOLUTION INTRAVENOUS at 08:02

## 2019-01-01 RX ADMIN — METOPROLOL SUCCINATE 25 MG: 25 TABLET, EXTENDED RELEASE ORAL at 09:41

## 2019-01-01 RX ADMIN — INSULIN LISPRO 2 UNITS: 100 INJECTION, SOLUTION INTRAVENOUS; SUBCUTANEOUS at 12:17

## 2019-01-01 RX ADMIN — CARBIDOPA AND LEVODOPA 1 TABLET: 25; 100 TABLET ORAL at 17:12

## 2019-01-01 RX ADMIN — INSULIN LISPRO 2 UNITS: 100 INJECTION, SOLUTION INTRAVENOUS; SUBCUTANEOUS at 12:07

## 2019-01-01 RX ADMIN — CARBIDOPA AND LEVODOPA 1 TABLET: 25; 100 TABLET ORAL at 22:01

## 2019-01-01 RX ADMIN — FENTANYL CITRATE 25 MCG: 50 INJECTION, SOLUTION INTRAMUSCULAR; INTRAVENOUS at 14:19

## 2019-01-01 RX ADMIN — POTASSIUM CHLORIDE 10 MEQ: 200 INJECTION, SOLUTION INTRAVENOUS at 08:35

## 2019-01-01 RX ADMIN — MIDAZOLAM 1 MG: 1 INJECTION INTRAMUSCULAR; INTRAVENOUS at 08:15

## 2019-01-01 RX ADMIN — Medication 2 G: at 07:09

## 2019-01-01 RX ADMIN — Medication 10 ML: at 21:39

## 2019-01-01 RX ADMIN — FENTANYL CITRATE 25 MCG: 50 INJECTION INTRAMUSCULAR; INTRAVENOUS at 08:15

## 2019-01-01 RX ADMIN — APIXABAN 2.5 MG: 2.5 TABLET, FILM COATED ORAL at 16:58

## 2019-01-01 RX ADMIN — HYDROMORPHONE HYDROCHLORIDE 0.5 MG: 2 INJECTION, SOLUTION INTRAMUSCULAR; INTRAVENOUS; SUBCUTANEOUS at 04:16

## 2019-01-01 RX ADMIN — CARBIDOPA AND LEVODOPA 1 TABLET: 25; 100 TABLET ORAL at 16:11

## 2019-01-01 RX ADMIN — POTASSIUM CHLORIDE 20 MEQ: 1.5 POWDER, FOR SOLUTION ORAL at 03:26

## 2019-01-01 RX ADMIN — MAGNESIUM SULFATE HEPTAHYDRATE 2 G: 40 INJECTION, SOLUTION INTRAVENOUS at 08:44

## 2019-01-01 RX ADMIN — MULTIPLE VITAMINS W/ MINERALS TAB 1 TABLET: TAB at 09:50

## 2019-01-01 RX ADMIN — FAMOTIDINE 20 MG: 10 INJECTION, SOLUTION INTRAVENOUS at 21:24

## 2019-01-01 RX ADMIN — HYDRALAZINE HYDROCHLORIDE 25 MG: 25 TABLET, FILM COATED ORAL at 11:43

## 2019-01-01 RX ADMIN — SODIUM CHLORIDE 1000 ML: 900 INJECTION, SOLUTION INTRAVENOUS at 14:04

## 2019-01-01 RX ADMIN — LEVOFLOXACIN 750 MG: 5 INJECTION, SOLUTION INTRAVENOUS at 03:20

## 2019-01-01 RX ADMIN — HYDRALAZINE HYDROCHLORIDE 10 MG: 20 INJECTION INTRAMUSCULAR; INTRAVENOUS at 02:55

## 2019-01-01 RX ADMIN — ROSUVASTATIN CALCIUM 20 MG: 10 TABLET, FILM COATED ORAL at 21:24

## 2019-01-01 RX ADMIN — FAMOTIDINE 20 MG: 20 TABLET ORAL at 11:43

## 2019-01-01 RX ADMIN — CARBIDOPA AND LEVODOPA 1 TABLET: 25; 100 TABLET ORAL at 17:53

## 2019-01-01 RX ADMIN — MIDAZOLAM 2 MG: 1 INJECTION INTRAMUSCULAR; INTRAVENOUS at 08:14

## 2019-01-01 RX ADMIN — HYDRALAZINE HYDROCHLORIDE 25 MG: 25 TABLET, FILM COATED ORAL at 17:24

## 2019-01-01 RX ADMIN — MIDAZOLAM 2 MG: 1 INJECTION INTRAMUSCULAR; INTRAVENOUS at 14:03

## 2019-01-01 RX ADMIN — METOPROLOL SUCCINATE 25 MG: 25 TABLET, EXTENDED RELEASE ORAL at 08:52

## 2019-01-01 RX ADMIN — HYDRALAZINE HYDROCHLORIDE 25 MG: 25 TABLET, FILM COATED ORAL at 18:51

## 2019-01-01 RX ADMIN — POTASSIUM CHLORIDE 10 MEQ: 200 INJECTION, SOLUTION INTRAVENOUS at 11:08

## 2019-01-01 RX ADMIN — Medication 10 ML: at 05:05

## 2019-01-01 RX ADMIN — IOPAMIDOL 100 ML: 755 INJECTION, SOLUTION INTRAVENOUS at 00:51

## 2019-01-01 RX ADMIN — HYDRALAZINE HYDROCHLORIDE 5 MG: 20 INJECTION INTRAMUSCULAR; INTRAVENOUS at 17:40

## 2019-01-01 RX ADMIN — POTASSIUM CHLORIDE 10 MEQ: 200 INJECTION, SOLUTION INTRAVENOUS at 11:06

## 2019-01-01 RX ADMIN — INSULIN LISPRO 2 UNITS: 100 INJECTION, SOLUTION INTRAVENOUS; SUBCUTANEOUS at 12:54

## 2019-01-01 RX ADMIN — PROPOFOL 30 MG: 10 INJECTION, EMULSION INTRAVENOUS at 16:26

## 2019-01-01 RX ADMIN — INSULIN LISPRO 2 UNITS: 100 INJECTION, SOLUTION INTRAVENOUS; SUBCUTANEOUS at 12:51

## 2019-01-01 RX ADMIN — FENTANYL CITRATE 25 MCG: 50 INJECTION, SOLUTION INTRAMUSCULAR; INTRAVENOUS at 14:03

## 2019-01-01 RX ADMIN — Medication 10 ML: at 21:38

## 2019-01-01 RX ADMIN — SUCCINYLCHOLINE CHLORIDE 140 MG: 20 INJECTION INTRAMUSCULAR; INTRAVENOUS at 14:19

## 2019-01-01 RX ADMIN — CEPHALEXIN 250 MG: 250 CAPSULE ORAL at 17:24

## 2019-01-01 RX ADMIN — FAMOTIDINE 20 MG: 20 TABLET ORAL at 16:58

## 2019-01-01 RX ADMIN — FENTANYL CITRATE 25 MCG: 50 INJECTION, SOLUTION INTRAMUSCULAR; INTRAVENOUS at 14:08

## 2019-01-01 RX ADMIN — POTASSIUM CHLORIDE 10 MEQ: 200 INJECTION, SOLUTION INTRAVENOUS at 13:30

## 2019-01-01 RX ADMIN — Medication 10 ML: at 11:58

## 2019-01-01 RX ADMIN — Medication 100 MCG: at 15:03

## 2019-01-01 RX ADMIN — FENTANYL CITRATE 50 MCG: 50 INJECTION INTRAMUSCULAR; INTRAVENOUS at 08:04

## 2019-01-01 RX ADMIN — Medication 10 ML: at 05:00

## 2019-01-01 RX ADMIN — HEPARIN SODIUM 4000 UNITS: 5000 INJECTION, SOLUTION INTRAVENOUS; SUBCUTANEOUS at 14:40

## 2019-01-01 RX ADMIN — FAMOTIDINE 20 MG: 20 TABLET ORAL at 17:24

## 2019-01-01 RX ADMIN — INSULIN LISPRO 2 UNITS: 100 INJECTION, SOLUTION INTRAVENOUS; SUBCUTANEOUS at 01:05

## 2019-01-01 RX ADMIN — ALTEPLASE 56.38 MG: KIT at 13:58

## 2019-01-01 RX ADMIN — FAMOTIDINE 20 MG: 10 INJECTION, SOLUTION INTRAVENOUS at 22:25

## 2019-01-01 RX ADMIN — LIDOCAINE HYDROCHLORIDE 60 MG: 20 INJECTION, SOLUTION EPIDURAL; INFILTRATION; INTRACAUDAL; PERINEURAL at 14:19

## 2019-01-01 RX ADMIN — ACETAMINOPHEN 650 MG: 650 SUPPOSITORY RECTAL at 16:11

## 2019-01-01 RX ADMIN — ROSUVASTATIN CALCIUM 20 MG: 10 TABLET, FILM COATED ORAL at 21:41

## 2019-01-01 RX ADMIN — LEVOTHYROXINE SODIUM 50 MCG: 50 TABLET ORAL at 06:04

## 2019-01-01 RX ADMIN — POTASSIUM CHLORIDE 10 MEQ: 200 INJECTION, SOLUTION INTRAVENOUS at 05:53

## 2019-01-01 RX ADMIN — Medication 10 ML: at 22:40

## 2019-01-01 RX ADMIN — CARBIDOPA AND LEVODOPA 1 TABLET: 25; 100 TABLET ORAL at 15:48

## 2019-01-01 RX ADMIN — MIDAZOLAM 2 MG: 1 INJECTION INTRAMUSCULAR; INTRAVENOUS at 08:04

## 2019-01-01 RX ADMIN — LEVOTHYROXINE SODIUM 50 MCG: 50 TABLET ORAL at 08:22

## 2019-01-01 RX ADMIN — CEFAZOLIN SODIUM 1 G: 1 INJECTION, POWDER, FOR SOLUTION INTRAMUSCULAR; INTRAVENOUS at 14:08

## 2019-01-01 RX ADMIN — Medication 20 ML: at 15:49

## 2019-01-01 RX ADMIN — PANTOPRAZOLE SODIUM 40 MG: 40 TABLET, DELAYED RELEASE ORAL at 08:50

## 2019-01-01 RX ADMIN — Medication 100 MCG: at 15:30

## 2019-01-01 RX ADMIN — AMIODARONE HYDROCHLORIDE 200 MG: 200 TABLET ORAL at 08:57

## 2019-01-01 RX ADMIN — INSULIN LISPRO 7 UNITS: 100 INJECTION, SOLUTION INTRAVENOUS; SUBCUTANEOUS at 18:48

## 2019-01-01 RX ADMIN — APIXABAN 2.5 MG: 2.5 TABLET, FILM COATED ORAL at 17:12

## 2019-01-01 RX ADMIN — LEVOTHYROXINE SODIUM 50 MCG: 50 TABLET ORAL at 08:58

## 2019-01-01 RX ADMIN — FAMOTIDINE 20 MG: 10 INJECTION, SOLUTION INTRAVENOUS at 21:22

## 2019-01-01 RX ADMIN — FAMOTIDINE 20 MG: 20 TABLET ORAL at 18:50

## 2019-01-01 RX ADMIN — POTASSIUM CHLORIDE 10 MEQ: 200 INJECTION, SOLUTION INTRAVENOUS at 09:42

## 2019-01-01 RX ADMIN — ASPIRIN 81 MG: 81 TABLET, COATED ORAL at 08:56

## 2019-01-01 RX ADMIN — LISINOPRIL 40 MG: 20 TABLET ORAL at 09:50

## 2019-01-01 RX ADMIN — ATORVASTATIN CALCIUM 20 MG: 20 TABLET, FILM COATED ORAL at 22:59

## 2019-01-01 RX ADMIN — Medication 100 MCG: at 15:23

## 2019-01-01 RX ADMIN — POTASSIUM CHLORIDE 10 MEQ: 200 INJECTION, SOLUTION INTRAVENOUS at 12:18

## 2019-01-01 RX ADMIN — Medication 20 ML: at 06:04

## 2019-01-01 RX ADMIN — ROSUVASTATIN CALCIUM 20 MG: 10 TABLET, FILM COATED ORAL at 22:19

## 2019-01-01 RX ADMIN — LORAZEPAM 1 MG: 2 INJECTION INTRAMUSCULAR; INTRAVENOUS at 17:58

## 2019-01-01 RX ADMIN — POTASSIUM CHLORIDE 10 MEQ: 200 INJECTION, SOLUTION INTRAVENOUS at 08:44

## 2019-01-01 RX ADMIN — Medication 10 ML: at 22:07

## 2019-01-01 RX ADMIN — AMIODARONE HYDROCHLORIDE 200 MG: 200 TABLET ORAL at 08:53

## 2019-01-01 RX ADMIN — POTASSIUM CHLORIDE 10 MEQ: 200 INJECTION, SOLUTION INTRAVENOUS at 10:40

## 2019-01-01 RX ADMIN — Medication 100 MCG: at 15:06

## 2019-01-01 RX ADMIN — LEVOTHYROXINE SODIUM 50 MCG: 50 TABLET ORAL at 05:28

## 2019-01-01 RX ADMIN — INSULIN LISPRO 2 UNITS: 100 INJECTION, SOLUTION INTRAVENOUS; SUBCUTANEOUS at 11:42

## 2019-01-01 RX ADMIN — INSULIN LISPRO 2 UNITS: 100 INJECTION, SOLUTION INTRAVENOUS; SUBCUTANEOUS at 08:22

## 2019-01-01 RX ADMIN — POTASSIUM CHLORIDE 10 MEQ: 200 INJECTION, SOLUTION INTRAVENOUS at 10:55

## 2019-01-01 RX ADMIN — MULTIPLE VITAMINS W/ MINERALS TAB 1 TABLET: TAB at 09:41

## 2019-01-01 RX ADMIN — Medication 20 ML: at 05:51

## 2019-01-01 RX ADMIN — LABETALOL 20 MG/4 ML (5 MG/ML) INTRAVENOUS SYRINGE 10 MG: at 05:00

## 2019-01-01 RX ADMIN — Medication 10 ML: at 06:28

## 2019-01-01 RX ADMIN — MIRTAZAPINE 15 MG: 15 TABLET, FILM COATED ORAL at 22:01

## 2019-01-01 RX ADMIN — INSULIN LISPRO 2 UNITS: 100 INJECTION, SOLUTION INTRAVENOUS; SUBCUTANEOUS at 23:47

## 2019-01-01 RX ADMIN — Medication 10 ML: at 00:51

## 2019-01-01 RX ADMIN — HEPARIN SODIUM 4000 UNITS: 5000 INJECTION, SOLUTION INTRAVENOUS; SUBCUTANEOUS at 14:22

## 2019-01-01 RX ADMIN — LEVOTHYROXINE SODIUM ANHYDROUS 37.5 MCG: 100 INJECTION, POWDER, LYOPHILIZED, FOR SOLUTION INTRAVENOUS at 12:52

## 2019-01-01 RX ADMIN — Medication 10 ML: at 06:16

## 2019-01-01 RX ADMIN — INSULIN LISPRO 2 UNITS: 100 INJECTION, SOLUTION INTRAVENOUS; SUBCUTANEOUS at 17:34

## 2019-01-01 RX ADMIN — FENTANYL CITRATE 25 MCG: 50 INJECTION, SOLUTION INTRAMUSCULAR; INTRAVENOUS at 14:31

## 2019-01-01 RX ADMIN — CARBIDOPA AND LEVODOPA 1 TABLET: 25; 100 TABLET ORAL at 08:58

## 2019-01-01 RX ADMIN — SODIUM CHLORIDE 75 ML/HR: 900 INJECTION, SOLUTION INTRAVENOUS at 07:56

## 2019-01-01 RX ADMIN — SODIUM CHLORIDE 500 MG: 900 INJECTION, SOLUTION INTRAVENOUS at 21:01

## 2019-01-01 RX ADMIN — Medication 10 ML: at 23:00

## 2019-01-01 RX ADMIN — Medication 10 ML: at 06:27

## 2019-01-01 RX ADMIN — Medication 10 ML: at 05:29

## 2019-01-01 RX ADMIN — HYDRALAZINE HYDROCHLORIDE 25 MG: 25 TABLET, FILM COATED ORAL at 22:20

## 2019-01-01 RX ADMIN — Medication 10 ML: at 22:39

## 2019-01-01 RX ADMIN — Medication 10 ML: at 21:24

## 2019-01-01 RX ADMIN — LIDOCAINE HYDROCHLORIDE 400 MG: 20 INJECTION, SOLUTION INFILTRATION; PERINEURAL at 15:40

## 2019-01-01 RX ADMIN — ACETAMINOPHEN 650 MG: 650 SUPPOSITORY RECTAL at 03:32

## 2019-01-01 RX ADMIN — PROPOFOL 50 MG: 10 INJECTION, EMULSION INTRAVENOUS at 15:17

## 2019-01-01 RX ADMIN — POTASSIUM CHLORIDE 10 MEQ: 200 INJECTION, SOLUTION INTRAVENOUS at 12:08

## 2019-01-01 RX ADMIN — METOPROLOL SUCCINATE 25 MG: 25 TABLET, EXTENDED RELEASE ORAL at 09:50

## 2019-01-01 RX ADMIN — FAMOTIDINE 20 MG: 10 INJECTION, SOLUTION INTRAVENOUS at 08:12

## 2019-01-01 RX ADMIN — FAMOTIDINE 20 MG: 10 INJECTION, SOLUTION INTRAVENOUS at 21:00

## 2019-01-01 RX ADMIN — LEVOTHYROXINE SODIUM 50 MCG: 50 TABLET ORAL at 05:51

## 2019-01-01 RX ADMIN — Medication 80 MCG: at 15:26

## 2019-01-01 RX ADMIN — HYDRALAZINE HYDROCHLORIDE 25 MG: 25 TABLET, FILM COATED ORAL at 21:33

## 2019-01-01 RX ADMIN — IOPAMIDOL 110 ML: 612 INJECTION, SOLUTION INTRAVENOUS at 15:22

## 2019-01-01 RX ADMIN — MAGNESIUM SULFATE HEPTAHYDRATE 2 G: 40 INJECTION, SOLUTION INTRAVENOUS at 07:51

## 2019-01-01 RX ADMIN — INSULIN LISPRO 2 UNITS: 100 INJECTION, SOLUTION INTRAVENOUS; SUBCUTANEOUS at 12:00

## 2019-01-01 RX ADMIN — INSULIN LISPRO 2 UNITS: 100 INJECTION, SOLUTION INTRAVENOUS; SUBCUTANEOUS at 12:31

## 2019-01-01 RX ADMIN — Medication 10 ML: at 06:15

## 2019-01-01 RX ADMIN — ACETAMINOPHEN 650 MG: 650 SUPPOSITORY RECTAL at 20:41

## 2019-01-01 RX ADMIN — BENZOCAINE, BUTAMBEN, AND TETRACAINE HYDROCHLORIDE 1 SPRAY: .028; .004; .004 AEROSOL, SPRAY TOPICAL at 14:02

## 2019-01-01 RX ADMIN — LABETALOL 20 MG/4 ML (5 MG/ML) INTRAVENOUS SYRINGE 10 MG: at 22:25

## 2019-01-01 RX ADMIN — LABETALOL 20 MG/4 ML (5 MG/ML) INTRAVENOUS SYRINGE 10 MG: at 13:45

## 2019-01-01 RX ADMIN — POTASSIUM CHLORIDE 10 MEQ: 200 INJECTION, SOLUTION INTRAVENOUS at 14:41

## 2019-01-01 RX ADMIN — HYDRALAZINE HYDROCHLORIDE 25 MG: 25 TABLET, FILM COATED ORAL at 17:12

## 2019-01-01 RX ADMIN — HYDRALAZINE HYDROCHLORIDE 10 MG: 20 INJECTION INTRAMUSCULAR; INTRAVENOUS at 23:02

## 2019-01-01 RX ADMIN — ASPIRIN 81 MG: 81 TABLET, COATED ORAL at 11:43

## 2019-01-01 RX ADMIN — INSULIN LISPRO 2 UNITS: 100 INJECTION, SOLUTION INTRAVENOUS; SUBCUTANEOUS at 14:20

## 2019-01-01 RX ADMIN — INSULIN LISPRO 2 UNITS: 100 INJECTION, SOLUTION INTRAVENOUS; SUBCUTANEOUS at 18:21

## 2019-01-01 RX ADMIN — CARBIDOPA AND LEVODOPA 1 TABLET: 25; 100 TABLET ORAL at 11:12

## 2019-01-01 RX ADMIN — Medication 10 ML: at 10:00

## 2019-01-01 RX ADMIN — CARBIDOPA AND LEVODOPA 1 TABLET: 25; 100 TABLET ORAL at 08:50

## 2019-01-01 RX ADMIN — HYDRALAZINE HYDROCHLORIDE 10 MG: 20 INJECTION INTRAMUSCULAR; INTRAVENOUS at 10:05

## 2019-01-01 RX ADMIN — SODIUM CHLORIDE 50 ML/HR: 900 INJECTION, SOLUTION INTRAVENOUS at 12:01

## 2019-01-01 RX ADMIN — FENTANYL CITRATE 25 MCG: 50 INJECTION INTRAMUSCULAR; INTRAVENOUS at 08:14

## 2019-01-01 RX ADMIN — Medication 10 ML: at 22:59

## 2019-01-01 RX ADMIN — APIXABAN 2.5 MG: 2.5 TABLET, FILM COATED ORAL at 18:20

## 2019-01-01 RX ADMIN — ASPIRIN 81 MG: 81 TABLET, COATED ORAL at 08:22

## 2019-01-01 RX ADMIN — METRONIDAZOLE 500 MG: 500 INJECTION, SOLUTION INTRAVENOUS at 05:31

## 2019-01-01 RX ADMIN — Medication 10 ML: at 06:04

## 2019-01-01 RX ADMIN — FAMOTIDINE 20 MG: 20 TABLET ORAL at 08:58

## 2019-01-01 RX ADMIN — SODIUM CHLORIDE: 900 INJECTION, SOLUTION INTRAVENOUS at 13:14

## 2019-01-01 RX ADMIN — Medication 10 ML: at 14:33

## 2019-01-01 RX ADMIN — CARBIDOPA AND LEVODOPA 1 TABLET: 25; 100 TABLET ORAL at 21:24

## 2019-01-01 RX ADMIN — HYDRALAZINE HYDROCHLORIDE 25 MG: 25 TABLET, FILM COATED ORAL at 08:58

## 2019-01-01 RX ADMIN — SODIUM CHLORIDE 5 MG/HR: 900 INJECTION, SOLUTION INTRAVENOUS at 09:39

## 2019-01-01 RX ADMIN — INSULIN LISPRO 2 UNITS: 100 INJECTION, SOLUTION INTRAVENOUS; SUBCUTANEOUS at 22:37

## 2019-01-01 RX ADMIN — CARBIDOPA AND LEVODOPA 1 TABLET: 25; 100 TABLET ORAL at 21:33

## 2019-01-01 RX ADMIN — LIDOCAINE HYDROCHLORIDE 30 ML: 10 INJECTION, SOLUTION INFILTRATION; PERINEURAL at 08:04

## 2019-01-01 RX ADMIN — PANTOPRAZOLE SODIUM 40 MG: 40 TABLET, DELAYED RELEASE ORAL at 08:52

## 2019-01-01 RX ADMIN — INSULIN LISPRO 2 UNITS: 100 INJECTION, SOLUTION INTRAVENOUS; SUBCUTANEOUS at 11:56

## 2019-01-01 RX ADMIN — Medication 80 MCG: at 14:25

## 2019-01-01 RX ADMIN — ACETAMINOPHEN 650 MG: 650 SUPPOSITORY RECTAL at 22:48

## 2019-01-01 RX ADMIN — CEFAZOLIN SODIUM 1 G: 1 INJECTION, POWDER, FOR SOLUTION INTRAMUSCULAR; INTRAVENOUS at 21:38

## 2019-01-01 RX ADMIN — Medication 10 ML: at 22:27

## 2019-01-01 RX ADMIN — POTASSIUM CHLORIDE 10 MEQ: 200 INJECTION, SOLUTION INTRAVENOUS at 09:58

## 2019-01-01 RX ADMIN — Medication 10 ML: at 12:52

## 2019-01-01 RX ADMIN — ASPIRIN 81 MG: 81 TABLET, COATED ORAL at 08:25

## 2019-01-01 RX ADMIN — PROPOFOL 40 MG: 10 INJECTION, EMULSION INTRAVENOUS at 16:02

## 2019-01-01 RX ADMIN — LEVOTHYROXINE SODIUM ANHYDROUS 37.5 MCG: 100 INJECTION, POWDER, LYOPHILIZED, FOR SOLUTION INTRAVENOUS at 11:57

## 2019-01-01 RX ADMIN — Medication 10 ML: at 15:58

## 2019-01-01 RX ADMIN — CARBIDOPA AND LEVODOPA 1 TABLET: 25; 100 TABLET ORAL at 21:41

## 2019-01-01 RX ADMIN — CARBIDOPA AND LEVODOPA 1 TABLET: 25; 100 TABLET ORAL at 09:42

## 2019-01-01 RX ADMIN — CARBIDOPA AND LEVODOPA 1 TABLET: 25; 100 TABLET ORAL at 22:36

## 2019-01-01 RX ADMIN — POTASSIUM CHLORIDE 40 MEQ: 750 TABLET, EXTENDED RELEASE ORAL at 14:09

## 2019-01-01 RX ADMIN — FENTANYL CITRATE 25 MCG: 50 INJECTION, SOLUTION INTRAMUSCULAR; INTRAVENOUS at 14:51

## 2019-01-01 RX ADMIN — CARBIDOPA AND LEVODOPA 1 TABLET: 25; 100 TABLET ORAL at 22:15

## 2019-01-01 RX ADMIN — ATROPINE SULFATE 0.25 MG: 0.4 INJECTION, SOLUTION ENDOTRACHEAL; INTRAMEDULLARY; INTRAMUSCULAR; INTRAVENOUS; SUBCUTANEOUS at 15:30

## 2019-01-01 RX ADMIN — Medication 80 MCG: at 14:27

## 2019-01-01 RX ADMIN — HYDRALAZINE HYDROCHLORIDE 10 MG: 20 INJECTION INTRAMUSCULAR; INTRAVENOUS at 14:07

## 2019-01-01 RX ADMIN — Medication 80 MCG: at 14:30

## 2019-01-01 RX ADMIN — CARBIDOPA AND LEVODOPA 1 TABLET: 25; 100 TABLET ORAL at 16:18

## 2019-01-01 RX ADMIN — CARBIDOPA AND LEVODOPA 1 TABLET: 25; 100 TABLET ORAL at 08:22

## 2019-01-01 RX ADMIN — CARBIDOPA AND LEVODOPA 1 TABLET: 25; 100 TABLET ORAL at 08:25

## 2019-01-01 RX ADMIN — FENTANYL CITRATE 25 MCG: 50 INJECTION, SOLUTION INTRAMUSCULAR; INTRAVENOUS at 15:47

## 2019-01-01 RX ADMIN — VANCOMYCIN HYDROCHLORIDE 1000 MG: 1 INJECTION, POWDER, LYOPHILIZED, FOR SOLUTION INTRAVENOUS at 08:23

## 2019-01-01 RX ADMIN — HYDRALAZINE HYDROCHLORIDE 25 MG: 25 TABLET, FILM COATED ORAL at 18:22

## 2019-01-01 RX ADMIN — ROCURONIUM BROMIDE 5 MG: 10 INJECTION, SOLUTION INTRAVENOUS at 14:19

## 2019-01-01 RX ADMIN — CARBIDOPA AND LEVODOPA 1 TABLET: 25; 100 TABLET ORAL at 22:20

## 2019-01-01 RX ADMIN — SODIUM CHLORIDE 50 ML/HR: 900 INJECTION, SOLUTION INTRAVENOUS at 12:56

## 2019-01-01 RX ADMIN — Medication 10 ML: at 14:48

## 2019-01-01 RX ADMIN — PANTOPRAZOLE SODIUM 40 MG: 40 TABLET, DELAYED RELEASE ORAL at 09:41

## 2019-01-01 RX ADMIN — APIXABAN 2.5 MG: 2.5 TABLET, FILM COATED ORAL at 08:56

## 2019-01-01 RX ADMIN — INSULIN LISPRO 2 UNITS: 100 INJECTION, SOLUTION INTRAVENOUS; SUBCUTANEOUS at 08:25

## 2019-01-01 RX ADMIN — INSULIN LISPRO 2 UNITS: 100 INJECTION, SOLUTION INTRAVENOUS; SUBCUTANEOUS at 13:39

## 2019-01-01 RX ADMIN — ROCURONIUM BROMIDE 25 MG: 10 INJECTION, SOLUTION INTRAVENOUS at 14:36

## 2019-01-01 RX ADMIN — APIXABAN 2.5 MG: 2.5 TABLET, FILM COATED ORAL at 18:51

## 2019-01-01 RX ADMIN — POTASSIUM CHLORIDE 10 MEQ: 200 INJECTION, SOLUTION INTRAVENOUS at 07:00

## 2019-01-01 RX ADMIN — METOPROLOL SUCCINATE 25 MG: 25 TABLET, EXTENDED RELEASE ORAL at 08:49

## 2019-01-01 RX ADMIN — POTASSIUM CHLORIDE 10 MEQ: 200 INJECTION, SOLUTION INTRAVENOUS at 08:38

## 2019-01-01 RX ADMIN — SODIUM CHLORIDE 50 ML/HR: 900 INJECTION, SOLUTION INTRAVENOUS at 11:08

## 2019-01-01 RX ADMIN — Medication 60 MCG: at 14:48

## 2019-01-01 RX ADMIN — HEPARIN SODIUM 4000 UNITS: 5000 INJECTION, SOLUTION INTRAVENOUS; SUBCUTANEOUS at 14:26

## 2019-01-01 RX ADMIN — CARBIDOPA AND LEVODOPA 1 TABLET: 25; 100 TABLET ORAL at 22:59

## 2019-01-01 RX ADMIN — SODIUM CHLORIDE 5 MG/HR: 900 INJECTION, SOLUTION INTRAVENOUS at 19:59

## 2019-01-01 RX ADMIN — MIDAZOLAM 1 MG: 1 INJECTION INTRAMUSCULAR; INTRAVENOUS at 14:08

## 2019-01-01 RX ADMIN — SODIUM CHLORIDE 15 MG/HR: 900 INJECTION, SOLUTION INTRAVENOUS at 05:01

## 2019-01-08 PROBLEM — I63.9 CVA (CEREBRAL VASCULAR ACCIDENT) (HCC): Status: ACTIVE | Noted: 2019-01-01

## 2019-01-08 NOTE — ED PROVIDER NOTES
80 y.o. female with past medical history significant for chronic back pain, hypertension, and Parkinson's disease who presents from home via EMS with chief complaint of left sided weakness. Per EMS, pt began with slurred speech, left sided facial droop, and left sided weakness at home today. EMS reports last known well time was 1225. EMS reports the pt's blood pressure en route was 164/134. Per EMS, pt is ambulatory at baseline. EMS reports the pt's family is en route. There are no other acute medical concerns at this time. Full history, physical exam, and ROS unable to be obtained due to:  Acuity condition. Social hx - Tobacco use: none, Alcohol Use: none PCP: Marlyn Mcelroy MD 
 
Note written by Meghna Self, as dictated by Jose Phillips MD 1:00 PM. The history is provided by the EMS personnel. The history is limited by the condition of the patient. No  was used. Past Medical History:  
Diagnosis Date  Anxiety  Arthritis  Chest pain 2008 Negative stress test and Holter monitor w/Dr Waldron Brittle.  Chronic back pain  Chronic venous insufficiency  Colon polyp   
 last scope normal 2007; Dr. Castellanos Hence  Cyst of right kidney  Depression  Diabetes ([address]  Hypercholesterolemia  Hypertension  Hypothyroidism  Memory disorder  Other ill-defined conditions(799.89)   
 heart murmur  Painful hip  Parkinson disease ([address]  Urinary frequency Sees [facility]. Theodore [facility] Past Surgical History:  
Procedure Laterality Date  COLONOSCOPY,DIAGNOSTIC  6/12/2015  HX BREAST BIOPSY  2002  HX HYSTERECTOMY Due to a prolapsed uterus  HX KNEE REPLACEMENT    
 left  HX KNEE REPLACEMENT  2006  
 right  HX ORTHOPAEDIC B knees.  HX OTHER SURGICAL    
 pelvic prolasp  UPPER GI ENDOSCOPY,BIOPSY  6/12/2015 Family History: Problem Relation Age of Onset  Cancer Mother   
     pancreatic  Cancer Father   
     colon  Cancer Sister   
     pancreatic  Diabetes Sister  Alzheimer Brother Social History Socioeconomic History  Marital status:  Spouse name: Not on file  Number of children: Not on file  Years of education: Not on file  Highest education level: Not on file Social Needs  Financial resource strain: Not on file  Food insecurity - worry: Not on file  Food insecurity - inability: Not on file  Transportation needs - medical: Not on file  Transportation needs - non-medical: Not on file Occupational History  Not on file Tobacco Use  Smoking status: Never Smoker  Smokeless tobacco: Never Used Substance and Sexual Activity  Alcohol use: No  
  Alcohol/week: 0.0 oz  
  Comment: Rare  Drug use: No  
 Sexual activity: No  
  Birth control/protection: Surgical  
Other Topics Concern  Not on file Social History Narrative  Not on file ALLERGIES: Norvasc [amlodipine]; Flexeril [cyclobenzaprine]; Percocet [oxycodone-acetaminophen]; Remeron [mirtazapine]; and Tramadol Review of Systems Unable to perform ROS: Acuity of condition Vitals:  
 01/08/19 1334 01/08/19 1346 01/08/19 1354 01/08/19 1400 BP:  (!) 185/122 178/73 164/77 Pulse:   80 74 Resp:   19 17 Temp:   98.4 °F (36.9 °C) SpO2:   93% Weight: 69.6 kg (153 lb 7 oz) Physical Exam  
Constitutional: She appears well-developed and well-nourished. No distress. HENT:  
Head: Normocephalic and atraumatic. Eyes: Conjunctivae are normal.  
Neck: Neck supple. Cardiovascular: Normal rate and regular rhythm. Pulmonary/Chest: Effort normal. No respiratory distress. Abdominal: She exhibits no distension. Musculoskeletal: Normal range of motion. She exhibits no deformity. Neurological: She is alert. Strong 5/5 strength in the right upper extremity, 1/5 strength in the left upper extremity. Global left sided elvis-neglect. Right-sided gaze deviation with gaze preference. Preservation of speech. Skin: Skin is warm and dry. Psychiatric: Her behavior is normal.  
Nursing note and vitals reviewed. Note written by Meghna Bennett, as dictated by Vy Chen MD 1:10 PM. MDM Number of Diagnoses or Management Options Acute ischemic stroke [facility]):  
 
81 yo female presents with stroke symptoms of right-sided gaze deviation with left hemineglect and left-sided body weakness. Onset of symptoms was within the hour noticed by family. She is then positive so a CT angiogram and perfusion studies were ordered from radiology. Patient has very severe presentation suggesting possible large vessel occlusion. Discussed with neurology and will admit for TPA monitoring, neuro interventional was consulted and will intervene on the lesion noted on CTA. Patient moved to angio suite in critical but stable condition, protecting airway appropriately. Procedures Critical Care Time: 30 minutes I personally performed critical care time separate of billable procedures which may include ordering and interpretation of testing, ordering of medications, direct patient assessment and reassessment, discussion with consultants or family, and documentation. CONSULT NOTE: 
1:10 PM Vy Chen MD spoke with Dr. Zeeshan Boston, Consult for Teleneurology. Discussed available diagnostic tests and clinical findings. Dr. Zeeshan Boston agrees with tPA administration after talking to the family and agrees with CTA and perfusion studies. PROGRESS NOTE: 
1:33 PM 
Pt family at bedside. Report that last known well was around 1210, and found the pt slumping over at about 1225 this afternoon. Total critical care time spent exclusive of procedures:  30 minutes CONSULT NOTE: 
 1:59 PM Patti Chadwick MD spoke with Dr. Villa Begum, Consult for Teleneurology. Discussed available diagnostic tests and clinical findings. I was called by the interventional radiologist and was informed the pt has a right posterior M2 occlusion. Dr. Villa Begum states she will consult with Dr. Edilberto Cesar to consider intervention. ED EKG interpretation: 
1:44 PM 
Rhythm: sinus rhythm with occasional PV's; and regular . Rate (approx.): 100 bpm; Axis: left axis deviation; incomplete right bundle branch block with secondary repolarization abnormality Note written by Meghna Duran, as dictated by Elizabeth Ferrer MD 2:35 PM. PROGRESS NOTE: 
2:37 PM 
Pt has been taken to IR by Dr. Edilberto Cesar.  
 
2:37 PM 
Patient is being admitted to the hospital.  The results of their tests and reasons for their admission have been discussed with them and/or available family. They convey agreement and understanding for the need to be admitted and for their admission diagnosis. Consultation will be made now with the inpatient physician for hospitalization.

## 2019-01-08 NOTE — ANESTHESIA PREPROCEDURE EVALUATION
Anesthetic History No history of anesthetic complications Review of Systems / Medical History Patient summary reviewed, nursing notes reviewed and pertinent labs reviewed Pulmonary Within defined limits Neuro/Psych CVA TIA Comments: Parkinsons Cardiovascular Hypertension: well controlled GI/Hepatic/Renal 
  
 
 
Renal disease: ARF Endo/Other Diabetes: well controlled, type 2 Hypothyroidism Other Findings Anesthetic History No history of anesthetic complications Review of Systems / Medical History Patient summary reviewed, nursing notes reviewed and pertinent labs reviewed Pulmonary Within defined limits Neuro/Psych Psychiatric history (Anxiety,, Depression) Comments: Memory disorder Cardiovascular Hypertension: well controlled Exercise tolerance: <4 METS Comments: Chronic venous insufficiency HyperlipidemiaNeg stress test 5-6-15 ECHO 5-6-15:  EF 55-60%, No AS 
EKG: RBBB 
CXR: Low lung volumes, left base atelectasis Not very active, walks with a cane/wheelchair GI/Hepatic/Renal 
  
 
 
 
 
 
Comments: Colon polyps Wt loss of 30 pounds in 3 months, change in bowel function Endo/Other Diabetes: type 2 Hypothyroidism: well controlled Arthritis Other Findings Comments: Hard of hearing Physical Exam 
 
Airway Mallampati: II 
TM Distance: 4 - 6 cm Neck ROM: normal range of motion Cardiovascular Rhythm: regular Rate: normal 
 
 
 
 Dental 
 
Dentition: Full lower dentures and Full upper dentures Pulmonary Decreased breath sounds: bibasilar Abdominal 
GI exam deferred Other Findings Anesthetic Plan ASA: 3 Anesthesia type: general 
 
 
 
 
Induction: Intravenous Anesthetic plan and risks discussed with: Patient Physical Exam 
 
Airway Mallampati: II 
TM Distance: > 6 cm 
 Neck ROM: normal range of motion Mouth opening: Normal 
 
 Cardiovascular Regular rate and rhythm,  S1 and S2 normal,  no murmur, click, rub, or gallop Dental 
 
Dentition: Full lower dentures and Full upper dentures Pulmonary Breath sounds clear to auscultation Abdominal 
GI exam deferred Other Findings Anesthetic Plan ASA: 3, emergent Anesthesia type: general 
 
 
 
 
Induction: Intravenous and RSI Anesthetic plan and risks discussed with: Patient

## 2019-01-08 NOTE — PROCEDURES
NEUROINTERVENTIONAL SURGERY POST-PROCEDURE NOTE PROCEDURE: 
Right MCA thrombectomy IA infusion of nonlytic agent: verapamil: MARGARITA Cerebral angiogram 
US guided arterial access Angioseal 
 
VESSEL(S) STUDIED: 
1. RCCA 2. MARGARITA 3. LCCA VESSEL(S) TREATED: 
1. Right MCA thrombectomy 2. MARGARITA verapamil infusion (5 mg) CSC METRICS: 
   1. GROIN PUNCTURE TIME: 14:25 2. FIRST PASS TIME: 15:08 
   3. FINAL RECANALIZATION TIME: 15:42 4. PRE-TREATMENT TICI SCORE: 0 
   5. POST-TREATMENT TICI SCORE: 3 PRELIMINARY REPORT & DISPOSITION:  
Occlusion of large right MCA M2 posterior division. Successful thrombectomy (organized firm fibrous clot) with Penumbra 4MAX and 3x20 Trevo stent retriever. COMPLICATIONS: 
None immediate FOLLOW-UP: 
ICU observation SBP goal 110-140 A-line DATE OF SERVICE: 
1/8/2019 4:04 PM  
 
ATTENDING SURGEON(S): 
Kristin Carver MD 
 
 
ANESTHESIA:  
MAC MEDICATIONS:  
See nursing record 190 mL Isovue 300 Verapamil 5 mg IA PUNCTURE SITE: 
Right common femoral artery. Arteriotomy closed with 8F Angiseal.  Flat x 4 hours. @SIG@

## 2019-01-08 NOTE — ED TRIAGE NOTES
Triage Note: Patient is coming in with right sided gaze devaition with left sided weakness, slurred speech and facial droop.  /134. Last known well 1225

## 2019-01-08 NOTE — CONSULTS
[place]: Donna Carpenter 
[phone] Requesting/referring provider:  
Reason for Consult: HPI: Laz Tapia, a 80y.o. year-old who presents for evaluation of CVA, elevated troponin. Apparently was fine this morning but this afternoon developed facial droop and slurred speech. Given TPA and underwent embolectomy. Clot organized fibrinous. Now unable to speak, in restraints, pulling at gown with fingers of right hand. Gaze is rightward. ROS unobtainable due to patient factors EKG shows sinus tach rbbb anterior ischemia unchanged. Assessment/Plan: 1. CVA, acute s/p TPA and embolectomy. Concern for cardiac source due to organized fibrin character of clot 
-CINDY /TTE pending 
-watch on tele for afib as potential source, no hx  
2. Syncope nothing recent 3. Diabetes glucose 180 4. Dyslipidemia taken off statin last year 5. HTN- multiple meds PTA, cardene gtt for now ip. Soc no tob no etoh FHx no early cad She  has a past medical history of Anxiety, Arthritis, Chest pain, Chronic back pain, Chronic venous insufficiency, Colon polyp, Cyst of right kidney, Depression, Diabetes ([address] Hypercholesterolemia, Hypertension, Hypothyroidism, Memory disorder, Other ill-defined conditions(799.89), Painful hip, Parkinson disease ([address] and Urinary frequency. PE 
Vitals:  
 01/08/19 1645 01/08/19 1700 01/08/19 1715 01/08/19 1725 BP: 140/60 151/66 152/77 (!) 129/114 Pulse: 79 94 (!) 104 (!) 107 Resp: 14 14  17 Temp:      
SpO2: 100% 100% 100% 91% Weight:      
 Body mass index is 25.53 kg/m². General appearance - alert, ill, moving about in restraints Mental status - agitated Eyes - sclera anicteric, moist mucous membranes Neck - supple, no significant adenopathy Lymphatics - no  lymphadenopathy Chest - clear to auscultation, no wheezes, rales or rhonchi Heart - tachy, regular rhythm, distant heart sounds no murmur noted Abdomen - soft, nontender, nondistended, no masses or organomegaly Back exam - full range of motion, no tenderness Neurological - consistent with acute CVA, rightward gaze, decreased strength. Not moving LUE, exam limited by restraints, agitation, procedures Musculoskeletal - no muscular tenderness noted, normal strength Extremities - peripheral pulses 1+, 1+ pedal edema Skin - normal coloration  no rashes Recent Labs: 
Lab Results Component Value Date/Time Cholesterol, total 120 06/16/2018 02:14 AM  
 HDL Cholesterol 63 06/16/2018 02:14 AM  
 LDL, calculated 39 06/16/2018 02:14 AM  
 Triglyceride 90 06/16/2018 02:14 AM  
 CHOL/HDL Ratio 1.9 06/16/2018 02:14 AM  
 
Lab Results Component Value Date/Time Creatinine 0.95 01/08/2019 01:51 PM  
 
Lab Results Component Value Date/Time BUN 19 01/08/2019 01:51 PM  
 
Lab Results Component Value Date/Time Potassium 4.1 01/08/2019 01:51 PM  
 
Lab Results Component Value Date/Time Hemoglobin A1c 5.8 06/16/2018 02:14 AM  
 
Lab Results Component Value Date/Time HGB 10.3 (L) 01/08/2019 01:51 PM  
 
Lab Results Component Value Date/Time PLATELET 440 09/02/2167 01:51 PM  
 
 
Reviewed: 
Past Medical History:  
Diagnosis Date  Anxiety  Arthritis  Chest pain 2008 Negative stress test and Holter monitor w/Dr Cecil Murguia.  Chronic back pain  Chronic venous insufficiency  Colon polyp   
 last scope normal 2007; Dr. Suarez Jubilee  Cyst of right kidney  Depression  Diabetes ([address]  Hypercholesterolemia  Hypertension  Hypothyroidism  Memory disorder  Other ill-defined conditions(799.89)   
 heart murmur  Painful hip  Parkinson disease ([address]  Urinary frequency Sees Dr. Argueta Tangirnaq with [facility] Social History Tobacco Use Smoking Status Never Smoker Smokeless Tobacco Never Used Social History Substance and Sexual Activity Alcohol Use No  
  Alcohol/week: 0.0 oz  
 Comment: Rare Allergies Allergen Reactions  Norvasc [Amlodipine] Swelling  Flexeril [Cyclobenzaprine] Other (comments) Caused pt to fall & hallucination  Percocet [Oxycodone-Acetaminophen] Itching  Remeron [Mirtazapine] Diarrhea  Tramadol Other (comments) Caused falls Current Facility-Administered Medications Medication Dose Route Frequency  sodium chloride 0.9 % bolus infusion 100 mL  100 mL IntraVENous RAD ONCE  
 iopamidol (ISOVUE-370) 76 % injection 120 mL  120 mL IntraVENous RAD ONCE  
 sodium chloride (NS) flush 10 mL  10 mL IntraVENous RAD ONCE  
 sodium chloride (NS) flush 5-40 mL  5-40 mL IntraVENous Q8H  
 sodium chloride (NS) flush 5-40 mL  5-40 mL IntraVENous PRN  
 labetalol (NORMODYNE;TRANDATE) 20 mg/4 mL (5 mg/mL) injection 10 mg  10 mg IntraVENous Q10MIN PRN  
 niCARdipine (CARDENE) 25 mg in 0.9% sodium chloride 250 mL infusion  0-15 mg/hr IntraVENous TITRATE  ondansetron (ZOFRAN) injection 4 mg  4 mg IntraVENous Q6H PRN  
 naloxone (NARCAN) injection 0.4 mg  0.4 mg IntraVENous PRN  
 0.9% sodium chloride infusion  75 mL/hr IntraVENous CONTINUOUS  
 levETIRAcetam (KEPPRA) 500 mg in 0.9% sodium chloride 100 mL IVPB  500 mg IntraVENous Q12H  
 famotidine (PF) (PEPCID) 20 mg in sodium chloride 0.9% 10 mL injection  20 mg IntraVENous Q24H  
 levothyroxine (SYNTHROID) injection 37.5 mcg  37.5 mcg IntraVENous Q24H  
 sodium chloride (NS) flush 5-40 mL  5-40 mL IntraVENous Q8H  
 glucose chewable tablet 16 g  4 Tab Oral PRN  
 dextrose (D50W) injection syrg 12.5-25 g  25-50 mL IntraVENous PRN  
 glucagon (GLUCAGEN) injection 1 mg  1 mg IntraMUSCular PRN  
 insulin lispro (HUMALOG) injection   SubCUTAneous Q6H  
 sodium chloride (NS) flush 5-40 mL  5-40 mL IntraVENous Q8H  
 sodium chloride (NS) flush 5-40 mL  5-40 mL IntraVENous PRN  niCARdipine (CARDENE) 25 mg in 0.9% sodium chloride 250 mL infusion 5-15 mg/hr IntraVENous TITRATE  PHENYLephrine (ESTEBAN-SYNEPHRINE) 30 mg in 0.9% sodium chloride 250 mL infusion   mcg/min IntraVENous TITRATE  sugammadex (BRIDION) 100 mg/mL injection 278 mg  4 mg/kg IntraVENous NOW  
 LORazepam (ATIVAN) injection 1 mg  1 mg IntraVENous Q2H PRN Facility-Administered Medications Ordered in Other Encounters Medication Dose Route Frequency  fentaNYL citrate (PF) injection    PRN  
 rocuronium (ZEMURON) injection   IntraVENous PRN  
 succinylcholine (ANECTINE) injection   IntraVENous PRN  
 lidocaine (PF) (XYLOCAINE) 20 mg/mL (2 %) injection   IntraVENous PRN  propofol (DIPRIVAN) 10 mg/mL injection   IntraVENous PRN  
 ondansetron (ZOFRAN) injection    PRN  
 PHENYLephrine (ESTEBAN-SYNEPHRINE) 10 mg in 0.9% sodium chloride 250 mL infusion   IntraVENous CONTINUOUS  
 PHENYLephrine (NEOSYNEPHRINE) in NS syringe   IntraVENous PRN  
 atropine 0.4 mg/mL injection   IntraVENous PRN Filippo Mallory MD 
[facility] heart and Vascular [place] [address]

## 2019-01-08 NOTE — PROGRESS NOTES
Date/Time Last Known Well Time: 1-8-19 12:25 Date/Time Discovery of Stroke Symptoms: 
 
NIHSS upon arrival: 20 Time to IR Suite:  14:10 Time of Arterial Puncture: 14:25  (15 min) Start of IA Infusion of tPA or 1st pass of recanalization device in Target vessel:  1st pass   15:12  (47 min from puncture) Time of Revascularization:  15:42 (77 min from puncture: 
 
Pretreatment TICI:  0 Post treatment TICI:  3 Procedure Stop Time(When sterile drape is off):  16:25  (CT done on angio table at this time.) Time of first set of VS, neuro cks, groin, and pulse checks: 16:30 Time transfer to ICU: 17:15 Time of last VS, neuro, groin, and pulse checks documentation before ICU caring for pt:  [id number] TRANSFER - OUT REPORT: 
 
Verbal report given to Darion Moreno RN on  [facility], being transferred to ICU for routine progression of care Report consisted of patients Situation, Background, Assessment and  
Recommendations(SBAR). Information from the following report(s) ED Summary, Procedure Summary, MAR and Cardiac Rhythm NSR was reviewed with the receiving nurse. Lines:  Anesthesia report at bedside for meds and airway during procedure by PEYTON Mariscal. Opportunity for questions and clarification was provided. Patient transported with: ETT and ambu, Cardiac Monitor Registered Nurse

## 2019-01-08 NOTE — PROGRESS NOTES
1730: TRANSFER - IN REPORT: 
 
Verbal report received from RN(name) on Emilee Favors  being received from Angio(unit) for routine post - op Report consisted of patients Situation, Background, Assessment and  
Recommendations(SBAR). Information from the following report(s) SBAR, Kardex, ED Summary, OR Summary, Procedure Summary, Intake/Output, MAR and Accordion was reviewed with the receiving nurse. for questions and clarification was provided. Assessment completed upon patients arrival to unit and care assumed. 1745: Patient extubated at bedside by Anesthesia to 2 L NC. Anesthesia placed art line in left wrist at bedside post extubation.

## 2019-01-08 NOTE — PROGRESS NOTES
1410 Rcvd. In angio for Code Stroke from ER. Neuro evaluation unchanged from ER. NIH at this time per by Dr. Paredes Dopp at bedside upon arrival to Angio remains unchanged from ER. Family at bedside and given Know Stroke Booklet. Daughter [facility]  (Cell [phone]) at bedside. Family verbalized understanding of procedure. Into angio suite upon arrival.  DP and PT pulses easily found with doppler and marked. Anesthesia at bedside to intubate with sz 7 ETT. TPA infusing upon arrival into #22 left wrist.   
 
See anesthesia notes for airway, VS and medications.

## 2019-01-08 NOTE — PROGRESS NOTES
Unable to extubate following procedure as patient not responding. Dr. Victor Manuel Hernandez made aware. Will transport to ICU with ETT in place. Patient awoke, moving right arm with right radial saul being dislodged. Pressure held at site after removal of A-line and pressure dressing applied for transport. PEYTON Miranda and 2 RNs with patient for transport on cardiac monitor to ICU. BP obtained by cuff at this time. Report at bedside to [facility] with review of procedure, VS timing and results, neuro checks with decreased mental status as prior to procedure, and post orders reviewed.

## 2019-01-08 NOTE — PROGRESS NOTES
Keppra held per ER MD until TPA infused. Dr. Mayo Vicente states he does not want the Keppra given until after the procedure. Review with Dr. Mayo Vicente prior to giving [address]. Lower dentures removed prior to intubation and placed in pink cup with clothing bag. Upper denture not removed as very tight.

## 2019-01-08 NOTE — ADVANCED PRACTICE NURSE
Neurointerventional Surgery Brief Progress Note: We were called by teleneurologist to look at imaging on this patient at approx. 1:50 PM which revealed an acute right MCA M2 occlusion. Patients last known well, per her daughter was 11:47 AM. Per facility her last known well was 12:25. She began with slurred speech, left sided facial droop, and left sided weakness. Her NIH stroke scale on admission was 22. The teleneurologist recommended TPA which was started at approx 1:57 PM. She has had little to no improvement . At the start time of procedure 2:28 PM NIH stroke scale was still 20. I spoke to her daughter at bedside who consented for the procedure of Cerebral Angiogram with possible thrombectomy with possible groin seal closure device. This procedure has been fully reviewed with the patients daughter and written informed consent has been obtained. Ramos Lomeli [facility] Neurointerventional Surgery

## 2019-01-08 NOTE — PROGRESS NOTES
Spiritual Care Assessment/Progress Note [address] 
 
 
NAME: Leticia Fishman      MRN: [id number] AGE: 80 y.o. SEX: female Presybeterian Affiliation: [facility]  
Language: [place] 1/8/2019     Total Time (in minutes): 7 Spiritual Assessment begun in [address] DEP through conversation with: 
  
    []Patient        [] Family    [] Friend(s) Reason for Consult: Other (comment)(Code S) Spiritual beliefs: (Please include comment if needed) 
   [] Identifies with a mckenna tradition:     
   [] Supported by a mckenna community:        
   [] Claims no spiritual orientation:       
   [] Seeking spiritual identity:            
   [] Adheres to an individual form of spirituality:       
   [x] Not able to assess:                   
 
    
Identified resources for coping:  
   [] Prayer                           
   [] Music                  [] Guided Imagery 
   [] Family/friends                 [] Pet visits [] Devotional reading                         [] Unknown 
   [] Other:                                          
 
 
Interventions offered during this visit: (See comments for more details) Patient Interventions: Crisis Plan of Care: 
 
 [] Support spiritual and/or cultural needs  
 [] Support AMD and/or advance care planning process    
 [] Support grieving process 
 [] Coordinate Rites and/or Rituals  
 [] Coordination with community clergy [] No spiritual needs identified at this time 
 [] Detailed Plan of Care below (See Comments)  [] Make referral to Music Therapy 
[] Make referral to Pet Therapy    
[] Make referral to Addiction services 
[] Make referral to [facility] 
[] Make referral to Spiritual Care Partner 
[] No future visits requested       
[] Follow up visits as needed Comments: Responded to Code S called for Ms Chavez Eller in ED. EMS was taking patient directly to CT and no family was present when  arrived. Please notify [facility] Michel Mckee if  support desired. : Rev. Eyal Prescott. [facility]; [facility], to contact [phone] Michel Mckee call: [phone]

## 2019-01-09 NOTE — ADDENDUM NOTE
Addendum  created 01/08/19 1915 by Silva Lam, CRNA Intraprocedure LDAs edited, Intraprocedure Meds edited, LDA properties accepted

## 2019-01-09 NOTE — PROGRESS NOTES
Report received from [address] [place] [address]. 1730: dr Janelle Curtis and dr Gloria Anglin on the floor and made aware of pt's mri results that was completed at 1700.

## 2019-01-09 NOTE — PROGRESS NOTES
Neurocritical Care Progress Note Kathie DARIN Novak Cell: [phone] Admit Date: 2019 LOS: 1 day Daily Progress Note: 2019 S/P: Right MCA thrombectomy by Dr. Yesy Tenorio 19 HPI: Ms Asha Cronin presented to the ED  via EMS with complaints of left sided weakness, left facial droop and slurred speech. Her last known well was 11:47 per her daughter. In the ED a Code S was called and a head CTA was performed which showed an acute right MCA M2 occlusion. Her NIH on arrival was 22. She was given TPA in the ED and her NIH improved to 20, so she was taken to angio for thrombectomy by Dr. Yesy Tenorio, with successful removal of clot. Subjective:  
 
Patient remains somnolent . Opens eyes very briefly to name. Unfortunately the stroke scale has not improved ( 20 today) . Followed one command to squeeze with right hand. Need MRI to be completed. NIH Stroke Scale : 20 LOC-2 LOC-2 Best Gaze - 1 Visual- 0 Facial Palsy- 2 Motor Arm - L ( 4) R (3) Motor Leg - L (3) R (3) Limb Ataxia - Roberto Carlos Conradi Sensory- 0 Best Language- 3 Dysarthria- Roberto Carlos Conradi Extinction and Inattention- 0 Allergies Allergen Reactions  Norvasc [Amlodipine] Swelling  Flexeril [Cyclobenzaprine] Other (comments) Caused pt to fall & hallucination  Percocet [Oxycodone-Acetaminophen] Itching  Remeron [Mirtazapine] Diarrhea  Tramadol Other (comments) Caused falls Review of Systems: 
Unable to obtain meaningful ROS due to AMS. Objective:  
Vital signs Temp (24hrs), Av.3 °F (36.8 °C), Min:96.6 °F (35.9 °C), Max:100.5 °F (38.1 °C) 
 701 - 1900 In: -  
Out: 50 [Urine:50]  1901 -  07 In: 2594.2 [I.V.:2594.2] Out: 150 [Urine:100] Visit Vitals /47 Pulse 85 Temp (!) 100.5 °F (38.1 °C) Resp 26 Wt 154 lb 15.7 oz (70.3 kg) SpO2 98% BMI 25.79 kg/m² O2 Flow Rate (L/min): 2 l/min O2 Device: Nasal cannula Vitals: 01/09/19 0621 01/09/19 0700 01/09/19 0800 01/09/19 0900 BP: 143/59 127/49 127/49 127/47 Pulse: 82 82 81 85 Resp: 21 24 20 26 Temp:   (!) 100.5 °F (38.1 °C) SpO2: 99% 100% 100% 98% Weight:      
  
Physical Exam: 
Gen:NAD. Neuro: Responsive to painful stimuli only, does not follows commands. Pupils 2mm bilaterally, round and sluggish. Face symmetric. QUESADA spontaneously but moves right more than left. Gait deferred. Skin: Right groin puncture site soft, C/D/I with scant blood on dressing at this time. Labs: 
Lab Results Component Value Date/Time WBC 15.7 (H) 01/09/2019 05:15 AM  
 HGB 9.3 (L) 01/09/2019 05:15 AM  
 HCT 29.4 (L) 01/09/2019 05:15 AM  
 PLATELET 507 07/99/3146 05:15 AM  
 MCV 97.0 01/09/2019 05:15 AM  
 
Lab Results Component Value Date/Time Sodium 142 01/09/2019 05:15 AM  
 Potassium 4.3 01/09/2019 05:15 AM  
 Chloride 111 (H) 01/09/2019 05:15 AM  
 CO2 20 (L) 01/09/2019 05:15 AM  
 Anion gap 11 01/09/2019 05:15 AM  
 Glucose 214 (H) 01/09/2019 05:15 AM  
 BUN 20 01/09/2019 05:15 AM  
 Creatinine 0.88 01/09/2019 05:15 AM  
 BUN/Creatinine ratio 23 (H) 01/09/2019 05:15 AM  
 GFR est AA >60 01/09/2019 05:15 AM  
 GFR est non-AA >60 01/09/2019 05:15 AM  
 Calcium 7.9 (L) 01/09/2019 05:15 AM  
 
Imaging: CT head 1/8/19 at 476 1626 showed no acute intracranial abnormality. CTA head 1/8/19 showed acute thromboembolic occlusion of the proximal to mid posterior division M2 segment right middle cerebral artery. There is associated decreased perfusion to the right parieto-occipital and posterior temporal lobes with questionable small increased blood volume in the parietal lobe. Diminutive right vertebral artery with age indeterminant occlusion of the distal V4 segment. Left vertebral artery and basilar artery are patent and unremarkable. CT head 1/8/19 at 2019 shows findings suggesting extensive right MCA territory acute infarction with hemorrhagic foci as above.  Repeat CT imaging study recommended in several hours to determine the evolution of findings, or earlier if clinically indicated. CT Head 1/9 :  
 
Impression: CT reveals evolving RMCA infarction. The areas of hyperdensity in the R MCA territory that were reported as hemorrhage have resolved and represented residual contrast in damaged tissue, rather than hemorrhage. The increased density posterior to the trigone of the left latteral ventricle may actually represent a small amount of hemorrhage, of uncertain significance.  
  
 
 
Assessment:  
Principal Problem: 
  CVA (cerebral vascular accident) ([address] (1/8/2019) Plan:  
 
1.) Right MCA M2 occlusion CVA 
 - s/p thrombectomy by Dr. Duane Keller 1/8/19 
 - Received TPA in ED 
 - repeat head CT reveals evolving RMCA infraction. There is an area of hyperdensity in R MCA territory that represents staining.  
 - Hold ASA - Lipid panel pending 
 - PT/OT/SLP consults placed - MRI brain pending 
  
2.) HTN 
 - SBP goal 110-140 
  - Cardene PRN 
 - Aly/Labetalol PRN 
 - ECHO pending 
 
3.) Hx DMII, well controlled - Takes Janumet and glimepiride at home, hold PO meds for now - A1C pending, last A1C 6/16/18 was 5.8 
 - SSI and accuchecks 
 - Hospitalist following 
 
4.) Elevated troponin - Troponin 0.76 today, serial troponins ordered - Cardiology consulted Discussed with Dr. Juan Pretty and ICU nurse.   
 
Steve West NP

## 2019-01-09 NOTE — PROGRESS NOTES
[facility] Daily Progress Note HPI: Shellie Dangelo, a 80y.o. year-old who presented for evaluation of CVA, elevated troponin. Apparently she was fine the morning of admission but in the afternoon developed facial droop and slurred speech. Given TPA and underwent embolectomy - showed organized fibrinous clot - suspicion for Atrial Fib. Unable to obtain ROS due to patient factors on admission. Update: 
Moving extremities spontaneously, RN reported that she said \"yes\" this AM 
Unresponsive currently. Unable to obtain ROS. No family at bedside. Assessment/Plan: 1. CVA, acute s/p TPA and embolectomy - concern for cardiac source due to organized fibrinous character of clot, awaiting TTE results, watching telemetry for Atrial Fib (none thusfar) 2. Syncope - none recently 3. Diabetes mellitus - management per primary team, A1C 5.5% 4. Dyslipidemia - taken off statin last year, LDL 50 
5. HTN- on multiple meds PTA, now on IV cardene at 7.5 mg/hr 6. Anemia - Hgb 9.3, workup per primary team 
7. Elevated troponin - non-specific in the setting of acute CVA, no ischemic changes on ECG, TTE results pending 8. RBBB - chronic 9. Loud systolic ejection murmur - TTE results pending, suspicious for AS Soc no tob no etoh FHx no early cad She  has a past medical history of Anxiety, Arthritis, Chest pain, Chronic back pain, Chronic venous insufficiency, Colon polyp, Cyst of right kidney, Depression, Diabetes ([address] Hypercholesterolemia, Hypertension, Hypothyroidism, Memory disorder, Other ill-defined conditions(799.89), Painful hip, Parkinson disease ([address] and Urinary frequency. PE 
Vitals:  
 01/10/19 0700 01/10/19 0759 01/10/19 0800 01/10/19 0900 BP: 130/51  132/47 131/56 Pulse: 90  93 90 Resp: 19  17 15 Temp:   99.5 °F (37.5 °C) SpO2: 100%  99% 99% Weight:  151 lb 10.8 oz (68.8 kg) Height:      
 Body mass index is 25.24 kg/m². General appearance - unresponsive Mental status - unresponsive Eyes - sclera anicteric, moist mucous membranes Neck - supple Lymphatics - not assessed Chest - clear to auscultation anteriorly Heart - regular rate and rhythm, distant heart sounds, 4/6 PATRICIA Abdomen - soft, nontender, nondistended Back exam - not assessed Neurological - unresponsive, not assessed Musculoskeletal - not assessed Extremities - peripheral pulses 1+, 1+ LE edema Skin - normal coloration  no rashes Telemetry: NSR, RBBB Recent Labs: 
Lab Results Component Value Date/Time Cholesterol, total 132 01/09/2019 05:15 AM  
 HDL Cholesterol 73 01/09/2019 05:15 AM  
 LDL, calculated 50.2 01/09/2019 05:15 AM  
 Triglyceride 44 01/09/2019 05:15 AM  
 CHOL/HDL Ratio 1.8 01/09/2019 05:15 AM  
 
Lab Results Component Value Date/Time Creatinine 0.66 01/10/2019 04:52 AM  
 
Lab Results Component Value Date/Time BUN 12 01/10/2019 04:52 AM  
 
Lab Results Component Value Date/Time Potassium 3.4 (L) 01/10/2019 04:52 AM  
 
Lab Results Component Value Date/Time Hemoglobin A1c 5.5 01/09/2019 05:19 AM  
 
Lab Results Component Value Date/Time HGB 8.1 (L) 01/10/2019 04:52 AM  
 
Lab Results Component Value Date/Time PLATELET 291 02/09/8766 04:52 AM  
 
 
Reviewed: 
Past Medical History:  
Diagnosis Date  Anxiety  Arthritis  Chest pain 2008 Negative stress test and Holter monitor w/Dr Keshia Lund.  Chronic back pain  Chronic venous insufficiency  Colon polyp   
 last scope normal 2007; Dr. Tonie Garcia  Cyst of right kidney  Depression  Diabetes ([address]  Hypercholesterolemia  Hypertension  Hypothyroidism  Memory disorder  Other ill-defined conditions(799.89)   
 heart murmur  Painful hip  Parkinson disease ([address]  Urinary frequency Sees Dr. Karlo Hunt with [facility] Social History Tobacco Use Smoking Status Never Smoker Smokeless Tobacco Never Used Social History Substance and Sexual Activity Alcohol Use No  
 Alcohol/week: 0.0 oz  
 Comment: Rare Allergies Allergen Reactions  Norvasc [Amlodipine] Swelling  Flexeril [Cyclobenzaprine] Other (comments) Caused pt to fall & hallucination  Percocet [Oxycodone-Acetaminophen] Itching  Remeron [Mirtazapine] Diarrhea  Tramadol Other (comments) Caused falls Current Facility-Administered Medications Medication Dose Route Frequency  potassium chloride 10 mEq in 50 ml IVPB  10 mEq IntraVENous Q1H  
 hydrALAZINE (APRESOLINE) 20 mg/mL injection 10 mg  10 mg IntraVENous Q6H PRN  
 labetalol (NORMODYNE;TRANDATE) 20 mg/4 mL (5 mg/mL) injection 10 mg  10 mg IntraVENous Q4H PRN  
 acetaminophen (TYLENOL) suppository 650 mg  650 mg Rectal Q4H PRN  
 sodium chloride (NS) flush 5-40 mL  5-40 mL IntraVENous Q8H  
 sodium chloride (NS) flush 5-40 mL  5-40 mL IntraVENous PRN  
 ondansetron (ZOFRAN) injection 4 mg  4 mg IntraVENous Q6H PRN  
 naloxone (NARCAN) injection 0.4 mg  0.4 mg IntraVENous PRN  
 0.9% sodium chloride infusion  75 mL/hr IntraVENous CONTINUOUS  
 famotidine (PF) (PEPCID) 20 mg in sodium chloride 0.9% 10 mL injection  20 mg IntraVENous Q24H  
 [START ON 1/11/2019] levothyroxine (SYNTHROID) injection 37.5 mcg  37.5 mcg IntraVENous Q24H  
 sodium chloride (NS) flush 5-40 mL  5-40 mL IntraVENous Q8H  
 glucose chewable tablet 16 g  4 Tab Oral PRN  
 dextrose (D50W) injection syrg 12.5-25 g  25-50 mL IntraVENous PRN  
 glucagon (GLUCAGEN) injection 1 mg  1 mg IntraMUSCular PRN  
 insulin lispro (HUMALOG) injection   SubCUTAneous Q6H  
 sodium chloride (NS) flush 5-40 mL  5-40 mL IntraVENous Q8H  
 sodium chloride (NS) flush 5-40 mL  5-40 mL IntraVENous PRN  niCARdipine (CARDENE) 25 mg in 0.9% sodium chloride 250 mL infusion  5-15 mg/hr IntraVENous TITRATE  PHENYLephrine (ESTEBAN-SYNEPHRINE) 30 mg in 0.9% sodium chloride 250 mL infusion   mcg/min IntraVENous TITRATE  LORazepam (ATIVAN) injection 1 mg  1 mg IntraVENous Q2H PRN MD Nel Guerrier [facility] [place] [address]

## 2019-01-09 NOTE — PROGRESS NOTES
Speech Path Consult received and appreciated. Chart reviewed. Patient extubated and laying in bed without acute distress noted. Spoke with Vicci Service, RN who reports patient is not overly responsive, not following commands and is not appropriate yet for skilled SLP evaluation. Will continue to follow with you for formal assessment as appropriate. Akilah Pike MS, CCC-SLP, [facility]

## 2019-01-09 NOTE — PROGRESS NOTES
Neurointerventional Surgery: 
 
The pt is a 80 y.o. female who presents with right MCA M2 thrombus now s/p successful thrombectomy by Dr Meera Arboleda. Pt maintaining good BP with out support. Cardiology following for cardiac ischemia. Noted to have decreased MS so sent for CT. CT reveals evolving RMCA infarction. The areas of hyperdensity in the R MCA territory are more likely to be contrast staining than actual hemorrhage. Impression and Plan: 
 
No  Significant hemorrhagic conversion. Evolving RMCA stroke. Continue medical support. Repeat CT at 4 AM. Jefferson Elizondo MD 
Professor of Radiology, Neurology and Neurological Surgery [facility]

## 2019-01-09 NOTE — PROGRESS NOTES
Neurocritical Care Progress Note Bhavna Polanco NP Neurocritical Care Nurse Practitioner [facility] Cell: [phone] Admit Date: 2019 LOS: 0 days Daily Progress Note: 2019 S/P: Right MCA thrombectomy by Dr. Liseth Kaufman 19 HPI: Ms Azul Odom presented to the ED today via EMS with complaints of left sided weakness, left facial droop and slurred speech. Her last known well was 11:47 per her daughter. In the ED a Code S was called and a head CTA was performed which showed an acute right MCA M2 occlusion. Her NIH on arrival was 22. She was given TPA in the ED and her NIH improved to 20, so she was taken to angio for thrombectomy by Dr. Liseth Kaufman, with successful removal of clot. Subjective:  
 
Pt appears agitated on assessment, trying to pull lines and monitor wires. Was given ativan at 1800 but remains restless. Pt sent for stat head CT. Pt currently only responsive to painful stimuli. Allergies Allergen Reactions  Norvasc [Amlodipine] Swelling  Flexeril [Cyclobenzaprine] Other (comments) Caused pt to fall & hallucination  Percocet [Oxycodone-Acetaminophen] Itching  Remeron [Mirtazapine] Diarrhea  Tramadol Other (comments) Caused falls Review of Systems: 
Unable to obtain meaningful ROS due to AMS. Objective:  
Vital signs Temp (24hrs), Av.9 °F (36.6 °C), Min:97.4 °F (36.3 °C), Max:98.4 °F (36.9 °C) No intake/output data recorded.  07 - 1900 In: 700 [I.V.:700] Out: - Visit Vitals BP 97/75 Pulse 91 Temp 97.4 °F (36.3 °C) Resp 22 Wt 68.4 kg (150 lb 12.7 oz) SpO2 98% BMI 25.09 kg/m² O2 Flow Rate (L/min): 2 l/min O2 Device: Nasal cannula Vitals:  
 19 1800 19 1815 19 1830 19 190 BP: 150/68 122/56 121/57 97/75 Pulse: 95 88 87 91 Resp: 22 21 26 22 Temp:      
SpO2: 97% 98% 96% 98% Weight: 68.4 kg (150 lb 12.7 oz) Physical Exam: 
Gen:NAD. Neuro: Responsive to painful stimuli only, does not follows commands. Pupils 2mm bilaterally, round and sluggish. Face symmetric. QUESADA spontaneously but moves right more than left. Gait deferred. Skin: Right groin puncture site soft, C/D/I with scant blood on dressing at this time. Labs: 
Lab Results Component Value Date/Time WBC 5.6 01/08/2019 01:51 PM  
 HGB 10.3 (L) 01/08/2019 01:51 PM  
 HCT 33.3 (L) 01/08/2019 01:51 PM  
 PLATELET 712 11/44/2434 01:51 PM  
 MCV 98.8 01/08/2019 01:51 PM  
 
Lab Results Component Value Date/Time Sodium 136 01/08/2019 01:51 PM  
 Potassium 4.1 01/08/2019 01:51 PM  
 Chloride 103 01/08/2019 01:51 PM  
 CO2 26 01/08/2019 01:51 PM  
 Anion gap 7 01/08/2019 01:51 PM  
 Glucose 149 (H) 01/08/2019 01:51 PM  
 BUN 19 01/08/2019 01:51 PM  
 Creatinine 0.95 01/08/2019 01:51 PM  
 BUN/Creatinine ratio 20 01/08/2019 01:51 PM  
 GFR est AA >60 01/08/2019 01:51 PM  
 GFR est non-AA 56 (L) 01/08/2019 01:51 PM  
 Calcium 9.0 01/08/2019 01:51 PM  
 
Imaging: CT head 1/8/19 at 476 1626 showed no acute intracranial abnormality. CTA head 1/8/19 showed acute thromboembolic occlusion of the proximal to mid posterior division M2 segment right middle cerebral artery. There is associated decreased perfusion to the right parieto-occipital and posterior temporal lobes with questionable small increased blood volume in the parietal lobe. Diminutive right vertebral artery with age indeterminant occlusion of the distal V4 segment. Left vertebral artery and basilar artery are patent and unremarkable. CT head 1/8/19 at 2019 shows findings suggesting extensive right MCA territory acute infarction with hemorrhagic foci as above. Repeat CT imaging study recommended in several hours to determine the evolution of findings, or earlier if clinically indicated. Assessment:  
Principal Problem: 
  CVA (cerebral vascular accident) ([address] (1/8/2019) Plan:  
 
1.) Right MCA M2 occlusion CVA - s/p thrombectomy by Dr. Israel Elizondo 1/8/19 
 - Received TPA in ED 
 - CT head this evening showing areas of hyperdensity in the R MCA territory, most likely to be contrast staining than hemorrhage, will repeat head CT in am 
 - Hold ASA - Lipid panel pending 
 - PT/OT/SLP consults placed - MRI brain pending 
  
2.) HTN 
 - SBP goal 110-140 
  - Cardene PRN 
 - Aly/Labetalol PRN 
 - ECHO pending 
 
3.) Hx DMII, well controlled - Takes Janumet and glimepiride at home, hold PO meds for now - A1C pending, last A1C 6/16/18 was 5.8 
 - SSI and accuchecks 
 - Hospitalist following 
 
4.) Elevated troponin - Troponin 0.76 today, serial troponins ordered - Cardiology consulted Discussed with Dr. Anoop Araujo, Dr. Ricardo Valentino and the ICU nurse. [place], NP Neurocritical Care Nurse Practitioner

## 2019-01-09 NOTE — PROGRESS NOTES
Problem: Pressure Injury - Risk of 
Goal: *Prevention of pressure injury Document Clint Scale and appropriate interventions in the flowsheet. Outcome: Progressing Towards Goal 
Pressure Injury Interventions: 
Sensory Interventions: Assess changes in LOC, Check visual cues for pain, Keep linens dry and wrinkle-free Moisture Interventions: Absorbent underpads, Check for incontinence Q2 hours and as needed Activity Interventions: Pressure redistribution bed/mattress(bed type) Mobility Interventions: HOB 30 degrees or less, Pressure redistribution bed/mattress (bed type) Nutrition Interventions: Document food/fluid/supplement intake Problem: Falls - Risk of 
Goal: *Absence of Falls Document Avani Mathews Fall Risk and appropriate interventions in the flowsheet. Outcome: Progressing Towards Goal 
Fall Risk Interventions: 
  
 
Mentation Interventions: Door open when patient unattended Medication Interventions: Evaluate medications/consider consulting pharmacy Elimination Interventions: Call light in reach History of Falls Interventions: Door open when patient unattended, Evaluate medications/consider consulting pharmacy

## 2019-01-09 NOTE — PROGRESS NOTES
PCCM 
 
In ICU for management of CVA s/p TPA and embolectomy - ? Cardiac source. Cardiology following and echo pnd She is quite lethargic Not on vasoacitive medications MRI has been ordered Patient Vitals for the past 4 hrs: 
 BP Temp Pulse Resp SpO2  
19 1000 124/71  82 18 98 % 19 0900 127/47  85 26 98 % 19 0800 127/49 (!) 100.5 °F (38.1 °C) 81 20 100 % 19 0700 127/49  82 24 100 % Temp (24hrs), Av.3 °F (36.8 °C), Min:96.6 °F (35.9 °C), Max:100.5 °F (38.1 °C) Intake/Output Summary (Last 24 hours) at 2019 1024 Last data filed at 2019 1811 Gross per 24 hour Intake 2769.18 ml Output 200 ml Net 2569.18 ml Lethargic Rouses some to stim Rhonchi 
Irreg Soft Lab: 
Recent Labs 19 
0515 19 
2133 19 
1351 WBC 15.7*  --  5.6 HGB 9.3*  --  10.3*   --  264   --  136  
K 4.3  --  4.1 *  --  103 CO2 20*  --  26 BUN 20  --  19  
CREA 0.88  --  0.95 *  --  149* CA 7.9*  --  9.0 MG  --   --  1.9 INR  --   --  1.0  
TROIQ 0.37* 0.51* 0.76* TBILI  --   --  0.3 SGOT  --   --  21 Impression R MCA infarct s/p tpa and thrombecotmy H/o htn DM Elevated troponin 
 
--MRI has been ordered 
--bp control --cardiology and NIS following Kimberly Arnold MD

## 2019-01-09 NOTE — ANESTHESIA POSTPROCEDURE EVALUATION
* No procedures listed *. Anesthesia Post Evaluation Patient location during evaluation: PACU Patient participation: complete - patient participated Level of consciousness: awake and alert Pain management: adequate Airway patency: patent Anesthetic complications: no 
Cardiovascular status: acceptable Respiratory status: acceptable Hydration status: acceptable Comments: I have seen and evaluated the patient and is ready for discharge. De Barraza MD 
 
Post anesthesia nausea and vomiting:  none Visit Vitals /57 Pulse 87 Temp 36.3 °C (97.4 °F) Resp 26 Wt 68.4 kg (150 lb 12.7 oz) SpO2 96% BMI 25.09 kg/m²

## 2019-01-09 NOTE — PROGRESS NOTES
Bedside shift change report received from [facility], [facility]. Report included the following information SBAR, Kardex, Procedure Summary, Intake/Output, MAR and Recent Results. 2000: BP stable, NSR, 2L NC. Pt restless, q 30 neuro assessments in progress. Travel to routine H CT. 
2300: Pt trying to open eyes to voice, mumbling. 2330: Pt brief has small amount urine noted. Bladder scanned 730cc. 2350: MD paged. No orders at this time, continue to hold catheterizations 2nd to tPA. 
0000: Pt stating name. 0300: AM head CT. 6980: Small amount vomiting noted. HOB elevated, zofran given. 0600: Pt voided 100cc pur wic. PVR 786cc.

## 2019-01-09 NOTE — H&P
[address]  HISTORY AND PHYSICAL Yvonne Ortega 
MR#: [id number] : 1932 ACCOUNT #: [de-identified] ADMIT DATE: 2019 CHIEF COMPLAINT:  Altered mental status. HISTORY OF PRESENT ILLNESS:  The patient is an 49-year-old female with history of diabetes, colonic polyps, increased urinary frequency, chronic kidney disease, history of chronic back pain, depression, hypercholesterolemia, hypertension, thoracic radicular pain, hypothyroidism and syncope, who presents to the hospital today with the above-mentioned symptoms. When I evaluated the patient, the patient was intubated and thus no history could be obtained. There is no family that is present at the bedside. Apparently, the patient came with left-sided weakness by EMS. The history was primarily provided by the ER physician. Per ER physician, the patient had slurred speech, left-sided facial droop and left-sided weakness at home today. Last known well was around 12:25 p.m. Patient's blood pressure on arrival was 164/134 and patient was brought to the ER. In the ER, the patient had a CT scan of the head which showed acute thromboembolic occlusion of the proximal to mid posterior division of M2 segment right middle cerebral artery with decreased perfusion of the right parietal, occipital and posterior temporal lobe. The patient was immediately evaluated by Neurointerventional Radiology and was taken for thrombectomy. Patient also received TPA per neurology request.  Patient in the IR suite, was intubated and currently is intubated and being transferred to the ICU. No further history could be obtained from the patient or the ER physician. PAST MEDICAL HISTORY:  See above.  
 
HOME MEDICATION:  Currently the patient, per chart, is on:  Norvasc 10 mg every day, glimepiride 4 mg every day, Janumet 50/500 mg b.i.d., Sinemet 25/100 mg 3 times a day, Crestor 20 mg daily, levothyroxine 50 mcg every day and aspirin 81 mg every day. SOCIAL HISTORY:  Per chart, no history of tobacco abuse, no alcohol, no IV drug abuse. Lives at home. ALLERGIES:  FLEXERIL, PERCOCET, REMERON, TRAMADOL. REVIEW OF SYMPTOMS:  Cannot be obtained as the patient is intubated. FAMILY HISTORY:  Per chart: Mother has history of pancreatic cancer. Father had history of colon cancer. Sister has a history of pancreatic cancer. Sister has history of diabetes. PHYSICAL EXAMINATION: 
VITAL SIGNS:  Temperature 98.4, pulse 107, respiratory rate 17, blood pressure 129/114, pulse ox 100%, intubated. GENERAL:  Intubated. HEENT:  Pupils equal, reactive to light. Dry mucous membrane. NECK:  Supple. CHEST:  Decreased basal breath sounds. HEART:  S1, S2 heard. ABDOMEN:  Soft, nontender, nondistended. Bowel sounds are physiological. 
EXTREMITIES:  No clubbing, no cyanosis, no edema. NEUROPSYCHIATRIC:  Limited exam.  Currently the patient intubated. DTR 1+/4. The rest of the exam could not be performed. SKIN:  Warm. LABORATORY DATA:  White count 5.6, hemoglobin 10.2, hematocrit 33.3, platelets 022. INR 1. Sodium 136, potassium 4.1, chloride 103, bicarbonate 26, anion gap 7, glucose 141, BUN 19, creatinine 0.9, calcium 9.0, magnesium 1.9, bilirubin total 0.3, ALT 11, AST 21, alkaline phosphatase 74. Troponin 0.76, glucose 149. CT of the shows no acute abnormality. CT of the head shows acute thromboembolic occlusion of the proximal to mid posterior division M2 segment right middle cerebral artery. There is associated decreased perfusion to the right parietal, occipital and posterior temporal lobes. There is associated decreased perfusion to the right parietal, occipital and posterior temporal lobes with questionable small increased blood volume in the parietal lobe. Diminutive right vertebral artery with age indeterminate occlusion of the distal V4 segment.   Left vertebral artery and basilar artery are patent and unremarkable. EKG shows sinus rhythm with PVC, left anterior fascicular block and nonspecific ST changes. ASSESSMENT AND PLAN: 
1. 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 Patient status post TPA and thrombectomy per interventional radiology. Will be admitted to an ICU bed, currently intubated. We will provide neurovascular checks, Keppra. No aspirin for the first 24 hours post-TPA. Closely monitor neurological status. Get an MRI of the brain and neurology consult. Speech, PT, OT consult when patient is stable and continue to closely monitor. If symptoms persist, may consider further intervention and diagnostics. Reassess if needed. Continue to monitor. 2.  Non-ST elevation myocardial infarction. Cardiology has been consulted. Will get echocardiogram.  No aspirin for now as the patient has received TPA, but we are going to resume aspirin in the morning. Will cycle troponins, get an echocardiogram and provide telemetry monitoring. If any changes in cardiogenic status, would consider emergent intervention. Await Cardiology input. Continue to closely monitor. 3.  Diabetes. The patient will be on sliding scale, NovoLog insulin, Accu-Cheks, diet control and close monitoring. Further intervention per hospital course. 4.  Gastrointestinal and deep venous thrombosis prophylaxis. Patient will be on Pepcid. 5.  History of hypothyroidism. Continue home medication. 6.  Hypertension. Neurosurgery recommends tight blood pressure range and nicardipine as well as Aly-Synephrine drips have been ordered. Continue to monitor. Emelyn Miller MD 
 
  
MM/LN 
D: 01/08/2019 17:47    
T: 01/08/2019 22:52 JOB #: [id number]

## 2019-01-09 NOTE — CONSULTS
Consult Consulting Provider: Ciera Fabian NP Reason for Consultation:  Unable to place duran Assessment: 1. Difficult duran placement due to prior colpocleisis. 2. 14 [place] coude duran placed under sterile conditions in standard fashion using small speculum to increase GH. Unable to place 16 fr duran. Plan: 1. Continue duran drainage, recommend void trial prior to discontinuation of duran given difficulty with placement Subjective:  
 
Keerthi Santiago is a 80 y.o. female who I am consulted for evaluation of  Urinary retention and inability to place duran; admitting diagnosis:  CVA (cerebral vascular accident) ([facility] [I63.9]. Past Medical History:  
Diagnosis Date  Anxiety  Arthritis  Chest pain 2008 Negative stress test and Holter monitor w/Dr Asia Montero.  Chronic back pain  Chronic venous insufficiency  Colon polyp   
 last scope normal 2007; Dr. Carlin Peck  Cyst of right kidney  Depression  Diabetes ([facility] [address]  Hypercholesterolemia  Hypertension  Hypothyroidism  Memory disorder  Other ill-defined conditions(799.89)   
 heart murmur  Painful hip  Parkinson disease ([facility] [address]  Urinary frequency Sees Dr. Yoseph Cantu with [facility] Past Surgical History:  
Procedure Laterality Date  COLONOSCOPY,DIAGNOSTIC  6/12/2015  HX BREAST BIOPSY  2002  HX HYSTERECTOMY Due to a prolapsed uterus  HX KNEE REPLACEMENT    
 left  HX KNEE REPLACEMENT  2006  
 right  HX ORTHOPAEDIC B knees.  HX OTHER SURGICAL    
 pelvic prolasp  UPPER GI ENDOSCOPY,BIOPSY  6/12/2015 Family History Problem Relation Age of Onset  Cancer Mother   
     pancreatic  Cancer Father   
     colon  Cancer Sister   
     pancreatic  Diabetes Sister  Alzheimer Brother Social History Tobacco Use  Smoking status: Never Smoker  Smokeless tobacco: Never Used Substance Use Topics  Alcohol use: No  
  Alcohol/week: 0.0 oz  
  Comment: Rare Current Facility-Administered Medications Medication Dose Route Frequency  hydrALAZINE (APRESOLINE) 20 mg/mL injection 10 mg  10 mg IntraVENous Q6H PRN  
 labetalol (NORMODYNE;TRANDATE) 20 mg/4 mL (5 mg/mL) injection 10 mg  10 mg IntraVENous Q4H PRN  
 acetaminophen (TYLENOL) suppository 650 mg  650 mg Rectal Q4H PRN  
 sodium chloride (NS) flush 5-40 mL  5-40 mL IntraVENous Q8H  
 sodium chloride (NS) flush 5-40 mL  5-40 mL IntraVENous PRN  
 ondansetron (ZOFRAN) injection 4 mg  4 mg IntraVENous Q6H PRN  
 naloxone (NARCAN) injection 0.4 mg  0.4 mg IntraVENous PRN  
 0.9% sodium chloride infusion  75 mL/hr IntraVENous CONTINUOUS  
 famotidine (PF) (PEPCID) 20 mg in sodium chloride 0.9% 10 mL injection  20 mg IntraVENous Q24H  
 [START ON 1/11/2019] levothyroxine (SYNTHROID) injection 37.5 mcg  37.5 mcg IntraVENous Q24H  
 sodium chloride (NS) flush 5-40 mL  5-40 mL IntraVENous Q8H  
 glucose chewable tablet 16 g  4 Tab Oral PRN  
 dextrose (D50W) injection syrg 12.5-25 g  25-50 mL IntraVENous PRN  
 glucagon (GLUCAGEN) injection 1 mg  1 mg IntraMUSCular PRN  
 insulin lispro (HUMALOG) injection   SubCUTAneous Q6H  
 sodium chloride (NS) flush 5-40 mL  5-40 mL IntraVENous Q8H  
 sodium chloride (NS) flush 5-40 mL  5-40 mL IntraVENous PRN  niCARdipine (CARDENE) 25 mg in 0.9% sodium chloride 250 mL infusion  5-15 mg/hr IntraVENous TITRATE  PHENYLephrine (ESTEBAN-SYNEPHRINE) 30 mg in 0.9% sodium chloride 250 mL infusion   mcg/min IntraVENous TITRATE  LORazepam (ATIVAN) injection 1 mg  1 mg IntraVENous Q2H PRN Allergies Allergen Reactions  Norvasc [Amlodipine] Swelling  Flexeril [Cyclobenzaprine] Other (comments) Caused pt to fall & hallucination  Percocet [Oxycodone-Acetaminophen] Itching  Remeron [Mirtazapine] Diarrhea  Tramadol Other (comments) Caused falls Review of Systems: 
Review of systems not obtained due to patient factors. Objective:  
 
Vital Signs: 
  
Patient Vitals for the past 8 hrs: 
 BP Temp Pulse Resp SpO2 Height 01/09/19 1400 137/72  83 20 100 %   
01/09/19 1300 139/45  83 19 100 %   
01/09/19 1249      5' 5\" (1.651 m)  
01/09/19 1200 134/51 98.3 °F (36.8 °C) 90 19 99 %   
01/09/19 1100 135/53  85 25 100 %   
01/09/19 1000 124/71  82 18 98 %   
01/09/19 0900 127/47  85 26 98 %   
01/09/19 0800 127/49 (!) 100.5 °F (38.1 °C) 81 20 100 %  Intake and Output:   
01/09 0701 - 01/09 1900 In: 475 [I.V.:475] Out: 150 [Urine:150] 01/07 1901 - 01/09 0700 In: 2594.2 [I.V.:2594.2] Out: 150 [Urine:100] Physical Exam:  
General appearance: somnolent, not following commands Head: Normocephalic, without obvious abnormality, atraumatic Abdomen: soft, non-tender. Pelvic: External genitalia normal, GH 2cm following colopocleisis, 3cm vagina, urethral meatus retracted into vagina Labs: 
Recent Labs 01/09/19 
0515 01/08/19 
1351 WBC 15.7* 5.6 HGB 9.3* 10.3*  264  136  
K 4.3 4.1 CREA 0.88 0.95  
BUN 20 19 INR  --  1.0 Signed By: Max Kay MD   
 January 9, 2019

## 2019-01-09 NOTE — PROGRESS NOTES
Occupational Therapy: hold Chart reviewed, consulted with RN, attempted OT evaluation. Per RN, patient is not following commands at this time and is not medically appropriate for therapy. OT will hold and will f/u as able and appropriate.  
 
Marito Leon, OTR/L

## 2019-01-09 NOTE — PROGRESS NOTES
Neurointerventional Surgery: 
  
The pt is a 80 y.o. female who presents with right MCA M2 thrombus now s/p successful thrombectomy by Dr Zora Ho. Over night Pt has required small amounts of Cardene to maintain target BPs. Cardiology following for cardiac ischemia. 
  
  
CT reveals evolving RMCA infarction. The areas of hyperdensity in the R MCA territory that were reported as hemorrhage have resolved and represented residual contrast in damaged tissue, rather than hemorrhage. The increased density posterior to the trigone of the left latteral ventricle may actually represent a small amount of hemorrhage, of uncertain significance.  
  
  
Impression and Plan: 
  
No  Significant hemorrhagic conversion. Evolving RMCA stroke. Continue medical support. MRI when possible. 
  
  
Stephanie Rivera MD 
Professor of Radiology, Neurology and Neurological Surgery [facility] [address]

## 2019-01-09 NOTE — CONSULTS
Neurology Consult Note [facility] NP-C Admit Date: 1/8/2019 LOS: 1 day 1/9/2019 Subjective:  
 
80year old female with history of DM, CKD, HLD, HTN, hypothyroidism, syncope, newly diagnosed with PD after having positive DaTscan at [facility]. She presented to the ED yesterday, 1/8/19 with slurred speech, left sided weakness, left facial droop, right gaze deviation. Last known well 1210 on 1/8/2019, found slumped at 1225. /134 in ED. CT showed acute thromboembolic occlusion of the proximal to mid posterior division of M2 right MCA with decreased perfusion of right parietal, occipital, and posterior temporal lobe. Teleneurology recommended tPA and this was started at ~1357. NIS performed  succesful thrombectomy shortly after tPA started, she was unable to be extubated and was transferred to ICU. Goal -140 via A-line per Dr. Jaelyn Salinas. Agitated upon arrival to ICU despite receiving ativan, only responding to painful stimuli, repeat head CT performed at 2000 on 1/8/19 showed evolving right MCA infarction, hyperdensity thought to be due to contrast staining per Dr. Daylin Nj, no significant hemorrhagic conversion. This was repeated this morning and has not been read. A little more alert O/N for RN. Cardiology following for elevated troponin. On Keppra, no seizures noted in chart. On ASA 81 mg at home. MRI brain pending TTE pending A1c 5.5 LDL 50.2 Patient unable to speak to complete ROS or provide history. No family at the bedside. Current Facility-Administered Medications Medication Dose Route Frequency Provider Last Rate Last Dose  hydrALAZINE (APRESOLINE) 20 mg/mL injection 10 mg  10 mg IntraVENous Q6H PRN Lack, Bradly Duane, NP      
 labetalol (NORMODYNE;TRANDATE) 20 mg/4 mL (5 mg/mL) injection 10 mg  10 mg IntraVENous Q4H PRN Lack, Bradly Duane, NP   10 mg at 01/09/19 0500  
 sodium chloride (NS) flush 5-40 mL  5-40 mL IntraVENous Q8H [place], Tarik Marcus MD   10 mL at 01/09/19 7679  sodium chloride (NS) flush 5-40 mL  5-40 mL IntraVENous PRN Maricruz Malin MD   10 mL at 01/08/19 1345  ondansetron (ZOFRAN) injection 4 mg  4 mg IntraVENous Q6H PRN Zuleyma Stanford MD   4 mg at 01/09/19 0426  
 naloxone (NARCAN) injection 0.4 mg  0.4 mg IntraVENous PRN Zuleyma Stanford MD      
 0.9% sodium chloride infusion  75 mL/hr IntraVENous CONTINUOUS Zuleyma Stanford MD 75 mL/hr at 01/09/19 0756 75 mL/hr at 01/09/19 0756  levETIRAcetam (KEPPRA) 500 mg in 0.9% sodium chloride 100 mL IVPB  500 mg IntraVENous Q12H Zuleyma Stanford  mL/hr at 01/09/19 0802 500 mg at 01/09/19 0656  famotidine (PF) (PEPCID) 20 mg in sodium chloride 0.9% 10 mL injection  20 mg IntraVENous Q24H Zuleyma Stanford MD   20 mg at 01/08/19 2100  [START ON 1/11/2019] levothyroxine (SYNTHROID) injection 37.5 mcg  37.5 mcg IntraVENous Q24H Zuleyma Stanford MD      
 sodium chloride (NS) flush 5-40 mL  5-40 mL IntraVENous Q8H Eliseo Cordero MD   10 mL at 01/09/19 4937  
 glucose chewable tablet 16 g  4 Tab Oral PRN Zuleyma Stanford MD      
 dextrose (D50W) injection syrg 12.5-25 g  25-50 mL IntraVENous PRN Zuleyma Stanford MD      
 glucagon (GLUCAGEN) injection 1 mg  1 mg IntraMUSCular PRN Zuleyma Stanford MD      
 insulin lispro (HUMALOG) injection   SubCUTAneous Q6H Zuleyma Stanford MD   2 Units at 01/09/19 0531  
 sodium chloride (NS) flush 5-40 mL  5-40 mL IntraVENous Q8H Eliseo Cordero MD   10 mL at 01/09/19 9081  
 sodium chloride (NS) flush 5-40 mL  5-40 mL IntraVENous PRN Eliseo Crodero MD      
 niCARdipine (CARDENE) 25 mg in 0.9% sodium chloride 250 mL infusion  5-15 mg/hr IntraVENous TITRATE Eliseo Cordero MD 75 mL/hr at 01/09/19 0629 7.5 mg/hr at 01/09/19 5118  PHENYLephrine (ESTEBAN-SYNEPHRINE) 30 mg in 0.9% sodium chloride 250 mL infusion   mcg/min IntraVENous TITRATE Eliseo Cordero MD   Stopped at 01/08/19 1700  LORazepam (ATIVAN) injection 1 mg  1 mg IntraVENous Q2H PRN Ceasar Sun MD   1 mg at 01/08/19 7875 Allergies Allergen Reactions  Norvasc [Amlodipine] Swelling  Flexeril [Cyclobenzaprine] Other (comments) Caused pt to fall & hallucination  Percocet [Oxycodone-Acetaminophen] Itching  Remeron [Mirtazapine] Diarrhea  Tramadol Other (comments) Caused falls Past Medical History:  
Diagnosis Date  Anxiety  Arthritis  Chest pain 2008 Negative stress test and Holter monitor w/Dr Eligio Mercado.  Chronic back pain  Chronic venous insufficiency  Colon polyp   
 last scope normal 2007; Dr. Andressa William  Cyst of right kidney  Depression  Diabetes ([facility] [address]  Hypercholesterolemia  Hypertension  Hypothyroidism  Memory disorder  Other ill-defined conditions(799.89)   
 heart murmur  Painful hip  Parkinson disease ([facility] [address]  Urinary frequency Sees Dr. Catrachito Drew with [address] Past Surgical History:  
Procedure Laterality Date  COLONOSCOPY,DIAGNOSTIC  6/12/2015  HX BREAST BIOPSY  2002  HX HYSTERECTOMY Due to a prolapsed uterus  HX KNEE REPLACEMENT    
 left  HX KNEE REPLACEMENT  2006  
 right  HX ORTHOPAEDIC B knees.  HX OTHER SURGICAL    
 pelvic prolasp  UPPER GI ENDOSCOPY,BIOPSY  6/12/2015 Family History Problem Relation Age of Onset  Cancer Mother   
     pancreatic  Cancer Father   
     colon  Cancer Sister   
     pancreatic  Diabetes Sister  Alzheimer Brother Social History Socioeconomic History  Marital status:  Spouse name: Not on file  Number of children: Not on file  Years of education: Not on file  Highest education level: Not on file Social Needs  Financial resource strain: Not on file  Food insecurity - worry: Not on file  Food insecurity - inability: Not on file  Transportation needs - medical: Not on file  Transportation needs - non-medical: Not on file Occupational History  Not on file Tobacco Use  Smoking status: Never Smoker  Smokeless tobacco: Never Used Substance and Sexual Activity  Alcohol use: No  
  Alcohol/week: 0.0 oz  
  Comment: Rare  Drug use: No  
 Sexual activity: No  
  Birth control/protection: Surgical  
Other Topics Concern  Not on file Social History Narrative  Not on file Review of Systems: 
Review of systems not obtained due to patient factors. Objective:  
 
Vital signs Temp (24hrs), Av.3 °F (36.8 °C), Min:96.6 °F (35.9 °C), Max:100.5 °F (38.1 °C) 
 701 - 1900 In: -  
Out: 50 [Urine:50]  1901 - 700 In: 2594.2 [I.V.:2594.2] Out: 150 [Urine:100] Visit Vitals /49 Pulse 81 Temp (!) 100.5 °F (38.1 °C) Resp 20 Wt 70.3 kg (154 lb 15.7 oz) SpO2 100% BMI 25.79 kg/m² O2 Flow Rate (L/min): 2 l/min O2 Device: Nasal cannula Pain control Pain Assessment Pain Scale 1: Adult Nonverbal Pain Scale Pain Intensity 1: 0 Vitals:  
 19 0600 19 0621 19 0700 19 0800 BP:  143/59 127/49 127/49 Pulse: 80 82 82 81 Resp: 23 21 24 20 Temp:    (!) 100.5 °F (38.1 °C) SpO2: 99% 99% 100% 100% Weight:      
  
 
Neurologic Exam: 
Mental Status:  Drowsy, easily aroused by voice but not opening her eyes. Moans, attempting to answer questions but not able to. Attempting to follow simple commands but unable to (moves fingers in her right hand but not able to open and close). Cranial Nerves:   Pupils equal, round and reactive to light. No gaze deviation appreciated, unable to fully asses EOM Right facial weakness Hearing intact grossly Motor:    No pronator drift on the right after having arm extended by riki. No movement LUE. Spontaneous movement RUE,s t RLE, scant LLE. Unable to assess strength. Withdraws to painful stimuli RUE, RLE,    LLE. Grimace only to nailbed pressure LUE Sensation:    Cannot assess Coordination & Gait: Cannot assess Physical Exam: 
General:  Well defined, nourished, and groomed individual in no acute distress. Neck: Supple, nontender, no bruits, no pain with resistance to active range of motion. Heart: Regular rate and rhythm, no murmurs, rub, or gallop. Normal S1S2. Lungs:  Extubated. Breathing non-labored. Clear to auscultation bilaterally Psych:  Calm, appears comfortable 24 hour results: 
 
Recent Results (from the past 24 hour(s)) EKG, 12 LEAD, INITIAL Collection Time: 01/08/19  1:44 PM  
Result Value Ref Range Ventricular Rate 100 BPM  
 Atrial Rate 100 BPM  
 P-R Interval 198 ms QRS Duration 108 ms Q-T Interval 368 ms QTC Calculation (Bezet) 474 ms Calculated P Axis 42 degrees Calculated R Axis -48 degrees Calculated T Axis 6 degrees Diagnosis Sinus rhythm with occasional premature ventricular complexes Left anterior fascicular block ST & T wave abnormality, consider anterior ischemia When compared with ECG of 15-CINTHYA-2018 19:30, 
premature ventricular complexes are now present Left anterior fascicular block is now present T wave inversion no longer evident in Inferior leads SAMPLES BEING HELD Collection Time: 01/08/19  1:51 PM  
Result Value Ref Range SAMPLES BEING HELD 1RED COMMENT Add-on orders for these samples will be processed based on acceptable specimen integrity and analyte stability, which may vary by analyte. METABOLIC PANEL, COMPREHENSIVE Collection Time: 01/08/19  1:51 PM  
Result Value Ref Range Sodium 136 136 - 145 mmol/L Potassium 4.1 3.5 - 5.1 mmol/L Chloride 103 97 - 108 mmol/L  
 CO2 26 21 - 32 mmol/L Anion gap 7 5 - 15 mmol/L Glucose 149 (H) 65 - 100 mg/dL  BUN 19 6 - 20 MG/DL  
 Creatinine 0.95 0.55 - 1.02 MG/DL  
 BUN/Creatinine ratio 20 12 - 20 GFR est AA >60 >60 ml/min/1.73m2 GFR est non-AA 56 (L) >60 ml/min/1.73m2 Calcium 9.0 8.5 - 10.1 MG/DL Bilirubin, total 0.3 0.2 - 1.0 MG/DL  
 ALT (SGPT) 11 (L) 12 - 78 U/L  
 AST (SGOT) 21 15 - 37 U/L Alk. phosphatase 74 45 - 117 U/L Protein, total 7.3 6.4 - 8.2 g/dL Albumin 3.3 (L) 3.5 - 5.0 g/dL Globulin 4.0 2.0 - 4.0 g/dL A-G Ratio 0.8 (L) 1.1 - 2.2    
CBC WITH AUTOMATED DIFF Collection Time: 01/08/19  1:51 PM  
Result Value Ref Range WBC 5.6 3.6 - 11.0 K/uL  
 RBC 3.37 (L) 3.80 - 5.20 M/uL  
 HGB 10.3 (L) 11.5 - 16.0 g/dL HCT 33.3 (L) 35.0 - 47.0 % MCV 98.8 80.0 - 99.0 FL  
 MCH 30.6 26.0 - 34.0 PG  
 MCHC 30.9 30.0 - 36.5 g/dL  
 RDW 13.5 11.5 - 14.5 % PLATELET 890 754 - 590 K/uL MPV 10.2 8.9 - 12.9 FL  
 NRBC 0.0 0  WBC ABSOLUTE NRBC 0.00 0.00 - 0.01 K/uL NEUTROPHILS 73 32 - 75 % LYMPHOCYTES 18 12 - 49 % MONOCYTES 8 5 - 13 % EOSINOPHILS 1 0 - 7 % BASOPHILS 0 0 - 1 % IMMATURE GRANULOCYTES 0 0.0 - 0.5 % ABS. NEUTROPHILS 4.1 1.8 - 8.0 K/UL  
 ABS. LYMPHOCYTES 1.0 0.8 - 3.5 K/UL  
 ABS. MONOCYTES 0.4 0.0 - 1.0 K/UL  
 ABS. EOSINOPHILS 0.1 0.0 - 0.4 K/UL  
 ABS. BASOPHILS 0.0 0.0 - 0.1 K/UL  
 ABS. IMM. GRANS. 0.0 0.00 - 0.04 K/UL  
 DF AUTOMATED    
TROPONIN I Collection Time: 01/08/19  1:51 PM  
Result Value Ref Range Troponin-I, Qt. 0.76 (H) <0.05 ng/mL MAGNESIUM Collection Time: 01/08/19  1:51 PM  
Result Value Ref Range Magnesium 1.9 1.6 - 2.4 mg/dL PTT Collection Time: 01/08/19  1:51 PM  
Result Value Ref Range aPTT 23.6 22.1 - 32.0 sec  
 aPTT, therapeutic range     58.0 - 77.0 SECS  
PROTHROMBIN TIME + INR Collection Time: 01/08/19  1:51 PM  
Result Value Ref Range INR 1.0 0.9 - 1.1 Prothrombin time 10.3 9.0 - 11.1 sec GLUCOSE, POC Collection Time: 01/08/19  6:46 PM  
Result Value Ref Range Glucose (POC) 300 (H) 65 - 100 mg/dL Performed by Dario Zapien   
TROPONIN I Collection Time: 01/08/19  9:33 PM  
Result Value Ref Range Troponin-I, Qt. 0.51 (H) <0.05 ng/mL GLUCOSE, POC Collection Time: 01/08/19 11:44 PM  
Result Value Ref Range Glucose (POC) 168 (H) 65 - 100 mg/dL Performed by Yamila Thornton   
EKG, 12 LEAD, INITIAL Collection Time: 01/09/19  4:59 AM  
Result Value Ref Range Ventricular Rate 95 BPM  
 Atrial Rate 95 BPM  
 P-R Interval 164 ms QRS Duration 108 ms Q-T Interval 384 ms QTC Calculation (Bezet) 482 ms Calculated R Axis -41 degrees Calculated T Axis -5 degrees Diagnosis Normal sinus rhythm Left axis deviation Incomplete right bundle branch block ST & T wave abnormality, consider anterior ischemia Prolonged QT When compared with ECG of 08-JAN-2019 13:44, 
premature ventricular complexes are no longer present CBC W/O DIFF Collection Time: 01/09/19  5:15 AM  
Result Value Ref Range WBC 15.7 (H) 3.6 - 11.0 K/uL  
 RBC 3.03 (L) 3.80 - 5.20 M/uL HGB 9.3 (L) 11.5 - 16.0 g/dL HCT 29.4 (L) 35.0 - 47.0 % MCV 97.0 80.0 - 99.0 FL  
 MCH 30.7 26.0 - 34.0 PG  
 MCHC 31.6 30.0 - 36.5 g/dL  
 RDW 13.7 11.5 - 14.5 % PLATELET 686 728 - 994 K/uL MPV 10.6 8.9 - 12.9 FL  
 NRBC 0.0 0  WBC ABSOLUTE NRBC 0.00 0.00 - 0.01 K/uL METABOLIC PANEL, BASIC Collection Time: 01/09/19  5:15 AM  
Result Value Ref Range Sodium 142 136 - 145 mmol/L Potassium 4.3 3.5 - 5.1 mmol/L Chloride 111 (H) 97 - 108 mmol/L  
 CO2 20 (L) 21 - 32 mmol/L Anion gap 11 5 - 15 mmol/L Glucose 214 (H) 65 - 100 mg/dL BUN 20 6 - 20 MG/DL Creatinine 0.88 0.55 - 1.02 MG/DL  
 BUN/Creatinine ratio 23 (H) 12 - 20 GFR est AA >60 >60 ml/min/1.73m2 GFR est non-AA >60 >60 ml/min/1.73m2 Calcium 7.9 (L) 8.5 - 10.1 MG/DL  
TROPONIN I Collection Time: 01/09/19  5:15 AM  
Result Value Ref Range Troponin-I, Qt. 0.37 (H) <0.05 ng/mL LIPID PANEL Collection Time: 01/09/19  5:15 AM  
Result Value Ref Range LIPID PROFILE Cholesterol, total 132 <200 MG/DL Triglyceride 44 <150 MG/DL  
 HDL Cholesterol 73 MG/DL  
 LDL, calculated 50.2 0 - 100 MG/DL VLDL, calculated 8.8 MG/DL  
 CHOL/HDL Ratio 1.8 0 - 5.0 HEMOGLOBIN A1C WITH EAG Collection Time: 01/09/19  5:19 AM  
Result Value Ref Range Hemoglobin A1c 5.5 4.2 - 6.3 % Est. average glucose 111 mg/dL GLUCOSE, POC Collection Time: 01/09/19  5:22 AM  
Result Value Ref Range Glucose (POC) 150 (H) 65 - 100 mg/dL Performed by Yuan Freeman   
  
 
 
Imaging: CT Results (most recent): 
Results from Hospital Encounter encounter on 01/08/19 CT HEAD WO CONT Narrative EXAM: CT HEAD WO CONT INDICATION: Stroke follow-up COMPARISON: CT from earlier. CONTRAST: None. TECHNIQUE: Unenhanced CT of the head was performed using 5 mm images. Brain and 
bone windows were generated. CT dose reduction was achieved through use of a 
standardized protocol tailored for this examination and automatic exposure 
control for dose modulation. FINDINGS: 
The ventricles and sulci are stable in size, shape and configuration, with 
moderate lateral ventriculomegaly again shown. Diminished corticomedullary 
differentiation is noted in the interval throughout the right cerebral 
hemisphere, sparing the anterior right frontal region. Subtle foci of increased 
attenuation are noted at the junction of the gray-white matter and white matter 
in the right parietal lobe and the posterior right temporal lobe, as well as at 
a 1 cm focus adjacent to the inferior atrium of the left lateral ventricle. There is furthermore subtle increased attenuation in the right insular cortex. As these findings may be a hemorrhagic nature, short-term imaging follow-up is 
recommended to assess changes in these findings. There is no extra-axial 
collection. Impression IMPRESSION: Findings suggesting extensive right MCA territory acute infarction 
with hemorrhagic foci as above. Repeat CT imaging study recommended in several 
hours to determine the evolution of findings, or earlier if clinically 
indicated. Assessment:  
 
Principal Problem: 
  CVA (cerebral vascular accident) ([address] (1/8/2019) Plan:  
 
80year old female with h/o DM, HTN, HLD admitted on 1/8/19 with sudden onset left face and arm weakness, dysarthria. Imaging suggestive of right M2 occlusion and she received tPA ~1357 on 1/8/19 before undergoing successful thrombectomy. Mental status declined post-procedure, repeat CT not suggestive of hemorrhagic conversion per NIS read. Repeated this AM. More alert this morning, moving right side well, to some degree the LLE. Not able to open eyes, speak, follow commands. Not agitated. Extubated and breathing non-labored. On ASA at home. MRI pending TTE pending Tele Escalate anti-platelet if TTE and tele not suggestive of cardioembolic source LDL, A1c within goal 
SBP goal 110-140 per NIS Repeat CT ~1400 today at 24 hours post tPA 
OT/PT/speech Plan d/w Dr. Olivarez Channel Adelina Monk, NP Addendum: 
80year old female h/o HTN, HPL, DM, PD presenting with L-HP, dysarthria. CTH/CTA showing R MCA infarct, R M2 occlusion as well as possible R VA occlusion s/p tPA and successful thrombectomy of R M2 clot. Noted with agitation overnight. Repeat head CT reviewed showing evidence of contrast staining R MCA territory, L lateral subcortical small hemorrhage, stable on repeat imaging this AM. PE: somnolent, follows minimal commands with R hand (squeezes), mumbling incoherently. PERRL, no blink threat b/l, EOMI Spontaneous AG RUE/RLE, dense plegia LUE, minimal AG LLE 
W/D throughout to noxious stimulation DTR 1+, toes down b/l 
A/P: R MCA acute infarct, R M2 occlusion s/p tPA and endovascular intervention 1/8/19. Repeat head CT with contrast staining R MCA territory and question of hemorrhagic foci near left lateral ventricle. Mechanism undetermined for acute infarct, possible central source of emboli. -MRI Brain WO today 24h post tPA 
-No AP s/p tPA and due to trace hemorrhage L subcortical/lateral ventricle 
-BP parameters per CATARINO-on cardene gtt 
-Telemetry, TTE 
-Resume home statin therapy when able 
-May D/C LEV 
-PT/OT/ST evaluations Jodene Favre, DO 
01/09/19

## 2019-01-09 NOTE — PROGRESS NOTES
Physical Therapy: hold Chart reviewed, consulted with RN, attempted PT evaluation. Per RN, patient is not following commands at this time and is not medically appropriate for therapy. PT will hold and will f/u as able and appropriate.  
 
Rosalind Vanegas, PT, DPT

## 2019-01-09 NOTE — PROGRESS NOTES
Hospitalist Progress Note Theron Priest MD 
Answering service: [phone] OR [phone] from in house phone Date of Service:  2019 NAME:  Torin Mehta :  1932 MRN:  [id number] Admission Summary:  
80 y.o F with PMH of parkinson's disease,HTN,hypothyroidism,diabetes was admitted to the hospital for management of acute rt CVA s/p tpa. Interval history / Subjective:  
   
Patient was somnolent. Opened eyes on sternal rub. Drooling secretions Per her daughter,patient was having memory problems(recognising family members,managing meds) in the last few months and in the last 2 months was slowly declining. She walks with a walker. Was living independently. daughter stays next door. 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 
-Repeat CT brain last night - extensive rt MCA territory acute infarction. -MRI brain showed acute right MCA territory infarct,other small scattered acute supratentorial and infratentorial right-sided infarcts. 
-There is a new small hemorrhage in the left occipital lobe with mild edema ? Post tpa bleed vs stroke with hemorrhagic conversion 
-neurology and neuro interventionist following,appreciate recommendations 
-follow up echo results 
-Pt/Ot/speech eval 
 
 
#HTN: 
-BP goal 110-140 
-on cardene drip. #Diabetes: 
-on glimepiride,sitagliptin-metformin 
-will continue SSI for now and monitor BS #Parkinson's disease: 
-will resume when she takes oral meds #hypothyrodism: 
-on synthroid -hold while NPO. #Difficulty duran placement: 
-Gyn team placed a coude catheter. Mild elevation of troponin:trended down. - in the setting of stroke ?demand ischemia Code status: full DVT prophylaxis: SCD Care Plan discussed with: patient's family at bed side ,nurse and  Disposition: TBD  
 
 Hospital Problems  Date Reviewed: 1/8/2019 Codes Class Noted POA * (Principal) CVA (cerebral vascular accident) ([facility] [address] ICD-10-CM: I63.9 ICD-9-CM: 434.91  1/8/2019 Unknown Review of Systems:  
Unable to obtain due to patient's mental status Vital Signs:  
 Last 24hrs VS reviewed since prior progress note. Most recent are: 
Visit Vitals /59 Pulse 82 Temp 99.3 °F (37.4 °C) Resp 21 Wt 70.3 kg (154 lb 15.7 oz) SpO2 99% BMI 25.79 kg/m² Intake/Output Summary (Last 24 hours) at 1/9/2019 1765 Last data filed at 1/9/2019 0600 Gross per 24 hour Intake 2476.26 ml Output 150 ml Net 2326.26 ml Physical Examination:  
 
 
     
Constitutional:  elderly patient ENT:  Oral mucous dry, drooling secretion from the mouth Resp:  slightly decreased breath sounds with gurgling sound. No wheezing/rhonchi/rales. CV:  Regular rhythm, normal rate, no murmurs, gallops, rubs GI:  Soft, non distended, non tender. normoactive bowel sounds Musculoskeletal:  No edema Neurologic: She is somnolent,did not follow commands Data Review:  
 Review and/or order of clinical lab test 
Review and/or order of tests in the radiology section of CPT Review and/or order of tests in the medicine section of CPT Labs:  
 
Recent Labs 01/09/19 
0515 01/08/19 
1351 WBC 15.7* 5.6 HGB 9.3* 10.3* HCT 29.4* 33.3*  
 264 Recent Labs 01/09/19 
0515 01/08/19 
1351  136  
K 4.3 4.1 * 103 CO2 20* 26 BUN 20 19 CREA 0.88 0.95 * 149* CA 7.9* 9.0 MG  --  1.9 Recent Labs 01/08/19 
1351 SGOT 21 ALT 11* AP 74 TBILI 0.3 TP 7.3 ALB 3.3*  
GLOB 4.0 Recent Labs 01/08/19 
1351 INR 1.0 PTP 10.3 APTT 23.6 No results for input(s): FE, TIBC, PSAT, FERR in the last 72 hours. Lab Results Component Value Date/Time  Folate 11.9 06/16/2018 12:45 AM  
  
 No results for input(s): PH, PCO2, PO2 in the last 72 hours. Recent Labs 01/09/19 
0515 01/08/19 
2133 01/08/19 
1351 TROIQ 0.37* 0.51* 0.76* Lab Results Component Value Date/Time Cholesterol, total 132 01/09/2019 05:15 AM  
 HDL Cholesterol 73 01/09/2019 05:15 AM  
 LDL, calculated 50.2 01/09/2019 05:15 AM  
 Triglyceride 44 01/09/2019 05:15 AM  
 CHOL/HDL Ratio 1.8 01/09/2019 05:15 AM  
 
Lab Results Component Value Date/Time Glucose (POC) 150 (H) 01/09/2019 05:22 AM  
 Glucose (POC) 168 (H) 01/08/2019 11:44 PM  
 Glucose (POC) 300 (H) 01/08/2019 06:46 PM  
 Glucose (POC) 180 (H) 06/19/2018 11:27 AM  
 Glucose (POC) 99 06/19/2018 06:31 AM  
 
Lab Results Component Value Date/Time Color YELLOW/STRAW 06/16/2018 01:00 PM  
 Appearance CLEAR 06/16/2018 01:00 PM  
 Specific gravity 1.008 06/16/2018 01:00 PM  
 pH (UA) 7.5 06/16/2018 01:00 PM  
 Protein 30 (A) 06/16/2018 01:00 PM  
 Glucose NEGATIVE  06/16/2018 01:00 PM  
 Ketone NEGATIVE  06/16/2018 01:00 PM  
 Bilirubin NEGATIVE  06/16/2018 01:00 PM  
 Urobilinogen 1.0 06/16/2018 01:00 PM  
 Nitrites NEGATIVE  06/16/2018 01:00 PM  
 Leukocyte Esterase LARGE (A) 06/16/2018 01:00 PM  
 Epithelial cells MODERATE (A) 06/16/2018 01:00 PM  
 Bacteria NEGATIVE  06/16/2018 01:00 PM  
 WBC 20-50 06/16/2018 01:00 PM  
 RBC 0-5 06/16/2018 01:00 PM  
 
 
 
Medications Reviewed:  
 
Current Facility-Administered Medications Medication Dose Route Frequency  hydrALAZINE (APRESOLINE) 20 mg/mL injection 10 mg  10 mg IntraVENous Q6H PRN  
 labetalol (NORMODYNE;TRANDATE) 20 mg/4 mL (5 mg/mL) injection 10 mg  10 mg IntraVENous Q4H PRN  
 sodium chloride (NS) flush 5-40 mL  5-40 mL IntraVENous Q8H  
 sodium chloride (NS) flush 5-40 mL  5-40 mL IntraVENous PRN  
 ondansetron (ZOFRAN) injection 4 mg  4 mg IntraVENous Q6H PRN  
 naloxone (NARCAN) injection 0.4 mg  0.4 mg IntraVENous PRN  
 0.9% sodium chloride infusion  75 mL/hr IntraVENous CONTINUOUS  
  levETIRAcetam (KEPPRA) 500 mg in 0.9% sodium chloride 100 mL IVPB  500 mg IntraVENous Q12H  
 famotidine (PF) (PEPCID) 20 mg in sodium chloride 0.9% 10 mL injection  20 mg IntraVENous Q24H  
 [START ON 1/11/2019] levothyroxine (SYNTHROID) injection 37.5 mcg  37.5 mcg IntraVENous Q24H  
 sodium chloride (NS) flush 5-40 mL  5-40 mL IntraVENous Q8H  
 glucose chewable tablet 16 g  4 Tab Oral PRN  
 dextrose (D50W) injection syrg 12.5-25 g  25-50 mL IntraVENous PRN  
 glucagon (GLUCAGEN) injection 1 mg  1 mg IntraMUSCular PRN  
 insulin lispro (HUMALOG) injection   SubCUTAneous Q6H  
 sodium chloride (NS) flush 5-40 mL  5-40 mL IntraVENous Q8H  
 sodium chloride (NS) flush 5-40 mL  5-40 mL IntraVENous PRN  niCARdipine (CARDENE) 25 mg in 0.9% sodium chloride 250 mL infusion  5-15 mg/hr IntraVENous TITRATE  PHENYLephrine (ESTEBAN-SYNEPHRINE) 30 mg in 0.9% sodium chloride 250 mL infusion   mcg/min IntraVENous TITRATE  LORazepam (ATIVAN) injection 1 mg  1 mg IntraVENous Q2H PRN  
 
______________________________________________________________________ EXPECTED LENGTH OF STAY: - - - 
ACTUAL LENGTH OF STAY:          1 Juliann Au MD

## 2019-01-10 NOTE — PROGRESS NOTES
Renal Dosing/Monitoring Medication: Famotidine Current regimen:  20 mg IV every 24 hr 
Recent Labs  
  01/10/19 
0452 01/09/19 
0515 01/08/19 
1351 CREA 0.66 0.88 0.95 BUN 12 20 19 Estimated CrCl:  ~50-60 ml/min Plan: Change to 20 mg IV every 12 hours per [address] [place] [address] P&T Committee Protocol with respect to renal function. Pharmacy will continue to monitor patient daily and will make dosage adjustments based upon changing renal function.

## 2019-01-10 NOTE — PROGRESS NOTES
Report received from [address] [facility],[place] [address]. 1000: pt passed speech evaluation. Pureed diet order placed. Daughter Concetta guerra would like to hold placing an ng tube today to see if pt able to eat diet. David Bishop advised pt may not be able to have enough caloric intake due to drowsiness but ms. guerra wants to see how she does. 1100: dr Raj Hurtado in to see pt. Dr Raj Hurtado ordered to restart pta crestor, head ct in morning, and requested dr andrews to be called re: ordering a mesha for pt. rn called and talked to Alcides Boogie re: mesha.

## 2019-01-10 NOTE — PROGRESS NOTES
[facility] Daily Progress Note HPI: Alexandre Quintero, a 80y.o. year-old who presented for evaluation of CVA, elevated troponin. Apparently she was fine the morning of admission but in the afternoon developed facial droop and slurred speech. Given TPA and underwent embolectomy - showed organized fibrinous clot - suspicion for Atrial Fib. Unable to obtain ROS due to patient factors on admission. Update: 
Moving extremities spontaneously, answering basic questions and briefly opening eyes. Denies any chest pain or dyspnea. Improving from prior exam. No evidence of afib on tele. Bp is controlled. Assessment/Plan: No evidence of Atrial Fib, TTE ok We will sign off at this point. Please feel free to contact us for any future problems. Thank you for allowing us to participate in the care of Alexandre Quintero. 1. Acute CVA s/p TPA and embolectomy - also some evidence of hemorrhage in left occipital lobe 
-concern for cardiac source due to organized fibrinous character of clot but TTE ok and no evidence of Atrial Fib on telemetry thusfar 2. Syncope - none recently 3. Diabetes mellitus - management per primary team, A1C 5.5% 4. Dyslipidemia - taken off statin last year, LDL 50 
5. HTN- off IV cardene this AM, recommend slowly restarting home meds when ok with neurology and as BP allows 6. Anemia - Hgb 8.2, workup per primary team 
7. Elevated troponin - non-specific in the setting of acute CVA, no ischemic changes on ECG, TTE ok  
8. RBBB - chronic 9. Loud systolic ejection murmur - TTE ok, no valvular abnormalities 10. Hypokalemia - K 3.4, will give KCL 20meq IV, recheck in AM 
11. Hypomagnesemia - Mg 1.8, will give Mg Sulfate 2g IV, recheck in AM 
 
Echo 1/19 - LVEF 60-65%, grade 2 dd Soc no tob no etoh FHx no early cad She  has a past medical history of Anxiety, Arthritis, Chest pain, Chronic back pain, Chronic venous insufficiency, Colon polyp, Cyst of right kidney, Depression, Diabetes ([facility] [address] Hypercholesterolemia, Hypertension, Hypothyroidism, Memory disorder, Other ill-defined conditions(799.89), Painful hip, Parkinson disease ([facility] [address] and Urinary frequency. PE 
Vitals:  
 01/10/19 1830 01/10/19 2200 01/11/19 0200 01/11/19 0600 BP: 140/73 136/64 133/60 128/68 Pulse: 90 94 92 75 Resp: 20 18 18 17 Temp: 98.8 °F (37.1 °C) 100 °F (37.8 °C) 100.1 °F (37.8 °C) 100.2 °F (37.9 °C) SpO2: 100% 100% 95% 97% Weight:   150 lb 12.7 oz (68.4 kg) Height:   5' 5\" (1.651 m) Body mass index is 25.09 kg/m². General appearance - follows commands, answers questions Mental status - calm Eyes - sclera anicteric, moist mucous membranes Neck - supple Lymphatics - not assessed Chest - clear to auscultation anteriorly Heart - regular rate and rhythm, distant heart sounds, 4/6 PATRICIA Abdomen - soft, nontender, nondistended Back exam - not assessed Neurological - answering questions, following commands Musculoskeletal - not assessed Extremities - peripheral pulses 1+, 1+ LE edema Skin - normal coloration  no rashes Telemetry: NSR, RBBB Recent Labs: 
Lab Results Component Value Date/Time Cholesterol, total 132 01/09/2019 05:15 AM  
 HDL Cholesterol 73 01/09/2019 05:15 AM  
 LDL, calculated 50.2 01/09/2019 05:15 AM  
 Triglyceride 44 01/09/2019 05:15 AM  
 CHOL/HDL Ratio 1.8 01/09/2019 05:15 AM  
 
Lab Results Component Value Date/Time Creatinine 0.74 01/11/2019 03:09 AM  
 
Lab Results Component Value Date/Time BUN 12 01/11/2019 03:09 AM  
 
Lab Results Component Value Date/Time Potassium 3.2 (L) 01/11/2019 03:09 AM  
 
Lab Results Component Value Date/Time Hemoglobin A1c 5.5 01/09/2019 05:19 AM  
 
Lab Results Component Value Date/Time HGB 7.6 (L) 01/11/2019 03:09 AM  
 
Lab Results Component Value Date/Time  PLATELET 473 77/48/7922 03:09 AM  
 
 
 Reviewed: 
Past Medical History:  
Diagnosis Date  Anxiety  Arthritis  Chest pain 2008 Negative stress test and Holter monitor w/Dr Celsa Goodman.  Chronic back pain  Chronic venous insufficiency  Colon polyp   
 last scope normal 2007; Dr. Amee Locke  Cyst of right kidney  Depression  Diabetes ([facility] [address]  Hypercholesterolemia  Hypertension  Hypothyroidism  Memory disorder  Other ill-defined conditions(799.89)   
 heart murmur  Painful hip  Parkinson disease ([facility] [address]  Urinary frequency Sees Dr. Richie Torres with [address] Social History Tobacco Use Smoking Status Never Smoker Smokeless Tobacco Never Used Social History Substance and Sexual Activity Alcohol Use No  
 Alcohol/week: 0.0 oz  
 Comment: Rare Allergies Allergen Reactions  Norvasc [Amlodipine] Swelling  Flexeril [Cyclobenzaprine] Other (comments) Caused pt to fall & hallucination  Percocet [Oxycodone-Acetaminophen] Itching  Remeron [Mirtazapine] Diarrhea  Tramadol Other (comments) Caused falls Current Facility-Administered Medications Medication Dose Route Frequency  rosuvastatin (CRESTOR) tablet 20 mg  20 mg Oral QHS  famotidine (PF) (PEPCID) 20 mg in sodium chloride 0.9% 10 mL injection  20 mg IntraVENous Q12H  carbidopa-levodopa (SINEMET)  mg per tablet 1 Tab  1 Tab Oral TID  insulin lispro (HUMALOG) injection   SubCUTAneous AC&HS  hydrALAZINE (APRESOLINE) 20 mg/mL injection 10 mg  10 mg IntraVENous Q6H PRN  
 labetalol (NORMODYNE;TRANDATE) 20 mg/4 mL (5 mg/mL) injection 10 mg  10 mg IntraVENous Q4H PRN  
 acetaminophen (TYLENOL) suppository 650 mg  650 mg Rectal Q4H PRN  
 sodium chloride (NS) flush 5-40 mL  5-40 mL IntraVENous Q8H  
 sodium chloride (NS) flush 5-40 mL  5-40 mL IntraVENous PRN  
 ondansetron (ZOFRAN) injection 4 mg  4 mg IntraVENous Q6H PRN  
  naloxone (NARCAN) injection 0.4 mg  0.4 mg IntraVENous PRN  
 0.9% sodium chloride infusion  50 mL/hr IntraVENous CONTINUOUS  
 levothyroxine (SYNTHROID) injection 37.5 mcg  37.5 mcg IntraVENous Q24H  
 sodium chloride (NS) flush 5-40 mL  5-40 mL IntraVENous Q8H  
 glucose chewable tablet 16 g  4 Tab Oral PRN  
 dextrose (D50W) injection syrg 12.5-25 g  25-50 mL IntraVENous PRN  
 glucagon (GLUCAGEN) injection 1 mg  1 mg IntraMUSCular PRN  
 sodium chloride (NS) flush 5-40 mL  5-40 mL IntraVENous Q8H  
 sodium chloride (NS) flush 5-40 mL  5-40 mL IntraVENous PRN  
 LORazepam (ATIVAN) injection 1 mg  1 mg IntraVENous Q2H PRN Anam Brewer MD 
[address] [facility] [place] [facility]

## 2019-01-10 NOTE — PROGRESS NOTES
Problem: Pressure Injury - Risk of 
Goal: *Prevention of pressure injury Document Clint Scale and appropriate interventions in the flowsheet. Outcome: Progressing Towards Goal 
Pressure Injury Interventions: 
Sensory Interventions: Assess changes in LOC, Float heels, Keep linens dry and wrinkle-free Moisture Interventions: Absorbent underpads, Check for incontinence Q2 hours and as needed Activity Interventions: Pressure redistribution bed/mattress(bed type) Mobility Interventions: Float heels, HOB 30 degrees or less, Pressure redistribution bed/mattress (bed type) Nutrition Interventions: Document food/fluid/supplement intake Friction and Shear Interventions: HOB 30 degrees or less, Lift sheet, Lift team/patient mobility team 
 
  
 
 
 
 
Problem: Falls - Risk of 
Goal: *Absence of Falls Document Avani Mathews Fall Risk and appropriate interventions in the flowsheet. Outcome: Progressing Towards Goal 
Fall Risk Interventions: 
  
 
Mentation Interventions: Adequate sleep, hydration, pain control, Door open when patient unattended, Evaluate medications/consider consulting pharmacy Medication Interventions: Evaluate medications/consider consulting pharmacy Elimination Interventions: Toileting schedule/hourly rounds History of Falls Interventions: Door open when patient unattended, Evaluate medications/consider consulting pharmacy

## 2019-01-10 NOTE — PROGRESS NOTES
1930: Bedside shift change report given to Marquis Thacker RN (oncoming nurse) by Vlad King RN (offgoing nurse). Report included the following information SBAR, Kardex, ED Summary, OR Summary, Intake/Output, MAR, Recent Results, Med Rec Status, Cardiac Rhythm SR and Alarm Parameters . 0730: Bedside shift change report given to Vlad King RN (oncoming nurse) by Marquis Thacker RN (offgoing nurse). Report included the following information SBAR, Kardex, ED Summary, Intake/Output, MAR, Recent Results, Med Rec Status, Cardiac Rhythm SR and Alarm Parameters . Shift Summary: Uneventful shift. Pt opens eyes to voice, PERRL but sluggish, decreased command following, focuses, moves all extremities with the exceptions of the LUE, withdraws on the LUE, afebrile, HR 70s-100s, BP maintain b/w 110-140 on cardene, cardene now off, lung sounds coarse and UOP adequate.

## 2019-01-10 NOTE — CDMP QUERY
Account Number: [de-identified] Patient: Neeta Nesbitt Admitted: 1/8/2019 12:00:00 AM 
Created: [id number] Type: [facility] Code: G93.41 Dr. Daniel Padgett : 
Please clarify if this patient is (was) being treated/managed for:  
 
=> Metabolic encephalopathy, resolved as evidenced by lethargy, agitation, acute cva req IV Ativan, stat ct, mri brain  
=> Other explanation of clinical findings 
=> Clinically Undetermined (no explanation for clinical findings) The medical record reflects the following clinical findings, treatment, and risk factors. Risk Factors:  86F adm w/ CVA s/p TPA and embolectomy Clinical Indicators:  8 pn--appears agitated on assessment, trying to pull lines and monitor wires. Was given ativan at 1800 but remains restless. Pt sent for stat head CT. Pt currently only responsive to painful stimuli, 1/9 lethargic Treatment: IV Ativan, stat ct, mri brain Please clarify and document your clinical opinion in the progress notes and discharge summary including the definitive and/or presumptive diagnosis, (suspected or probable), related to the above clinical findings. Please include clinical findings supporting your diagnosis. Thank Pantera Bedoya Rn/CDMP

## 2019-01-10 NOTE — PROGRESS NOTES
PCCM 
 
In ICU for management of CVA s/p TPA and embolectomy - ? Cardiac source. Cardiology following and echo pnd She is quite lethargic but a bit more interactive than yesterday Not on vasoacitive medications currently - off cardene MRI with acute R MCA infarct and new small intraparenchymal hemorrhages in left occiptial lobe - not changed from  CT Patient Vitals for the past 4 hrs: 
 BP Temp Pulse Resp SpO2 Weight 01/10/19 0900 131/56  90 15 99 %   
01/10/19 0800 132/47 99.5 °F (37.5 °C) 93 17 99 %   
01/10/19 0759      68.8 kg (151 lb 10.8 oz) 01/10/19 0700 130/51  90 19 100 %   
01/10/19 0600 137/54  84 15 100 %  Temp (24hrs), Av.4 °F (36.9 °C), Min:97.4 °F (36.3 °C), Max:99.5 °F (37.5 °C) Intake/Output Summary (Last 24 hours) at 1/10/2019 1842 Last data filed at 1/10/2019 0900 Gross per 24 hour Intake 3217.5 ml Output 3415 ml Net -197.5 ml Lethargic Rouses some to stim Rhonchi 
Irreg Soft Lab: 
Recent Labs  
  01/10/19 
0452 19 
0515 19 
2133 19 
1351 WBC 13.8* 15.7*  --  5.6 HGB 8.1* 9.3*  --  10.3*  261  --  264  142  --  136  
K 3.4* 4.3  --  4.1 * 111*  --  103 CO2 21 20*  --  26 BUN 12 20  --  19  
CREA 0.66 0.88  --  0.95 * 214*  --  149* CA 8.2* 7.9*  --  9.0 MG 1.8  --   --  1.9 PHOS 2.6  --   --   --   
INR  --   --   --  1.0  
TROIQ  --  0.37* 0.51* 0.76* TBILI 0.4  --   --  0.3 SGOT 27  --   --  21 Impression R MCA infarct s/p tpa and thrombecotmy H/o htn DM Elevated troponin 
 
--MRI has been ordered 
--bp control per neurology --cardiology following 
--PT and OT following 
--too lethargic for speech currently 
--place ngt - and nutrition eval for TF 
--? Palliative care eval 
 
MD Darek Gibson MD

## 2019-01-10 NOTE — PROGRESS NOTES
Speech LAnguage Pathology bedside swallow evaluation Patient: Zuleyma Ponce (73 y.o. female) Date: 1/10/2019 Primary Diagnosis: CVA (cerebral vascular accident) ([facility] [address] [I63.9] Precautions: aspiration  Fall ASSESSMENT : 
Based on the objective data described below, the patient presents with much improved mental status today, alert and communicative. Presents with moderate oral and mild/moderate pharyngeal dysphagia that was impacted by fatigue post PT/OT sessions. Demonstrates decreased labial seal with mild anterior spillage, mildly delayed posterior propulsion, suspected mildly delayed swallow initiation, and reduced hyolaryngeal elevation/excursion via palpation. No s/s of aspiration observed. Do not suspect she will be able to meet nutritional needs via PO at this time given effort required for intake and fatigue, however, good improvements in status from yesterday to today so hopeful this will continue to improve. Patient will benefit from skilled intervention to address the above impairments. Patients rehabilitation potential is considered to be Fair Factors which may influence rehabilitation potential include:  
[]            None noted [x]            Mental ability/status [x]            Medical condition []            Home/family situation and support systems [x]            Safety awareness 
[]            Pain tolerance/management []            Other: PLAN : 
Recommendations and Planned Interventions: 
--recommend puree/thin liquid diet, strict aspiration precautions. meds crushed in puree. Straws ok with single sips only. Hold if patient not fully alert or not actively accepting. SLP to follow for diet tolerance and upgrade. Would consider holding on NG tube for now given much improved alertness and function this date. Frequency/Duration: Patient will be followed by speech-language pathology 3 times a week to address goals. Discharge Recommendations: Ramón Sweeney SUBJECTIVE:  
Patient stated I don't really like ice in my water, can I have just plain water?  patient demonstrating fatigue after PT/OT sessions OBJECTIVE:  
 
Past Medical History:  
Diagnosis Date  Anxiety  Arthritis  Chest pain 2008 Negative stress test and Holter monitor w/Dr Darell Montoya.  Chronic back pain  Chronic venous insufficiency  Colon polyp   
 last scope normal 2007; Dr. Murali Da Silva  Cyst of right kidney  Depression  Diabetes ([facility] [address]  Hypercholesterolemia  Hypertension  Hypothyroidism  Memory disorder  Other ill-defined conditions(799.89)   
 heart murmur  Painful hip  Parkinson disease ([facility] [address]  Urinary frequency Sees Dr. Hetal Tolentino with [address] [facility] Past Surgical History:  
Procedure Laterality Date  COLONOSCOPY,DIAGNOSTIC  6/12/2015  HX BREAST BIOPSY  2002  HX HYSTERECTOMY Due to a prolapsed uterus  HX KNEE REPLACEMENT    
 left  HX KNEE REPLACEMENT  2006  
 right  HX ORTHOPAEDIC B knees.  HX OTHER SURGICAL    
 pelvic prolasp  UPPER GI ENDOSCOPY,BIOPSY  6/12/2015 Prior Level of Function/Home Situation:  
Home Situation Home Environment: Private residence One/Two Story Residence: One story Living Alone: Yes Support Systems: Child(nicanor), Family member(s) Patient Expects to be Discharged to[de-identified] Rehabilitation facility Current DME Used/Available at Home: Walker, rolling Diet prior to admission: regular Current Diet:  NPO  Cognitive and Communication Status: 
Neurologic State: Alert Orientation Level: Oriented to person, Disoriented to place, Disoriented to situation, Disoriented to time Cognition: Follows commands, Decreased attention/concentration Perception: Cues to attend left visual field, Cues to attend to left side of body Perseveration: No perseveration noted Safety/Judgement: Not assessed Oral Assessment: 
Oral Assessment Labial: Decreased rate;Decreased seal;Left droop Dentition: Edentulous Oral Hygiene: moist mucosa Lingual: No impairment Velum: Unable to visualize Mandible: No impairment P.O. Trials: 
Patient Position: upright in bed Vocal quality prior to P.O.: No impairment Consistency Presented: Thin liquid;Puree How Presented: SLP-fed/presented;Spoon;Straw Bolus Acceptance: No impairment Bolus Formation/Control: Impaired Type of Impairment: Anterior;Spillage;Delayed Propulsion: Delayed (# of seconds) Oral Residue: None Initiation of Swallow: Delayed (# of seconds)(suspect mild ) Laryngeal Elevation: Decreased Aspiration Signs/Symptoms: None Pharyngeal Phase Characteristics: Other (comment)(suspected mildly delayed initiation, reduced strength ) Effective Modifications: Small sips and bites Cues for Modifications: Minimal-moderate Oral Phase Severity: Moderate Pharyngeal Phase Severity : Mild-moderate NOMS:  
The NOMS functional outcome measure was used to quantify this patient's level of swallowing impairment. Based on the NOMS, the patient was determined to be at level 3 for swallow function NOMS Swallowing Levels: 
Level 1 (CN): NPO Level 2 (CM): NPO but takes consistency in therapy Level 3 (CL): Takes less than 50% of nutrition p.o. and continues with nonoral feedings; and/or safe with mod cues; and/or max diet restriction Level 4 (CK): Safe swallow but needs mod cues; and/or mod diet restriction; and/or still requires some nonoral feeding/supplements Level 5 (CJ): Safe swallow with min diet restriction; and/or needs min cues Level 6 (CI): Independent with p.o.; rare cues; usually self cues; may need to avoid some foods or needs extra time Level 7 (CH): Independent for all p.o. GISELA. (2003). National Outcomes Measurement System (NOMS): Adult Speech-Language Pathology User's Guide. Pain: 
Pain Scale 1: Adult Nonverbal Pain Scale Pain Intensity 1: 0 After treatment:  
[]            Patient left in no apparent distress sitting up in chair 
[x]            Patient left in no apparent distress in bed 
[x]            Call bell left within reach [x]            Nursing notified 
[]            Caregiver present 
[]            Bed alarm activated COMMUNICATION/EDUCATION:  
The patients plan of care including recommendations, planned interventions, and recommended diet changes were discussed with: Occupational Therapist and Registered Nurse. Patient was educated regarding Her deficit(s) of dysphagia as this relates to Her diagnosis of CVA. She demonstrated Guarded understanding as evidenced by limited response to education. Discussed with dtr who verbalized good understanding  
[x]            Posted safety precautions in patient's room. [x]            Patient/family have participated as able in goal setting and plan of care. [x]            Patient/family agree to work toward stated goals and plan of care. []            Patient understands intent and goals of therapy, but is neutral about his/her participation. []            Patient is unable to participate in goal setting and plan of care. Thank you for this referral. 
Jazzy Moncada M.CD. CCC-SLP Time Calculation: 11 mins

## 2019-01-10 NOTE — PROGRESS NOTES
Problem: Self Care Deficits Care Plan (Adult) Goal: *Acute Goals and Plan of Care (Insert Text) Occupational Therapy Goals Initiated 1/10/2019 1. Patient will perform grooming with minimal assistance  within 7 day(s). 2.  Patient will perform self-feeding (if appropriate for PO) with minimal assistance within 7 day(s). 3.  Patient will perform lower body dressing with moderate assistance  within 7 day(s). 4.  Patient will perform toilet transfers with moderate assistance  within 7 day(s). 5.  Patient will perform all aspects of toileting with moderate assistance  within 7 day(s). 6.  Patient will participate in upper extremity therapeutic exercise/activities with moderate assistance  for 10 minutes within 7 day(s). 7.  Patient will utilize energy conservation techniques during functional activities with verbal cues within 7 day(s). 8.  Patient will improve their Fugl Wiseman score by 5 points in prep for ADLs within 7 days. Comments:  
Occupational Therapy EVALUATION Patient: Luiz Ibrahim (6 y.o. female) Date: 1/10/2019 Primary Diagnosis: CVA (cerebral vascular accident) ([facility] [address] [I63.9] Precautions:   Fall ASSESSMENT : 
Based on the objective data described below, the patient presents with L inattention to body and visual fields (although able to track and detect stimuli in all fields with verbal cues), R gaze preference (although did turn head to midline and to L with verbal cues), confusion (oriented to person only), LUE flaccidity (Ladona Barks not appropriate at this time, infer 0/66), and impaired activity tolerance s/p admission for R MCA CVA. Patient followed 100% simple commands (on R) with additional time. RUE AROM/ strength/ coordination generally decreased but functional, shoulder AROM limited to ~80 degrees, also demonstrates RUE tremor with movement.   Patient able to grasp washcloth with R hand and washed both sides of face with set-up, however infer patient would require max-A for other grooming tasks due to above deficits. Infer patient currently requires overall max-A to total-A for ADLs. Patient reports PTA, fully independent, using RW, living alone in single story apartment, with some help for IADLs, and some falls (although unable to clarify/ provide additional information). Patient is significantly below functional baseline. Recommend rehab at d/c, level TBD pending progress. Patient will benefit from skilled intervention to address the above impairments. Patients rehabilitation potential is considered to be Fair Factors which may influence rehabilitation potential include:  
[]                None noted [x]                Mental ability/status [x]                Medical condition []                Home/family situation and support systems []                Safety awareness []                Pain tolerance/management 
[]                Other: PLAN : 
Recommendations and Planned Interventions: 
[x]                  Self Care Training                  [x]           Therapeutic Activities [x]                  Functional Mobility Training    [x]           Cognitive Retraining 
[x]                  Therapeutic Exercises           [x]           Endurance Activities [x]                  Balance Training                   [x]           Neuromuscular Re-Education [x]                  Visual/Perceptual Training     [x]      Home Safety Training 
[x]                  Patient Education                 [x]           Family Training/Education []                  Other (comment): Frequency/Duration: Patient will be followed by occupational therapy 5 times a week to address goals. Discharge Recommendations: Rehab, level TBD pending progress Further Equipment Recommendations for Discharge:TBD SUBJECTIVE:  
Patient stated I feel okay.  OBJECTIVE DATA SUMMARY:  
HISTORY:  
Past Medical History: Diagnosis Date  Anxiety  Arthritis  Chest pain 2008 Negative stress test and Holter monitor w/Dr Andrea Rodriguez.  Chronic back pain  Chronic venous insufficiency  Colon polyp   
 last scope normal 2007; Dr. Nolberto Mcdowell  Cyst of right kidney  Depression  Diabetes ([facility] [address]  Hypercholesterolemia  Hypertension  Hypothyroidism  Memory disorder  Other ill-defined conditions(799.89)   
 heart murmur  Painful hip  Parkinson disease ([facility] [address]  Urinary frequency Sees Dr. Aleksandra Hammonds with [address] Past Surgical History:  
Procedure Laterality Date  COLONOSCOPY,DIAGNOSTIC  6/12/2015  HX BREAST BIOPSY  2002  HX HYSTERECTOMY Due to a prolapsed uterus  HX KNEE REPLACEMENT    
 left  HX KNEE REPLACEMENT  2006  
 right  HX ORTHOPAEDIC B knees.  HX OTHER SURGICAL    
 pelvic prolasp  UPPER GI ENDOSCOPY,BIOPSY  6/12/2015 Prior Level of Function/Environment/Context: Patient reports PTA, fully independent, using RW, living alone in single story apartment, with some help for IADLs, and some falls (although unable to clarify/ provide additional information). Expanded or extensive additional review of patient history:  
 
Home Situation Home Environment: Private residence One/Two Story Residence: One story Living Alone: Yes Support Systems: Child(nicanor), Family member(s) Patient Expects to be Discharged to[de-identified] Rehabilitation facility Current DME Used/Available at Home: Walker, rolling Hand dominance: RightEXAMINATION OF PERFORMANCE DEFICITS: 
Cognitive/Behavioral Status: 
Neurologic State: Drowsy; Confused; Eyes open to voice Orientation Level: Oriented to person;Disoriented to place; Disoriented to situation;Disoriented to time Cognition: Follows commands;Memory loss;Decreased attention/concentration Perception: Cues to attend left visual field;Cues to attend to left side of body; Tactile;Verbal;Visual 
 Perseveration: No perseveration noted Safety/Judgement: Decreased insight into deficits Skin: visible skin appears intact Edema: none noted Hearing: Auditory Auditory Impairment: Hard of hearing, bilateral, Hearing aid(s) Vision/Perceptual:   
Tracking: Requires cues, head turns, or add eye shifts to track; Able to track right of midline; Able to track left of midline Visual Fields: (able to detect in all fields, with additional cues on L) Range of Motion: 
 
AROM: (RUE generally decreased, LUE grossly decreased/ flaccid) PROM: Generally decreased, functional 
  
  
  
  
  
  
Strength: 
 
Strength: (RUE generally decreased, LUE grossly decreased/ flaccid) Coordination: 
Coordination: (RUE generally decreased/ tremor, LUE grossly decreased) Fine Motor Skills-Upper: Right Impaired;Left Impaired(R decreased, L flaccid) Gross Motor Skills-Upper: Left Impaired;Right Impaired(R decreased, L flaccid) Tone & Sensation: 
 
Tone: Abnormal(LUE flaccid) Sensation: Impaired(reports decreased LUE) Balance: 
 not assessed Functional Mobility and Transfers for ADLs: (per physical therapy) Bed Mobility: 
Rolling: Maximum assistance Scooting: Maximum assistance ADL Assessment: 
Feeding: Maximum assistance(inferred using RUE but currently NPO) Oral Facial Hygiene/Grooming: Maximum assistance(washed face with R hand, infer mod-A for other tasks) Bathing: Total assistance(inferred) Upper Body Dressing: Total assistance(inferred) Lower Body Dressing: Total assistance(inferred) Toileting: Total assistance(inferred) ADL Intervention and task modifications: 
  
 
  
  
 
Cognitive Retraining Safety/Judgement: Decreased insight into deficits Functional Measure:  
 
Barthel Index: 
 
Bathin Bladder: 0 Bowels: 0 Groomin Dressin Feedin Mobility: 0 Stairs: 0 Toilet Use: 0 Transfer (Bed to Chair and Back): 0 Total: 0 The Barthel ADL Index: Guidelines 1. The index should be used as a record of what a patient does, not as a record of what a patient could do. 2. The main aim is to establish degree of independence from any help, physical or verbal, however minor and for whatever reason. 3. The need for supervision renders the patient not independent. 4. A patient's performance should be established using the best available evidence. Asking the patient, friends/relatives and nurses are the usual sources, but direct observation and common sense are also important. However direct testing is not needed. 5. Usually the patient's performance over the preceding 24-48 hours is important, but occasionally longer periods will be relevant. 6. Middle categories imply that the patient supplies over 50 per cent of the effort. 7. Use of aids to be independent is allowed. [employer] Barthel, D.W. (0989). Functional evaluation: the Barthel Index. [address] [facility] (14)2. Collin Quick malu RAZ Terry, Enrico Choudhary., Tim Salinas., [place], [address] [place] [address] (1999). Measuring the change indisability after inpatient rehabilitation; comparison of the responsiveness of the Barthel Index and Functional [place] Measure. Journal of Neurology, Neurosurgery, and Psychiatry, 66(4), 223-518. Chelle Lorenzo, N.J.A, GLENN Gonzalez, & Demi Payton MDAMARIS. (2004.) Assessment of post-stroke quality of life in cost-effectiveness studies: The usefulness of the Barthel Index and the EuroQoL-5D. [facility], 13, 382-74 Fugl-Wiseman Assessment of Motor Recovery after Stroke:  
 
Reflex Activity Flexors/Biceps/Fingers: None Extensors/Triceps: None Reflex Subtotal: 0 Volitional Movement Within Synergies Shoulder Retraction: None Shoulder Elevation: None Shoulder Abduction (90 degrees): None Shoulder External Rotation: None Elbow Flexion: None Forearm Supination: None Shoulder Adduction/Internal Rotation: None Elbow Extension: None Forearm Pronation: None Subtotal: 0 Volitional Movement Mixing Synergies Hand to Lumbar Spine: None Shoulder Flexion (0-90 degrees): None Pronation-Supination: None Subtotal: 0 Volitional Movement With Little or No Synergy Shoulder Abduction (0-90 degrees): None Shoulder Flexion ( degrees): None Pronation/Supination: None Subtotal : 0 Normal Reflex Activity Biceps, Triceps, Finger Flexors: None Subtotal : 0 Upper Extremity Total  
Upper Extremity Total: 0 Wrist 
Stability at 15 Degree Dorsiflexion: None Repeated Dorsiflexion/ Volar Flexion: None Stability at 15 Degree Dorsiflexion: None Repeated Dorsiflexion/ Volar Flexion: None Circumduction: None Wrist Total: 0 Hand Mass Flexion: None Mass Extension: None Grasp A: None Grasp B: None Grasp C: None Grasp D: None Grasp E: None Hand Total: 0 Coordination/Speed Tremor: Marked Dysmetria: Marked Time: >5s Coordination/Speed Total : 0 Total A-D Total A-D (Motor Function): 0/66 Percentage of impairment CH 
0% CI 
1-19% CJ 
20-39% CK 
40-59% CL 
60-79% CM 
80-99% CN 
100% Fugl-Wiseman score: 0-66 66 53-65 39-52 26-38 13-25 1-12 
 0 This is a reliable/valid measure of arm function after a neurological event. It has established value to characterize functional status and for measuring spontaneous and therapy-induced recovery; tests proximal and distal motor functions. Fugl-Wiseman Assessment  UE scores recorded between five and 30 days post neurologic event can be used to predict UE recovery at six months post neurologic event. Severe = 0-21 points Moderately Severe = 22-33 points Moderate = 34-47 points Mild = 48-66 points KILLIAN Polanco, MATEO Gallardo, & ALYSSA Burnette (1992). Measurement of motor recovery after stroke: Outcome assessment and sample size requirements. Stroke, 23, pp. 9622-7053. ------------------------------------------------------------------------------------------------------------------------------------------------------------------MCID: 
Stroke: Oswaldo Garcia et al, 2001; n = 171; mean age 79 (6) years; assessed within 16 (12) days of stroke, Acute Stroke) FMA Motor Scores from Admission to Discharge 10 point increase in FMA Upper Extremity = 1.5 change in discharge FIM  
10 point increase in FMA Lower Extremity = 1.9 change in discharge FIM 
MDC:  
Stroke:  
Festus Trinidad et al, 2008, n = 14, mean age = 59.9 (14.6) years, assessed on average 14 (6.5) months post stroke, Chronic Stroke) FMA = 5.2 points for the Upper Extremity portion of the assessment Occupational Therapy Evaluation Charge Determination History Examination Decision-Making LOW Complexity : Brief history review  MEDIUM Complexity : 3-5 performance deficits relating to physical, cognitive , or psychosocial skils that result in activity limitations and / or participation restrictions MEDIUM Complexity : Patient may present with comorbidities that affect occupational performnce. Miniml to moderate modification of tasks or assistance (eg, physical or verbal ) with assesment(s) is necessary to enable patient to complete evaluation Based on the above components, the patient evaluation is determined to be of the following complexity level: LOW Pain: 
Pain Scale 1: Adult Nonverbal Pain Scale Pain Intensity 1: 0 Activity Tolerance: VSS After treatment:  
[]  Patient left in no apparent distress sitting up in chair 
[x]  Patient left in no apparent distress in bed 
[x]  Call bell left within reach [x]  Nursing notified 
[]  Caregiver present 
[]  Bed alarm activated COMMUNICATION/EDUCATION:  
The patients plan of care was discussed with: Physical Therapist, Speech Therapist and Registered Nurse.  
 
[x]      Home safety education was provided and the patient/caregiver indicated understanding. [x]      Patient/family have participated as able and agree with findings and recommendations. []      Patient is unable to participate in plan of care at this time. This patients plan of care is appropriate for delegation to [facility]. Thank you for this referral. 
Rishabh Archuleta OT Time Calculation: 23 mins

## 2019-01-10 NOTE — PROGRESS NOTES
Neurology Progress Note Patient ID: 
Pavan Jordan [id number] 
74 y.o. 
2/7/1932 Subjective:  
Doing better, more alert today and interactive. MRI Brain reviewed showing small to moderate R MCA stroke with scattered foci of ischemia also involving the R posterior circulation. Stable L occipital ICH. Current Facility-Administered Medications Medication Dose Route Frequency  rosuvastatin (CRESTOR) tablet 20 mg  20 mg Oral QHS  famotidine (PF) (PEPCID) 20 mg in sodium chloride 0.9% 10 mL injection  20 mg IntraVENous Q12H  hydrALAZINE (APRESOLINE) 20 mg/mL injection 10 mg  10 mg IntraVENous Q6H PRN  
 labetalol (NORMODYNE;TRANDATE) 20 mg/4 mL (5 mg/mL) injection 10 mg  10 mg IntraVENous Q4H PRN  
 acetaminophen (TYLENOL) suppository 650 mg  650 mg Rectal Q4H PRN  
 sodium chloride (NS) flush 5-40 mL  5-40 mL IntraVENous Q8H  
 sodium chloride (NS) flush 5-40 mL  5-40 mL IntraVENous PRN  
 ondansetron (ZOFRAN) injection 4 mg  4 mg IntraVENous Q6H PRN  
 naloxone (NARCAN) injection 0.4 mg  0.4 mg IntraVENous PRN  
 0.9% sodium chloride infusion  50 mL/hr IntraVENous CONTINUOUS  
 [START ON 1/11/2019] levothyroxine (SYNTHROID) injection 37.5 mcg  37.5 mcg IntraVENous Q24H  
 sodium chloride (NS) flush 5-40 mL  5-40 mL IntraVENous Q8H  
 glucose chewable tablet 16 g  4 Tab Oral PRN  
 dextrose (D50W) injection syrg 12.5-25 g  25-50 mL IntraVENous PRN  
 glucagon (GLUCAGEN) injection 1 mg  1 mg IntraMUSCular PRN  
 insulin lispro (HUMALOG) injection   SubCUTAneous Q6H  
 sodium chloride (NS) flush 5-40 mL  5-40 mL IntraVENous Q8H  
 sodium chloride (NS) flush 5-40 mL  5-40 mL IntraVENous PRN  
 LORazepam (ATIVAN) injection 1 mg  1 mg IntraVENous Q2H PRN Objective:  
 
Patient Vitals for the past 8 hrs: 
 BP Temp Pulse Resp SpO2 Weight 01/10/19 1400 139/49  90 27 100 %   
01/10/19 1300 137/66  96 18 100 %   
01/10/19 1200 140/61 99.6 °F (37.6 °C) 85 20 100 %   
 01/10/19 1100   100 17 99 %   
01/10/19 1000 134/51  99 14 100 %   
01/10/19 0900 131/56  90 15 99 %   
01/10/19 0800 132/47 99.5 °F (37.5 °C) 93 17 99 %   
01/10/19 0759      68.8 kg (151 lb 10.8 oz) 01/10 0701 - 01/10 1900 In: 1300 [I.V.:1300] Out: 450 [Urine:450] 01/08 1901 - 01/10 0700 In: 4211.7 [I.V.:4211.7] Out: 8777 [[facility]:0109] Lab Review Recent Results (from the past 24 hour(s)) GLUCOSE, POC Collection Time: 01/09/19  8:01 PM  
Result Value Ref Range Glucose (POC) 121 (H) 65 - 100 mg/dL Performed by Nelson Minaya, POC Collection Time: 01/10/19 12:08 AM  
Result Value Ref Range Glucose (POC) 109 (H) 65 - 100 mg/dL Performed by Spring Andrade   
CBC WITH AUTOMATED DIFF Collection Time: 01/10/19  4:52 AM  
Result Value Ref Range WBC 13.8 (H) 3.6 - 11.0 K/uL  
 RBC 2.68 (L) 3.80 - 5.20 M/uL HGB 8.1 (L) 11.5 - 16.0 g/dL HCT 26.4 (L) 35.0 - 47.0 % MCV 98.5 80.0 - 99.0 FL  
 MCH 30.2 26.0 - 34.0 PG  
 MCHC 30.7 30.0 - 36.5 g/dL  
 RDW 14.1 11.5 - 14.5 % PLATELET 929 693 - 897 K/uL MPV 10.8 8.9 - 12.9 FL  
 NRBC 0.0 0  WBC ABSOLUTE NRBC 0.00 0.00 - 0.01 K/uL NEUTROPHILS 86 (H) 32 - 75 % LYMPHOCYTES 6 (L) 12 - 49 % MONOCYTES 7 5 - 13 % EOSINOPHILS 0 0 - 7 % BASOPHILS 0 0 - 1 % IMMATURE GRANULOCYTES 1 (H) 0.0 - 0.5 % ABS. NEUTROPHILS 11.9 (H) 1.8 - 8.0 K/UL  
 ABS. LYMPHOCYTES 0.8 0.8 - 3.5 K/UL  
 ABS. MONOCYTES 1.0 0.0 - 1.0 K/UL  
 ABS. EOSINOPHILS 0.0 0.0 - 0.4 K/UL  
 ABS. BASOPHILS 0.0 0.0 - 0.1 K/UL  
 ABS. IMM. GRANS. 0.1 (H) 0.00 - 0.04 K/UL  
 DF AUTOMATED MAGNESIUM Collection Time: 01/10/19  4:52 AM  
Result Value Ref Range Magnesium 1.8 1.6 - 2.4 mg/dL METABOLIC PANEL, COMPREHENSIVE Collection Time: 01/10/19  4:52 AM  
Result Value Ref Range Sodium 145 136 - 145 mmol/L Potassium 3.4 (L) 3.5 - 5.1 mmol/L  Chloride 115 (H) 97 - 108 mmol/L  
 CO2 21 21 - 32 mmol/L  
 Anion gap 9 5 - 15 mmol/L Glucose 127 (H) 65 - 100 mg/dL BUN 12 6 - 20 MG/DL Creatinine 0.66 0.55 - 1.02 MG/DL  
 BUN/Creatinine ratio 18 12 - 20 GFR est AA >60 >60 ml/min/1.73m2 GFR est non-AA >60 >60 ml/min/1.73m2 Calcium 8.2 (L) 8.5 - 10.1 MG/DL Bilirubin, total 0.4 0.2 - 1.0 MG/DL  
 ALT (SGPT) 14 12 - 78 U/L  
 AST (SGOT) 27 15 - 37 U/L Alk. phosphatase 60 45 - 117 U/L Protein, total 6.0 (L) 6.4 - 8.2 g/dL Albumin 2.9 (L) 3.5 - 5.0 g/dL Globulin 3.1 2.0 - 4.0 g/dL A-G Ratio 0.9 (L) 1.1 - 2.2 PHOSPHORUS Collection Time: 01/10/19  4:52 AM  
Result Value Ref Range Phosphorus 2.6 2.6 - 4.7 MG/DL  
GLUCOSE, POC Collection Time: 01/10/19  5:23 AM  
Result Value Ref Range Glucose (POC) 133 (H) 65 - 100 mg/dL Performed by Neymar Smallwood GLUCOSE, POC Collection Time: 01/10/19  1:33 PM  
Result Value Ref Range Glucose (POC) 141 (H) 65 - 100 mg/dL Performed by Lidia Vazquez PE: Alert, follows commands, no aphasia, mod dysarthria. PERRL, EOMI, VF intact b/l, L severe FD 
LUE 0/5, LLE 3-/5, RUE 5/5, RLE 4/5 Decreased LUE/LLE to LT 
DTR 1+, toes down b/l Assessment:  
 
Principal Problem: 
  CVA (cerebral vascular accident) ([facility] [address] (1/8/2019) A/P: R MCA acute infarct, R M2 occlusion s/p tPA and endovascular intervention 1/8/19. MRI Brain reviewed with small/moderate R MCA infarct, scattered foci ischemia anterior as well as posterior circulation suggestive of embolic event. Also noted L occipital small ICH, stable since last imaging. Plan:  
Hold AP/OAC presently due to presence of L occipital hemorrhage Repeat head CT tomorrow AM 
SBP goal<140 Resume statin therapy, goal LDL<70 Telemetry, CINDY-concern for central embolic source of infarct PT/OT Signed: 
Breana Maciel DO 
1/10/2019 
3:02 PM

## 2019-01-10 NOTE — PROGRESS NOTES
Hospitalist Progress Note Clair Branch MD 
Answering service: [phone] OR [phone] from in house phone Date of Service:  1/10/2019 NAME:  Romana Logan :  1932 MRN:  [id number] Admission Summary:  
80 y.o F with PMH of parkinson's disease,HTN,hypothyroidism,diabetes was admitted to the hospital for management of acute rt CVA s/p tpa. Interval history / Subjective:  
  Patient was comfortably sitting in bed. Was talking and passed swallow eval. 
Denied any pain. 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 
-Repeat CT brain last night - extensive rt MCA territory acute infarction. -MRI brain showed acute right MCA territory infarct,other small scattered acute supratentorial and infratentorial right-sided infarcts. 
-There is a new small hemorrhage in the left occipital lobe with mild edema ? Post tpa bleed vs stroke with hemorrhagic conversion 
-neurology and neuro interventionist following,appreciate recommendations 
-echo showed normal EF.  
-Plan for CINDY and CT brain tomorrow 
-Patient is improving- was talking and passed swallow eval.Still weak on the left side. 
-Pt/Ot/speech eval following #HTN: 
-BP goal 110-140 
-off cardene drip. -PRN hydralazine. #Diabetes: 
-on glimepiride,sitagliptin-metformin 
-will continue SSI for now and monitor BS #Parkinson's disease: 
-will resume sinemet #hypothyrodism: 
-on synthroid #Difficulty duran placement: 
-Gyn team placed a coude catheter. Mild elevation of troponin:trended down. - in the setting of stroke ?demand ischemia Code status: full DVT prophylaxis: SCD Care Plan discussed with: patient's family at bed side ,nurse Disposition: TBD Hospital Problems  Date Reviewed: 2019 Codes Class Noted POA * (Principal) CVA (cerebral vascular accident) ([facility] [address] ICD-10-CM: I63.9 ICD-9-CM: 434.91  1/8/2019 Unknown Review of Systems: As per HPI Vital Signs:  
 Last 24hrs VS reviewed since prior progress note. Most recent are: 
Visit Vitals /52 Pulse 80 Temp 98.2 °F (36.8 °C) Resp 18 Ht 5' 5\" (1.651 m) Wt 68.8 kg (151 lb 10.8 oz) SpO2 100% BMI 25.24 kg/m² Intake/Output Summary (Last 24 hours) at 1/10/2019 1736 Last data filed at 1/10/2019 1600 Gross per 24 hour Intake 2201.25 ml Output 3275 ml Net -1073.75 ml Physical Examination:  
 
 
     
Constitutional:  elderly patient ENT:  Oral mucous dry, drooling secretion from the mouth Resp:  slightly decreased breath sounds with gurgling sound. No wheezing/rhonchi/rales. CV:  Regular rhythm, normal rate, no murmurs, gallops, rubs GI:  Soft, non distended, non tender. normoactive bowel sounds Musculoskeletal:  No edema Neurologic: She is A&O X 2,very minimal movement of  LUE and LLE 3/5,RUE and RLE 4+/5 Data Review:  
 Review and/or order of clinical lab test 
Review and/or order of tests in the medicine section of [facility] Labs:  
 
Recent Labs  
  01/10/19 
0452 01/09/19 
0515 WBC 13.8* 15.7* HGB 8.1* 9.3* HCT 26.4* 29.4*  
 261 Recent Labs  
  01/10/19 
0452 01/09/19 
0515 01/08/19 
1351  142 136  
K 3.4* 4.3 4.1 * 111* 103 CO2 21 20* 26 BUN 12 20 19 CREA 0.66 0.88 0.95 * 214* 149* CA 8.2* 7.9* 9.0 MG 1.8  --  1.9 PHOS 2.6  --   --   
 
Recent Labs  
  01/10/19 
0452 01/08/19 
1351 SGOT 27 21 ALT 14 11* AP 60 74 TBILI 0.4 0.3 TP 6.0* 7.3 ALB 2.9* 3.3*  
GLOB 3.1 4.0 Recent Labs 01/08/19 
1351 INR 1.0 PTP 10.3 APTT 23.6 No results for input(s): FE, TIBC, PSAT, FERR in the last 72 hours. Lab Results Component Value Date/Time  Folate 11.9 06/16/2018 12:45 AM  
  
 No results for input(s): PH, PCO2, PO2 in the last 72 hours. Recent Labs 01/09/19 
0515 01/08/19 
2133 01/08/19 
1351 TROIQ 0.37* 0.51* 0.76* Lab Results Component Value Date/Time Cholesterol, total 132 01/09/2019 05:15 AM  
 HDL Cholesterol 73 01/09/2019 05:15 AM  
 LDL, calculated 50.2 01/09/2019 05:15 AM  
 Triglyceride 44 01/09/2019 05:15 AM  
 CHOL/HDL Ratio 1.8 01/09/2019 05:15 AM  
 
Lab Results Component Value Date/Time Glucose (POC) 141 (H) 01/10/2019 01:33 PM  
 Glucose (POC) 133 (H) 01/10/2019 05:23 AM  
 Glucose (POC) 109 (H) 01/10/2019 12:08 AM  
 Glucose (POC) 121 (H) 01/09/2019 08:01 PM  
 Glucose (POC) 189 (H) 01/09/2019 12:25 PM  
 
Lab Results Component Value Date/Time Color YELLOW/STRAW 06/16/2018 01:00 PM  
 Appearance CLEAR 06/16/2018 01:00 PM  
 Specific gravity 1.008 06/16/2018 01:00 PM  
 pH (UA) 7.5 06/16/2018 01:00 PM  
 Protein 30 (A) 06/16/2018 01:00 PM  
 Glucose NEGATIVE  06/16/2018 01:00 PM  
 Ketone NEGATIVE  06/16/2018 01:00 PM  
 Bilirubin NEGATIVE  06/16/2018 01:00 PM  
 Urobilinogen 1.0 06/16/2018 01:00 PM  
 Nitrites NEGATIVE  06/16/2018 01:00 PM  
 Leukocyte Esterase LARGE (A) 06/16/2018 01:00 PM  
 Epithelial cells MODERATE (A) 06/16/2018 01:00 PM  
 Bacteria NEGATIVE  06/16/2018 01:00 PM  
 WBC 20-50 06/16/2018 01:00 PM  
 RBC 0-5 06/16/2018 01:00 PM  
 
 
 
Medications Reviewed:  
 
Current Facility-Administered Medications Medication Dose Route Frequency  rosuvastatin (CRESTOR) tablet 20 mg  20 mg Oral QHS  famotidine (PF) (PEPCID) 20 mg in sodium chloride 0.9% 10 mL injection  20 mg IntraVENous Q12H  hydrALAZINE (APRESOLINE) 20 mg/mL injection 10 mg  10 mg IntraVENous Q6H PRN  
 labetalol (NORMODYNE;TRANDATE) 20 mg/4 mL (5 mg/mL) injection 10 mg  10 mg IntraVENous Q4H PRN  
 acetaminophen (TYLENOL) suppository 650 mg  650 mg Rectal Q4H PRN  
 sodium chloride (NS) flush 5-40 mL  5-40 mL IntraVENous Q8H  
  sodium chloride (NS) flush 5-40 mL  5-40 mL IntraVENous PRN  
 ondansetron (ZOFRAN) injection 4 mg  4 mg IntraVENous Q6H PRN  
 naloxone (NARCAN) injection 0.4 mg  0.4 mg IntraVENous PRN  
 0.9% sodium chloride infusion  50 mL/hr IntraVENous CONTINUOUS  
 [START ON 1/11/2019] levothyroxine (SYNTHROID) injection 37.5 mcg  37.5 mcg IntraVENous Q24H  
 sodium chloride (NS) flush 5-40 mL  5-40 mL IntraVENous Q8H  
 glucose chewable tablet 16 g  4 Tab Oral PRN  
 dextrose (D50W) injection syrg 12.5-25 g  25-50 mL IntraVENous PRN  
 glucagon (GLUCAGEN) injection 1 mg  1 mg IntraMUSCular PRN  
 insulin lispro (HUMALOG) injection   SubCUTAneous Q6H  
 sodium chloride (NS) flush 5-40 mL  5-40 mL IntraVENous Q8H  
 sodium chloride (NS) flush 5-40 mL  5-40 mL IntraVENous PRN  
 LORazepam (ATIVAN) injection 1 mg  1 mg IntraVENous Q2H PRN  
 
______________________________________________________________________ EXPECTED LENGTH OF STAY: 1d 7h 
ACTUAL LENGTH OF STAY:          2 Saranya Briceno MD

## 2019-01-10 NOTE — PROGRESS NOTES
Neurointerventional Surgery Progress Note Shruti Costello NP Cell: [phone] Admit Date: 1/8/2019 LOS: 2 days Daily Progress Note: 1/10/2019 S/P: Right MCA thrombectomy by Dr. Leon Taylor 1/8/19 HPI: Ms Gee Sheridan presented to the ED 1/8 via EMS with complaints of left sided weakness, left facial droop and slurred speech. Her last known well was 11:47 per her daughter. In the ED a Code S was called and a head CTA was performed which showed an acute right MCA M2 occlusion. Her NIH on arrival was 22. She was given TPA in the ED and her NIH improved to 20, so she was taken to angio for thrombectomy by Dr. Leon Taylor, with successful removal of clot. Subjective:  
 
Ms. Gee Sheridan is doing much better today. Awake, alert and talking to us. Her MRI is back and shows a small area of stroke in the right frontoparietal lobe and posterior insula. There are multiple other small scattered acute right sided infarcts. There is also small 7 X 5 mm intraparenchymal hemorrhage within the left occipital periventricular white matter. We recommend doing a follow up MRI with contrast in one month to further examine this area as it is hard to differentiate between hemorrhage from stroke vs. Hemorrhage from tumor at this point. Stroke scale has improved from 20 to 13 today. NIH Stroke Scale : 13 LOC-0 
LOC-1 Best Gaze - 0 Visual- 1 Facial Palsy- 2 Motor Arm - L ( 3) R (0) Motor Leg - L (1) R (1) Limb Ataxia - 1 Sensory- 2 Best Language- 0 Dysarthria- 0 Extinction and Inattention- 1 We will sign off the case at this time. Thankyou for the opportunity to participate in the care of Ms. Chavez Eller. Please call us or reconsult for further questions or concerns. Allergies Allergen Reactions  Norvasc [Amlodipine] Swelling  Flexeril [Cyclobenzaprine] Other (comments) Caused pt to fall & hallucination  Percocet [Oxycodone-Acetaminophen] Itching  Remeron [Mirtazapine] Diarrhea  Tramadol Other (comments) Caused falls Review of Systems: 
Unable to obtain meaningful ROS due to AMS. Patient does tell us she is hungry and feels she cannot move her left arm. Objective:  
Vital signs Temp (24hrs), Av.4 °F (36.9 °C), Min:97.4 °F (36.3 °C), Max:99.5 °F (37.5 °C) 
 01/10 0701 - 01/10 1900 In: 4420 [I.V.:1075] Out: 450 [Urine:450]  1901 - 01/10 0700 In: 4211.7 [I.V.:4211.7] Out: 1797 [KOXYW:0614] Visit Vitals /51 Pulse 99 Temp 99.5 °F (37.5 °C) Resp 14 Ht 5' 5\" (1.651 m) Wt 151 lb 10.8 oz (68.8 kg) SpO2 100% BMI 25.24 kg/m² O2 Flow Rate (L/min): 1 l/min O2 Device: Nasal cannula Vitals:  
 01/10/19 0759 01/10/19 0800 01/10/19 0900 01/10/19 1000 BP:  132/47 131/56 134/51 Pulse:  93 90 99 Resp:  17 15 14 Temp:  99.5 °F (37.5 °C) SpO2:  99% 99% 100% Weight: 151 lb 10.8 oz (68.8 kg) Height:      
  
Physical Exam: 
Gen:NAD. Neurologic Exam: 
Mental Status:  Alert and oriented x 4. Appropriate affect, mood and behavior. Language:    Normal fluency, repetition, comprehension and naming. Cranial Nerves:   Pupils equal, round and reactive to light. Left quadrant visual field cut noted Extraocular movements intact. Loss of facial sensation on left side Facial Asymmetry noted, left sided partial paralysis Hearing intact bilaterally. No dysarthria. Some slurred speech which is likely                                                 related to significant facial droop. Tongue protrudes to                                          midline, palate elevates symmetrically. Motor:    No pronator drift on the right. Bulk and tone normal.  
   Global weakness noted. Hip Flexor weakness in                                                   bilateral lower extremity ( 2/5) , otherwise strength in LE                                     is 5/5. LUE with trace movement. RUE 5/5 in strength. No involuntary movements. Sensation:    Loss of sensation to left side including face and arm. Sensation to right side is intact to light touch and pin                                                   prick. Coordination & Gait: deferred Labs: 
Lab Results Component Value Date/Time WBC 13.8 (H) 01/10/2019 04:52 AM  
 HGB 8.1 (L) 01/10/2019 04:52 AM  
 HCT 26.4 (L) 01/10/2019 04:52 AM  
 PLATELET 813 00/06/4958 04:52 AM  
 MCV 98.5 01/10/2019 04:52 AM  
 
Lab Results Component Value Date/Time Sodium 145 01/10/2019 04:52 AM  
 Potassium 3.4 (L) 01/10/2019 04:52 AM  
 Chloride 115 (H) 01/10/2019 04:52 AM  
 CO2 21 01/10/2019 04:52 AM  
 Anion gap 9 01/10/2019 04:52 AM  
 Glucose 127 (H) 01/10/2019 04:52 AM  
 BUN 12 01/10/2019 04:52 AM  
 Creatinine 0.66 01/10/2019 04:52 AM  
 BUN/Creatinine ratio 18 01/10/2019 04:52 AM  
 GFR est AA >60 01/10/2019 04:52 AM  
 GFR est non-AA >60 01/10/2019 04:52 AM  
 Calcium 8.2 (L) 01/10/2019 04:52 AM  
 
Imaging: All images were personally reviewed by myself and Dr. Yesy Tenorio. CT head 1/8/19 at 476 1626 showed no acute intracranial abnormality. CTA head 1/8/19 showed acute thromboembolic occlusion of the proximal to mid posterior division M2 segment right middle cerebral artery. There is associated decreased perfusion to the right parieto-occipital and posterior temporal lobes with questionable small increased blood volume in the parietal lobe. Diminutive right vertebral artery with age indeterminant occlusion of the distal V4 segment. Left vertebral artery and basilar artery are patent and unremarkable. CT head 1/8/19 at 2019 shows findings suggesting extensive right MCA territory acute infarction with hemorrhagic foci as above. Repeat CT imaging study recommended in several hours to determine the evolution of findings, or earlier if clinically indicated. CT Head 1/9 :  
 
Impression: CT reveals evolving RMCA infarction. The areas of hyperdensity in the R MCA territory that were reported as hemorrhage have resolved and represented residual contrast in damaged tissue, rather than hemorrhage. The increased density posterior to the trigone of the left latteral ventricle may actually represent a small amount of hemorrhage, of uncertain significance.  
  
MRI Head 1/9/19 :  
 
1. Acute right MCA territory infarct predominantly involving the right 
frontoparietal lobe and posterior insula as described. Multiple other small 
scattered acute supratentorial and infratentorial right-sided infarcts as 
detailed above. 2. New small acute intraparenchymal hemorrhage within the left occipital 
periventricular white matter measuring 7 x 5 mm, with mild surrounding edema but 
no significant mass effect. This acute hemorrhage was present on head CT 
1/9/2019, and is not significantly changed since that recent exam. 
3. Unchanged generalized parenchymal volume loss and chronic microvascular 
ischemic disease. Assessment:  
Principal Problem: 
  CVA (cerebral vascular accident) ([address] (1/8/2019) Plan:  
 
1.) Right MCA M2 occlusion CVA 
 - s/p thrombectomy by Dr. Sherly Bailey 1/8/19 
 - Received TPA in ED 
 - MRI shows acute right MCA territory infarct in right frontoparietal lobe and posterior insula . Multiple other small scattered acute right sided infarcts. New small acute IPH in left occipital white matter measuring 7 X 5 mm. - Hold ASA - Lipid panel is WNL , LDL is 50  
 - PT/OT/SLP consults placed 2.) HTN 
 - SBP goal 110-140 
  - Cardene PRN 
 - Aly/Labetalol PRN 
 - ECHO shows grade 2 diastolic dysfunction , EF preserved 3.) Hx DMII, well controlled - Takes Janumet and glimepiride at home, hold PO meds for now - A1C 5.5 
 - SSI and accuchecks 
 - Hospitalist following 
 
4.) Elevated troponin 
 - likely demand ischemia related to stroke  
            - cardiology following We will sign off the case at this time. Thankyou for the opportunity to participate in the care of Ms. Adama Nava. Please call us or reconsult for further questions or concerns. Discussed with Dr. Delisa Singletary  and ICU nurse.   
 
Sherita Roy NP

## 2019-01-10 NOTE — PROGRESS NOTES
Problem: Mobility Impaired (Adult and Pediatric) Goal: *Acute Goals and Plan of Care (Insert Text) Physical Therapy Goals Initiated 1/10/2019 1. Patient will move from supine to sit and sit to supine  in bed with maximal assistance within 7 day(s). 2.  Patient will transfer from bed to chair and chair to bed with maximal assistance using the least restrictive device within 7 day(s). 3.  Patient will perform sit to stand with maximal assistance within 7 day(s). 4.  Patient will ambulate with maximal assistance for 10 feet with the least restrictive device within 7 day(s). 6.  Patient will improve Tavarez Balance score by 7 points within 7 days. physical Therapy EVALUATION- neuro population Patient: Lillie Manjarrez (76 y.o. female) Date: 1/10/2019 Primary Diagnosis: CVA (cerebral vascular accident) ([facility] [address] [I63.9] Precautions:   Fall ASSESSMENT : 
Based on the objective data described below, the patient presents with decreased functional mobility, generalized weakness; 0/5 LUE, inattention to the L side and head preference to the R s/p R MCA infarct and new small intraparenchymal hemorrhages in left occiptial lobe. Pt received supine in bed and agreeable to therapy. Pt oriented to self, location, date (year and month and guessed Wednesday or Thursday). Pt reported prior to admission, she was ambulatory with a RW and living alone in a 1 story. Pt able to grasp and release on command with R hand. Pt performed AROM with minimal assistance to R and L LE and against resistance. Repositioned patient with max assist supine in bed. Provided manual stretching to cervical neck to the L and placed a towel roll for positioning to midline. Pt will continue to benefit from PT to progress mobility as tolerated and reach highest level of independence. Anticipate pt will need rehab upon discharge, inpatient rehab versus SNF level, pending further OOB mobility. Avani Wallis Patient will benefit from skilled intervention to address the above impairments. Patients rehabilitation potential is considered to be Good Factors which may influence rehabilitation potential include:  
[]           None noted 
[]           Mental ability/status [x]           Medical condition 
[]           Home/family situation and support systems 
[]           Safety awareness 
[]           Pain tolerance/management 
[]           Other: PLAN : 
Recommendations and Planned Interventions: 
[x]             Bed Mobility Training             [x]      Neuromuscular Re-Education [x]             Transfer Training                   []      Orthotic/Prosthetic Training 
[x]             Gait Training                         []      Modalities [x]             Therapeutic Exercises           []      Edema Management/Control [x]             Therapeutic Activities            [x]      Patient and Family Training/Education []             Other (comment): Frequency/Duration: Patient will be followed by physical therapy 5 times a week to address goals. Discharge Recommendations: Inpatient Rehab versus [facility] Further Equipment Recommendations for Discharge: tbd SUBJECTIVE:  
Patient stated Am I doing everything I am supposed to be doing.  OBJECTIVE DATA SUMMARY:  
HISTORY:   
Past Medical History:  
Diagnosis Date  Anxiety  Arthritis  Chest pain 2008 Negative stress test and Holter monitor w/Dr Serena Osler.  Chronic back pain  Chronic venous insufficiency  Colon polyp   
 last scope normal 2007; Dr. Milena Ogden  Cyst of right kidney  Depression  Diabetes ([address]  Hypercholesterolemia  Hypertension  Hypothyroidism  Memory disorder  Other ill-defined conditions(699.90)   
 heart murmur  Painful hip  Parkinson disease ([address]  Urinary frequency Sees Dr. Sonja Schmid with [address] Past Surgical History: Procedure Laterality Date  COLONOSCOPY,DIAGNOSTIC  2015  HX BREAST BIOPSY    HX HYSTERECTOMY Due to a prolapsed uterus  HX KNEE REPLACEMENT    
 left  HX KNEE REPLACEMENT  2006  
 right  HX ORTHOPAEDIC B knees.  HX OTHER SURGICAL    
 pelvic prolasp  UPPER GI ENDOSCOPY,BIOPSY  2015 Prior Level of Function/Home Situation: see above Personal factors and/or comorbidities impacting plan of care:  
 
Home Situation Home Environment: Private residence One/Two Story Residence: One story Living Alone: Yes Support Systems: Child(nicanor), Family member(s) Patient Expects to be Discharged to[de-identified] Rehabilitation facility Current DME Used/Available at Home: Walker, rolling EXAMINATION/PRESENTATION/DECISION MAKING:  
Critical Behavior: 
Neurologic State: Confused, Drowsy, Eyes open spontaneously Orientation Level: Disoriented to place, Disoriented to situation, Disoriented to time, Oriented to person Cognition: Follows commands Hearing: Auditory Auditory Impairment: Hard of hearing, bilateral, Hearing aid(s)Skin:   
Edema:  
Range Of Motion: 
AROM: Generally decreased, functional(except L UE) PROM: Within functional limits Strength:   
Strength: Within functional limits(except L UE 0/5) Tone & Sensation:  
Tone: Abnormal 
  
  
  
  
  
  
  
  
   
Coordination: 
  
Vision:  
  
Functional Mobility: 
Bed Mobility: 
Rolling: Maximum assistance Scooting: Maximum assistance Transfers: 
  
  
     
  
     
  
  
Balance:  
 Ambulation/Gait Training:  
  
  
  
  
  
  
  
  
  
  
  
  
  
  
  
   
 
 Stair Training: 
  
  
  
  
 
Functional Measure Tavarez Balance Test: 
 
Sitting to Standin Standing Unsupported: 0 Sitting with Back Unsupported: 0 Standing to Sittin Transfers: 0 Standing Unsupported with Eyes Closed: 0 Standing Unsupported with Feet Together: 0 Reach Forward with Outstretched Arm: 0  Object: 0 Turn to Look Over Shoulders: 0 Turn 360 Degrees: 0 Alternate Foot on Step/Stool: 0 Standing Unsupported One Foot in Front: 0 Stand on One Le Total: 0 
 
 
 
56=Maximum possible score;  
0-20=High fall risk 21-40=Moderate fall risk 41-56=Low fall risk Tavarez Balance Test and G-code impairment scale: 
Percentage of Impairment CH 
 
0% 
 CI 
 
1-19% CJ 
 
20-39% CK 
 
40-59% CL 
 
60-79% CM 
 
80-99% CN  
 
100% Mauro Rodrigues Score 0-56 56 45-55 34-44 23-33 12-22 1-11 0 Based on the above components, the patient evaluation is determined to be of the following complexity level: LOW Therapeutic Exercises:  
Passive ROM to LUE in PNF patterns, passive ROM to bilateral ankles, AAROM to bilateral LEs, and against manual resistance Pain: 
Pain Scale 1: Adult Nonverbal Pain Scale Pain Intensity 1: 0 Activity Tolerance:  
Good. vss Please refer to the flowsheet for vital signs taken during this treatment. After treatment:  
[]     Patient left in no apparent distress sitting up in chair 
[x]     Patient left in no apparent distress in bed 
[x]     Call bell left within reach [x]     Nursing notified 
[]     Caregiver present 
[]     Bed alarm activated COMMUNICATION/EDUCATION:  
The patients plan of care was discussed with: Occupational Therapist and Registered Nurse. Patient was educated regarding her deficit(s) of L sided weakness, L side inattention as this relates to her diagnosis of R MCA. She demonstrated Fair understanding. Patient and/or family was verbally educated on the BE FAST acronym for signs/symptoms of CVA and TIA. BE FAST was written on patient's communication board  for visual education and reinforcement. All questions answered with patient indicating fair understanding. [x]  Fall prevention education was provided and the patient/caregiver indicated understanding. [x]  Patient/family have participated as able in goal setting and plan of care. [x]  Patient/family agree to work toward stated goals and plan of care. []  Patient understands intent and goals of therapy, but is neutral about his/her participation. []  Patient is unable to participate in goal setting and plan of care. Thank you for this referral. 
Stefany Marcum, PT, DPT Time Calculation: 16 mins

## 2019-01-10 NOTE — PROGRESS NOTES
Problem: Pressure Injury - Risk of 
Goal: *Prevention of pressure injury Document Clint Scale and appropriate interventions in the flowsheet. Outcome: Progressing Towards Goal 
Pressure Injury Interventions: 
Sensory Interventions: Assess changes in LOC Moisture Interventions: Absorbent underpads, Internal/External urinary devices Activity Interventions: Pressure redistribution bed/mattress(bed type) Mobility Interventions: HOB 30 degrees or less, Pressure redistribution bed/mattress (bed type), Turn and reposition approx. every two hours(pillow and wedges) Nutrition Interventions: Document food/fluid/supplement intake Friction and Shear Interventions: HOB 30 degrees or less, Lift sheet, Lift team/patient mobility team 
 
  
 
 
 
 
Problem: Falls - Risk of 
Goal: *Absence of Falls Document Flori Hannon Fall Risk and appropriate interventions in the flowsheet. Outcome: Progressing Towards Goal 
Fall Risk Interventions: 
  
 
Mentation Interventions: Evaluate medications/consider consulting pharmacy Medication Interventions: Evaluate medications/consider consulting pharmacy Elimination Interventions: Toileting schedule/hourly rounds History of Falls Interventions: Evaluate medications/consider consulting pharmacy

## 2019-01-10 NOTE — PROGRESS NOTES
NUTRITION COMPLETE ASSESSMENT 
 
RECOMMENDATIONS:  
Consider adding bowel regimen if no stool in next 1-2 days Resume Megace Encourage or offer supplements at end/between meals Interventions/Plan:  
Food/Nutrient Delivery:  Modify diet/texture/consistency/nutrients-Pureed,  Commercial supplement-Ensure Compact and Ensure [employer] Assessment:  
Reason for Assessment:  
[x] Provider Consult-general nutrition management and supplements; tube feeding recommendations Diet: Puree Supplements: none Nutritionally Significant Medications: [x] Reviewed & Includes: correction scale insulin, KCL, Synthroid, NS @ 50 ml/hr, magnesium sulfate Meal Intake: No data found. Subjective: 
Pt sleeping. Spoke with daughter earlier-reports pt has lost ~20 # in the past 8 months or so. Recently started on Megace; drinks Ensure (chocolate). Objective: Ms Tom Castillo was admitted d/t AMS. Noted: Acute (R) MCA infarct-s/p TPA and thrombectomy; AMS improving; SLP following. PMHx: DM 2, CKD, HTN, others noted. Per weight trends in EHR patient has lost 20# (11%) in the past year which is not considered significant. Per daughter pt started on Megace about a month ago with a little improvement in oral intake. Magnesium and potassium replaced today. SLP advanced diet today but concerned about pt's ability to meet nutritional needs d/t fatigue. Ordered Ensure Compact and Ensure Enlive supplements per daughter's request. Encourage pt to consume at end/between meals to increase nutrient intake. If oral intake remains poor-consider DHT for nocturnal tube feeding to supplement oral intake. Suggest: Osmolite 1.5 @ 50 ml/hr x 12 hr with 75 ml water flush q 4 hr during feeding. This will provide 600 ml, 900 calories, 38 gm protein and 680 ml free free water (tube feeding/flush) to meet 55-66% estimated protein and energy needs, respectively. Estimated Nutrition Needs:  
Kcals/day: 1360 Kcals/day(MSJ x 1.2) Protein: 69 g(69-83 (1.0-1.2g/kg) Fluid: (1 ml/kcal) Based On: [place] 1898 Weight Used: Actual wt(69 kg) Pt expected to meet estimated nutrient needs:  []   Yes     []  No  [x] Unable to predict at this time Nutrition Diagnosis:  
1. Inadequate oral intake related to acute (R) MCA infarct as evidenced by slow diet advancement. Goals:   
 Consume 50% of meals and 1-2 supplements per day in next 5-7 days. Monitoring & Evaluation: - Total energy intake, Protein intake - Weight/weight change Previous Nutrition Goals Met: N/A Previous Recommendations: N/A Education & Discharge Needs: 
 [x] None Identified 
 [] Identified and addressed [x] Participated in care plan, discharge planning, and/or interdisciplinary rounds Cultural, Sabianist and ethnic food preferences identified: 
 None Skin Integrity: [x]Intact  [x]Other Edema: [x]None []Other Last BM: PTA Food Allergies: [x]None []Other Anthropometrics:   
Weight Loss Metrics 1/10/2019 12/17/2018 12/14/2018 10/23/2018 10/5/2018 7/10/2018 6/22/2018 Today's Wt 151 lb 10.8 oz 147 lb 147 lb 154 lb 155 lb 3.2 oz 163 lb 12.8 oz 158 lb 9.6 oz  
BMI 25.24 kg/m2 24.46 kg/m2 24.46 kg/m2 25.63 kg/m2 25.83 kg/m2 27.26 kg/m2 26.39 kg/m2 Last 3 Recorded Weights in this Encounter 01/09/19 0500 01/10/19 0759 01/10/19 1558 Weight: 70.3 kg (154 lb 15.7 oz) 68.8 kg (151 lb 10.8 oz) 68.8 kg (151 lb 10.8 oz) Weight Source: Bed Height: 5' 5\" (165.1 cm), Body mass index is 25.24 kg/m². IBW : 56.7 kg (125 lb), % IBW (Calculated): 121.34 % 
 ,   
 
Labs:   
Lab Results Component Value Date/Time  Sodium 145 01/10/2019 04:52 AM  
 Potassium 3.4 (L) 01/10/2019 04:52 AM  
 Chloride 115 (H) 01/10/2019 04:52 AM  
 CO2 21 01/10/2019 04:52 AM  
 Glucose 127 (H) 01/10/2019 04:52 AM  
 BUN 12 01/10/2019 04:52 AM  
 Creatinine 0.66 01/10/2019 04:52 AM  
 Calcium 8.2 (L) 01/10/2019 04:52 AM  
 Magnesium 1.8 01/10/2019 04:52 AM  
 Phosphorus 2.6 01/10/2019 04:52 AM  
 Albumin 2.9 (L) 01/10/2019 04:52 AM  
 
Lab Results Component Value Date/Time Hemoglobin A1c 5.5 01/09/2019 05:19 AM  
 Hemoglobin A1c (POC) 6.6 (A) 02/02/2015 04:32 PM  
 
Lab Results Component Value Date/Time Glucose (POC) 141 (H) 01/10/2019 01:33 PM  
  
Lab Results Component Value Date/Time ALT (SGPT) 14 01/10/2019 04:52 AM  
 AST (SGOT) 27 01/10/2019 04:52 AM  
 Alk.  phosphatase 60 01/10/2019 04:52 AM  
 Bilirubin, total 0.4 01/10/2019 04:52 AM  
  
 
Aliya Traylor RD [facility]

## 2019-01-10 NOTE — PROGRESS NOTES
TRANSFER - OUT REPORT: 
 
Verbal report given to oralia(name) on Romana Logan  being transferred to nstu(unit) for routine progression of care Report consisted of patients Situation, Background, Assessment and  
Recommendations(SBAR). Information from the following report(s) SBAR, Kardex and MAR was reviewed with the receiving nurse. Lines:  
Peripheral IV 01/08/19 Right Antecubital (Active) Site Assessment Clean, dry, & intact 1/10/2019  4:00 PM  
Phlebitis Assessment 0 1/10/2019  4:00 PM  
Infiltration Assessment 0 1/10/2019  4:00 PM  
Dressing Status Clean, dry, & intact 1/10/2019  4:00 PM  
Dressing Type Transparent 1/10/2019  4:00 PM  
Hub Color/Line Status Pink;Capped 1/10/2019  4:00 PM  
Action Taken Open ports on tubing capped 1/10/2019  4:00 PM  
Alcohol Cap Used Yes 1/10/2019  4:00 PM  
   
Peripheral IV 01/10/19 Right Hand (Active) Site Assessment Clean, dry, & intact 1/10/2019  4:00 PM  
Phlebitis Assessment 0 1/10/2019  4:00 PM  
Infiltration Assessment 0 1/10/2019  4:00 PM  
Dressing Status Clean, dry, & intact 1/10/2019  4:00 PM  
Dressing Type Transparent 1/10/2019  4:00 PM  
Hub Color/Line Status Blue; Infusing 1/10/2019  4:00 PM  
Action Taken Open ports on tubing capped 1/10/2019  4:00 PM  
Alcohol Cap Used Yes 1/10/2019  4:00 PM  
  
 
Opportunity for questions and clarification was provided. Patient transported with: 
 O2 @ 1 liters Registered Nurse Tech

## 2019-01-10 NOTE — PROGRESS NOTES
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 Insurance includes Medicare and [employer] FEP. Top Challenges facing patient (as identified by patient/family and CM): Finances/Medication cost?   No concerns voiced Transportation? Daughter Xena Farrar takes patient to her appointments Support system or lack thereof? Supportive daughter Pavel Boyd [phone] and son Hugo Saravia [phone] Living arrangements? Lives alone in an apartment Self-care/ADLs/Cognition? Patient needs assist with bathing able to feed hersel, daughter fixes her meals Current Advanced Directive/Advance Care Plan:  Not on File Plan for utilizing home health:    TBD Likelihood of readmission: Moderate Transition of Care Plan:   Rehab Care manager met with patient's daughter Xena Farrar and son Carol Mariscal to explain role and discuss transitions of care. Patient lives in a ground level apartment and her daughter Xena Farrar lives next door. Daughter fixes patient meals and asssits her with her ADL's. Patient uses a walker and has a cane. She just finished PT through a home health agency, family does not recall the name of the agency. Daughter confirmed that patient sees her PCP Dr Nigel Song every 3 months and uses the [facility] [employer] at [facility] and [place]. Therapy is recommending rehab for patient prior to going home, CM provided a list of skilled facilities as well as inpatient rehab facilities to family. Lennox Houston RN,CRM Care Management Interventions PCP Verified by CM: Yes(Dr Nigel Song) MyChart Signup: No 
Discharge Durable Medical Equipment: No 
Physical Therapy Consult: No 
Occupational Therapy Consult: No 
 Speech Therapy Consult: Yes Current Support Network: Lives Alone(Daughter Jeff Brandon [phone], Son Davis Meckel [phone]).

## 2019-01-11 NOTE — PROGRESS NOTES
CM reviewed patient chart- PT/OT currently recommending inpatient rehab vs. SNF, CM will receive choices from patient and family for both inpatient rehab and SNF. CM will continue to follow and speak with patient and family. MALGORZATA Masterson 
 
12:07 p.m.- BURKE met with patient (getting taken down for test, however, confused) son Mayra Lindo present at bedside. CM discussed rehab- the family has a list of SNFs and inpatient rehabs- family denied any psychiatric hospitalizations in the past two years. The patient's son stated to call the patient's daughter Che Mayes ([phone]) in order to discuss choices for rehab. RN stated patient is confused and appears aphasic in CM's interaction. CM called and left voicemail message for Mari Shipman, the patient's daughter requesting a call back. MALGORZATA Masterson CM spoke with Mari Shipamn, the patient's daughter, and she would like referrals sent to [facility] and if patient does not qualify, also wants referral sent to [facility]. CM will send referral to [address] [place] [address] via [employer], and [facility] via [employer]. CM inquired about the plan for after rehab and family already has a plan in place- Che Mayes lives next door to her mother, was already providing assistance, and she plans to be there all day with the patient upon discharge, and she states her brother Mayra Lindo will be spending the night, family stated she will have 24/7 support at home upon discharge from rehab. Family already has wheelchair for the patient. CM will send referrals to [address] [place] [address] and [facility] and continue to follow.  MALGORZATA Masterson

## 2019-01-11 NOTE — PROGRESS NOTES
Bedside and Verbal shift change report given to Angelito Bajwa RN (oncoming nurse) by Gabe Briscoe RN (offgoing nurse). Report included the following information SBAR, Kardex, Procedure Summary, Intake/Output, MAR, Recent Results and Cardiac Rhythm NSR.

## 2019-01-11 NOTE — ROUTINE PROCESS
Bedside shift change report given to [address] [place] [address] (oncoming nurse) by Dylan Adan RN (offgoing nurse). Report included the following information SBAR, Kardex, ED Summary, OR Summary, Procedure Summary, Intake/Output, MAR, Accordion, Recent Results and Cardiac Rhythm NSR.

## 2019-01-11 NOTE — PROGRESS NOTES
Cardiac Cath Lab Procedure Area Arrival Note: 
 
Shellie Dangelo arrived to Cardiac Cath Lab, Procedure Area. Patient identifiers verified with NAME and DATE OF BIRTH. Procedure verified with patient. Consent forms verified. Allergies verified. Patient informed of procedure and plan of care. Questions answered with review. Patient voiced understanding of procedure and plan of care. Patient on cardiac monitor, non-invasive blood pressure, SPO2 monitor. On room air then placed on O2 @ 2 lpm via NC.  IV of normal saline on pump at 25 ml/hr. Patient status doing well with some problems : slurred speech. Patient is A&Ox 2. Patient reports no pain. Patient medicated during procedure with orders obtained and verified by Dr. Erik Palacios. 
 
Refer to patients Cardiac Cath Lab PROCEDURE REPORT for vital signs, assessment, status, and response during procedure, printed at end of case. Printed report on chart or scanned into chart.

## 2019-01-11 NOTE — PROGRESS NOTES
Problem: Pressure Injury - Risk of 
Goal: *Prevention of pressure injury Document Clint Scale and appropriate interventions in the flowsheet. Pressure Injury Interventions: 
Sensory Interventions: Assess changes in LOC, Discuss PT/OT consult with provider, Float heels, Keep linens dry and wrinkle-free, Maintain/enhance activity level, Minimize linen layers, Pressure redistribution bed/mattress (bed type) Moisture Interventions: Internal/External urinary devices, Offer toileting Q_hr Activity Interventions: Assess need for specialty bed, Increase time out of bed, Pressure redistribution bed/mattress(bed type), PT/OT evaluation Mobility Interventions: PT/OT evaluation, Turn and reposition approx. every two hours(pillow and wedges), HOB 30 degrees or less Nutrition Interventions: Document food/fluid/supplement intake Friction and Shear Interventions: Foam dressings/transparent film/skin sealants, Lift sheet, Transfer aides:transfer board/Liz lift/ceiling lift, Transferring/repositioning devices, Apply protective barrier, creams and emollients Problem: Falls - Risk of 
Goal: *Absence of Falls Document Lauren Mccurdy Fall Risk and appropriate interventions in the flowsheet. Outcome: Progressing Towards Goal 
Fall Risk Interventions: 
Mobility Interventions: Assess mobility with egress test, Bed/chair exit alarm, Patient to call before getting OOB, OT consult for ADLs, PT Consult for mobility concerns, PT Consult for assist device competence, Strengthening exercises (ROM-active/passive), Communicate number of staff needed for ambulation/transfer Mentation Interventions: Adequate sleep, hydration, pain control, Bed/chair exit alarm, Door open when patient unattended, Evaluate medications/consider consulting pharmacy, Familiar objects from home, Eyeglasses and hearing aids, Family/sitter at bedside, Increase mobility, Reorient patient, Room close to nurse's station, Self-releasing belt Medication Interventions: Assess postural VS orthostatic hypotension, Evaluate medications/consider consulting pharmacy, Patient to call before getting OOB, Teach patient to arise slowly Elimination Interventions: Bed/chair exit alarm, Call light in reach, Elevated toilet seat, Patient to call for help with toileting needs, Toilet paper/wipes in reach, Toileting schedule/hourly rounds History of Falls Interventions: Bed/chair exit alarm, Consult care management for discharge planning, Door open when patient unattended, Evaluate medications/consider consulting pharmacy, Investigate reason for fall

## 2019-01-11 NOTE — PROGRESS NOTES
Occupational Therapy: defer Chart reviewed, consulted with RN, attempted OT treatment session. Patient currently off the floor in CCL. OT will defer at this time and will f/u as able and appropriate.  
 
Marito Leon, OTISELA/L

## 2019-01-11 NOTE — PROGRESS NOTES
Speech Pathology:  Chart reviewed and discussed with RN. Patient currently NPO for CINDY this date. SLP to continue to follow for dysphagia treatment. Thank you.  
 
Blaire Bach, SLP

## 2019-01-11 NOTE — PROGRESS NOTES
Problem: Falls - Risk of 
Goal: *Absence of Falls Document Gonsalo Grimm Fall Risk and appropriate interventions in the flowsheet. Outcome: Progressing Towards Goal 
Fall Risk Interventions: 
Mobility Interventions: Communicate number of staff needed for ambulation/transfer, OT consult for ADLs, Patient to call before getting OOB, PT Consult for mobility concerns, PT Consult for assist device competence, Strengthening exercises (ROM-active/passive), Bed/chair exit alarm Mentation Interventions: Adequate sleep, hydration, pain control, Bed/chair exit alarm, Door open when patient unattended, Evaluate medications/consider consulting pharmacy, Eyeglasses and hearing aids, Familiar objects from home, Increase mobility, More frequent rounding, Reorient patient, Room close to nurse's station, Toileting rounds, Update white board Medication Interventions: Evaluate medications/consider consulting pharmacy, Patient to call before getting OOB, Teach patient to arise slowly Elimination Interventions: Bed/chair exit alarm, Call light in reach, Elevated toilet seat, Patient to call for help with toileting needs, Toilet paper/wipes in reach, Toileting schedule/hourly rounds History of Falls Interventions: Bed/chair exit alarm, Consult care management for discharge planning, Door open when patient unattended, Evaluate medications/consider consulting pharmacy, Investigate reason for fall, Room close to nurse's station

## 2019-01-11 NOTE — PROGRESS NOTES
Neurology Progress Note Patient ID: 
Keerthi Santiago [id number] 
71 y.o. 
2/7/1932 Subjective: No acute events overnight. Stable L-HP. Alert, interactive today. Small L occipital ICH appears stable on repeat head CT-final read pending. Current Facility-Administered Medications Medication Dose Route Frequency  atropine injection 1 mg  1 mg IntraVENous PRN  
 fentaNYL citrate (PF) injection  mcg   mcg IntraVENous Multiple  midazolam (VERSED) injection 0.5-5 mg  0.5-5 mg IntraVENous Multiple  sodium chloride (NS) flush 5-10 mL  5-10 mL IntraVENous PRN  
 [START ON 1/12/2019] aspirin delayed-release tablet 81 mg  81 mg Oral DAILY  rosuvastatin (CRESTOR) tablet 20 mg  20 mg Oral QHS  famotidine (PF) (PEPCID) 20 mg in sodium chloride 0.9% 10 mL injection  20 mg IntraVENous Q12H  carbidopa-levodopa (SINEMET)  mg per tablet 1 Tab  1 Tab Oral TID  insulin lispro (HUMALOG) injection   SubCUTAneous AC&HS  hydrALAZINE (APRESOLINE) 20 mg/mL injection 10 mg  10 mg IntraVENous Q6H PRN  
 labetalol (NORMODYNE;TRANDATE) 20 mg/4 mL (5 mg/mL) injection 10 mg  10 mg IntraVENous Q4H PRN  
 acetaminophen (TYLENOL) suppository 650 mg  650 mg Rectal Q4H PRN  
 sodium chloride (NS) flush 5-40 mL  5-40 mL IntraVENous Q8H  
 sodium chloride (NS) flush 5-40 mL  5-40 mL IntraVENous PRN  
 ondansetron (ZOFRAN) injection 4 mg  4 mg IntraVENous Q6H PRN  
 naloxone (NARCAN) injection 0.4 mg  0.4 mg IntraVENous PRN  
 0.9% sodium chloride infusion  50 mL/hr IntraVENous CONTINUOUS  
 levothyroxine (SYNTHROID) injection 37.5 mcg  37.5 mcg IntraVENous Q24H  
 sodium chloride (NS) flush 5-40 mL  5-40 mL IntraVENous Q8H  
 glucose chewable tablet 16 g  4 Tab Oral PRN  
 dextrose (D50W) injection syrg 12.5-25 g  25-50 mL IntraVENous PRN  
 glucagon (GLUCAGEN) injection 1 mg  1 mg IntraMUSCular PRN  
 sodium chloride (NS) flush 5-40 mL  5-40 mL IntraVENous Q8H  
  sodium chloride (NS) flush 5-40 mL  5-40 mL IntraVENous PRN  
 LORazepam (ATIVAN) injection 1 mg  1 mg IntraVENous Q2H PRN Objective:  
 
Patient Vitals for the past 8 hrs: 
 BP Temp Pulse Resp SpO2  
19 1000 149/64 99.6 °F (37.6 °C) 79 15 95 % 19 0600 128/68 100.2 °F (37.9 °C) 75 17 97 % No intake/output data recorded. 1901 - 700 In: .3 [I.V.:.3] Out: Winsome Fonseca [KOLX] Lab Review Recent Results (from the past 24 hour(s)) GLUCOSE, POC Collection Time: 01/10/19  1:33 PM  
Result Value Ref Range Glucose (POC) 141 (H) 65 - 100 mg/dL Performed by Avila Pacheco, POC Collection Time: 01/10/19  5:33 PM  
Result Value Ref Range Glucose (POC) 92 65 - 100 mg/dL Performed by Nelson Minaya, POC Collection Time: 01/10/19 10:04 PM  
Result Value Ref Range Glucose (POC) 143 (H) 65 - 100 mg/dL Performed by Eleonore Paci CBC WITH AUTOMATED DIFF Collection Time: 19  3:09 AM  
Result Value Ref Range WBC 9.2 3.6 - 11.0 K/uL  
 RBC 2.49 (L) 3.80 - 5.20 M/uL HGB 7.6 (L) 11.5 - 16.0 g/dL HCT 25.0 (L) 35.0 - 47.0 % .4 (H) 80.0 - 99.0 FL  
 MCH 30.5 26.0 - 34.0 PG  
 MCHC 30.4 30.0 - 36.5 g/dL  
 RDW 14.2 11.5 - 14.5 % PLATELET 952 960 - 603 K/uL MPV 10.5 8.9 - 12.9 FL  
 NRBC 0.0 0  WBC ABSOLUTE NRBC 0.00 0.00 - 0.01 K/uL NEUTROPHILS 70 32 - 75 % LYMPHOCYTES 20 12 - 49 % MONOCYTES 9 5 - 13 % EOSINOPHILS 0 0 - 7 % BASOPHILS 0 0 - 1 % IMMATURE GRANULOCYTES 0 0.0 - 0.5 % ABS. NEUTROPHILS 6.4 1.8 - 8.0 K/UL  
 ABS. LYMPHOCYTES 1.9 0.8 - 3.5 K/UL  
 ABS. MONOCYTES 0.8 0.0 - 1.0 K/UL  
 ABS. EOSINOPHILS 0.0 0.0 - 0.4 K/UL  
 ABS. BASOPHILS 0.0 0.0 - 0.1 K/UL  
 ABS. IMM. GRANS. 0.0 0.00 - 0.04 K/UL  
 DF AUTOMATED MAGNESIUM Collection Time: 19  3:09 AM  
Result Value Ref Range Magnesium 2.0 1.6 - 2.4 mg/dL METABOLIC PANEL, COMPREHENSIVE  
 Collection Time: 01/11/19  3:09 AM  
Result Value Ref Range Sodium 145 136 - 145 mmol/L Potassium 3.2 (L) 3.5 - 5.1 mmol/L Chloride 112 (H) 97 - 108 mmol/L  
 CO2 25 21 - 32 mmol/L Anion gap 8 5 - 15 mmol/L Glucose 93 65 - 100 mg/dL BUN 12 6 - 20 MG/DL Creatinine 0.74 0.55 - 1.02 MG/DL  
 BUN/Creatinine ratio 16 12 - 20 GFR est AA >60 >60 ml/min/1.73m2 GFR est non-AA >60 >60 ml/min/1.73m2 Calcium 8.3 (L) 8.5 - 10.1 MG/DL Bilirubin, total 0.3 0.2 - 1.0 MG/DL  
 ALT (SGPT) <6 (L) 12 - 78 U/L  
 AST (SGOT) 25 15 - 37 U/L Alk. phosphatase 55 45 - 117 U/L Protein, total 5.6 (L) 6.4 - 8.2 g/dL Albumin 2.5 (L) 3.5 - 5.0 g/dL Globulin 3.1 2.0 - 4.0 g/dL A-G Ratio 0.8 (L) 1.1 - 2.2 PHOSPHORUS Collection Time: 01/11/19  3:09 AM  
Result Value Ref Range Phosphorus 2.4 (L) 2.6 - 4.7 MG/DL  
GLUCOSE, POC Collection Time: 01/11/19  7:17 AM  
Result Value Ref Range Glucose (POC) 96 65 - 100 mg/dL Performed by Lorena Nixon GLUCOSE, POC Collection Time: 01/11/19 11:48 AM  
Result Value Ref Range Glucose (POC) 111 (H) 65 - 100 mg/dL Performed by Sahara Benitez PE: Alert, follows commands, no aphasia, mod dysarthria. PERRL, EOMI, VF intact b/l, L severe FD 
LUE 0/5, LLE 3-/5, RUE 5/5, RLE 4/5 Decreased LUE/LLE to LT 
DTR 1+, toes down b/l Assessment:  
 
Principal Problem: 
  CVA (cerebral vascular accident) ([facility] [address] (1/8/2019) A/P: R MCA acute infarct, R M2 occlusion s/p tPA and endovascular intervention 1/8/19. MRI Brain reviewed with small/moderate R MCA infarct, scattered foci ischemia anterior as well as posterior circulation suggestive of embolic event. Also noted L occipital small ICH, stable since last imaging. Plan:  
Start ASA 81mg/daily-will need to consider NOAC possibly 10 days post infarction due to high suspicion for cardioembolic mechanism. Cont. Crestor, goal LDL<70 SBP goal<140 
 Extended cardiac monitoring, CINDY today for evaluation of central embolic source of infarct. Signed: 
Magdalena Hubbard DO 
1/11/2019 
3:02 PM 
 
Addendum: 
CINDY without thrombus/negative. Still with very high suspicion for central embolic mechanism given multi-territory infarction. Please arrange for repeat head CT 1/18/19- if no hemorrhage start NOAC. F/U Neurology NP 1-2 weeks post discharge. Please call if further questions/concerns.  
 
Magdalena Hubbard DO

## 2019-01-11 NOTE — PROGRESS NOTES
TRANSFER - IN REPORT: 
 
Verbal report received from  FLO Dumont RN on Ila Patel, Procedure: CINDY , from the Cardiac Cath lab, for routine progression of care. Report consisted of patients Situation, Background, Assessment and Recommendations(SBAR). Information from the following report(s) Procedure Summary, MAR and Recent Results was reviewed with the receiving clinician. Opportunity for questions and clarification was provided. Assessment completed upon patients arrival to [address] [place] [address] and care assumed. Cardiac Cath Lab Recovery Arrival Note: 
 
 Ila Patel arrived to [facility] recovery area. Patient procedure= CINDY. Patient on cardiac monitor, non-invasive blood pressure, Patient status doing well without problems. Patient is Arousable&Ox 4. Patient reports no pain, no chest pain, no n/v. Procedure site without any bleeding.

## 2019-01-11 NOTE — PROGRESS NOTES
Physical Therapy: defer Chart reviewed, consulted with RN, attempted PT treatment session. Patient currently off the floor in CCL. PT will defer at this time and will f/u as able and appropriate.  
 
Catherine Villarreal, PT, DPT

## 2019-01-11 NOTE — PROGRESS NOTES
Bedside shift change report given to Gary Brown RN (oncoming nurse) by Sonja Alan RN (offgoing nurse). Report included the following information SBAR, Kardex, Intake/Output, MAR, Accordion, Recent Results, Med Rec Status, Cardiac Rhythm NSR, Alarm Parameters , Pre Procedure Checklist, Procedure Verification and Quality Measures.

## 2019-01-11 NOTE — PROGRESS NOTES
Hospitalist Progress Note Rachel Gracia MD 
Answering service: [phone] OR [phone] from in house phone Date of Service:  2019 NAME:  Evan Nieves :  1932 MRN:  [id number] Admission Summary:  
80 y.o F with PMH of parkinson's disease,HTN,hypothyroidism,diabetes was admitted to the hospital for management of acute rt CVA s/p tpa. Interval history / Subjective:  
   
Patient seen after she came back from [facility]. Denied any headache,nausea. vomiting. 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 
-Repeat CT brain last night - extensive rt MCA territory acute infarction. -MRI brain showed acute right MCA territory infarct,other small scattered acute supratentorial and infratentorial right-sided infarcts. 
-There is a new small hemorrhage in the left occipital lobe with mild edema ? Post tpa bleed vs stroke with hemorrhagic conversion 
-neurology and neuro interventionist following,appreciate recommendations 
-echo showed normal EF.  
-Patient is improving- was talking and passed swallow eval.Still weak on the left side. 
-Pt/Ot/speech eval following 
-CT brain shows stable left occipital lobe hemorrhage. 
-Discussed with  -recommended repeat CT brain on 2019 and needs to be on anticoagulation. Will need OP neurology follow up next week based on when her discharge is. -CINDY did not show any clots. 
-has event monitor #HTN: 
-BP goal 110-140 
-If >140 tomorrow,will start small dose of amlodipine. -PRN hydralazine. #Diabetes: 
-on glimepiride,sitagliptin-metformin 
-will continue SSI for now and monitor BS #Parkinson's disease: 
- resumed sinemet #hypothyrodism: 
-on synthroid #Difficulty duran placement: 
-Gyn team placed a coude catheter. 
-need to talk to GYN team prior to removal. 
 
 Mild elevation of troponin:trended down. - in the setting of stroke ?demand ischemia Code status: full DVT prophylaxis: SCD Care Plan discussed with: patient's family at bed side ,nurse Disposition: TBD Hospital Problems  Date Reviewed: 1/8/2019 Codes Class Noted POA * (Principal) CVA (cerebral vascular accident) ([facility] [address] ICD-10-CM: I63.9 ICD-9-CM: 434.91  1/8/2019 Unknown Review of Systems: As per HPI Vital Signs:  
 Last 24hrs VS reviewed since prior progress note. Most recent are: 
Visit Vitals /60 (BP 1 Location: Left arm, BP Patient Position: At rest) Pulse 73 Temp 98.5 °F (36.9 °C) Resp 16 Ht 5' 5\" (1.651 m) Wt 68.4 kg (150 lb 12.7 oz) SpO2 100% BMI 25.09 kg/m² Intake/Output Summary (Last 24 hours) at 1/11/2019 1701 Last data filed at 1/11/2019 2362 Gross per 24 hour Intake  Output 800 ml Net -800 ml Physical Examination:  
 
 
     
Constitutional:  elderly patient ENT:  Oral mucous moist  
Resp:  CTA b/l. No wheezing/rhonchi/rales. CV:  Regular rhythm, normal rate, no murmurs, gallops, rubs GI:  Soft, non distended, non tender. normoactive bowel sounds Musculoskeletal:  No edema Neurologic: She is A&O X 2,very minimal movement of  LUE,  LLE 3/5,RUE and RLE 4+/5 Data Review:  
 Review and/or order of clinical lab test 
Review and/or order of tests in the medicine section of CPT Labs:  
 
Recent Labs  
  01/11/19 
0309 01/10/19 
7555 WBC 9.2 13.8* HGB 7.6* 8.1* HCT 25.0* 26.4*  
 230 Recent Labs  
  01/11/19 
0309 01/10/19 
6514 01/09/19 
0515  145 142  
K 3.2* 3.4* 4.3  
* 115* 111* CO2 25 21 20* BUN 12 12 20 CREA 0.74 0.66 0.88 GLU 93 127* 214* CA 8.3* 8.2* 7.9*  
MG 2.0 1.8  --   
PHOS 2.4* 2.6  --   
 
Recent Labs  
  01/11/19 
0309 01/10/19 
6082 SGOT 25 27 ALT <6* 14  
AP 55 60 TBILI 0.3 0.4 TP 5.6* 6.0* ALB 2.5* 2.9*  
 GLOB 3.1 3.1 No results for input(s): INR, PTP, APTT in the last 72 hours. No lab exists for component: INREXT, INREXT No results for input(s): FE, TIBC, PSAT, FERR in the last 72 hours. Lab Results Component Value Date/Time Folate 11.9 06/16/2018 12:45 AM  
  
No results for input(s): PH, PCO2, PO2 in the last 72 hours. Recent Labs 01/09/19 
0515 01/08/19 
2133 TROIQ 0.37* 0.51* Lab Results Component Value Date/Time Cholesterol, total 132 01/09/2019 05:15 AM  
 HDL Cholesterol 73 01/09/2019 05:15 AM  
 LDL, calculated 50.2 01/09/2019 05:15 AM  
 Triglyceride 44 01/09/2019 05:15 AM  
 CHOL/HDL Ratio 1.8 01/09/2019 05:15 AM  
 
Lab Results Component Value Date/Time Glucose (POC) 111 (H) 01/11/2019 11:48 AM  
 Glucose (POC) 96 01/11/2019 07:17 AM  
 Glucose (POC) 143 (H) 01/10/2019 10:04 PM  
 Glucose (POC) 92 01/10/2019 05:33 PM  
 Glucose (POC) 141 (H) 01/10/2019 01:33 PM  
 
Lab Results Component Value Date/Time Color YELLOW/STRAW 06/16/2018 01:00 PM  
 Appearance CLEAR 06/16/2018 01:00 PM  
 Specific gravity 1.008 06/16/2018 01:00 PM  
 pH (UA) 7.5 06/16/2018 01:00 PM  
 Protein 30 (A) 06/16/2018 01:00 PM  
 Glucose NEGATIVE  06/16/2018 01:00 PM  
 Ketone NEGATIVE  06/16/2018 01:00 PM  
 Bilirubin NEGATIVE  06/16/2018 01:00 PM  
 Urobilinogen 1.0 06/16/2018 01:00 PM  
 Nitrites NEGATIVE  06/16/2018 01:00 PM  
 Leukocyte Esterase LARGE (A) 06/16/2018 01:00 PM  
 Epithelial cells MODERATE (A) 06/16/2018 01:00 PM  
 Bacteria NEGATIVE  06/16/2018 01:00 PM  
 WBC 20-50 06/16/2018 01:00 PM  
 RBC 0-5 06/16/2018 01:00 PM  
 
 
 
Medications Reviewed:  
 
Current Facility-Administered Medications Medication Dose Route Frequency  [START ON 1/12/2019] aspirin delayed-release tablet 81 mg  81 mg Oral DAILY  rosuvastatin (CRESTOR) tablet 20 mg  20 mg Oral QHS  famotidine (PF) (PEPCID) 20 mg in sodium chloride 0.9% 10 mL injection  20 mg IntraVENous Q12H  carbidopa-levodopa (SINEMET)  mg per tablet 1 Tab  1 Tab Oral TID  insulin lispro (HUMALOG) injection   SubCUTAneous AC&HS  hydrALAZINE (APRESOLINE) 20 mg/mL injection 10 mg  10 mg IntraVENous Q6H PRN  
 labetalol (NORMODYNE;TRANDATE) 20 mg/4 mL (5 mg/mL) injection 10 mg  10 mg IntraVENous Q4H PRN  
 acetaminophen (TYLENOL) suppository 650 mg  650 mg Rectal Q4H PRN  
 sodium chloride (NS) flush 5-40 mL  5-40 mL IntraVENous Q8H  
 sodium chloride (NS) flush 5-40 mL  5-40 mL IntraVENous PRN  
 ondansetron (ZOFRAN) injection 4 mg  4 mg IntraVENous Q6H PRN  
 naloxone (NARCAN) injection 0.4 mg  0.4 mg IntraVENous PRN  
 0.9% sodium chloride infusion  50 mL/hr IntraVENous CONTINUOUS  
 levothyroxine (SYNTHROID) injection 37.5 mcg  37.5 mcg IntraVENous Q24H  
 sodium chloride (NS) flush 5-40 mL  5-40 mL IntraVENous Q8H  
 glucose chewable tablet 16 g  4 Tab Oral PRN  
 dextrose (D50W) injection syrg 12.5-25 g  25-50 mL IntraVENous PRN  
 glucagon (GLUCAGEN) injection 1 mg  1 mg IntraMUSCular PRN  
 sodium chloride (NS) flush 5-40 mL  5-40 mL IntraVENous Q8H  
 sodium chloride (NS) flush 5-40 mL  5-40 mL IntraVENous PRN  
 LORazepam (ATIVAN) injection 1 mg  1 mg IntraVENous Q2H PRN  
 
______________________________________________________________________ EXPECTED LENGTH OF STAY: 1d 7h 
ACTUAL LENGTH OF STAY:          3 Bobby MD Yahir

## 2019-01-11 NOTE — PROGRESS NOTES
TRANSFER - IN REPORT: 
 
Verbal report received from BRANDEN Dumont RN on Kirill Hernandez, Procedure: CINDY , from the Cardiac Cath lab, for routine progression of care. Report consisted of patients Situation, Background, Assessment and Recommendations(SBAR). Information from the following report(s) Procedure Summary, MAR and Recent Results was reviewed with the receiving clinician. Opportunity for questions and clarification was provided. Assessment completed upon patients arrival to [address] [place] [address] and care assumed. Cardiac Cath Lab Recovery Arrival Note: 
 
 Kirill Hernandez arrived to [facility] recovery area. Patient procedure= CINDY. Patient on cardiac monitor, non-invasive blood pressure, Patient status doing well without problems. Patient is Aruosable&Ox 4. Patient reports no pain, no chest pain. Procedure site without any bleeding.

## 2019-01-12 NOTE — PROGRESS NOTES
Hospitalist Progress Note Clair Branch MD 
Answering service: [phone] OR [phone] from in house phone Date of Service:  2019 NAME:  Romana Logan :  1932 MRN:  [id number] Admission Summary:  
80 y.o F with PMH of parkinson's disease,HTN,hypothyroidism,diabetes was admitted to the hospital for management of acute rt CVA s/p tpa. Interval history / Subjective:  
  Patient was more lethargic today but woke up and answered few questions -speech was slurred. She was leaning more towards her right and has RLE weakness. Ordered CT brain stat. 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 
-MRI brain showed acute right MCA territory infarct,other small scattered acute supratentorial and infratentorial right-sided infarcts and  a small left occipital hemorrhage 
-neurology and neuro interventionist following,appreciate recommendations 
-echo showed normal EF.  
-CT brain today  shows stable left occipital lobe hemorrhage. No acute findings. -CINDY did not show any clots and she has event monitor now. 
-will follow up repeat MRI brain results. #HTN: 
-BP stable. #Diabetes: 
-on glimepiride,sitagliptin-metformin 
-will continue SSI for now and monitor BS #Parkinson's disease: 
- resumed sinemet #hypothyrodism: 
-on synthroid #Difficulty duran placement: 
-Gyn team placed a coude catheter. 
-need to talk to GYN team prior to removal. 
 
Mild elevation of troponin:trended down. - in the setting of stroke ?demand ischemia Code status: full DVT prophylaxis: SCD Care Plan discussed with: patient's family at bed side ,nurse Disposition: TBD Hospital Problems  Date Reviewed: 2019 Codes Class Noted POA * (Principal) Cerebrovascular accident (CVA) due to embolism ([facility] [address] ICD-10-CM: I63.9 ICD-9-CM: 434.11  1/8/2019 Unknown Review of Systems: As per HPI Vital Signs:  
 Last 24hrs VS reviewed since prior progress note. Most recent are: 
Visit Vitals /55 (BP 1 Location: Left arm, BP Patient Position: At rest) Pulse 78 Temp 100 °F (37.8 °C) Resp 17 Ht 5' 5\" (1.651 m) Wt 71.1 kg (156 lb 12 oz) SpO2 98% BMI 26.08 kg/m² Intake/Output Summary (Last 24 hours) at 1/12/2019 1504 Last data filed at 1/12/2019 0400 Gross per 24 hour Intake  Output 1150 ml Net -1150 ml Physical Examination:  
 
 
     
Constitutional:  elderly patient ENT:  Oral mucous moist  
Resp:  CTA b/l. No wheezing/rhonchi/rales. CV:  Regular rhythm, normal rate, no murmurs, gallops, rubs GI:  Soft, non distended, non tender. normoactive bowel sounds Musculoskeletal:  No edema Neurologic: She is A&O X 1,RUE 5/5, RLE 1/5,was unable to move LLE and LUE. Data Review:  
 Review and/or order of clinical lab test 
Review and/or order of tests in the medicine section of CPT Labs:  
 
Recent Labs  
  01/11/19 
0309 01/10/19 
9627 WBC 9.2 13.8* HGB 7.6* 8.1* HCT 25.0* 26.4*  
 230 Recent Labs  
  01/12/19 
0259 01/11/19 
0309 01/10/19 
6950  145 145  
K 2.9* 3.2* 3.4*  
* 112* 115* CO2 23 25 21 BUN 16 12 12 CREA 0.78 0.74 0.66 * 93 127* CA 8.2* 8.3* 8.2* MG 1.9 2.0 1.8 PHOS  --  2.4* 2.6 Recent Labs  
  01/11/19 
0309 01/10/19 
4680 SGOT 25 27 ALT <6* 14  
AP 55 60 TBILI 0.3 0.4 TP 5.6* 6.0* ALB 2.5* 2.9*  
GLOB 3.1 3.1 No results for input(s): INR, PTP, APTT in the last 72 hours. No lab exists for component: INREXT, INREXT No results for input(s): FE, TIBC, PSAT, FERR in the last 72 hours. Lab Results Component Value Date/Time Folate 11.9 06/16/2018 12:45 AM  
  
No results for input(s): PH, PCO2, PO2 in the last 72 hours. No results for input(s): CPK, CKNDX, TROIQ in the last 72 hours. No lab exists for component: CPKMB Lab Results Component Value Date/Time Cholesterol, total 132 01/09/2019 05:15 AM  
 HDL Cholesterol 73 01/09/2019 05:15 AM  
 LDL, calculated 50.2 01/09/2019 05:15 AM  
 Triglyceride 44 01/09/2019 05:15 AM  
 CHOL/HDL Ratio 1.8 01/09/2019 05:15 AM  
 
Lab Results Component Value Date/Time Glucose (POC) 141 (H) 01/12/2019 11:21 AM  
 Glucose (POC) 174 (H) 01/12/2019 06:50 AM  
 Glucose (POC) 115 (H) 01/11/2019 09:28 PM  
 Glucose (POC) 109 (H) 01/11/2019 05:22 PM  
 Glucose (POC) 111 (H) 01/11/2019 11:48 AM  
 
Lab Results Component Value Date/Time Color YELLOW/STRAW 06/16/2018 01:00 PM  
 Appearance CLEAR 06/16/2018 01:00 PM  
 Specific gravity 1.008 06/16/2018 01:00 PM  
 pH (UA) 7.5 06/16/2018 01:00 PM  
 Protein 30 (A) 06/16/2018 01:00 PM  
 Glucose NEGATIVE  06/16/2018 01:00 PM  
 Ketone NEGATIVE  06/16/2018 01:00 PM  
 Bilirubin NEGATIVE  06/16/2018 01:00 PM  
 Urobilinogen 1.0 06/16/2018 01:00 PM  
 Nitrites NEGATIVE  06/16/2018 01:00 PM  
 Leukocyte Esterase LARGE (A) 06/16/2018 01:00 PM  
 Epithelial cells MODERATE (A) 06/16/2018 01:00 PM  
 Bacteria NEGATIVE  06/16/2018 01:00 PM  
 WBC 20-50 06/16/2018 01:00 PM  
 RBC 0-5 06/16/2018 01:00 PM  
 
 
 
Medications Reviewed:  
 
Current Facility-Administered Medications Medication Dose Route Frequency  aspirin delayed-release tablet 81 mg  81 mg Oral DAILY  rosuvastatin (CRESTOR) tablet 20 mg  20 mg Oral QHS  famotidine (PF) (PEPCID) 20 mg in sodium chloride 0.9% 10 mL injection  20 mg IntraVENous Q12H  carbidopa-levodopa (SINEMET)  mg per tablet 1 Tab  1 Tab Oral TID  insulin lispro (HUMALOG) injection   SubCUTAneous AC&HS  hydrALAZINE (APRESOLINE) 20 mg/mL injection 10 mg  10 mg IntraVENous Q6H PRN  
 labetalol (NORMODYNE;TRANDATE) 20 mg/4 mL (5 mg/mL) injection 10 mg  10 mg IntraVENous Q4H PRN  
  acetaminophen (TYLENOL) suppository 650 mg  650 mg Rectal Q4H PRN  
 sodium chloride (NS) flush 5-40 mL  5-40 mL IntraVENous Q8H  
 sodium chloride (NS) flush 5-40 mL  5-40 mL IntraVENous PRN  
 ondansetron (ZOFRAN) injection 4 mg  4 mg IntraVENous Q6H PRN  
 naloxone (NARCAN) injection 0.4 mg  0.4 mg IntraVENous PRN  
 0.9% sodium chloride infusion  50 mL/hr IntraVENous CONTINUOUS  
 levothyroxine (SYNTHROID) injection 37.5 mcg  37.5 mcg IntraVENous Q24H  
 sodium chloride (NS) flush 5-40 mL  5-40 mL IntraVENous Q8H  
 glucose chewable tablet 16 g  4 Tab Oral PRN  
 dextrose (D50W) injection syrg 12.5-25 g  25-50 mL IntraVENous PRN  
 glucagon (GLUCAGEN) injection 1 mg  1 mg IntraMUSCular PRN  
 sodium chloride (NS) flush 5-40 mL  5-40 mL IntraVENous Q8H  
 sodium chloride (NS) flush 5-40 mL  5-40 mL IntraVENous PRN  
 LORazepam (ATIVAN) injection 1 mg  1 mg IntraVENous Q2H PRN  
 
______________________________________________________________________ EXPECTED LENGTH OF STAY: 1d 7h 
ACTUAL LENGTH OF STAY:          4 Katey Simons MD

## 2019-01-12 NOTE — PROGRESS NOTES
ANTONIO Summers is a 80 y.o. female admitted 1/8 with LUE>LLE weakness. S/P IV TPA and embolectomy form R MCA. MRI with multiples embolic appearing infarcts, predominant infarct involving area around R motor cortex. Developed new weakness in RLE today and LLE is weaker. EXAM 
Exam: 
Visit Vitals /61 (BP 1 Location: Left arm, BP Patient Position: At rest) Pulse 88 Temp 99.7 °F (37.6 °C) Resp 20 Ht 5' 5\" (1.651 m) Wt 71.1 kg (156 lb 12 oz) SpO2 98% BMI 26.08 kg/m² Gen:Alert CV: RRR Lungs: non labored breathing Abd: non distending Neuro: A&O x 3, mild dysarthria or aphasia CN II-XII: PERRL, EOMI, L facial droop Motor: strength 5/5 RUE. 0/5 LUE, 1/5 Bilateral LE. Wiggles toes. Sensory: intact with left sided neglect Gait: deferred LABS/ IMAGING 
CT Results (most recent): 
Results from Hospital Encounter encounter on 01/08/19 CT HEAD WO CONT Narrative EXAM: CT HEAD WO CONT INDICATION: lethargy,new RLE weakness COMPARISON: January 11, 2019. CONTRAST: None. TECHNIQUE: Unenhanced CT of the head was performed using 5 mm images. Brain and 
bone windows were generated. CT dose reduction was achieved through use of a 
standardized protocol tailored for this examination and automatic exposure 
control for dose modulation. FINDINGS: 
Prominent ventricles once again seen, ischemic changes in the vascular 
distribution of the right middle cerebral artery appear unchanged there was some 
effacement of sulci but no shift of the midline. Small hemorrhage left 
parieto-occipital unchanged. Impression  impression: No change. MRI Results (most recent): 
Results from Hospital Encounter encounter on 01/08/19 MRI BRAIN WO CONT Narrative EXAM:  MRI BRAIN WO CONT INDICATION:    cva 
 
COMPARISON:  CT head 1/9/2019, MRI brain 6/16/2018. CONTRAST: None. TECHNIQUE:   
Multiplanar multisequence acquisition without contrast of the brain. FINDINGS: 
Acute right MCA territory infarct with cortical/subcortical infarct of the right 
frontoparietal lobe, parietal operculum, and posterior insula. There are other 
small scattered foci of acute infarction seen within the right frontal lobe, 
parietal lobe, and occipital lobe, as well as small acute infarct in the right 
cerebellum. Small acute infarct in the right caudate head and right posterior 
body of the corpus callosum. There is a new acute small intraparenchymal hemorrhage within the left occipital 
periventricular white matter measuring 7 x 5 mm, with mild surrounding edema but 
no mass effect. Generalized parenchymal volume loss with commensurate dilation of the sulci and 
ventricular system. Confluent areas of periventricular and deep white matter T2/FLAIR hyperintensity, consistent with chronic microvascular ischemic disease. There is no cerebellar tonsillar herniation. Expected arterial flow-voids are 
present. Mucosal thickening within the right frontal sinus and right anterior 
ethmoidal air cells, as well as a retention cyst in the right maxillary sinus. Small left mastoid effusion. The orbital contents are within normal limits. No 
significant osseous or scalp lesions are identified. Impression IMPRESSION:  
1. Acute right MCA territory infarct predominantly involving the right 
frontoparietal lobe and posterior insula as described. Multiple other small 
scattered acute supratentorial and infratentorial right-sided infarcts as 
detailed above. 2. New small acute intraparenchymal hemorrhage within the left occipital 
periventricular white matter measuring 7 x 5 mm, with mild surrounding edema but 
no significant mass effect. This acute hemorrhage was present on head CT 
1/9/2019, and is not significantly changed since that recent exam. 
3. Unchanged generalized parenchymal volume loss and chronic microvascular 
ischemic disease. The findings were called to Anthony Hinojosa on 1/9/2019 at 5:14 PM by Dr. Gallardo Body. Gabrielleeddie 128 ASSESSMENT Hospital Problems  Date Reviewed: 1/8/2019 Codes Class Noted POA * (Principal) Cerebrovascular accident (CVA) due to embolism ([facility] [address] ICD-10-CM: I63.9 ICD-9-CM: 434.11  1/8/2019 Unknown PLAN Developed new weakness today in RLE and LLE is weaker. CT is stable Will need repeat MRI to r/o new infarction Conintue ASA+crestor Elisa Hernandez MD

## 2019-01-12 NOTE — PROGRESS NOTES
Problem: Falls - Risk of 
Goal: *Absence of Falls Document Negar Palma Fall Risk and appropriate interventions in the flowsheet. Outcome: Progressing Towards Goal 
Fall Risk Interventions: 
Mobility Interventions: Bed/chair exit alarm, Communicate number of staff needed for ambulation/transfer, OT consult for ADLs, Patient to call before getting OOB, PT Consult for mobility concerns, PT Consult for assist device competence, Strengthening exercises (ROM-active/passive), Utilize walker, cane, or other assistive device Mentation Interventions: Adequate sleep, hydration, pain control, Door open when patient unattended, Bed/chair exit alarm, Evaluate medications/consider consulting pharmacy, Eyeglasses and hearing aids, Familiar objects from home, Increase mobility, More frequent rounding, Reorient patient, Room close to nurse's station, Toileting rounds, Update white board Medication Interventions: Bed/chair exit alarm, Evaluate medications/consider consulting pharmacy, Patient to call before getting OOB, Teach patient to arise slowly Elimination Interventions: Call light in reach, Elevated toilet seat, Patient to call for help with toileting needs, Toilet paper/wipes in reach, Toileting schedule/hourly rounds History of Falls Interventions: Consult care management for discharge planning, Door open when patient unattended, Evaluate medications/consider consulting pharmacy, Room close to nurse's station, Investigate reason for fall, Bed/chair exit alarm

## 2019-01-12 NOTE — PROGRESS NOTES
Pt's K+ 2.9. Dr. Guanakito Middleton paged. Orders received for 10meq IV potassium x4 runs and repeat BMP/Mag at 11AM.

## 2019-01-13 NOTE — PROGRESS NOTES
Bedside shift change report given to Brooke River RN (oncoming nurse) by Alo Zuluaga RN (offgoing nurse). Report included the following information SBAR, Kardex, Intake/Output and Recent Results.

## 2019-01-13 NOTE — ROUTINE PROCESS
Bedside shift change report given to [address] [place] [address] (oncoming nurse) by Dylan Adan RN (offgoing nurse). Report included the following information SBAR, Kardex, ED Summary, Procedure Summary, Intake/Output, MAR, Accordion, Recent Results and Cardiac Rhythm NSR.

## 2019-01-13 NOTE — PROGRESS NOTES
Clinical Pharmacy Note: IV to PO Automatic Conversion Please note: Tika Arm medication(s) levothyroxine and famotidine have been changed from IV to PO based on the following critiera: 
 
? Patient is taking scheduled oral medications ? Patient is tolerating soft diet This IV to PO conversion is based on the [employer] approved automatic conversion policy for eligible patients. Please call with questions. Magaly Trevino, [address]

## 2019-01-13 NOTE — PROGRESS NOTES
Problem: Pressure Injury - Risk of 
Goal: *Prevention of pressure injury Document Clint Scale and appropriate interventions in the flowsheet. Outcome: Progressing Towards Goal 
Pressure Injury Interventions: 
Sensory Interventions: Assess changes in LOC, Assess need for specialty bed, Avoid rigorous massage over bony prominences, Check visual cues for pain, Discuss PT/OT consult with provider, Float heels, Keep linens dry and wrinkle-free, Maintain/enhance activity level, Minimize linen layers, Monitor skin under medical devices, Pad between skin to skin, Pressure redistribution bed/mattress (bed type), Sit a 90-degree angle/use footstool if needed Moisture Interventions: Apply protective barrier, creams and emollients, Assess need for specialty bed, Check for incontinence Q2 hours and as needed, Internal/External urinary devices, Maintain skin hydration (lotion/cream), Minimize layers, Moisture barrier Activity Interventions: Increase time out of bed, Pressure redistribution bed/mattress(bed type), PT/OT evaluation Mobility Interventions: Float heels, HOB 30 degrees or less, Pressure redistribution bed/mattress (bed type), PT/OT evaluation Nutrition Interventions: Document food/fluid/supplement intake, Discuss nutritional consult with provider, Offer support with meals,snacks and hydration Friction and Shear Interventions: Apply protective barrier, creams and emollients, Feet elevated on foot rest, Foam dressings/transparent film/skin sealants, Lift sheet, HOB 30 degrees or less, Minimize layers, Sit at 90-degree angle Problem: Falls - Risk of 
Goal: *Absence of Falls Document Nimco Fearing Fall Risk and appropriate interventions in the flowsheet.  
Outcome: Progressing Towards Goal 
Fall Risk Interventions: 
Mobility Interventions: Bed/chair exit alarm, Communicate number of staff needed for ambulation/transfer, OT consult for ADLs, Patient to call before getting OOB, PT Consult for mobility concerns, PT Consult for assist device competence, Strengthening exercises (ROM-active/passive), Utilize walker, cane, or other assistive device Mentation Interventions: Adequate sleep, hydration, pain control, Bed/chair exit alarm, Door open when patient unattended, Evaluate medications/consider consulting pharmacy, Eyeglasses and hearing aids, Familiar objects from home, Increase mobility, More frequent rounding, Reorient patient, Room close to nurse's station, Toileting rounds, Update white board Medication Interventions: Bed/chair exit alarm, Evaluate medications/consider consulting pharmacy, Patient to call before getting OOB, Teach patient to arise slowly Elimination Interventions: Call light in reach, Elevated toilet seat, Patient to call for help with toileting needs, Toilet paper/wipes in reach, Toileting schedule/hourly rounds, Bed/chair exit alarm History of Falls Interventions: Consult care management for discharge planning, Door open when patient unattended, Evaluate medications/consider consulting pharmacy, Bed/chair exit alarm

## 2019-01-13 NOTE — PROGRESS NOTES
SUBJECTIVE Ila Patel is a 80 y.o. female admitted 1/8 with LUE>LLE weakness. S/P IV TPA and embolectomy form R MCA. MRI with multiples embolic appearing infarcts, predominant infarct involving area around R motor cortex. Developed new weakness in RLE yesterday and LLE became weaker. EXAM 
Exam: 
Visit Vitals /62 (BP 1 Location: Left arm, BP Patient Position: At rest) Pulse 89 Temp 100 °F (37.8 °C) Resp 20 Ht 5' 5\" (1.651 m) Wt 73.1 kg (161 lb 2.5 oz) SpO2 97% BMI 26.82 kg/m² Gen:Alert CV: RRR Lungs: non labored breathing Abd: non distending Neuro: A&O x 3, mild dysarthria or aphasia CN II-XII: PERRL, EOMI, L facial droop Motor: strength 5/5 RUE. 0/5 LUE, 3/5 RLE, 2/5 LLE. Wiggles toes. Sensory: intact with left sided neglect Gait: deferred LABS/ IMAGING 
CT Results (most recent): 
Results from Hospital Encounter encounter on 01/08/19 CT HEAD WO CONT Narrative EXAM: CT HEAD WO CONT INDICATION: lethargy,new RLE weakness COMPARISON: January 11, 2019. CONTRAST: None. TECHNIQUE: Unenhanced CT of the head was performed using 5 mm images. Brain and 
bone windows were generated. CT dose reduction was achieved through use of a 
standardized protocol tailored for this examination and automatic exposure 
control for dose modulation. FINDINGS: 
Prominent ventricles once again seen, ischemic changes in the vascular 
distribution of the right middle cerebral artery appear unchanged there was some 
effacement of sulci but no shift of the midline. Small hemorrhage left 
parieto-occipital unchanged. Impression  impression: No change. MRI Results (most recent): 
Results from Hospital Encounter encounter on 01/08/19 MRI BRAIN WO CONT  Narrative Prelim report: 
 
Multiple right-sided MCA territory infarcts; majority of the infarcts are 
unchanged, however several increased in size and several are new compared to 
 prior MRI dated January 9, 2019. Left high frontal subcentimeter focus of 
restricted diffusion, unchanged. 2 mm left basal ganglia acute infarct, new. Left posterior parieto-occipital hemorrhage, unchanged. Final report to follow. ASSESSMENT Hospital Problems  Date Reviewed: 1/8/2019 Codes Class Noted POA * (Principal) Cerebrovascular accident (CVA) due to embolism ([facility] [address] ICD-10-CM: I63.9 ICD-9-CM: 434.11  1/8/2019 Unknown PLAN Developed new areas of infarction in R MCA territory and some of the infarcts look bigger. There is a new infarct in the left basal ganglia which is potentially causing the right leg weakness. Left parieto-occipital hemorrhage is unchanged Suspicion remains that she is having an embolic phenomena Would recommend starting Eliquis 2.5 mg twice daily Conintue ASA+crestor Continue PT/OT D/W Dr. Ree Pederson MD

## 2019-01-13 NOTE — PROGRESS NOTES
Hospitalist Progress Note Ignacio Lou MD 
Answering service: [phone] OR [phone] from in house phone Date of Service:  2019 NAME:  Laz Tapia :  1932 MRN:  [id number] Admission Summary:  
80 y.o F with PMH of parkinson's disease,HTN,hypothyroidism,diabetes was admitted to the hospital for management of acute rt CVA s/p tpa. Interval history / Subjective:  
Patient denied any headache. 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 
-MRI brain showed acute right MCA territory infarct,other small scattered acute supratentorial and infratentorial right-sided infarcts and  a small left occipital hemorrhage 
-neurology  following,appreciate recommendations 
-echo showed normal EF.  
-CINDY did not show any clots and she has event monitor now. 
-As she had new deficits -RLE weakness- repeated MRI which showed new infarcts. Left occipital hemorrhage stable. -started 2.5 mg eliquis BID today. 
-discussed with patient's son and daughter at bedside about new infarcts -they are aware of benefits and risks of anticoagulation(intracranial hemorrhage etc). -speech eval again with new infarcts. NPO for now #HTN: 
-BP goal less than 140 
-will give prn hydralzine. #Diabetes: 
-on glimepiride,sitagliptin-metformin 
-will continue SSI for now and monitor BS #Parkinson's disease: 
- resumed sinemet #hypothyrodism: 
-on synthroid #Difficulty duran placement: 
-Gyn team placed a coude catheter. 
-need to talk to GYN team prior to removal. 
 
Mild elevation of troponin:trended down. - in the setting of stroke ?demand ischemia Code status: full DVT prophylaxis: eliquis Care Plan discussed with: patient's family at bed side ,nurse and Lily Geronimo Disposition: TBD Hospital Problems  Date Reviewed: 1/8/2019 Codes Class Noted POA * (Principal) Cerebrovascular accident (CVA) due to embolism ([facility] [address] ICD-10-CM: I63.9 ICD-9-CM: 434.11  1/8/2019 Unknown Review of Systems: As per HPI Vital Signs:  
 Last 24hrs VS reviewed since prior progress note. Most recent are: 
Visit Vitals /62 (BP 1 Location: Left arm, BP Patient Position: At rest) Pulse 89 Temp 100 °F (37.8 °C) Resp 20 Ht 5' 5\" (1.651 m) Wt 73.1 kg (161 lb 2.5 oz) SpO2 97% BMI 26.82 kg/m² Intake/Output Summary (Last 24 hours) at 1/13/2019 1730 Last data filed at 1/13/2019 1200 Gross per 24 hour Intake 3064.58 ml Output 650 ml Net 2414.58 ml Physical Examination:  
 
 
     
Constitutional:  elderly patient ENT:  Oral mucous moist  
Resp:  CTA b/l. No wheezing/rhonchi/rales. CV:  Regular rhythm, normal rate, no murmurs, gallops, rubs GI:  Soft, non distended, non tender. normoactive bowel sounds Musculoskeletal:  No edema Neurologic: She is A&O X 1,RUE 5/5, RLE and LLE 2/5,was unable to move  LUE. Data Review:  
 Review and/or order of clinical lab test 
Review and/or order of tests in the medicine section of [facility] Labs:  
 
Recent Labs  
  01/13/19 
1120 01/11/19 
0309 WBC 8.2 9.2 HGB 7.3* 7.6* HCT 23.9* 25.0*  
 211 Recent Labs  
  01/13/19 
0221 01/12/19 
0259 01/11/19 
0309  142 145  
K 3.1* 2.9* 3.2*  
* 111* 112* CO2 26 23 25 BUN 19 16 12 CREA 0.72 0.78 0.74 * 151* 93  
CA 8.4* 8.2* 8.3*  
MG 1.9 1.9 2.0 PHOS  --   --  2.4* Recent Labs  
  01/11/19 
0309 SGOT 25 ALT <6* AP 55 TBILI 0.3 TP 5.6* ALB 2.5*  
GLOB 3.1 No results for input(s): INR, PTP, APTT in the last 72 hours. No lab exists for component: INREXT, INREXT No results for input(s): FE, TIBC, PSAT, FERR in the last 72 hours. Lab Results Component Value Date/Time Folate 11.9 06/16/2018 12:45 AM  
  
No results for input(s): PH, PCO2, PO2 in the last 72 hours. No results for input(s): CPK, CKNDX, TROIQ in the last 72 hours. No lab exists for component: CPKMB Lab Results Component Value Date/Time Cholesterol, total 132 01/09/2019 05:15 AM  
 HDL Cholesterol 73 01/09/2019 05:15 AM  
 LDL, calculated 50.2 01/09/2019 05:15 AM  
 Triglyceride 44 01/09/2019 05:15 AM  
 CHOL/HDL Ratio 1.8 01/09/2019 05:15 AM  
 
Lab Results Component Value Date/Time Glucose (POC) 127 (H) 01/13/2019 04:42 PM  
 Glucose (POC) 160 (H) 01/13/2019 11:37 AM  
 Glucose (POC) 198 (H) 01/13/2019 07:32 AM  
 Glucose (POC) 150 (H) 01/12/2019 10:59 PM  
 Glucose (POC) 159 (H) 01/12/2019 05:12 PM  
 
Lab Results Component Value Date/Time Color YELLOW/STRAW 06/16/2018 01:00 PM  
 Appearance CLEAR 06/16/2018 01:00 PM  
 Specific gravity 1.008 06/16/2018 01:00 PM  
 pH (UA) 7.5 06/16/2018 01:00 PM  
 Protein 30 (A) 06/16/2018 01:00 PM  
 Glucose NEGATIVE  06/16/2018 01:00 PM  
 Ketone NEGATIVE  06/16/2018 01:00 PM  
 Bilirubin NEGATIVE  06/16/2018 01:00 PM  
 Urobilinogen 1.0 06/16/2018 01:00 PM  
 Nitrites NEGATIVE  06/16/2018 01:00 PM  
 Leukocyte Esterase LARGE (A) 06/16/2018 01:00 PM  
 Epithelial cells MODERATE (A) 06/16/2018 01:00 PM  
 Bacteria NEGATIVE  06/16/2018 01:00 PM  
 WBC 20-50 06/16/2018 01:00 PM  
 RBC 0-5 06/16/2018 01:00 PM  
 
 
 
Medications Reviewed:  
 
Current Facility-Administered Medications Medication Dose Route Frequency  famotidine (PEPCID) tablet 20 mg  20 mg Oral BID  levothyroxine (SYNTHROID) tablet 50 mcg  50 mcg Oral 6am  
 apixaban (ELIQUIS) tablet 2.5 mg  2.5 mg Oral BID  hydrALAZINE (APRESOLINE) 20 mg/mL injection 5 mg  5 mg IntraVENous Q4H PRN  
 aspirin delayed-release tablet 81 mg  81 mg Oral DAILY  rosuvastatin (CRESTOR) tablet 20 mg  20 mg Oral QHS  carbidopa-levodopa (SINEMET)  mg per tablet 1 Tab  1 Tab Oral TID  insulin lispro (HUMALOG) injection   SubCUTAneous AC&HS  acetaminophen (TYLENOL) suppository 650 mg  650 mg Rectal Q4H PRN  
 sodium chloride (NS) flush 5-40 mL  5-40 mL IntraVENous Q8H  
 sodium chloride (NS) flush 5-40 mL  5-40 mL IntraVENous PRN  
 ondansetron (ZOFRAN) injection 4 mg  4 mg IntraVENous Q6H PRN  
 naloxone (NARCAN) injection 0.4 mg  0.4 mg IntraVENous PRN  
 0.9% sodium chloride infusion  50 mL/hr IntraVENous CONTINUOUS  
 sodium chloride (NS) flush 5-40 mL  5-40 mL IntraVENous Q8H  
 glucose chewable tablet 16 g  4 Tab Oral PRN  
 dextrose (D50W) injection syrg 12.5-25 g  25-50 mL IntraVENous PRN  
 glucagon (GLUCAGEN) injection 1 mg  1 mg IntraMUSCular PRN  
 sodium chloride (NS) flush 5-40 mL  5-40 mL IntraVENous Q8H  
 sodium chloride (NS) flush 5-40 mL  5-40 mL IntraVENous PRN  
 LORazepam (ATIVAN) injection 1 mg  1 mg IntraVENous Q2H PRN  
 
______________________________________________________________________ EXPECTED LENGTH OF STAY: 1d 7h 
ACTUAL LENGTH OF STAY:          5 Wander Barker MD

## 2019-01-13 NOTE — PROGRESS NOTES
2nd degree type II block noted on telemetry. Pt asymptomatic and now back in NSR w/BBB. Dr. Julia bella, received orders to d/c prn labetalol and draw labs in AM.

## 2019-01-13 NOTE — PROGRESS NOTES
Received call from Cardiologist Dr. Erik Palacios regarding pt's block on heart event monitor. Dr. Erik Palacios gave orders to remove event monitor at this time as pt is being monitored on inpatient telemetry. Pt is to d/c with monitor.

## 2019-01-13 NOTE — PROGRESS NOTES
Patient currently on pureed diet. RN attempted to give crushed oral meds in applesauce. Patient initially took one bite and seemed to swallow fine. RN attempted to give another bite of applesauce and patient stated that she could \"no longer swallow\". RN paged Dr. Ree Love. While waiting for MD to return page, patient family comes to nursing station and states that patient is now choking and spitting up applesauce. Primary RN and charge RN went in room and oral suctioned patient. Dr. Ree Love returned call and came up to assess patient. Patient will now be NPO.

## 2019-01-14 NOTE — CONSULTS
Cardiac Electrophysiology Hospital Consultation Note Subjective:  
  
Romana Logan is a 80 y.o. patient who is seen for evaluation/management of paroxysmal atrial fibrillation with RVR & bradycardia with pause She was admitted on 01/08/2019 via EMS for left-sided weakness, left-sided facial droop, & slurred speech. Head CT showed acute thromboembolic occlusion of the proximal to mid posterior division of M2 segment right middle cerebral artery with decreased perfusion of the right parietal, occipital and posterior temporal lobe. She received TPA. She underwent embolectomy, showed organized fibrous clot. There was suspicion for AF as etiology. She has also had some evidence of hemorrhage in left occipital lobe. She has continued to shower embolic & multiple new & old infarcts. On 01/12/2019, she developed new weakness in RLE & LLE weakness worsened. Telemetry monitoring shows intermittent paroxysmal atrial fibrillation (ventricular rate 150 bpm) alternating with pause of 2.2 seconds. Troponin peak 0.76 on 01/08/2019. HR 84 bpm, NSR at time of exam.  /65 this afternoon. She denies current complaints. Daughter at bedside, states her father (patient's ) received pacemaker last week, so they are familiar with the process. Hgb 7.3, stable. K 3.1 (supplemented), Mg 1.9. Cr 0.72. Eliquis 2.5 mg po bid initiated on 01/13/2019 by primary team. 
 
 
CINDY (01/11/2019): LVEF 55-60%, no RWMA. No thrombus noted. No significant valvular abnormality. Echo (01/09/2019): LVEF 60-65%, no RWMA, mod concentric LV hypertrophy, grade 2 diastolic dysfunction. Trivial TR. Previous: 
Echo (06/16/2018): LVEF 55-60%, no RWMA, mild to mod LVH. LA mildly dilated. Lexiscan cardiolite stress (05/06/2015): Myocardial perfusion imaging normal, LVEF 75%. Seen by Dr. Caryle Chol. Parkinson's disease, on Sinemet. Previous TIA 06/2018. Problem List  Date Reviewed: 1/8/2019 Codes Class Noted * (Principal) Cerebrovascular accident (CVA) due to embolism ([facility] ICD-10-CM: I63.9 ICD-9-CM: 434.11  1/8/2019 Type 2 diabetes with nephropathy ([facility] ICD-10-CM: E11.21 
ICD-9-CM: 250.40, 583.81  7/10/2018 TIA (transient ischemic attack) ICD-10-CM: G45.9 ICD-9-CM: 435.9  6/17/2018 ARF (acute renal failure) (HCC) ICD-10-CM: N17.9 ICD-9-CM: 584.9  6/15/2018 Postmenopausal atrophic vaginitis ICD-10-CM: N95.2 ICD-9-CM: 627.3  1/31/2018 History of hysterectomy including cervix ICD-10-CM: Z90.710 ICD-9-CM: V88.01  1/31/2018 Prolapse of vaginal vault after hysterectomy ICD-10-CM: N99.3 ICD-9-CM: 618.5  1/31/2018 Rectocele ICD-10-CM: N81.6 ICD-9-CM: 618.04  1/31/2018 Somatization disorder ICD-10-CM: F45.0 ICD-9-CM: 300.81  8/18/2015 ADD (attention deficit disorder) ICD-10-CM: F98.8 ICD-9-CM: 314.00  8/18/2015 Overview Signed 8/18/2015  6:35 PM by Quentin Recio PsyD  
  chronic and moderate (6 out of 10) Diabetes mellitus ([facility] ICD-10-CM: E11.9 ICD-9-CM: 250.00  1/19/2012 Syncope and collapse ICD-10-CM: R55 
ICD-9-CM: 780.2  4/24/2011 Hypertension ICD-10-CM: I10 
ICD-9-CM: 401.9  Unknown Hypercholesterolemia ICD-10-CM: E78.00 ICD-9-CM: 272.0  Unknown Hypothyroidism ICD-10-CM: E03.9 ICD-9-CM: 244.9  Unknown Depression ICD-10-CM: F32.9 ICD-9-CM: 311  Unknown Anxiety ICD-10-CM: F41.9 ICD-9-CM: 300.00  Unknown Urinary frequency ICD-10-CM: R35.0 ICD-9-CM: 788.41  Unknown Chronic venous insufficiency ICD-10-CM: I87.2 ICD-9-CM: 459.81  Unknown Chronic back pain ICD-10-CM: M54.9, G89.29 ICD-9-CM: 724.5, 338.29  Unknown Painful hip ICD-10-CM: M25.559 ICD-9-CM: 719.45  Unknown Current Facility-Administered Medications Medication Dose Route Frequency Provider Last Rate Last Dose  amiodarone (CORDARONE) tablet 200 mg  200 mg Oral DAILY Diefenderfer, Granvilleranjeet Dodd, NP   200 mg at 01/14/19 1205  famotidine (PEPCID) tablet 20 mg  20 mg Oral BID Christina Nicole MD   20 mg at 01/14/19 1111  levothyroxine (SYNTHROID) tablet 50 mcg  50 mcg Oral Noemi Whalen MD   Stopped at 01/14/19 0600  
 apixaban (ELIQUIS) tablet 2.5 mg  2.5 mg Oral BID Christina Nicole MD   2.5 mg at 01/14/19 1112  hydrALAZINE (APRESOLINE) 20 mg/mL injection 10 mg  10 mg IntraVENous Q4H PRN Christina Nicole MD      
 hydrALAZINE (APRESOLINE) tablet 25 mg  25 mg Oral TID Christina Nicole MD   25 mg at 01/14/19 1111  aspirin delayed-release tablet 81 mg  81 mg Oral DAILY Mirian Durham DO   81 mg at 01/14/19 1111  
 rosuvastatin (CRESTOR) tablet 20 mg  20 mg Oral QHS Mirian Durham DO   Stopped at 01/13/19 2200  carbidopa-levodopa (SINEMET)  mg per tablet 1 Tab  1 Tab Oral TID Christina Nicole MD   1 Tab at 01/14/19 1112  
 insulin lispro (HUMALOG) injection   SubCUTAneous AC&HS Christina Nicole MD   2 Units at 01/14/19 1207  acetaminophen (TYLENOL) suppository 650 mg  650 mg Rectal Q4H PRN Sara Duggan MD   650 mg at 01/13/19 2041  sodium chloride (NS) flush 5-40 mL  5-40 mL IntraVENous Q8H Bj Salmeron MD   10 mL at 01/14/19 3259  sodium chloride (NS) flush 5-40 mL  5-40 mL IntraVENous PRN Bj Salmeron MD   10 mL at 01/08/19 1345  ondansetron (ZOFRAN) injection 4 mg  4 mg IntraVENous Q6H PRN Hattie Martinez MD   4 mg at 01/09/19 0426  
 naloxone (NARCAN) injection 0.4 mg  0.4 mg IntraVENous PRN Hattie Martinez MD      
 sodium chloride (NS) flush 5-40 mL  5-40 mL IntraVENous Q8H Olga Rojas MD   10 mL at 01/14/19 4009  glucose chewable tablet 16 g  4 Tab Oral PRN Hattie Martinez MD      
 dextrose (D50W) injection syrg 12.5-25 g  25-50 mL IntraVENous PRN Hattie Martinez MD      
 glucagon (GLUCAGEN) injection 1 mg  1 mg IntraMUSCular PRN Giselle MD Susannah      
 sodium chloride (NS) flush 5-40 mL  5-40 mL IntraVENous Q8H Kerrie Leon MD   10 mL at 01/14/19 0790  sodium chloride (NS) flush 5-40 mL  5-40 mL IntraVENous PRN Kerrie Leon MD      
 LORazepam (ATIVAN) injection 1 mg  1 mg IntraVENous Q2H PRN Reji Patterson MD   1 mg at 01/08/19 8885 Allergies Allergen Reactions  Norvasc [Amlodipine] Swelling  Flexeril [Cyclobenzaprine] Other (comments) Caused pt to fall & hallucination  Percocet [Oxycodone-Acetaminophen] Itching  Remeron [Mirtazapine] Diarrhea  Tramadol Other (comments) Caused falls Past Medical History:  
Diagnosis Date  Anxiety  Arthritis  Chest pain 2008 Negative stress test and Holter monitor w/Dr Sharon Pascual.  Chronic back pain  Chronic venous insufficiency  Colon polyp   
 last scope normal 2007; Dr. Cortez Morillo  Cyst of right kidney  Depression  Diabetes ([address]  Hypercholesterolemia  Hypertension  Hypothyroidism  Memory disorder  Other ill-defined conditions(799.89)   
 heart murmur  Painful hip  Parkinson disease ([address]  Urinary frequency Sees Dr. Collette Medford with [address] Past Surgical History:  
Procedure Laterality Date  COLONOSCOPY,DIAGNOSTIC  6/12/2015  HX BREAST BIOPSY  2002  HX HYSTERECTOMY Due to a prolapsed uterus  HX KNEE REPLACEMENT    
 left  HX KNEE REPLACEMENT  2006  
 right  HX ORTHOPAEDIC B knees.  HX OTHER SURGICAL    
 pelvic prolasp  UPPER GI ENDOSCOPY,BIOPSY  6/12/2015 Family History Problem Relation Age of Onset  Cancer Mother   
     pancreatic  Cancer Father   
     colon  Cancer Sister   
     pancreatic  Diabetes Sister  Alzheimer Brother Social History Tobacco Use  Smoking status: Never Smoker  Smokeless tobacco: Never Used Substance Use Topics  Alcohol use: No  
  Alcohol/week: 0.0 oz  
 Comment: Rare Review of Systems:  
Constitutional: Negative for fever, chills, weight loss, malaise/fatigue. HEENT: Negative for nosebleeds, vision changes. Respiratory: Negative for cough, hemoptysis Cardiovascular: Negative for chest pain, palpitations, orthopnea, claudication, leg swelling, syncope, and PND. Gastrointestinal: Negative for nausea, vomiting, diarrhea, blood in stool and melena. Genitourinary: Negative for dysuria, and hematuria. Musculoskeletal: Negative for myalgias, arthralgia. Skin: Negative for rash. Heme: Does not bleed or bruise easily. Neurological: + slurred speech, left facial droop, BLE weakness, LUE weakness. Objective:  
 
Visit Vitals /65 Pulse 84 Temp 98.9 °F (37.2 °C) Resp 20 Ht 5' 5\" (1.651 m) Wt 154 lb 12.2 oz (70.2 kg) SpO2 100% BMI 25.75 kg/m² Physical Exam:  
Constitutional: well-developed and well-nourished. No respiratory distress. Head: Normocephalic and atraumatic. Eyes: Pupils are equal, round ENT: Hearing grossly normal 
Neck: supple. No JVD present. Cardiovascular: Normal rate, regular rhythm. Exam reveals no gallop and no friction rub. 3/6 Systolic murmur. Pulmonary/Chest: Effort normal and breath sounds clear in anterior & lateral lung fields. Abdominal: Soft, no tenderness. Musculoskeletal: Trace bilat extremity edema. Neurological: Follows some commands, weakness BLEs, LUE.  + left facial droop. She does not speak when spoken to. Skin: Skin is warm and dry. EKG (01/13/2019): NSR with RBBB (QRSd 124 ms), first degree av block Assessment/Plan: Ms. Sue Dueñas was admitted on 01/08/2019 for left side weakness, facial droop, & slurred speech. MRI with multiple embolic appearing infarcts, predominantly involving area around R motor cortex. Received TPA, underwent embolectomy.  She also has new left basal ganglia CVA, small left occipital hemorrhage that is stable in size, & increased R side infarct. No prior known history of AF, but paroxysmal AF with RVR (ventricular rate 150) noted during admission. Bradycardia was also present Eliquis 2.5 mg po bid initiated by primary team.  Currently, patient is on reduced dose of eliquis due to recent tPA and hemorrhagic conversion but in the long run I recommend considering 5 mg po bid if hemorrhagic conversion risk is over. Currently receiving amiodarone 200 mg po daily. LFTs already done, needs baseline CXR & TFT. Discussed risks/benefits of potential dual chamber pacemaker implant with patient & daughter to allow for adequate rate control of AF with bradycardia & sinus pauses. VINCENT Valerio [facility] 01/14/19 Addendum from EP attending: 
 I have seen, examined patient, and discussed with nurse practitioner, registered nurse, reviewed, updated note and agree with the assessment and plan I have talked to her and her daughter who is her mPOA this pm. She is tired and has expressive aphasia as well as left side weakness Vital signs are stable Exam shows regular rhythm and 3/6 LSB systolic murmur Anterior lung fields are clear Assessment and Plan: 
She has transient third degree av block 1/12/2019 I have discussed with her and her daughter about dual chamber pacemaker and she agrees to proceed She is mPOA and signed consent Then beta blocker can be added to amiodarone to maintain NSR Pacemaker is planned for 1/16 at 7 am 
 
 
Thank you for involving me in this patient's care and please call with further concerns or questions. Ganga Howard M.D. Electrophysiology/Cardiology [address] [facility] [address], [place] [address] [place] [address] [address] [phone]

## 2019-01-14 NOTE — PROGRESS NOTES
[facility] has accepted this patient in cclink- family also interested in [employer], [address] [facility] see if patient will qualify for acute rehab. Per conversation with family on 1/11- plan is to bring the patient home upon discharge from rehab and family stated they will be taking shifts to provide 24/7 for the patient. MALGORZATA Sousa 
 
11:17 a.m.- BURKE spoke with Madelaine Riedel with [facility]- she plans to review patient, will need updated PT/OT notes, CM will notify therapy team. Attending MD stated patient is not medically stable for discharge, CM will continue to follow and await to hear from [address] [place] [address]. Plan A) Acute inpatient rehab, Plan B) [facility] SN[facility]. MALGORZATA Sousa  
 
11:20 a.m.- BURKE spoke with Julieanne Cooks in admissions at [address] provided update that family also interested in acute rehab, not medically stable. [facility] has accepted, will also await to hear from [address] [place] [address].  MALGORZATA Sousa

## 2019-01-14 NOTE — PROGRESS NOTES
Hospitalist Progress Note Avril Fox MD 
Answering service: [phone] OR [phone] from in house phone Date of Service:  2019 NAME:  Estrellita Proctor :  1932 MRN:  [id number] Admission Summary:  
80 y.o F with PMH of parkinson's disease,HTN,hypothyroidism,diabetes was admitted to the hospital for management of acute rt CVA s/p tpa. Interval history / Subjective:  
F/u for stroke Patient states that she is able to move lower extremities. 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 
-MRI brain showed acute right MCA territory infarct,other small scattered acute supratentorial and infratentorial right-sided infarcts and  a small left occipital hemorrhage 
-neurology  following,appreciate recommendations 
-echo showed normal EF.  
-CINDY did not show any clots  
-As she had new deficits on  -RLE weakness- repeated MRI which showed new infarcts. Left occipital hemorrhage stable. -Found to have paroxysmal a fib on . 
-on  2.5 mg eliquis BID now -started on lower dose due to occipital hemorrhage. 
-discussed with patient's son and daughter at bedside about new infarcts -they are aware of benefits and risks of anticoagulation(worsening hemorrhage ). -palliative care consult #Paroxysmal atrial fib: 
-with intermittent high heart rate and sinus pauses 
-cardiology following - started on amiodarone. #HTN: 
-BP goal less than 140 
-will give amlodipine and prn hydralazine. #Difficulty duran placement: 
-Gyn team placed a coude catheter. 
-need to talk to GYN team prior to removal. 
 
#Chronic anemia: 
-Hb at admission around 10.3,likely hemo concentrated sample 
-trending down -7.3 today 
-will follow iron and ferritin results -no evidence of active bleeding - 
 
 Hypokalemia:replete #Diabetes: 
-on glimepiride,sitagliptin-metformin 
-will continue SSI for now and monitor BS #Parkinson's disease: 
- resumed sinemet #hypothyrodism: 
-on synthroid Mild elevation of troponin:trended down. - in the setting of stroke ?demand ischemia Code status: full DVT prophylaxis: eliquis Care Plan discussed with: patient's family at bed side ,nurse and [id number] and Augusta Cano Disposition: Rehab placement Hospital Problems  Date Reviewed: 1/8/2019 Codes Class Noted POA * (Principal) Cerebrovascular accident (CVA) due to embolism ([facility] ICD-10-CM: I63.9 ICD-9-CM: 434.11  1/8/2019 Unknown Review of Systems: As per HPI Vital Signs:  
 Last 24hrs VS reviewed since prior progress note. Most recent are: 
Visit Vitals /61 (BP 1 Location: Right arm, BP Patient Position: At rest) Pulse 84 Temp 98.9 °F (37.2 °C) Resp 20 Ht 5' 5\" (1.651 m) Wt 70.2 kg (154 lb 12.2 oz) SpO2 100% BMI 25.75 kg/m² Intake/Output Summary (Last 24 hours) at 1/14/2019 1104 Last data filed at 1/13/2019 2000 Gross per 24 hour Intake 0 ml Output 200 ml Net -200 ml Physical Examination:  
 
 
     
Constitutional:  elderly patient ENT:  Oral mucous moist  
Resp:  CTA b/l. No wheezing/rhonchi/rales. CV:  Regular rhythm, normal rate, no murmurs, gallops, rubs GI:  Soft, non distended, non tender. normoactive bowel sounds Musculoskeletal:  No edema Neurologic: She is A&O X 2,RUE 5/5, RLE and LLE 3/5,LUE 3/5. Arbutus Ring Data Review:  
 Review and/or order of clinical lab test 
Review and/or order of tests in the medicine section of CPT Labs:  
 
Recent Labs  
  01/13/19 
1120 WBC 8.2 HGB 7.3* HCT 23.9*  
 Recent Labs  
  01/13/19 
0221 01/12/19 
0259  142  
K 3.1* 2.9*  
* 111* CO2 26 23 BUN 19 16 CREA 0.72 0.78 * 151* CA 8.4* 8.2* MG 1.9 1.9 No results for input(s): SGOT, GPT, ALT, AP, TBIL, TBILI, TP, ALB, GLOB, GGT, AML, LPSE in the last 72 hours. No lab exists for component: AMYP, HLPSE No results for input(s): INR, PTP, APTT in the last 72 hours. No lab exists for component: INREXT, INREXT No results for input(s): FE, TIBC, PSAT, FERR in the last 72 hours. Lab Results Component Value Date/Time Folate 11.9 06/16/2018 12:45 AM  
  
No results for input(s): PH, PCO2, PO2 in the last 72 hours. No results for input(s): CPK, CKNDX, TROIQ in the last 72 hours. No lab exists for component: CPKMB Lab Results Component Value Date/Time Cholesterol, total 132 01/09/2019 05:15 AM  
 HDL Cholesterol 73 01/09/2019 05:15 AM  
 LDL, calculated 50.2 01/09/2019 05:15 AM  
 Triglyceride 44 01/09/2019 05:15 AM  
 CHOL/HDL Ratio 1.8 01/09/2019 05:15 AM  
 
Lab Results Component Value Date/Time Glucose (POC) 117 (H) 01/14/2019 07:23 AM  
 Glucose (POC) 127 (H) 01/13/2019 04:42 PM  
 Glucose (POC) 160 (H) 01/13/2019 11:37 AM  
 Glucose (POC) 198 (H) 01/13/2019 07:32 AM  
 Glucose (POC) 150 (H) 01/12/2019 10:59 PM  
 
Lab Results Component Value Date/Time Color YELLOW/STRAW 06/16/2018 01:00 PM  
 Appearance CLEAR 06/16/2018 01:00 PM  
 Specific gravity 1.008 06/16/2018 01:00 PM  
 pH (UA) 7.5 06/16/2018 01:00 PM  
 Protein 30 (A) 06/16/2018 01:00 PM  
 Glucose NEGATIVE  06/16/2018 01:00 PM  
 Ketone NEGATIVE  06/16/2018 01:00 PM  
 Bilirubin NEGATIVE  06/16/2018 01:00 PM  
 Urobilinogen 1.0 06/16/2018 01:00 PM  
 Nitrites NEGATIVE  06/16/2018 01:00 PM  
 Leukocyte Esterase LARGE (A) 06/16/2018 01:00 PM  
 Epithelial cells MODERATE (A) 06/16/2018 01:00 PM  
 Bacteria NEGATIVE  06/16/2018 01:00 PM  
 WBC 20-50 06/16/2018 01:00 PM  
 RBC 0-5 06/16/2018 01:00 PM  
 
 
 
Medications Reviewed:  
 
Current Facility-Administered Medications Medication Dose Route Frequency  potassium chloride 10 mEq in 50 ml IVPB  10 mEq IntraVENous Q1H  
  amiodarone (CORDARONE) tablet 200 mg  200 mg Oral DAILY  potassium chloride (KLOR-CON) packet for solution 40 mEq  40 mEq Oral NOW  famotidine (PEPCID) tablet 20 mg  20 mg Oral BID  levothyroxine (SYNTHROID) tablet 50 mcg  50 mcg Oral 6am  
 apixaban (ELIQUIS) tablet 2.5 mg  2.5 mg Oral BID  hydrALAZINE (APRESOLINE) 20 mg/mL injection 10 mg  10 mg IntraVENous Q4H PRN  
 hydrALAZINE (APRESOLINE) tablet 25 mg  25 mg Oral TID  aspirin delayed-release tablet 81 mg  81 mg Oral DAILY  rosuvastatin (CRESTOR) tablet 20 mg  20 mg Oral QHS  carbidopa-levodopa (SINEMET)  mg per tablet 1 Tab  1 Tab Oral TID  insulin lispro (HUMALOG) injection   SubCUTAneous AC&HS  acetaminophen (TYLENOL) suppository 650 mg  650 mg Rectal Q4H PRN  
 sodium chloride (NS) flush 5-40 mL  5-40 mL IntraVENous Q8H  
 sodium chloride (NS) flush 5-40 mL  5-40 mL IntraVENous PRN  
 ondansetron (ZOFRAN) injection 4 mg  4 mg IntraVENous Q6H PRN  
 naloxone (NARCAN) injection 0.4 mg  0.4 mg IntraVENous PRN  
 sodium chloride (NS) flush 5-40 mL  5-40 mL IntraVENous Q8H  
 glucose chewable tablet 16 g  4 Tab Oral PRN  
 dextrose (D50W) injection syrg 12.5-25 g  25-50 mL IntraVENous PRN  
 glucagon (GLUCAGEN) injection 1 mg  1 mg IntraMUSCular PRN  
 sodium chloride (NS) flush 5-40 mL  5-40 mL IntraVENous Q8H  
 sodium chloride (NS) flush 5-40 mL  5-40 mL IntraVENous PRN  
 LORazepam (ATIVAN) injection 1 mg  1 mg IntraVENous Q2H PRN  
 
______________________________________________________________________ EXPECTED LENGTH OF STAY: 1d 7h 
ACTUAL LENGTH OF STAY:          6 Collette Murillo MD

## 2019-01-14 NOTE — PROGRESS NOTES
Problem: Mobility Impaired (Adult and Pediatric) Goal: *Acute Goals and Plan of Care (Insert Text) Physical Therapy Goals Initiated 1/10/2019 1. Patient will move from supine to sit and sit to supine  in bed with maximal assistance within 7 day(s). 2.  Patient will transfer from bed to chair and chair to bed with maximal assistance using the least restrictive device within 7 day(s). 3.  Patient will perform sit to stand with maximal assistance within 7 day(s). 4.  Patient will ambulate with maximal assistance for 5 feet with the least restrictive device within 7 day(s). 6.  Patient will improve Tavarez Balance score by 7 points within 7 days. physical Therapy TREATMENT Patient: Laz Tapia (28 y.o. female) Date: 1/14/2019 Diagnosis: CVA (cerebral vascular accident) [facility]) [I63.9] Cerebrovascular accident (CVA) due to embolism ([facility] Precautions: Fall Chart, physical therapy assessment, plan of care and goals were reviewed. ASSESSMENT: 
Patient received supine in bed with daughter present in room. Pt tolerated session fairly. Pt following commands, but minimal verbalizations during session and moaning, but unable to verbalize what was wrong. Pt found to have new left basal ganglia stroke and increased rt sided infarcts on 1/12. Pt with generalized weakness throughout, significant LUE weakness. Pt completed supine to sit with max assist x 2. Pt with poor seated balance requiring mod-max assist to maintain balance. Upon initial sitting, pt with fair head control and needed max verbal cues to maintain upright head position. Pt leaning back onto therapist and trying to lay back down. Pt returned to supine position and repositioned with max assist. Pt will continue to benefit from PT to progress mobility as tolerated. At this time, pt is most appropriate for SNF placement, however will continue to assess for improvement in mobility and tolerance for appropriateness for inpatient rehab. Progression toward goals: 
[]    Improving appropriately and progressing toward goals [x]    Improving slowly and progressing toward goals 
[]    Not making progress toward goals and plan of care will be adjusted PLAN: 
Patient continues to benefit from skilled intervention to address the above impairments. Continue treatment per established plan of care. Discharge Recommendations:  Ramón bradley will continue to assess for improvement in mobility and tolerance for appropriateness for inpatient rehab. Further Equipment Recommendations for Discharge:  tbd SUBJECTIVE:  
Patient stated I'm ok.  OBJECTIVE DATA SUMMARY:  
Critical Behavior: 
Neurologic State: Alert, Confused Orientation Level: Oriented to person, Oriented to situation, Oriented to time, Disoriented to place, Other (Comment)(oriented to hospital, not which one) Cognition: Follows commands Safety/Judgement: Not assessed Functional Mobility Training: 
Bed Mobility: 
Rolling: Maximum assistance Supine to Sit: Maximum assistance;Assist x2 Sit to Supine: Maximum assistance;Assist x2 Scooting: Maximum assistance Transfers: 
Balance: 
Sitting: Impaired Sitting - Static: Poor (constant support) Sitting - Dynamic: Poor (constant support)Ambulation/Gait Training: 
  
  
 
Therapeutic Exercises:  
 
Pain: 
Pain Scale 1: Numeric (0 - 10) Pain Intensity 1: 0 Activity Tolerance:  
Fair. VSS Please refer to the flowsheet for vital signs taken during this treatment. After treatment:  
[]    Patient left in no apparent distress sitting up in chair 
[x]    Patient left in no apparent distress in bed 
[x]    Call bell left within reach [x]    Nursing notified 
[x]    Caregiver present [x]    Bed alarm activated COMMUNICATION/COLLABORATION:  
The patients plan of care was discussed with: Occupational Therapist and Registered Nurse Raj Gavin, PT, DPT Time Calculation: 26 mins

## 2019-01-14 NOTE — PROGRESS NOTES
Problem: Pressure Injury - Risk of 
Goal: *Prevention of pressure injury Document Clint Scale and appropriate interventions in the flowsheet. Outcome: Progressing Towards Goal 
Pressure Injury Interventions: 
Sensory Interventions: Assess changes in LOC, Avoid rigorous massage over bony prominences, Check visual cues for pain, Discuss PT/OT consult with provider, Float heels, Keep linens dry and wrinkle-free, Minimize linen layers, Monitor skin under medical devices, Pad between skin to skin, Pressure redistribution bed/mattress (bed type), Turn and reposition approx. every two hours (pillows and wedges if needed) Moisture Interventions: Apply protective barrier, creams and emollients, Check for incontinence Q2 hours and as needed, Internal/External urinary devices, Maintain skin hydration (lotion/cream), Minimize layers, Moisture barrier Activity Interventions: Increase time out of bed, Pressure redistribution bed/mattress(bed type), PT/OT evaluation Mobility Interventions: Float heels, Pressure redistribution bed/mattress (bed type), PT/OT evaluation, Turn and reposition approx. every two hours(pillow and wedges) Nutrition Interventions: Document food/fluid/supplement intake, Offer support with meals,snacks and hydration Friction and Shear Interventions: Apply protective barrier, creams and emollients, Feet elevated on foot rest, Lift sheet, Lift team/patient mobility team, Minimize layers, Transfer aides:transfer board/Liz lift/ceiling lift

## 2019-01-14 NOTE — PROGRESS NOTES
Problem: Dysphagia (Adult) Goal: *Acute Goals and Plan of Care (Insert Text) Speech therapy goals Initiated 1/10/2019 1. Patient will tolerate puree/thin liquid diet without s/s of aspiration within 7 days 2. Patient will tolerate solid trials with timely and complete mastication and no s/s of aspiration to determine safety for diet upgrade within 7 days Speech language pathology dysphagia treatment Patient: Haja Molina (11 y.o. female) Date: 1/14/2019 Diagnosis: CVA (cerebral vascular accident) [facility]) [I63.9] Cerebrovascular accident (CVA) due to embolism ([facility] [address] Precautions: swalow Fall ASSESSMENT: 
Patient with increased weakness in LLE and new RLE weakness over weekend. MRI showed, \"1. Redemonstrated multifocal acute right MCA territory infarcts, predominantly involving the right parietal lobe. The large majority of these infarcts are unchanged since 1/9/2019, with few new or mildly enlarging small acute infarcts within the right MCA territory, predominantly within the right frontal deep 
white matter. No significant mass effect. 2. New small acute infarct in the left lateral thalamus. Other acute infarcts as detailed above, including in the right cerebellum and right occipital lobe, are unchanged. 3. Unchanged left occipital intraparenchymal hematoma. 4. Unchanged generalized parenchymal volume loss and chronic microvascular ischemic disease. \" Over the weekend, patient had increased difficulty swallowing, and SLP was asked to re-evaluate her this morning. Patient with continued moderate oral and mild pharyngeal dysphagia characterized by slow oral prep, mild oral holding, delayed posterior propulsion, and delayed swallow initiation. No overt s/s aspiration observed, even with thin liquids via straw. Patient is safe to resume puree/thin liquid diet. Progression toward goals: 
[x]         Improving appropriately and progressing toward goals []         Improving slowly and progressing toward goals 
[]         Not making progress toward goals and plan of care will be adjusted PLAN: 
Recommendations and Planned Interventions: 
--Puree/thin liquid diet 
--Straws ok 
--Meds crushed --At least distant supervision with PO intake to ensure tolerance 
--SLP to follow for diet tolerance and liberalization Patient continues to benefit from skilled intervention to address the above impairments. Continue treatment per established plan of care. Discharge Recommendations: rehab SUBJECTIVE:  
Patient stated It's not cold.  re: Ensure. Patient alert, cooperative. Oriented to person, hospital (not name of hospital), situation, month, and year. OBJECTIVE:  
Cognitive and Communication Status: 
Neurologic State: Alert, Confused Orientation Level: Oriented to person, Oriented to situation, Oriented to time, Disoriented to place, Other (Comment)(oriented to hospital, not which one) Cognition: Follows commands Perception: Cues to attend to left side of body Perseveration: No perseveration noted Safety/Judgement: Not assessed Dysphagia Treatment: 
Oral Assessment: 
Oral Assessment Labial: Left droop(severe) Dentition: Edentulous(dentures in room) Oral Hygiene: moist oral mucosa P.O. Trials: 
Patient Position: upright in bed Vocal quality prior to P.O.: No impairment Consistency Presented: Ice chips; Thin liquid;Puree How Presented: SLP-fed/presented;Spoon;Self-fed/presented;Cup/sip;Straw Bolus Acceptance: No impairment Bolus Formation/Control: Impaired Type of Impairment: Delayed Propulsion: Delayed (# of seconds) Oral Residue: None Initiation of Swallow: Delayed (# of seconds) Laryngeal Elevation: Functional 
Aspiration Signs/Symptoms: None Pharyngeal Phase Characteristics: No impairment, issues, or problems Oral Phase Severity: Moderate Pharyngeal Phase Severity : Mild Pain: 
Pain Scale 1: Numeric (0 - 10) Pain Intensity 1: 0 After treatment:  
[]              Patient left in no apparent distress sitting up in chair 
[x]              Patient left in no apparent distress in bed 
[x]              Call bell left within reach [x]              Nursing notified 
[x]              Caregiver present 
[]              Bed alarm activated COMMUNICATION/EDUCATION:  
Patient was educated regarding Her deficit(s) of dysphagia as this relates to Her diagnosis of CVA. She demonstrated Good understanding as evidenced by verbalizing understanding. The patients plan of care including recommendations, planned interventions, and recommended diet changes were discussed with: Registered Nurse. CRYSTAL Rodriguez Time Calculation: 18 mins

## 2019-01-14 NOTE — PALLIATIVE CARE
Ms. Kiya Gaspar is an 80year old woman with a history significant for HTN, DM2, hypothyroid, anxiety, arthritis and Parkinson's Disease. She was admitted on 1/8 with AMS, R sided gaze deviation, L sided weakness, slurred speech and facial droop. CT revealed R MCA with M2 occlusion in setting of new onset paroxysmal Afib s/p TPA and embolectomy Patient with new small intraparenchymal ICH 1/9; stable L occipital ICH; normal CINDY with no clots 1/11; new onset RLE and LLE weakness 1/12; new infarct in L basal ganglia 1/13. Palliative team was consulted to assist with code status given continued infarct, and hemorrhage, no great options for treatment. Patient has no AMD on file.   Two chinelaren are listed in demographics: Yony Torres ([phone]) and Leo Wu ([phone])FAJQ plan to follow-up for meeting Tuesday 1/15.

## 2019-01-14 NOTE — PROGRESS NOTES
Problem: Self Care Deficits Care Plan (Adult) Goal: *Acute Goals and Plan of Care (Insert Text) Initiated 1/10/2019, continued 1/14/2019 1. Patient will perform grooming with minimal assistance within 7 day(s). 2.  Patient will perform self-feeding (if appropriate for PO) with minimal assistance within 7 day(s). 3.  Patient will perform lower body dressing with moderate assistance  within 7 day(s). 4.  Patient will perform toilet transfers with moderate assistance  within 7 day(s). 5.  Patient will perform all aspects of toileting with moderate assistance  within 7 day(s). 6.  Patient will participate in upper extremity therapeutic exercise/activities with moderate assistance  for 10 minutes within 7 day(s). 7.  Patient will utilize energy conservation techniques during functional activities with verbal cues within 7 day(s). 8.  Patient will improve their Fugl Wiseman score by 5 points in prep for ADLs within 7 days. Occupational Therapy ReEVALUATION Patient: Torin Mehta (62 y.o. female) Date: 1/14/2019 Diagnosis: CVA (cerebral vascular accident) [facility]) [I63.9] Cerebrovascular accident (CVA) due to embolism ([facility] [address] Precautions: Fall ASSESSMENT : 
Based on the objective data described below, the patient presents with L inattention, R gaze preference, decreased alertness, impaired sitting balance, and impaired activity tolerance s/p admission for R MCA CVA. OT Re-eval completed due to acute RLE weakness beginning 1/12/19 and confirmed L basal ganglia infarct. Patient received supine in bed, followed commands but produced minimal verbalizations, moaning throughout session. Patient scored 51/66 on [facility] with RUE, indicating mild motor impairment. With LUE, patient demonstrates improving AROM, now demonstrates slight composite hand flexion and partial elbow flexion against gravity (MMT 3-), performed no wrist or shoulder AROM.   Patient performed supine-sit with max-A x2 and required constant support to maintain sitting balance, max-A for supporting head/ neck L lateral and for correcting posterior lean. Patient with poor tolerance to sitting EOB, and required max-A x2 to return to supine. Patient and daughter receptive to [facility] education on sign/ symptoms of CVA. Patient reports PTA, fully independent, using RW, living alone in single story apartment, with some help for IADLs, and some falls (although unable to clarify/ provide additional information). Patient is significantly below functional baseline. Recommend SNF level rehab at this time due to limited activity tolerance (vs inpatient rehab pending progress). Patient will benefit from skilled intervention to address the above impairments. Patients rehabilitation potential is considered to be Fair Factors which may influence rehabilitation potential include:  
[]                None noted [x]                Mental ability/status [x]                Medical condition []                Home/family situation and support systems []                Safety awareness []                Pain tolerance/management 
[]                Other: PLAN : 
Recommendations and Planned Interventions: 
[x]                  Self Care Training                  [x]           Therapeutic Activities [x]                  Functional Mobility Training    [x]           Cognitive Retraining 
[x]                  Therapeutic Exercises           [x]           Endurance Activities [x]                  Balance Training                   [x]           Neuromuscular Re-Education [x]                  Visual/Perceptual Training     [x]      Home Safety Training 
[x]                  Patient Education                 [x]           Family Training/Education []                  Other (comment): Frequency/Duration: Patient will be followed by occupational therapy 5 times a week to address goals. Discharge Recommendations: Ramón Sweeney Further Equipment Recommendations for Discharge:TBD SUBJECTIVE:  
Patient stated I'm tired.  OBJECTIVE DATA SUMMARY:  
Hospital course since last seen and reason for reevaluation: RLE weakness/ new L basal ganglia CVACognitive/Behavioral Status: 
Neurologic State: Alert;Confused Orientation Level: Oriented to person;Oriented to situation;Oriented to time;Disoriented to place; Other (Comment)(oriented to hospital, not which one) Cognition: Follows commands Perception: Cues to attend to left side of body Perseveration: No perseveration noted Safety/Judgement: Not assessedSkin: visible skin appears intact Edema: none noted Vision/Perceptual:   
Tracking: Unable to track left to  midline; Unable to track right of midline Visual Fields: (able to detect stimuli in all quadrants) Acuity: Within Defined Limits;Able to read clock/calendar on wall without difficulty; Able to read employee name badge without difficulty Range of Motion: 
 
AROM: (RUE generally decreased, LUE grossly decraesed) Strength: 
 
Strength: (RUE generally decreased, LUE grossly decreased) Coordination: 
Coordination: Grossly decreased, non-functional 
Fine Motor Skills-Upper: Left Impaired;Right Impaired Gross Motor Skills-Upper: Left Impaired;Right Impaired Tone & Sensation: 
 
Tone: Abnormal 
Sensation: Impaired(reports decreased LUE) Balance: 
Sitting: Impaired Sitting - Static: Poor (constant support) Sitting - Dynamic: Poor (constant support) Functional Mobility and Transfers for ADLs:Bed Mobility: 
Rolling: Maximum assistance Supine to Sit: Maximum assistance;Assist x2 Sit to Supine: Maximum assistance;Assist x2 Scooting: Maximum assistance ADL Assessment: 
Feeding: Total assistance Oral Facial Hygiene/Grooming: Total assistance Bathing: Total assistance Upper Body Dressing: Total assistance Lower Body Dressing: Total assistance Toileting: Total assistance ADL Intervention: 
  
 
   
 
Cognitive Retraining Safety/Judgement: Not assessed Functional Measure:RUE Fugl-Wiesman Assessment of Motor Recovery after Stroke: 
 
Reflex Activity Flexors/Biceps/Fingers: Can be elicited Extensors/Triceps: Can be elicited Reflex Subtotal: 4 Volitional Movement Within Synergies Shoulder Retraction: Partial 
Shoulder Elevation: Partial 
Shoulder Abduction (90 degrees): Partial 
Shoulder External Rotation: Partial 
Elbow Flexion: Full Forearm Supination: Full Shoulder Adduction/Internal Rotation: Full Elbow Extension: Partial 
Forearm Pronation: Full Subtotal: 13 Volitional Movement Mixing Synergies Hand to Lumbar Spine: Partial 
Shoulder Flexion (0-90 degrees): Full Pronation-Supination: Full Subtotal: 5 Volitional Movement With Little or No Synergy Shoulder Abduction (0-90 degrees): Partial 
Shoulder Flexion ( degrees): Full Pronation/Supination: Full Subtotal : 5 Normal Reflex Activity Biceps, Triceps, Finger Flexors: Full Subtotal : 2 Upper Extremity Total  
Upper Extremity Total: 29 Wrist 
Stability at 15 Degree Dorsiflexion: Full Repeated Dorsiflexion/ Volar Flexion: Full Stability at 15 Degree Dorsiflexion: Full Repeated Dorsiflexion/ Volar Flexion: Full Circumduction: Full Wrist Total: 10 Hand Mass Flexion: Full Mass Extension: Full Grasp A: Partial 
Grasp B: Partial 
Grasp C: Partial 
Grasp D: Partial 
Grasp E: Partial 
Hand Total: 9 Coordination/Speed Tremor: Slight Dysmetria: Slight Time: 2-5s Coordination/Speed Total : 3 Total A-D Total A-D (Motor Function): 52/15 This is a reliable/valid measure of arm function after a neurological event.  It has established value to characterize functional status and for measuring spontaneous and therapy-induced recovery; tests proximal and distal motor functions. Fugl-Wiseman Assessment  UE scores recorded between five and 30 days post neurologic event can be used to predict UE recovery at six months post neurologic event. Severe = 0-21 points Moderately Severe = 22-33 points Moderate = 34-47 points Mild = 48-66 points KILLIAN Salguero, MATEO Gallardo, & ALYSSA Burnette (1992). Measurement of motor recovery after stroke: Outcome assessment and sample size requirements. Stroke, 23, pp. 0509-3130.  
------------------------------------------------------------------------------------------------------------------------------------------------------------------MCID: 
Stroke: Grecia Walton et al, 2001; n = 171; mean age 79 (6) years; assessed within 16 (12) days of stroke, Acute Stroke) FMA Motor Scores from Admission to Discharge 10 point increase in FMA Upper Extremity = 1.5 change in discharge FIM  
10 point increase in FMA Lower Extremity = 1.9 change in discharge FIM 
MDC:  
Stroke:  
Alvina Fowler et al, 2008, n = 14, mean age = 59.9 (14.6) years, assessed on average 14 (6.5) months post stroke, Chronic Stroke) FMA = 5.2 points for the Upper Extremity portion of the assessment Pain: 
Pain Scale 1: Numeric (0 - 10) Pain Intensity 1: 0 Activity Tolerance:  
During activity: /78, HR 89, SPO2 99% on RA After treatment:  
[] Patient left in no apparent distress sitting up in chair 
[x] Patient left in no apparent distress in bed 
[x] Call bell left within reach [x] Nursing notified 
[x] Caregiver present 
[] Bed alarm activated COMMUNICATION/EDUCATION:  
The patients plan of care was discussed with: Physical Therapist and Registered Nurse. Patient was educated regarding Her deficit as this relates to Her diagnosis of CVA. She demonstrated Fair understanding as evidenced by verbalization.  
 
Patient and/or family was verbally educated on the BE FAST acronym for signs/symptoms of CVA and TIA. Provided with BEFAST handout. All questions answered with patient indicating fair understanding. [x]      Home safety education was provided and the patient/caregiver indicated understanding. [x]      Patient/family have participated as able and agree with findings and recommendations. []      Patient is unable to participate in plan of care at this time. This patients plan of care is appropriate for delegation to [facility]. Thank you for this referral. 
Nas Givens OT Time Calculation: 29 mins

## 2019-01-14 NOTE — ROUTINE PROCESS
Bedside shift change report given to [address] (oncoming nurse) by Koby Sethi RN (offgoing nurse). Report included the following information SBAR, Kardex, ED Summary, OR Summary, Procedure Summary, Intake/Output, MAR, Accordion, Recent Results and Cardiac Rhythm NSR BBB.

## 2019-01-14 NOTE — PROGRESS NOTES
Problem: Pressure Injury - Risk of 
Goal: *Prevention of pressure injury Document Clint Scale and appropriate interventions in the flowsheet. Outcome: Progressing Towards Goal 
Pressure Injury Interventions: 
Sensory Interventions: Assess changes in LOC, Assess need for specialty bed, Discuss PT/OT consult with provider, Float heels, Keep linens dry and wrinkle-free, Maintain/enhance activity level, Minimize linen layers, Pressure redistribution bed/mattress (bed type), Turn and reposition approx. every two hours (pillows and wedges if needed) Moisture Interventions: Absorbent underpads, Apply protective barrier, creams and emollients, Check for incontinence Q2 hours and as needed, Internal/External urinary devices, Limit adult briefs, Maintain skin hydration (lotion/cream), Minimize layers, Moisture barrier Activity Interventions: Increase time out of bed, Pressure redistribution bed/mattress(bed type), PT/OT evaluation Mobility Interventions: Float heels, Pressure redistribution bed/mattress (bed type), PT/OT evaluation, Turn and reposition approx. every two hours(pillow and wedges) Nutrition Interventions: Document food/fluid/supplement intake Friction and Shear Interventions: Feet elevated on foot rest, Lift sheet, Lift team/patient mobility team, Minimize layers Problem: Falls - Risk of 
Goal: *Absence of Falls Document Gonsalo Grimm Fall Risk and appropriate interventions in the flowsheet. Outcome: Progressing Towards Goal 
Fall Risk Interventions: 
Mobility Interventions: Communicate number of staff needed for ambulation/transfer, OT consult for ADLs, Patient to call before getting OOB, PT Consult for mobility concerns, PT Consult for assist device competence, Utilize walker, cane, or other assistive device Mentation Interventions: Adequate sleep, hydration, pain control, Bed/chair exit alarm, Door open when patient unattended, Evaluate medications/consider consulting pharmacy, Eyeglasses and hearing aids, Family/sitter at bedside, Increase mobility, More frequent rounding, Reorient patient, Room close to nurse's station, Update white board Medication Interventions: Bed/chair exit alarm, Patient to call before getting OOB, Teach patient to arise slowly Elimination Interventions: Bed/chair exit alarm, Call light in reach, Toileting schedule/hourly rounds History of Falls Interventions: Bed/chair exit alarm, Consult care management for discharge planning, Door open when patient unattended, Room close to nurse's station

## 2019-01-14 NOTE — PROGRESS NOTES
[facility] Daily Progress Note HPI: Kirill Hernandez, a 80y.o. year-old who presented for evaluation of CVA, elevated troponin. Apparently she was fine the morning of admission but in the afternoon developed facial droop and slurred speech. Given TPA and underwent embolectomy - showed organized fibrinous clot - suspicion for Atrial Fib. Unable to obtain ROS due to patient factors on admission. Update: 
Moving extremities spontaneously, answering basic questions and briefly opening eyes. Denies any chest pain or dyspnea. Improving from prior exam. No evidence of afib on tele. Bp is controlled. Low K, pauses and afib rate 150 c/w sss Assessment/Plan: 1. Acute CVA s/p TPA and embolectomy - also some evidence of hemorrhage in left occipital lobe 
-concern for cardiac source due to organized fibrinous character of clot but TTE ok and no evidence of Atrial Fib on telemetry thusfar  
-continues to shower emboli and multiple new and old infarcts 2. Syncope - none recently 3. Diabetes mellitus - management per primary team, A1C 5.5% 4. Dyslipidemia - taken off statin last year, LDL 50 
5. HTN- off IV cardene this AM, recommend slowly restarting home meds when ok with neurology and as BP allows 6. Anemia - Hgb 8.2, workup per primary team 
7. Elevated troponin - non-specific in the setting of acute CVA, no ischemic changes on ECG, TTE ok  
8. RBBB - chronic 9. Loud systolic ejection murmur - TTE ok, no valvular abnormalities 10. Hypokalemia - K 3.4, will give KCL 20meq IV, recheck in AM 
11. Hypomagnesemia - Mg 1.8, will give Mg Sulfate 2g IV, recheck in AM 
12. AFib, paroxysmal, with rate to 150, would do [facility] [place] allowable from neuro standpoint but with hemorrhage would defer to neuro 
-will give amio to help stabilize rhythm and minimize afib burden 13.  SSS- pauses of 2.2 seconds alternating with afib rvr to rate 150s, Continue tele. Need to define overall goals of care 14. Palliative input for goals of care, code status given continued infarct, and hemorrhage, no great options for treatment. Echo 1/19 - LVEF 60-65%, grade 2 dd Soc no tob no etoh FHx no early cad She  has a past medical history of Anxiety, Arthritis, Chest pain, Chronic back pain, Chronic venous insufficiency, Colon polyp, Cyst of right kidney, Depression, Diabetes ([facility] [address] Hypercholesterolemia, Hypertension, Hypothyroidism, Memory disorder, Other ill-defined conditions(799.89), Painful hip, Parkinson disease ([address] and Urinary frequency. PE 
Vitals:  
 01/13/19 2202 01/14/19 0205 01/14/19 0600 01/14/19 1000 BP: 138/64 138/66 127/66 159/61 Pulse: (!) 101 82 92 84 Resp: 18 17 15 20 Temp: 98.9 °F (37.2 °C) 99.2 °F (37.3 °C) 99.1 °F (37.3 °C) 98.9 °F (37.2 °C) SpO2: 100% 99% 98% 100% Weight:  154 lb 12.2 oz (70.2 kg) Height:      
 Body mass index is 25.75 kg/m². General appearance - follows commands, answers questions Mental status - calm Eyes - sclera anicteric, moist mucous membranes Neck - supple Lymphatics - not assessed Chest - clear to auscultation anteriorly Heart - regular rate and rhythm, distant heart sounds, 4/6 PATRICIA Abdomen - soft, nontender, nondistended Back exam - not assessed Neurological - answering questions, following commands Musculoskeletal - not assessed Extremities - peripheral pulses 1+, 1+ LE edema Skin - normal coloration  no rashes Telemetry: NSR, RBBB Recent Labs: 
Lab Results Component Value Date/Time Cholesterol, total 132 01/09/2019 05:15 AM  
 HDL Cholesterol 73 01/09/2019 05:15 AM  
 LDL, calculated 50.2 01/09/2019 05:15 AM  
 Triglyceride 44 01/09/2019 05:15 AM  
 CHOL/HDL Ratio 1.8 01/09/2019 05:15 AM  
 
Lab Results Component Value Date/Time Creatinine 0.72 01/13/2019 02:21 AM  
 
Lab Results Component Value Date/Time  BUN 19 01/13/2019 02:21 AM  
 
 Lab Results Component Value Date/Time Potassium 3.1 (L) 01/13/2019 02:21 AM  
 
Lab Results Component Value Date/Time Hemoglobin A1c 5.5 01/09/2019 05:19 AM  
 
Lab Results Component Value Date/Time HGB 7.3 (L) 01/13/2019 11:20 AM  
 
Lab Results Component Value Date/Time PLATELET 005 45/60/0072 11:20 AM  
 
 
Reviewed: 
Past Medical History:  
Diagnosis Date  Anxiety  Arthritis  Chest pain 2008 Negative stress test and Holter monitor w/Dr Devon Stovall.  Chronic back pain  Chronic venous insufficiency  Colon polyp   
 last scope normal 2007; Dr. Tana Carr  Cyst of right kidney  Depression  Diabetes ([facility] [address]  Hypercholesterolemia  Hypertension  Hypothyroidism  Memory disorder  Other ill-defined conditions(799.89)   
 heart murmur  Painful hip  Parkinson disease ([facility] [address]  Urinary frequency Sees Dr. Min Alvarez with [facility] Social History Tobacco Use Smoking Status Never Smoker Smokeless Tobacco Never Used Social History Substance and Sexual Activity Alcohol Use No  
 Alcohol/week: 0.0 oz  
 Comment: Rare Allergies Allergen Reactions  Norvasc [Amlodipine] Swelling  Flexeril [Cyclobenzaprine] Other (comments) Caused pt to fall & hallucination  Percocet [Oxycodone-Acetaminophen] Itching  Remeron [Mirtazapine] Diarrhea  Tramadol Other (comments) Caused falls Current Facility-Administered Medications Medication Dose Route Frequency  potassium chloride SR (KLOR-CON 10) tablet 40 mEq  40 mEq Oral NOW  potassium chloride 10 mEq in 50 ml IVPB  10 mEq IntraVENous Q1H  
 famotidine (PEPCID) tablet 20 mg  20 mg Oral BID  levothyroxine (SYNTHROID) tablet 50 mcg  50 mcg Oral 6am  
 apixaban (ELIQUIS) tablet 2.5 mg  2.5 mg Oral BID  hydrALAZINE (APRESOLINE) 20 mg/mL injection 10 mg  10 mg IntraVENous Q4H PRN  
 hydrALAZINE (APRESOLINE) tablet 25 mg  25 mg Oral TID  aspirin delayed-release tablet 81 mg  81 mg Oral DAILY  rosuvastatin (CRESTOR) tablet 20 mg  20 mg Oral QHS  carbidopa-levodopa (SINEMET)  mg per tablet 1 Tab  1 Tab Oral TID  insulin lispro (HUMALOG) injection   SubCUTAneous AC&HS  acetaminophen (TYLENOL) suppository 650 mg  650 mg Rectal Q4H PRN  
 sodium chloride (NS) flush 5-40 mL  5-40 mL IntraVENous Q8H  
 sodium chloride (NS) flush 5-40 mL  5-40 mL IntraVENous PRN  
 ondansetron (ZOFRAN) injection 4 mg  4 mg IntraVENous Q6H PRN  
 naloxone (NARCAN) injection 0.4 mg  0.4 mg IntraVENous PRN  
 sodium chloride (NS) flush 5-40 mL  5-40 mL IntraVENous Q8H  
 glucose chewable tablet 16 g  4 Tab Oral PRN  
 dextrose (D50W) injection syrg 12.5-25 g  25-50 mL IntraVENous PRN  
 glucagon (GLUCAGEN) injection 1 mg  1 mg IntraMUSCular PRN  
 sodium chloride (NS) flush 5-40 mL  5-40 mL IntraVENous Q8H  
 sodium chloride (NS) flush 5-40 mL  5-40 mL IntraVENous PRN  
 LORazepam (ATIVAN) injection 1 mg  1 mg IntraVENous Q2H PRN Armin Cuello MD 
[facility] [place] [address], [address]

## 2019-01-14 NOTE — PROGRESS NOTES
Problem: Falls - Risk of 
Goal: *Absence of Falls Document Rochelle Alfaro Fall Risk and appropriate interventions in the flowsheet. Outcome: Progressing Towards Goal 
Fall Risk Interventions: 
Mobility Interventions: Communicate number of staff needed for ambulation/transfer, OT consult for ADLs, Patient to call before getting OOB, PT Consult for mobility concerns, PT Consult for assist device competence, Strengthening exercises (ROM-active/passive), Utilize walker, cane, or other assistive device, Utilize gait belt for transfers/ambulation Mentation Interventions: Adequate sleep, hydration, pain control, Bed/chair exit alarm, Door open when patient unattended, Evaluate medications/consider consulting pharmacy, Gait belt with transfers/ambulation, Increase mobility, More frequent rounding, Reorient patient, Toileting rounds, Update white board Medication Interventions: Assess postural VS orthostatic hypotension, Evaluate medications/consider consulting pharmacy, Patient to call before getting OOB, Teach patient to arise slowly, Utilize gait belt for transfers/ambulation Elimination Interventions: Call light in reach, Patient to call for help with toileting needs, Toileting schedule/hourly rounds History of Falls Interventions: Consult care management for discharge planning, Door open when patient unattended, Evaluate medications/consider consulting pharmacy, Bed/chair exit alarm

## 2019-01-14 NOTE — PROGRESS NOTES
Problem: TIA/CVA Stroke: Day 2 Until Discharge Goal: Nutrition/Diet Outcome: Not Progressing Towards Goal 
Speech to re eval today. Difficulty swallowing. NPO w/o meds at this time.

## 2019-01-15 NOTE — PROGRESS NOTES
Problem: Self Care Deficits Care Plan (Adult) Goal: *Acute Goals and Plan of Care (Insert Text) Initiated 1/10/2019, continued 1/14/2019 1. Patient will perform grooming with minimal assistance within 7 day(s). 2.  Patient will perform self-feeding (if appropriate for PO) with minimal assistance within 7 day(s). 3.  Patient will perform lower body dressing with moderate assistance  within 7 day(s). 4.  Patient will perform toilet transfers with moderate assistance  within 7 day(s). 5.  Patient will perform all aspects of toileting with moderate assistance  within 7 day(s). 6.  Patient will participate in upper extremity therapeutic exercise/activities with moderate assistance  for 10 minutes within 7 day(s). 7.  Patient will utilize energy conservation techniques during functional activities with verbal cues within 7 day(s). 8.  Patient will improve their Fugl Wiseman score by 5 points in prep for ADLs within 7 days. Occupational Therapy TREATMENT: Neuro Patient: Elvia Suarez (52 y.o. female) Date: 1/15/2019 Diagnosis: CVA (cerebral vascular accident) [facility]) [I63.9] Cerebrovascular accident (CVA) due to embolism ([facility] [address] @D Dontae Schuster Precautions: Fall Chart, occupational therapy assessment, plan of care, and goals were reviewed. ASSESSMENT: 
Based on the objective data described below, the patient presents with L inattention, decreased LUE sensation, LUE weakness, impaired sitting balance, and impaired activity tolerance s/p admission for R MCA and L basal ganglia CVA. Patient received supine in bed, demonstrates increased alertness and verbalizations since session yesterday, however confused and Ox1 (to person only). Patient continues to demonstrates improving AROM with LUE, elbow flexion full AROM against gravity (MMT 3), shoulder flexion AROM improved to ~20 degrees.   Patient performed 10 reps L elbow flexion/ extension, 10 reps L AAROM shoulder flexion to 90 degrees, and 10 reps SROM bringing L hand to face using R hand. Exercises performed to promote neurological recovery and AROM/ strength required for ADLs and mobility. Patient washed face with set-up using R hand, then with both hands, supporting L with R. Also brushed hair with set-up using bedside table mirror using R hand. Patient demonstrated frequent R lateral trunk/ head lean, mirror also used for visual feedback to maintain midline positioning. Patient also applied lotion to L arm and hand using RUE, activity performed to promote attention to L and tactile/ sensory feedback. Patient demonstrates no AROM in L wrist or fingers, therefore no functional grasp. Patient reports PTA, fully independent, using RW, living alone in single story apartment, with some help for IADLs, and some falls (although unable to clarify/ provide additional information).  Patient is significantly below functional baseline. Recommend rehab at d/c (SNF vs inpatient rehab pending progress/ activity tolerance).  
  
Progression toward goals: 
[]          Improving appropriately and progressing toward goals [x]          Improving slowly and progressing toward goals 
[]          Not making progress toward goals and plan of care will be adjusted PLAN: 
Patient continues to benefit from skilled intervention to address the above impairments. Continue treatment per established plan of care. Discharge Recommendations:  Rehab Further Equipment Recommendations for Discharge: none SUBJECTIVE:  
Patient stated This [left]  arm is weak.  OBJECTIVE DATA SUMMARY:  
Cognitive/Behavioral Status: 
Neurologic State: Alert Orientation Level: Oriented to person;Disoriented to place; Disoriented to situation;Disoriented to time Cognition: Follows commands Perception: Cues to attend to left side of body;Cues to maintain midline in sitting Perseveration: No perseveration noted Functional Mobility and Transfers for ADLs: (per physical therapy) Bed Mobility: 
Rolling: Maximum assistance Supine to Sit: Maximum assistance;Assist x2 Sit to Supine: Maximum assistance;Assist x2; Additional time Scooting: Maximum assistance Transfers: 
Sit to Stand: Maximum assistance;Assist x2 Balance: 
Sitting: Impaired Sitting - Static: Poor (constant support) Sitting - Dynamic: Poor (constant support) Standing: Impaired Standing - Static: Poor Standing - Dynamic : Poor ADL Intervention: 
  
 
Grooming Washing Face: Supervision/set-up(supine with HOB raised, using RUE/ BUE with R supporting L) Brushing/Combing Hair: Supervision/set-up(supine with HOB raised, using bedside table mirror) Neuro Re-Education: 
  
  Patient continues to demonstrates improving AROM with LUE, elbow flexion full AROM against gravity (MMT 3), shoulder flexion AROM improved to ~20 degrees. Patient performed 10 reps L elbow flexion/ extension, 10 reps L AAROM shoulder flexion to 90 degrees, and 10 reps SROM bringing L hand to face using R hand. Exercises performed to promote neurological recovery and AROM/ strength required for ADLs and mobility. Pain: 
Pain Scale 1: Numeric (0 - 10) Pain Intensity 1: 0 Activity Tolerance: VSS After treatment:  
[] Patient left in no apparent distress sitting up in chair 
[x] Patient left in no apparent distress in bed 
[x] Call bell left within reach [x] Nursing notified 
[x] Caregiver present 
[] Bed alarm activated COMMUNICATION/EDUCATION:  
The patients plan of care was discussed with: Physical Therapist and Registered Nurse. [x]    Home safety education was provided and the patient/caregiver indicated understanding. [x]    Patient/family have participated as able in goal setting and plan of care. [x]    Patient/family agree to work toward stated goals and plan of care. []    Patient understands intent and goals of therapy, but is neutral about his/her participation. []    Patient is unable to participate in goal setting and plan of care. This patients plan of care is appropriate for delegation to [facility]. Thank you for this referral. 
Elena Zamudio OT Time Calculation: 27 mins

## 2019-01-15 NOTE — PALLIATIVE CARE
Palliative Medicine Social Work Ms. Jazlyn Soliman is an 80year old woman with a history significant for HTN, DM2, hypothyroid, anxiety, arthritis and Parkinson's Disease. She was admitted on 1/8 with AMS, R sided gaze deviation, L sided weakness, slurred speech and facial droop. CT revealed R MCA with M2 occlusion in setting of new onset paroxysmal Afib s/p TPA and embolectomy Patient with new small intraparenchymal ICH 1/9; stable L occipital ICH; normal CINDY with no clots 1/11; new onset RLE and LLE weakness 1/12; new infarct in L basal ganglia 1/13. Palliative team was consulted to assist with code status given continued infarct, and hemorrhage, no great options for treatment.  
  
Patient has no AMD on file. Two chidlren are listed in demographics: Duke Kimball ([phone]) and Sang Wyatt ([phone]). I placed a call to Radha Chaudhari to make her aware of consult and how we might support. She informed that patient does have AMD that names three children (Miguel Hill and Chuckie Rocha). Family meeting arranged for 3:30 today with Radha Chaudhari and Isaiah Culp. Chuckie Rocha will be working. She will bring copy of AMD for chart. Thank you for the opportunity to be involved in the care of Ms. Jazlyn Soliman. Kathie Yusuf, ERICW, [facility]- Palliative Medicine  [employer]pecting Choices ® ACP Facilitator [phone]

## 2019-01-15 NOTE — PROGRESS NOTES
Hospitalist Progress Note Candis Pierre MD 
Answering service: [phone] OR [phone] from in house phone Date of Service:  1/15/2019 NAME:  Paulette Mcdaniels :  1932 MRN:  [id number] Admission Summary:  
80 y.o F with PMH of parkinson's disease,HTN,hypothyroidism,diabetes was admitted to the hospital for management of acute rt CVA s/p tpa. Interval history / Subjective:  
F/u for stroke Patient reports no new complaints. 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 
-MRI brain showed acute right MCA territory infarct,other small scattered acute supratentorial and infratentorial right-sided infarcts and  a small left occipital hemorrhage 
-neurology  following,appreciate recommendations 
-echo showed normal EF.  
-CINDY did not show any clots  
-As she had new deficits on  -RLE weakness- repeated MRI which showed new infarcts. Left occipital hemorrhage stable. -Found to have paroxysmal a fib on . 
-on  2.5 mg eliquis BID now -started on lower dose due to occipital hemorrhage. 
-discussed with patient's son and daughter at bedside about new infarcts -they are aware of benefits and risks of anticoagulation(worsening hemorrhage ). -palliative care consult 
- PT/OT consult #Paroxysmal atrial fib: 
- atrioventricular block 
-with intermittent high heart rate and sinus pauses 
- Scheduled for cardiac pace maker tomorrow. #HTN: 
-BP goal less than 140 
-will give amlodipine and prn hydralazine. #Difficulty duran placement: 
-Gyn team placed a coude catheter. 
-need to talk to GYN team prior to removal. 
 
#Chronic anemia: 
-Hb at admission around 10.3,likely hemo concentrated sample 
-trending down -7.3 today 
-will follow iron and ferritin results -no evidence of active bleeding - Hypokalemia:replete #Diabetes: 
-on glimepiride,sitagliptin-metformin 
-will continue SSI for now and monitor BS #Parkinson's disease: 
- resumed sinemet #hypothyrodism: 
-on synthroid Mild elevation of troponin:trended down. - in the setting of stroke ?demand ischemia Code status: full DVT prophylaxis: eliquis Care Plan discussed with: patient's family at bed side ,nurse and [id number] Disposition: continue inpatient care. rehab Hospital Problems  Date Reviewed: 1/8/2019 Codes Class Noted POA * (Principal) Cerebrovascular accident (CVA) due to embolism ([facility] [address] ICD-10-CM: I63.9 ICD-9-CM: 434.11  1/8/2019 Unknown Review of Systems: As per HPI Vital Signs:  
 Last 24hrs VS reviewed since prior progress note. Most recent are: 
Visit Vitals /62 (BP 1 Location: Right arm, BP Patient Position: At rest) Pulse 94 Temp 98.8 °F (37.1 °C) Resp 20 Ht 5' 5\" (1.651 m) Wt 69.3 kg (152 lb 12.5 oz) SpO2 97% BMI 25.42 kg/m² Intake/Output Summary (Last 24 hours) at 1/15/2019 1846 Last data filed at 1/15/2019 1431 Gross per 24 hour Intake 0 ml Output 650 ml Net -650 ml Physical Examination:  
 
 
     
Constitutional:  elderly patient ENT:  Oral mucous moist  
Resp:  CTA b/l. No wheezing/rhonchi/rales. CV:  Regular rhythm, normal rate, no murmurs, gallops, rubs GI:  Soft, non distended, non tender. normoactive bowel sounds Musculoskeletal:  No edema Neurologic: She is A&O X 2,RUE 5/5, RLE and LLE 3/5,LUE 3/5. Hugo Ruiz Data Review:  
 Review and/or order of clinical lab test 
Review and/or order of tests in the medicine section of [facility] Labs:  
 
Recent Labs  
  01/14/19 
1642 01/13/19 
1120 WBC 8.2 8.2 HGB 8.2* 7.3* HCT 27.5* 23.9*  
 243 Recent Labs  
  01/15/19 
0552 01/14/19 
1642 01/13/19 
0221  144 145  
K 3.0* 3.7 3.1*  
 * 111* 112* CO2 27 25 26 BUN 16 18 19 CREA 0.67 0.73 0.72 * 205* 176* CA 8.6 8.6 8.4* MG 1.8  --  1.9 No results for input(s): SGOT, GPT, ALT, AP, TBIL, TBILI, TP, ALB, GLOB, GGT, AML, LPSE in the last 72 hours. No lab exists for component: AMYP, HLPSE No results for input(s): INR, PTP, APTT in the last 72 hours. No lab exists for component: INREXT, INREXT Recent Labs  
  01/14/19 
1642 TIBC 179* PSAT 14* FERR 634* Lab Results Component Value Date/Time Folate 11.9 06/16/2018 12:45 AM  
  
No results for input(s): PH, PCO2, PO2 in the last 72 hours. No results for input(s): CPK, CKNDX, TROIQ in the last 72 hours. No lab exists for component: CPKMB Lab Results Component Value Date/Time Cholesterol, total 132 01/09/2019 05:15 AM  
 HDL Cholesterol 73 01/09/2019 05:15 AM  
 LDL, calculated 50.2 01/09/2019 05:15 AM  
 Triglyceride 44 01/09/2019 05:15 AM  
 CHOL/HDL Ratio 1.8 01/09/2019 05:15 AM  
 
Lab Results Component Value Date/Time Glucose (POC) 122 (H) 01/15/2019 04:41 PM  
 Glucose (POC) 152 (H) 01/15/2019 11:20 AM  
 Glucose (POC) 145 (H) 01/15/2019 07:34 AM  
 Glucose (POC) 134 (H) 01/14/2019 10:10 PM  
 Glucose (POC) 188 (H) 01/14/2019 04:09 PM  
 
Lab Results Component Value Date/Time Color YELLOW/STRAW 06/16/2018 01:00 PM  
 Appearance CLEAR 06/16/2018 01:00 PM  
 Specific gravity 1.008 06/16/2018 01:00 PM  
 pH (UA) 7.5 06/16/2018 01:00 PM  
 Protein 30 (A) 06/16/2018 01:00 PM  
 Glucose NEGATIVE  06/16/2018 01:00 PM  
 Ketone NEGATIVE  06/16/2018 01:00 PM  
 Bilirubin NEGATIVE  06/16/2018 01:00 PM  
 Urobilinogen 1.0 06/16/2018 01:00 PM  
 Nitrites NEGATIVE  06/16/2018 01:00 PM  
 Leukocyte Esterase LARGE (A) 06/16/2018 01:00 PM  
 Epithelial cells MODERATE (A) 06/16/2018 01:00 PM  
 Bacteria NEGATIVE  06/16/2018 01:00 PM  
 WBC 20-50 06/16/2018 01:00 PM  
 RBC 0-5 06/16/2018 01:00 PM  
 
 
 
Medications Reviewed: Current Facility-Administered Medications Medication Dose Route Frequency  amiodarone (CORDARONE) tablet 200 mg  200 mg Oral DAILY  famotidine (PEPCID) tablet 20 mg  20 mg Oral BID  levothyroxine (SYNTHROID) tablet 50 mcg  50 mcg Oral 6am  
 apixaban (ELIQUIS) tablet 2.5 mg  2.5 mg Oral BID  hydrALAZINE (APRESOLINE) 20 mg/mL injection 10 mg  10 mg IntraVENous Q4H PRN  
 hydrALAZINE (APRESOLINE) tablet 25 mg  25 mg Oral TID  aspirin delayed-release tablet 81 mg  81 mg Oral DAILY  rosuvastatin (CRESTOR) tablet 20 mg  20 mg Oral QHS  carbidopa-levodopa (SINEMET)  mg per tablet 1 Tab  1 Tab Oral TID  insulin lispro (HUMALOG) injection   SubCUTAneous AC&HS  acetaminophen (TYLENOL) suppository 650 mg  650 mg Rectal Q4H PRN  
 sodium chloride (NS) flush 5-40 mL  5-40 mL IntraVENous Q8H  
 sodium chloride (NS) flush 5-40 mL  5-40 mL IntraVENous PRN  
 ondansetron (ZOFRAN) injection 4 mg  4 mg IntraVENous Q6H PRN  
 naloxone (NARCAN) injection 0.4 mg  0.4 mg IntraVENous PRN  
 sodium chloride (NS) flush 5-40 mL  5-40 mL IntraVENous Q8H  
 glucose chewable tablet 16 g  4 Tab Oral PRN  
 dextrose (D50W) injection syrg 12.5-25 g  25-50 mL IntraVENous PRN  
 glucagon (GLUCAGEN) injection 1 mg  1 mg IntraMUSCular PRN  
 sodium chloride (NS) flush 5-40 mL  5-40 mL IntraVENous Q8H  
 sodium chloride (NS) flush 5-40 mL  5-40 mL IntraVENous PRN  
 LORazepam (ATIVAN) injection 1 mg  1 mg IntraVENous Q2H PRN  
 
______________________________________________________________________ EXPECTED LENGTH OF STAY: 1d 7h 
ACTUAL LENGTH OF STAY:          7 Chinyere Sotelo MD

## 2019-01-15 NOTE — PROGRESS NOTES
Problem: Dysphagia (Adult) Goal: *Acute Goals and Plan of Care (Insert Text) Speech therapy goals Initiated 1/10/2019 1. Patient will tolerate puree/thin liquid diet without s/s of aspiration within 7 days 2. Patient will tolerate solid trials with timely and complete mastication and no s/s of aspiration to determine safety for diet upgrade within 7 days Speech language pathology dysphagia treatment Patient: Corrinne Lemma (69 y.o. female) Date: 1/15/2019 Diagnosis: CVA (cerebral vascular accident) [facility]) [I63.9] Cerebrovascular accident (CVA) due to embolism ([facility] [address] Precautions:   Fall ASSESSMENT: 
Patient refusing purees during today's session, but agreeable to sips of thin liquid via straw. NO overt s/s of aspiration noted and no anterior loss of bolus when drinking via straw. From discussion with RN, patient continues to require supervision with PO due to pocketing of purees. She is able to clear this with assistance. Difficulty with pills that are unable to be crushed. Not ready for diet advancement at this time. Progression toward goals: 
[]         Improving appropriately and progressing toward goals [x]         Improving slowly and progressing toward goals 
[]         Not making progress toward goals and plan of care will be adjusted PLAN: 
Recommendations and Planned Interventions: 
Continue puree/thins Upright for all PO At least distant supervision for diet tolerance, cues to clear pocketing. Patient continues to benefit from skilled intervention to address the above impairments. Continue treatment per established plan of care. Discharge Recommendations: To Be Determined SUBJECTIVE:  
Patient stated No I'm just not really hungry. OBJECTIVE:  
Cognitive and Communication Status: 
Neurologic State: Alert Orientation Level: Disoriented to situation, Disoriented to time, Oriented to person, Oriented to place Cognition: Follows commands Perception: Cues to attend to left side of body Perseveration: No perseveration noted Safety/Judgement: Not assessed Dysphagia Treatment: 
Oral Assessment: 
Oral Assessment Labial: Left droop Dentition: Edentulous Oral Hygiene: moist 
Lingual: No impairment Velum: Unable to visualize Mandible: No impairment P.O. Trials: 
Patient Position: upright in bed Vocal quality prior to P.O.: No impairment Consistency Presented: Thin liquid How Presented: SLP-fed/presented;Straw Bolus Acceptance: No impairment Bolus Formation/Control: (no impairment for thins) Initiation of Swallow: Delayed (# of seconds) Laryngeal Elevation: Functional 
Aspiration Signs/Symptoms: None Pharyngeal Phase Characteristics: No impairment, issues, or problems Oral Phase Severity: Moderate Pharyngeal Phase Severity : Mild Pain: 
Pain Scale 1: Numeric (0 - 10) Pain Intensity 1: 0 After treatment:  
[]              Patient left in no apparent distress sitting up in chair 
[x]              Patient left in no apparent distress in bed 
[x]              Call bell left within reach 
[]              Nursing notified 
[x]              Caregiver present 
[]              Bed alarm activated COMMUNICATION/EDUCATION:  
Patient was educated regarding purpose of SLP visit. She agreed to participate. The patients plan of care including recommendations, planned interventions, and recommended diet changes were discussed with: Registered Nurse. []              Posted safety precautions in patient's room. CRYSTAL Cartagena Time Calculation: 15 mins

## 2019-01-15 NOTE — PROGRESS NOTES
Bedside and Verbal shift change report given to ABDULAZIZ Cadet (oncoming nurse) by Raj Craft RN (offgoing nurse). Report included the following information SBAR, Kardex, Procedure Summary, Intake/Output, MAR, Recent Results and Cardiac Rhythm NSR.

## 2019-01-15 NOTE — PROGRESS NOTES
Problem: Mobility Impaired (Adult and Pediatric) Goal: *Acute Goals and Plan of Care (Insert Text) Physical Therapy Goals Initiated 1/10/2019 1. Patient will move from supine to sit and sit to supine  in bed with maximal assistance within 7 day(s). 2.  Patient will transfer from bed to chair and chair to bed with maximal assistance using the least restrictive device within 7 day(s). 3.  Patient will perform sit to stand with maximal assistance within 7 day(s). 4.  Patient will ambulate with maximal assistance for 5 feet with the least restrictive device within 7 day(s). 6.  Patient will improve Tavarez Balance score by 7 points within 7 days. physical Therapy TREATMENT Patient: Jensen Lara (73 y.o. female) Date: 1/15/2019 Diagnosis: CVA (cerebral vascular accident) [facility]) [I63.9] Cerebrovascular accident (CVA) due to embolism ([facility] [address] Precautions: Fall Chart, physical therapy assessment, plan of care and goals were reviewed. ASSESSMENT: 
Patient motivated for progress and making small gains in activity tolerance. Facilitated transfer to EOB and to work on unsupported sitting balance. Noted left LOB that improved with LUE prop sitting with support on pillow. This improved balance in the lateral plane but she remained challenged with anterior/posterior balance. Cued for scapular retraction and eyes/ears on the level with brief improvement noted prior to fatigue. As she fatigued, she progressed to pushing left with her RUE. Attempted standing from EOB with maximum assist x2 required to clear her buttocks where she achieved full knee extension but with a left lateral weight shift and flexion at the hips which could not be corrected. Returned to supine and notified nsg after the patient had a BM while attempting to stand. This patient is significantly below her baseline and requires high levels of assist affecting all mobility and self-care.  Recommend discharge to a rehab setting when medically stable. Progression toward goals: 
[]    Improving appropriately and progressing toward goals [x]    Improving slowly and progressing toward goals 
[]    Not making progress toward goals and plan of care will be adjusted PLAN: 
Patient continues to benefit from skilled intervention to address the above impairments. Continue treatment per established plan of care. Discharge Recommendations:  Rehab and Inpatient Rehab Further Equipment Recommendations for Discharge:  to be determined SUBJECTIVE:  
Patient stated That wasn't too bad.  OBJECTIVE DATA SUMMARY:  
Critical Behavior: 
Neurologic State: Alert Orientation Level: Disoriented to situation, Disoriented to time, Oriented to person, Oriented to place Cognition: Follows commands Safety/Judgement: Not assessed Functional Mobility Training: 
Bed Mobility: 
Rolling: Maximum assistance Supine to Sit: Maximum assistance;Assist x2 Sit to Supine: Maximum assistance;Assist x2; Additional time Scooting: Maximum assistance Transfers: 
Sit to Stand: Maximum assistance;Assist x2 Balance: 
Sitting: Impaired Sitting - Static: Poor (constant support) Sitting - Dynamic: Poor (constant support) Standing: Impaired Standing - Static: Poor Standing - Dynamic : PoorPain: 
Pain Scale 1: Numeric (0 - 10) Pain Intensity 1: 0 Activity Tolerance:  
 
Please refer to the flowsheet for vital signs taken during this treatment. After treatment:  
[]    Patient left in no apparent distress sitting up in chair 
[x]    Patient left in no apparent distress in bed 
[x]    Call bell left within reach 
[]    Nursing notified 
[]    Caregiver present 
[]    Bed alarm activated COMMUNICATION/COLLABORATION:  
The patients plan of care was discussed with: Registered Nurse Perez Jiménez, PT, DPT Time Calculation: 28 mins

## 2019-01-15 NOTE — PROGRESS NOTES
Patient was reviewed during IDR rounds- plan is for patient to receive Pacemaker tomorrow. [facility] has accepted patient for rehab in [address] also has referral. CM spoke with Faby Kilgore with [facility] stated facility wants to see how patient does with therapy following the Pacemaker procedure (have not yet accepted). CM met with patient and two grandchildren present at bedside. Of note, family also plans to meet with Palliative Care team today at 3:30 p.m. CM will continue to follow.  MALGORZATA Anderson

## 2019-01-15 NOTE — PROGRESS NOTES
Bedside and Verbal shift change report given to Amina Roth RN (oncoming nurse) by Keshia Rodrigez RN (offgoing nurse).  Report included the following information SBAR, Kardex, Procedure Summary, Intake/Output, MAR, Recent Results and Cardiac Rhythm SA.
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
## 2019-01-15 NOTE — PROGRESS NOTES
Problem: Pressure Injury - Risk of 
Goal: *Prevention of pressure injury Document Clint Scale and appropriate interventions in the flowsheet. Outcome: Progressing Towards Goal 
Pressure Injury Interventions: 
Sensory Interventions: Assess changes in LOC, Discuss PT/OT consult with provider, Float heels, Keep linens dry and wrinkle-free, Maintain/enhance activity level, Minimize linen layers, Pressure redistribution bed/mattress (bed type) Moisture Interventions: Absorbent underpads, Internal/External urinary devices, Limit adult briefs, Maintain skin hydration (lotion/cream), Minimize layers, Moisture barrier Activity Interventions: Increase time out of bed, Pressure redistribution bed/mattress(bed type), PT/OT evaluation Mobility Interventions: Float heels, Pressure redistribution bed/mattress (bed type), PT/OT evaluation, Turn and reposition approx. every two hours(pillow and wedges) Nutrition Interventions: Document food/fluid/supplement intake Friction and Shear Interventions: Feet elevated on foot rest, Lift sheet, Lift team/patient mobility team, Minimize layers Problem: Falls - Risk of 
Goal: *Absence of Falls Document Abbe Shows Fall Risk and appropriate interventions in the flowsheet. Outcome: Progressing Towards Goal 
Fall Risk Interventions: 
Mobility Interventions: Bed/chair exit alarm, OT consult for ADLs, Patient to call before getting OOB, PT Consult for mobility concerns, PT Consult for assist device competence Mentation Interventions: Adequate sleep, hydration, pain control, Bed/chair exit alarm, Door open when patient unattended, Family/sitter at bedside, Increase mobility, More frequent rounding, Reorient patient, Room close to nurse's station Medication Interventions: Patient to call before getting OOB, Teach patient to arise slowly Elimination Interventions: Call light in reach, Toileting schedule/hourly rounds, Bed/chair exit alarm History of Falls Interventions: Consult care management for discharge planning, Investigate reason for fall, Room close to nurse's station

## 2019-01-15 NOTE — ACP (ADVANCE CARE PLANNING)
Primary Decision Maker: Mortimer Helena Relationship to patient: Daughter Phone number: [phone] Primary Decision Maker: Lena Jones Relationship to patient: Son Phone number: [phone] [x] Named in a scanned document  
[] Legal Next of Kin 
[] Guardian ACP documents you current have include: 
[x] Advance Directive or Living Will 
[x] Durable Do Not Resuscitate 
[] Physician Orders for Scope of Treatment (POST) [x] Medical Power of  
[] Other

## 2019-01-15 NOTE — PROGRESS NOTES
Cardiac Electrophysiology Hospital Progress Note Subjective:  
  
Keerthi Santiago is a 80 y.o. patient who is seen for evaluation/management of reported paroxysmal atrial fibrillation with RVR & bradycardia with pause, transient complete AV block. She was admitted on 01/08/2019 via EMS for left-sided weakness, left-sided facial droop, & slurred speech. Head CT showed acute thromboembolic occlusion of the proximal to mid posterior division of M2 segment right middle cerebral artery with decreased perfusion of the right parietal, occipital and posterior temporal lobe. She received TPA. She underwent embolectomy, showed organized fibrous clot. There was suspicion for AF as etiology. She has also had some evidence of hemorrhage in left occipital lobe. She has continued to shower embolic & multiple new & old infarcts. On 01/12/2019, she developed new weakness in RLE & LLE weakness worsened. Intermittent paroxysmal atrial fibrillation (ventricular rate 150 bpm) reported to have occurred, but telemetry strips showing this are not available. Transient complete heart block resulting in 2.3 second pause during current admission, but none overnight. Troponin peak 0.76 on 01/08/2019. HR 84 bpm, sinus rhythm with PACs at time of exam.  /65 this morning. Hgb 8.2, improved. K 3 (supplementation ordered), Mg 1.8. Cr 0.67. Eliquis 2.5 mg po bid initiated on 01/13/2019 by primary team. 
 
Jayesh Chitra Desaieusebio) at bedside today. CINDY (01/11/2019): LVEF 55-60%, no RWMA. No thrombus noted. No significant valvular abnormality. Echo (01/09/2019): LVEF 60-65%, no RWMA, mod concentric LV hypertrophy, grade 2 diastolic dysfunction. Trivial TR. Previous: 
Echo (06/16/2018): LVEF 55-60%, no RWMA, mild to mod LVH. LA mildly dilated. Lexiscan cardiolite stress (05/06/2015): Myocardial perfusion imaging normal, LVEF 75%. Seen by Dr. Jessie Varma. Parkinson's disease, on Sinemet. Previous TIA 06/2018. Problem List  Date Reviewed: 1/8/2019 Codes Class Noted * (Principal) Cerebrovascular accident (CVA) due to embolism ([facility] ICD-10-CM: I63.9 ICD-9-CM: 434.11  1/8/2019 Type 2 diabetes with nephropathy ([facility] ICD-10-CM: E11.21 
ICD-9-CM: 250.40, 583.81  7/10/2018 TIA (transient ischemic attack) ICD-10-CM: G45.9 ICD-9-CM: 435.9  6/17/2018 ARF (acute renal failure) ([facility]) ICD-10-CM: N17.9 ICD-9-CM: 584.9  6/15/2018 Postmenopausal atrophic vaginitis ICD-10-CM: N95.2 ICD-9-CM: 627.3  1/31/2018 History of hysterectomy including cervix ICD-10-CM: Z90.710 ICD-9-CM: V88.01  1/31/2018 Prolapse of vaginal vault after hysterectomy ICD-10-CM: N99.3 ICD-9-CM: 618.5  1/31/2018 Rectocele ICD-10-CM: N81.6 ICD-9-CM: 618.04  1/31/2018 Somatization disorder ICD-10-CM: F45.0 ICD-9-CM: 300.81  8/18/2015 ADD (attention deficit disorder) ICD-10-CM: F98.8 ICD-9-CM: 314.00  8/18/2015 Overview Signed 8/18/2015  6:35 PM by Galo SANCHEZ PsyD  
  chronic and moderate (6 out of 10) Diabetes mellitus ([facility] ICD-10-CM: E11.9 ICD-9-CM: 250.00  1/19/2012 Syncope and collapse ICD-10-CM: R55 
ICD-9-CM: 780.2  4/24/2011 Hypertension ICD-10-CM: I10 
ICD-9-CM: 401.9  Unknown Hypercholesterolemia ICD-10-CM: E78.00 ICD-9-CM: 272.0  Unknown Hypothyroidism ICD-10-CM: E03.9 ICD-9-CM: 244.9  Unknown Depression ICD-10-CM: F32.9 ICD-9-CM: 311  Unknown Anxiety ICD-10-CM: F41.9 ICD-9-CM: 300.00  Unknown Urinary frequency ICD-10-CM: R35.0 ICD-9-CM: 788.41  Unknown Chronic venous insufficiency ICD-10-CM: I87.2 ICD-9-CM: 459.81  Unknown Chronic back pain ICD-10-CM: M54.9, G89.29 ICD-9-CM: 724.5, 338.29  Unknown Painful hip ICD-10-CM: M25.559 ICD-9-CM: 719.45  Unknown Current Facility-Administered Medications Medication Dose Route Frequency Provider Last Rate Last Dose  potassium chloride 10 mEq in 50 ml IVPB  10 mEq IntraVENous Q1H Marylouchasitybean Maggiesandra Field, NP      
 magnesium sulfate 2 g/50 ml IVPB (premix or compounded)  2 g IntraVENous ONCE Leanne Maggiesandra Field, NP      
 amiodarone (CORDARONE) tablet 200 mg  200 mg Oral DAILY Mayitomalumargaret Maggiesandra Field NP   200 mg at 01/14/19 1205  famotidine (PEPCID) tablet 20 mg  20 mg Oral BID Daylin Dubon MD   20 mg at 01/14/19 1820  levothyroxine (SYNTHROID) tablet 50 mcg  50 mcg Oral Nicole Sahu MD   50 mcg at 01/15/19 4040  apixaban (ELIQUIS) tablet 2.5 mg  2.5 mg Oral BID Daylin Dubon MD   2.5 mg at 01/14/19 1820  hydrALAZINE (APRESOLINE) 20 mg/mL injection 10 mg  10 mg IntraVENous Q4H PRN Daylin Dubon MD      
 hydrALAZINE (APRESOLINE) tablet 25 mg  25 mg Oral TID Daylin Dubon MD   25 mg at 01/14/19 2220  aspirin delayed-release tablet 81 mg  81 mg Oral DAILY Maribell Benitez DO   81 mg at 01/14/19 1111  
 rosuvastatin (CRESTOR) tablet 20 mg  20 mg Oral QHS Maribell Benitez DO   20 mg at 01/14/19 2219  carbidopa-levodopa (SINEMET)  mg per tablet 1 Tab  1 Tab Oral TID Daylin Dubon MD   1 Tab at 01/14/19 2220  
 insulin lispro (HUMALOG) injection   SubCUTAneous AC&HS Daylin Dubon MD   Stopped at 01/14/19 2200  acetaminophen (TYLENOL) suppository 650 mg  650 mg Rectal Q4H PRN Lacey Mccracken MD   650 mg at 01/13/19 2041  sodium chloride (NS) flush 5-40 mL  5-40 mL IntraVENous Q8H Eleni Morocho MD   10 mL at 01/15/19 6805  sodium chloride (NS) flush 5-40 mL  5-40 mL IntraVENous PRN Eleni Morocho MD   10 mL at 01/08/19 1345  ondansetron (ZOFRAN) injection 4 mg  4 mg IntraVENous Q6H PRN Matt Mendez MD   4 mg at 01/09/19 0426  
 naloxone (NARCAN) injection 0.4 mg  0.4 mg IntraVENous PRN Matt Mendez MD      
  sodium chloride (NS) flush 5-40 mL  5-40 mL IntraVENous Q8H Low Brooks MD   10 mL at 01/15/19 0551  
 glucose chewable tablet 16 g  4 Tab Oral PRN Kartik Bedoya MD      
 dextrose (D50W) injection syrg 12.5-25 g  25-50 mL IntraVENous PRN Kartik Bedoya MD      
 glucagon (GLUCAGEN) injection 1 mg  1 mg IntraMUSCular PRN Kartik Bedoya MD      
 sodium chloride (NS) flush 5-40 mL  5-40 mL IntraVENous Q8H Low Brooks MD   20 mL at 01/15/19 7913  sodium chloride (NS) flush 5-40 mL  5-40 mL IntraVENous PRN Low Brooks MD      
 LORazepam (ATIVAN) injection 1 mg  1 mg IntraVENous Q2H PRN Bayron Washington MD   1 mg at 01/08/19 3823 Allergies Allergen Reactions  Norvasc [Amlodipine] Swelling  Flexeril [Cyclobenzaprine] Other (comments) Caused pt to fall & hallucination  Percocet [Oxycodone-Acetaminophen] Itching  Remeron [Mirtazapine] Diarrhea  Tramadol Other (comments) Caused falls Past Medical History:  
Diagnosis Date  Anxiety  Arthritis  Chest pain 2008 Negative stress test and Holter monitor w/Dr Charline Chahal.  Chronic back pain  Chronic venous insufficiency  Colon polyp   
 last scope normal 2007; Dr. Cory Campbell  Cyst of right kidney  Depression  Diabetes ([address]  Hypercholesterolemia  Hypertension  Hypothyroidism  Memory disorder  Other ill-defined conditions(799.89)   
 heart murmur  Painful hip  Parkinson disease ([address]  Urinary frequency Sees Dr. Dinesh Dubon with [facility] Past Surgical History:  
Procedure Laterality Date  COLONOSCOPY,DIAGNOSTIC  6/12/2015  HX BREAST BIOPSY  2002  HX HYSTERECTOMY Due to a prolapsed uterus  HX KNEE REPLACEMENT    
 left  HX KNEE REPLACEMENT  2006  
 right  HX ORTHOPAEDIC B knees.  HX OTHER SURGICAL    
 pelvic prolasp  UPPER GI ENDOSCOPY,BIOPSY  6/12/2015 Family History Problem Relation Age of Onset  Cancer Mother   
     pancreatic  Cancer Father   
     colon  Cancer Sister   
     pancreatic  Diabetes Sister  Alzheimer Brother Social History Tobacco Use  Smoking status: Never Smoker  Smokeless tobacco: Never Used Substance Use Topics  Alcohol use: No  
  Alcohol/week: 0.0 oz  
  Comment: Rare Review of Systems:  
Constitutional: Negative for fever, chills, weight loss, malaise/fatigue. HEENT: Negative for nosebleeds, vision changes. Respiratory: Negative for cough, hemoptysis Cardiovascular: Negative for chest pain, palpitations, orthopnea, claudication, leg swelling, syncope, and PND. Gastrointestinal: Negative for nausea, vomiting, diarrhea, blood in stool and melena. Genitourinary: Negative for dysuria, and hematuria. Musculoskeletal: Negative for myalgias, arthralgia. Skin: Negative for rash. Heme: Does not bleed or bruise easily. Neurological: + slurred speech, left facial droop, BLE weakness, LUE weakness. Expressive aphasia. Objective:  
 
Visit Vitals /65 (BP 1 Location: Right arm, BP Patient Position: At rest) Pulse 84 Temp 99.9 °F (37.7 °C) Resp 22 Ht 5' 5\" (1.651 m) Wt 152 lb 12.5 oz (69.3 kg) SpO2 97% BMI 25.42 kg/m² Physical Exam:  
Constitutional: well-developed and well-nourished. No respiratory distress. Head: Normocephalic and atraumatic. Eyes: Pupils are equal, round ENT: Hearing grossly normal 
Neck: supple. No JVD present. Cardiovascular: Normal rate, irregular rhythm. Exam reveals no gallop and no friction rub. 3/6 Systolic murmur. Pulmonary/Chest: Effort normal and breath sounds clear in anterior lung fields. Abdominal: Soft, no tenderness. Musculoskeletal: Trace bilat extremity edema. Neurological: Follows some commands, weakness BLEs, LUE.  + left facial droop. Speaks spontaneously some this morning. Skin: Skin is warm and dry. EKG (01/13/2019): NSR with RBBB (QRSd 124 ms), first degree av block. Assessment/Plan: Ms. Quan Burger was admitted on 01/08/2019 for left side weakness, facial droop, & slurred speech. MRI with multiple embolic appearing infarcts, predominantly involving area around R motor cortex. Received TPA, underwent embolectomy. She also has new left basal ganglia CVA, small left occipital hemorrhage that is stable in size, & increased R side infarct. No prior known history of AF, but paroxysmal AF with RVR (ventricular rate 150) reported during admission; telemetry strips showing this are not currently available. Transient complete AV block with 2.3 second pause present. Eliquis 2.5 mg po bid initiated by primary team.  Currently, patient is on reduced dose of eliquis due to recent tPA and hemorrhagic conversion but in the long run I recommend considering 5 mg po bid if hemorrhagic conversion risk is over. Currently receiving amiodarone 200 mg po daily. LFTs already done, needs baseline CXR & TFT. Risks/benefits of pacemaker implant discussed with patient & daughter (MaryluA) yesterday, and they agree to proceed. Procedure is scheduled for 01/16/2019 at 7 AM.  Once pacemaker is in place, there will be flexibility to use beta blocker for rate control if needed. VINCENT Fernandez [facility] 01/15/19 Addendum from EP attending: 
 I have seen, examined patient, and discussed with nurse practitioner, registered nurse, reviewed, updated note and agree with the assessment and plan I have talked to her and her son this am.  
Vital signs as above Exam shows regular rhythm  
+ facial droop and left side weakness Assessment and Plan: I could not find the tele strips of PAF RVR but she had high degree av block and would need pacemaker for treatment. She will need to be in rehab later and atrial lead will inform us of atrial fib burden She is on reduced dose of eliquis Her son and daughter agree with dual chamber pacer tomorrow Thank you for involving me in this patient's care and please call with further concerns or questions. Pam Mondragon M.D. Electrophysiology/Cardiology [address] [facility] Vascular [place] [facility], [address], [address] [place] [address] 
[phone]

## 2019-01-15 NOTE — PROGRESS NOTES
[facility] Daily Progress Note HPI: Romana Logan, a 80y.o. year-old who presented for evaluation of CVA, elevated troponin. Apparently she was fine the morning of admission but in the afternoon developed facial droop and slurred speech. Given TPA and underwent embolectomy - showed organized fibrinous clot - suspicion for Atrial Fib. Update: 
Moving extremities spontaneously, answering basic questions and briefly opening eyes. Denies any chest pain or dyspnea. Improving from prior exam. No evidence of afib on tele. Bp is controlled. Low K, pauses and afib rate 150 c/w sss Discussed plan for pacer to help with rate regulation so we can manage afib in setting of SSS. Assessment/Plan: 1. Acute CVA s/p TPA and embolectomy - also some evidence of hemorrhage in left occipital lobe 
-concern for cardiac source due to organized fibrinous character of clot but TTE ok and no evidence of Atrial Fib on telemetry thusfar  
-continues to shower emboli and multiple new and old infarcts 2. Syncope - none recently 3. Diabetes mellitus - management per primary team, A1C 5.5% 4. Dyslipidemia - taken off statin last year, LDL 50 
5. HTN- off IV cardene this AM, recommend slowly restarting home meds when ok with neurology and as BP allows 6. Anemia - Hgb 8.2, workup per primary team 
7. Elevated troponin - non-specific in the setting of acute CVA, no ischemic changes on ECG, TTE ok  
8. RBBB - chronic 9. Loud systolic ejection murmur - TTE ok, no valvular abnormalities 10. Hypokalemia - K 3.4, will give KCL 20meq IV, recheck in AM 
11. Hypomagnesemia - Mg 1.8, will give Mg Sulfate 2g IV, recheck in AM 
12. AFib, paroxysmal, with rate to 150, NSR now on BRENDON amado [facility] 13. SSS- pauses of 2.2 seconds alternating with afib rvr to rate 150s, Continue tele.   
14. Palliative input for goals of care, code status given continued infarct, and hemorrhage, no great options for treatment. But she is improving clinically ands is alert and agreeable to pacer implant. Son at bedside agrees. Echo 1/19 - LVEF 60-65%, grade 2 dd Soc no tob no etoh FHx no early cad She  has a past medical history of Anxiety, Arthritis, Chest pain, Chronic back pain, Chronic venous insufficiency, Colon polyp, Cyst of right kidney, Depression, Diabetes ([address] Hypercholesterolemia, Hypertension, Hypothyroidism, Memory disorder, Other ill-defined conditions(799.89), Painful hip, Parkinson disease ([address] and Urinary frequency. PE 
Vitals:  
 01/15/19 0800 01/15/19 0844 01/15/19 1000 01/15/19 1400 BP:  171/71 147/61 (!) 111/95 Pulse:  84 83 82 Resp:   16 18 Temp:   99.4 °F (37.4 °C) 99.4 °F (37.4 °C) SpO2: 95%  96% 97% Weight:      
Height:      
 Body mass index is 25.42 kg/m². General appearance - follows commands, answers questions Mental status - calm Eyes - sclera anicteric, moist mucous membranes Neck - supple Lymphatics - not assessed Chest - clear to auscultation anteriorly Heart - regular rate and rhythm, distant heart sounds, 4/6 PATRICIA Abdomen - soft, nontender, nondistended Back exam - not assessed Neurological - answering questions, following commands Musculoskeletal - not assessed Extremities - peripheral pulses 1+, 1+ LE edema Skin - normal coloration  no rashes Telemetry: NSR, RBBB Recent Labs: 
Lab Results Component Value Date/Time Cholesterol, total 132 01/09/2019 05:15 AM  
 HDL Cholesterol 73 01/09/2019 05:15 AM  
 LDL, calculated 50.2 01/09/2019 05:15 AM  
 Triglyceride 44 01/09/2019 05:15 AM  
 CHOL/HDL Ratio 1.8 01/09/2019 05:15 AM  
 
Lab Results Component Value Date/Time Creatinine 0.67 01/15/2019 05:52 AM  
 
Lab Results Component Value Date/Time BUN 16 01/15/2019 05:52 AM  
 
Lab Results Component Value Date/Time Potassium 3.0 (L) 01/15/2019 05:52 AM  
 
Lab Results Component Value Date/Time Hemoglobin A1c 5.5 01/09/2019 05:19 AM  
 
Lab Results Component Value Date/Time HGB 8.2 (L) 01/14/2019 04:42 PM  
 
Lab Results Component Value Date/Time PLATELET 303 54/95/4466 04:42 PM  
 
 
Reviewed: 
Past Medical History:  
Diagnosis Date  Anxiety  Arthritis  Chest pain 2008 Negative stress test and Holter monitor w/Dr Ramona Bains.  Chronic back pain  Chronic venous insufficiency  Colon polyp   
 last scope normal 2007; Dr. Uche Nicole  Cyst of right kidney  Depression  Diabetes ([facility]  Hypercholesterolemia  Hypertension  Hypothyroidism  Memory disorder  Other ill-defined conditions(799.89)   
 heart murmur  Painful hip  Parkinson disease ([facility]  Urinary frequency Sees Dr. Jarvis Lozano with [facility] Social History Tobacco Use Smoking Status Never Smoker Smokeless Tobacco Never Used Social History Substance and Sexual Activity Alcohol Use No  
 Alcohol/week: 0.0 oz  
 Comment: Rare Allergies Allergen Reactions  Norvasc [Amlodipine] Swelling  Flexeril [Cyclobenzaprine] Other (comments) Caused pt to fall & hallucination  Percocet [Oxycodone-Acetaminophen] Itching  Remeron [Mirtazapine] Diarrhea  Tramadol Other (comments) Caused falls Current Facility-Administered Medications Medication Dose Route Frequency  amiodarone (CORDARONE) tablet 200 mg  200 mg Oral DAILY  famotidine (PEPCID) tablet 20 mg  20 mg Oral BID  levothyroxine (SYNTHROID) tablet 50 mcg  50 mcg Oral 6am  
 apixaban (ELIQUIS) tablet 2.5 mg  2.5 mg Oral BID  hydrALAZINE (APRESOLINE) 20 mg/mL injection 10 mg  10 mg IntraVENous Q4H PRN  
 hydrALAZINE (APRESOLINE) tablet 25 mg  25 mg Oral TID  aspirin delayed-release tablet 81 mg  81 mg Oral DAILY  rosuvastatin (CRESTOR) tablet 20 mg  20 mg Oral QHS  carbidopa-levodopa (SINEMET)  mg per tablet 1 Tab  1 Tab Oral TID  insulin lispro (HUMALOG) injection   SubCUTAneous AC&HS  acetaminophen (TYLENOL) suppository 650 mg  650 mg Rectal Q4H PRN  
 sodium chloride (NS) flush 5-40 mL  5-40 mL IntraVENous Q8H  
 sodium chloride (NS) flush 5-40 mL  5-40 mL IntraVENous PRN  
 ondansetron (ZOFRAN) injection 4 mg  4 mg IntraVENous Q6H PRN  
 naloxone (NARCAN) injection 0.4 mg  0.4 mg IntraVENous PRN  
 sodium chloride (NS) flush 5-40 mL  5-40 mL IntraVENous Q8H  
 glucose chewable tablet 16 g  4 Tab Oral PRN  
 dextrose (D50W) injection syrg 12.5-25 g  25-50 mL IntraVENous PRN  
 glucagon (GLUCAGEN) injection 1 mg  1 mg IntraMUSCular PRN  
 sodium chloride (NS) flush 5-40 mL  5-40 mL IntraVENous Q8H  
 sodium chloride (NS) flush 5-40 mL  5-40 mL IntraVENous PRN  
 LORazepam (ATIVAN) injection 1 mg  1 mg IntraVENous Q2H PRN Asa Gleason MD 
[facility] heart and Vascular [address]

## 2019-01-15 NOTE — PALLIATIVE CARE
Palliative Medicine Social Work Dr. Erin Henderson and I met with patient and two children, Alhaji Holder ([phone]) and Sade Smith ([phone]). Patient was alert, oriented and engaging. She related an accurate understanding of her \"series of strokes\" that have left her left side paralyzed; whole body weak. She admits to some discouragement about the inability to control her body. She maintains a glass half-full mentality, however, believing you have to take life as it comes; that \"you can't control everything\". Talked more about her underlying Afib responsible for her strokes; the plan for pacemaker tomorrow to hopefully regulate her heart rate and minimize risks for stroke. Discussed her ongoing risks, however, for future strokes; acute decline. Discussed potential for another acute stroke leaving her without capacity. Discussed this as an opportune time to begin conversations about the kind of care she may want for the future, putting her back in control of some things. Patient has completed an AMD which designates Nancy Luz and son, Santhosh Cota ([phone]) as her primary agents. She confirms she still trusts these two in that role and has no desire to make changes. She did not complete a Living Will in document, but left decisions open to her children. Discussed the importance of continuing to talk with them about what is important. Engaged in lengthy conversation about code status. Discussed the generally miniscule chances of resuscitative efforts being a bridge to meaningful recovery in the setting of her chronic conditions and age. Patient stated she had thought about these things before and agreed this was a good time to make some decisions. Her children agreed. Patient communicate a desire for DNR and signed DDNR. Informed that DNR would temporarily be revoked for procedure tomorrow. Copies of AMD and DDNR placed in chart for scanning. Patient nearing time of discharge to rehab. Will follow peripherally until then. Thank you for the opportunity to be involved in the care of Ms. Lizbeth Anthony. Kathie Yusuf, YOBANI, [facility]- Palliative Medicine  Respecting Choices ® ACP Facilitator [phone]

## 2019-01-16 PROBLEM — Z95.0 PACEMAKER: Status: ACTIVE | Noted: 2019-01-01

## 2019-01-16 NOTE — PROGRESS NOTES
Problem: Pressure Injury - Risk of 
Goal: *Prevention of pressure injury Document Clint Scale and appropriate interventions in the flowsheet. Outcome: Progressing Towards Goal 
Pressure Injury Interventions: 
Sensory Interventions: Assess changes in LOC, Float heels Moisture Interventions: Absorbent underpads, Limit adult briefs Activity Interventions: PT/OT evaluation, Increase time out of bed Mobility Interventions: Float heels, PT/OT evaluation Nutrition Interventions: Document food/fluid/supplement intake, Offer support with meals,snacks and hydration Friction and Shear Interventions: Lift sheet Problem: Falls - Risk of 
Goal: *Absence of Falls Document Reid العراقي Fall Risk and appropriate interventions in the flowsheet. Outcome: Progressing Towards Goal 
Fall Risk Interventions: 
Mobility Interventions: Patient to call before getting OOB Mentation Interventions: Bed/chair exit alarm Medication Interventions: Patient to call before getting OOB, Teach patient to arise slowly Elimination Interventions: Call light in reach, Toileting schedule/hourly rounds, Toilet paper/wipes in reach History of Falls Interventions: Consult care management for discharge planning

## 2019-01-16 NOTE — PROGRESS NOTES
Bedside and Verbal shift change report given to [facility] (oncoming nurse) by George Rodriguez (offgoing nurse). Report included the following information SBAR, Kardex and Recent Results.

## 2019-01-16 NOTE — PROGRESS NOTES
Hospitalist Progress Note Lencho Headley MD 
Answering service: [phone] OR [phone] from in house phone Date of Service:  2019 NAME:  Zuleyma Ponce :  1932 MRN:  [id number] Admission Summary:  
80 y.o F with PMH of parkinson's disease,HTN,hypothyroidism,diabetes was admitted to the hospital for management of acute rt CVA s/p tpa. Interval history / Subjective:  
F/u for stroke Patient reports no new complaints. 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 
-MRI brain showed acute right MCA territory infarct,other small scattered acute supratentorial and infratentorial right-sided infarcts and  a small left occipital hemorrhage 
-neurology  following,appreciate recommendations 
-echo showed normal EF.  
-CINDY did not show any clots  
-As she had new deficits on  -RLE weakness- repeated MRI which showed new infarcts. Left occipital hemorrhage stable. -Found to have paroxysmal a fib on . 
-Continue eliquis 2.5 mg BID now -started on lower dose due to occipital hemorrhage. 
-Continue PT/OT. Needs rehab #Paroxysmal atrial fib: 
- atrioventricular block 
-with intermittent high heart rate and sinus pauses - S/p dual chamber pace maker tomorrow. #HTN: 
-BP goal less than 140 
-will give amlodipine and prn hydralazine. #Difficulty duran placement: 
-Gyn team placed a coude catheter. 
-need to talk to GYN team prior to removal. 
 
#Chronic anemia: 
-Hb at admission around 10.3,likely hemo concentrated sample 
-trending down -7.3 today 
-will follow iron and ferritin results -no evidence of active bleeding # Hypokalemia:replete #Diabetes type 2: BG is stable 
-on glimepiride,sitagliptin-metformin 
-will continue SSI for now and monitor BS 
 
 #Parkinson's disease: 
- continue sinemet #hypothyrodism: 
-on synthroid Mild elevation of troponin:trended down. - in the setting of stroke ?demand ischemia Code status: full DVT prophylaxis: eliquis Care Plan discussed with: patient's family at bed side ,nurse and [id number] and Kyraklever Tyson Disposition: continue inpatient care. Pending placement rehab Hospital Problems  Date Reviewed: 1/8/2019 Codes Class Noted POA Pacemaker ICD-10-CM: Z95.0 ICD-9-CM: V45.01  1/16/2019 Unknown Overview Signed 1/16/2019  9:41 AM by Luh Can MD  
  1/16/2019 dual chamber [employer] pacer * (Principal) Cerebrovascular accident (CVA) due to embolism ([facility] [address] ICD-10-CM: I63.9 ICD-9-CM: 434.11  1/8/2019 Unknown Review of Systems: As per HPI Vital Signs:  
 Last 24hrs VS reviewed since prior progress note. Most recent are: 
Visit Vitals /66 Pulse 91 Temp 98.6 °F (37 °C) Resp 20 Ht 5' 5\" (1.651 m) Wt 68.3 kg (150 lb 9.2 oz) SpO2 100% BMI 25.06 kg/m² Intake/Output Summary (Last 24 hours) at 1/16/2019 1748 Last data filed at 1/16/2019 6067 Gross per 24 hour Intake 0 ml Output 550 ml Net -550 ml Physical Examination:  
 
 
     
Constitutional:  elderly patient ENT:  Oral mucous moist  
Resp:  CTA b/l. No wheezing/rhonchi/rales. CV:  Regular rhythm, normal rate, no murmurs, gallops, rubs GI:  Soft, non distended, non tender. normoactive bowel sounds Musculoskeletal:  No edema Neurologic: She is A&O X 2,RUE 5/5, RLE and LLE 3/5,LUE 3/5. Jerrod Aamto Data Review:  
 Review and/or order of clinical lab test 
Review and/or order of tests in the medicine section of CPT Labs:  
 
Recent Labs  
  01/14/19 
1642 WBC 8.2 HGB 8.2* HCT 27.5*  
 Recent Labs  
  01/16/19 
0251 01/15/19 
0552 01/14/19 
1642  145 144  
K 3.0* 3.0* 3.7  110* 111* CO2 27 27 25 BUN 13 16 18  
 CREA 0.62 0.67 0.73 * 134* 205* CA 8.4* 8.6 8.6 MG 1.9 1.8  -- No results for input(s): SGOT, GPT, ALT, AP, TBIL, TBILI, TP, ALB, GLOB, GGT, AML, LPSE in the last 72 hours. No lab exists for component: AMYP, HLPSE No results for input(s): INR, PTP, APTT in the last 72 hours. No lab exists for component: INREXT, INREXT Recent Labs  
  01/14/19 
1642 TIBC 179* PSAT 14* FERR 634* Lab Results Component Value Date/Time Folate 11.9 06/16/2018 12:45 AM  
  
No results for input(s): PH, PCO2, PO2 in the last 72 hours. No results for input(s): CPK, CKNDX, TROIQ in the last 72 hours. No lab exists for component: CPKMB Lab Results Component Value Date/Time Cholesterol, total 132 01/09/2019 05:15 AM  
 HDL Cholesterol 73 01/09/2019 05:15 AM  
 LDL, calculated 50.2 01/09/2019 05:15 AM  
 Triglyceride 44 01/09/2019 05:15 AM  
 CHOL/HDL Ratio 1.8 01/09/2019 05:15 AM  
 
Lab Results Component Value Date/Time Glucose (POC) 139 (H) 01/16/2019 04:46 PM  
 Glucose (POC) 151 (H) 01/16/2019 11:33 AM  
 Glucose (POC) 129 (H) 01/16/2019 06:23 AM  
 Glucose (POC) 223 (H) 01/15/2019 09:20 PM  
 Glucose (POC) 122 (H) 01/15/2019 04:41 PM  
 
Lab Results Component Value Date/Time Color YELLOW/STRAW 06/16/2018 01:00 PM  
 Appearance CLEAR 06/16/2018 01:00 PM  
 Specific gravity 1.008 06/16/2018 01:00 PM  
 pH (UA) 7.5 06/16/2018 01:00 PM  
 Protein 30 (A) 06/16/2018 01:00 PM  
 Glucose NEGATIVE  06/16/2018 01:00 PM  
 Ketone NEGATIVE  06/16/2018 01:00 PM  
 Bilirubin NEGATIVE  06/16/2018 01:00 PM  
 Urobilinogen 1.0 06/16/2018 01:00 PM  
 Nitrites NEGATIVE  06/16/2018 01:00 PM  
 Leukocyte Esterase LARGE (A) 06/16/2018 01:00 PM  
 Epithelial cells MODERATE (A) 06/16/2018 01:00 PM  
 Bacteria NEGATIVE  06/16/2018 01:00 PM  
 WBC 20-50 06/16/2018 01:00 PM  
 RBC 0-5 06/16/2018 01:00 PM  
 
 
 
Medications Reviewed:  
 
Current Facility-Administered Medications Medication Dose Route Frequency  sodium chloride (NS) flush 5-40 mL  5-40 mL IntraVENous Q8H  
 sodium chloride (NS) flush 5-40 mL  5-40 mL IntraVENous PRN  
 acetaminophen (TYLENOL) tablet 650 mg  650 mg Oral Q4H PRN  
 ceFAZolin (ANCEF) 1 g in 0.9% sodium chloride (MBP/ADV) 50 mL  1 g IntraVENous Q8H  
 [START ON 1/17/2019] cephALEXin (KEFLEX) capsule 250 mg  250 mg Oral TID  metoprolol succinate (TOPROL-XL) XL tablet 25 mg  25 mg Oral DAILY  amiodarone (CORDARONE) tablet 200 mg  200 mg Oral DAILY  famotidine (PEPCID) tablet 20 mg  20 mg Oral BID  levothyroxine (SYNTHROID) tablet 50 mcg  50 mcg Oral 6am  
 apixaban (ELIQUIS) tablet 2.5 mg  2.5 mg Oral BID  hydrALAZINE (APRESOLINE) 20 mg/mL injection 10 mg  10 mg IntraVENous Q4H PRN  
 hydrALAZINE (APRESOLINE) tablet 25 mg  25 mg Oral TID  aspirin delayed-release tablet 81 mg  81 mg Oral DAILY  rosuvastatin (CRESTOR) tablet 20 mg  20 mg Oral QHS  carbidopa-levodopa (SINEMET)  mg per tablet 1 Tab  1 Tab Oral TID  insulin lispro (HUMALOG) injection   SubCUTAneous AC&HS  acetaminophen (TYLENOL) suppository 650 mg  650 mg Rectal Q4H PRN  
 sodium chloride (NS) flush 5-40 mL  5-40 mL IntraVENous Q8H  
 sodium chloride (NS) flush 5-40 mL  5-40 mL IntraVENous PRN  
 ondansetron (ZOFRAN) injection 4 mg  4 mg IntraVENous Q6H PRN  
 naloxone (NARCAN) injection 0.4 mg  0.4 mg IntraVENous PRN  
 sodium chloride (NS) flush 5-40 mL  5-40 mL IntraVENous Q8H  
 glucose chewable tablet 16 g  4 Tab Oral PRN  
 dextrose (D50W) injection syrg 12.5-25 g  25-50 mL IntraVENous PRN  
 glucagon (GLUCAGEN) injection 1 mg  1 mg IntraMUSCular PRN  
 sodium chloride (NS) flush 5-40 mL  5-40 mL IntraVENous Q8H  
 sodium chloride (NS) flush 5-40 mL  5-40 mL IntraVENous PRN  
 LORazepam (ATIVAN) injection 1 mg  1 mg IntraVENous Q2H PRN  
 
______________________________________________________________________ EXPECTED LENGTH OF STAY: 1d 7h 
 ACTUAL LENGTH OF STAY:          8 Suki Corey MD

## 2019-01-16 NOTE — PROGRESS NOTES
Occupational Therapy: defer Chart reviewed, attempted OT treatment session. Patient is currently off the floor in CCL for PPM placement. OT will defer at this time and will f/u as able and appropriate for re-evaluation.  
 
Sheri Jesus, OTR/L

## 2019-01-16 NOTE — PROGRESS NOTES
Problem: Dysphagia (Adult) Goal: *Acute Goals and Plan of Care (Insert Text) Speech therapy goals Initiated 1/10/2019 1. Patient will tolerate puree/thin liquid diet without s/s of aspiration within 7 days 2. Patient will tolerate solid trials with timely and complete mastication and no s/s of aspiration to determine safety for diet upgrade within 7 days Speech language pathology dysphagia treatment Patient: Cindi Garsia (45 y.o. female) Date: 1/16/2019 Diagnosis: CVA (cerebral vascular accident) [facility]) [I63.9] Cerebrovascular accident (CVA) due to embolism ([facility] [address] Precautions:  Fall ASSESSMENT: 
Patient presents with moderate oral dysphagia and mild pharyngeal dysphagia characterized by oral motor weakness, suspected poor oral sensation given oral residue, poor bolus formation, delayed oral transit and suspected pharyngeal swallow delay. Patient tolerated all PO trials without overt s/s aspiration. Given pocketing and oral residue noted upon entry and RN report of difficulty with thick purees; patient not appropriate for diet advance. Progression toward goals: 
[]         Improving appropriately and progressing toward goals [x]         Improving slowly and progressing toward goals 
[]         Not making progress toward goals and plan of care will be adjusted PLAN: 
Recommendations and Planned Interventions: 
Pureed diet Thin liquids Sit up for all PO Small bites Alternate solids and liquids Patient continues to benefit from skilled intervention to address the above impairments. Continue treatment per established plan of care. Discharge Recommendations:  Inpatient Rehab SUBJECTIVE:  
Patient stated I think so when asked if she had lunch today. Rn reports patient had difficulty with thicker purees (mashed potatoes.) OBJECTIVE:  
Cognitive and Communication Status: 
Neurologic State: Alert Orientation Level: Oriented to person Cognition: Decreased attention/concentration, Follows commands Perception: Cues to attend to left side of body, Cues to maintain midline in sitting Perseveration: No perseveration noted Safety/Judgement: Not assessed Dysphagia Treatment: 
Oral Assessment: 
Oral Assessment Labial: Left droop Dentition: Edentulous(Daughter has dentures and adhesive but patient declined placement.) Oral Hygiene: Lingual residue and mild pocketing on left Velum: Unable to visualize Mandible: No impairment P.O. Trials: 
Patient Position: Upright in bed Vocal quality prior to P.O.: Low volume Consistency Presented: Thin liquid;Puree How Presented: Self-fed/presented;SLP-fed/presented;Spoon;Straw;Successive swallows Bolus Acceptance: No impairment Bolus Formation/Control: Impaired Type of Impairment: Delayed Propulsion: Delayed (# of seconds) Oral Residue: 10-50% of bolus; Left;Lingual 
Initiation of Swallow: Delayed (# of seconds) Laryngeal Elevation: Functional 
Aspiration Signs/Symptoms: None Effective Modifications: Alternate liquids/solids Oral Phase Severity: Moderate Pharyngeal Phase Severity : MildPain: 
Pain Scale 1: Numeric (0 - 10) Pain Intensity 1: 0 After treatment:  
[]              Patient left in no apparent distress sitting up in chair 
[x]              Patient left in no apparent distress in bed 
[x]              Call bell left within reach [x]              Nursing notified 
[x]              Caregiver present 
[]              Bed alarm activated COMMUNICATION/EDUCATION:  
Patient was educated regarding role of SLP, dysphagia, aspiration risk, diet and POC. Patient's daughter verbalized understanding. The patients plan of care including recommendations, planned interventions, and recommended diet changes were discussed with: Registered Nurse. []              Posted safety precautions in patient's room. CRYSTAL Cr Time Calculation: 18 mins

## 2019-01-16 NOTE — PROCEDURES
Cardiac Procedure Note Patient: Kirill Hernandez  MRN: [id number]  SSN: [id number] YOB: 1932 Age: 80 y.o. Sex: female Date of Procedure: 1/16/2019 Pre-procedure Diagnosis: PAF intermittent third degree av block, recent stroke Post-procedure Diagnosis: same Procedure: Permanent Pacemaker Insertion :  Dr. Mikal Zuniga MD 
 
Assistant(s):  None Anesthesia: Moderate Sedation Estimated Blood Loss: Less than 10 mL Specimens Removed: None Findings: RAA lead RV distal septal lead Sarah powder in pocket 
eliquis 2.5 mg bid She had PAT PAF during RA lead implant Complications: None Implants: [employer]pert dual chamber pacer Signed by:  Mikal Zuniga MD  1/16/2019  9:42 AM

## 2019-01-16 NOTE — PROCEDURES
DATE OF PROCEDURE: 1/16/2019 PROCEDURE:  Implantation of dual-chamber pacemaker with fluoroscopy. HISTORY:  This is a 80 y. o.woman with recent stroke and PAF with RVR but she has intermittent third degree av block so rate control medication has not been given. She is on anticoagulant. She meets the indication for dual chamber pacer insertion then start beta blocker. The risks and benefits were discussed with the patient's daughter who is here medical POA and she agreed to proceed. PROCEDURE IN DETAIL:  After the informed written consent had been obtained, the patient was brought to the Electrophysiology Suite where the patient was prepped and draped in the usual sterile fashion. Conscious sedation was initiated and maintained with intravenous Versed and fentanyl. Local anesthesia with 2% Xylocaine was given in the left infraclavicular area. The #10 blade scalpel was then used to make a 3 cm incision below the left clavicle. Using the modified Seldinger technique 1 guidewire was advanced into the left subclavian vein. A subcutaneous device pocket was made in the inferomedial direction by blunt dissection. Over the guidewire, a 9-Latvian peel-away introducer dilator was then advanced inside the vein. An active fixation pacing and sensing lead was positioned in the right ventricle distal septum. The lead was anchored down with #2 Ethibond sutures after the pacing and sensing were optimal.  Over the retained guidewire another 7-Latvian peel-away introducer dilator was then advanced inside the vein. The active fixation pacing and sensing lead was then advanced and positioned in the right atrial appendage. She had PAT/PAF during RA lead placement There was no diaphragmatic stimulation with 10 volts maximal output for either lead. The pocket is now irrigated with antibiotic solution and the lead was connected to the device and inserted inside the pocket.  Sarah clotting powder and Vancomycin powder were placed in the pocket. The pocket is now closed in three consecutive layers using 2-0 Vicryl sutures in continuous fashion. Steri strip is now applied over the surgical wound. COMPLICATIONS:  None. ESTIMATED BLOOD LOSS: 20 mL. IMPLANTED HARDWARE:   
 
The pacemaker is a [employer] Rupert Assurity MRI DR, model # [id number], serial # [id number] ATRIAL LEAD:  model # [id number] , serial # [id number] VENTRICULAR LEAD:   model # [id number] and serial # [id number] Specimen removed: NONE 
 
DATA:  The P wave is 2.0 mV, pacing impedance 334 ohms and pacing threshold acutely is 1.0 volts at 0.4 ms. The ventricular lead had the R wave sensing 9.3 mV and the pacing impedance 674 ohms and pacing threshold 0.9 volts at 0.4 ms. PROGRAMMED PARAMETER:  The device is programmed to VIPR pacing mode with the low rate of 60 beats per minute and upper tracking, sensor rate of 130 beats per minute. ASSESSMENT AND PLAN:  The patient will follow up with me in 4 weeks as she will likely go to neuro rehab

## 2019-01-16 NOTE — PROGRESS NOTES
CM met with patient's daughter Daniel Yost and son Jaimie Soler at bedside along with patient, to provide updates and discuss disposition- per conversation with [facility] on 1/15- they want to see how patient participates in therapy following pacemaker procedure. [facility] patient. CM will await to hear from [address] [place] [address] and therapy which level of rehab patient would most benefit. The family is agreeable with above plan, patient also agreeable. CM again discussed disposition from rehab and the family plans to bring the patient home with family assistance. Patient has significant family support- family members have been present at bedside during hospitalization, very involved in the patient's care. CM will continue to follow and await further PT/OT evaluations.  MALGORZATA White

## 2019-01-16 NOTE — PROGRESS NOTES
Cardiac Electrophysiology Hospital Progress Note Subjective:  
  
Jensen Lara is a 80 y.o. patient who is seen for evaluation/management of reported paroxysmal atrial fibrillation with RVR & bradycardia with pause, transient complete AV block. She was admitted on 01/08/2019 via EMS for left-sided weakness, left-sided facial droop, & slurred speech. Head CT showed acute thromboembolic occlusion of the proximal to mid posterior division of M2 segment right middle cerebral artery with decreased perfusion of the right parietal, occipital and posterior temporal lobe. She received TPA. She underwent embolectomy, showed organized fibrous clot. There was suspicion for AF as etiology. She has also had some evidence of hemorrhage in left occipital lobe. She has continued to shower embolic & multiple new & old infarcts. On 01/12/2019, she developed new weakness in RLE & LLE weakness worsened. Intermittent paroxysmal atrial fibrillation (ventricular rate 150 bpm) reported to have occurred, but telemetry strips showing this are not available. Transient complete heart block resulting in 2.3 second pause during current admission, but none overnight. Troponin peak 0.76 on 01/08/2019. HR 84 bpm, sinus rhythm with PAC. /55 this morning. K 3 (supplementation ordered), Mg 1.9. Cr 0.62. Eliquis 2.5 mg po bid initiated on 01/13/2019 by primary team. 
 
She is scheduled for dual chamber pacemaker implant this morning. CINDY (01/11/2019): LVEF 55-60%, no RWMA. No thrombus noted. No significant valvular abnormality. Echo (01/09/2019): LVEF 60-65%, no RWMA, mod concentric LV hypertrophy, grade 2 diastolic dysfunction. Trivial TR. Previous: 
Echo (06/16/2018): LVEF 55-60%, no RWMA, mild to mod LVH. LA mildly dilated. Lexiscan cardiolite stress (05/06/2015): Myocardial perfusion imaging normal, LVEF 75%. Seen by Dr. Fermin Scott. Parkinson's disease, on Sinemet. Previous TIA 06/2018. Problem List  Date Reviewed: 1/8/2019 Codes Class Noted * (Principal) Cerebrovascular accident (CVA) due to embolism ([email or web address] ICD-10-CM: I63.9 ICD-9-CM: 434.11  1/8/2019 Type 2 diabetes with nephropathy ([email or web address] ICD-10-CM: E11.21 
ICD-9-CM: 250.40, 583.81  7/10/2018 TIA (transient ischemic attack) ICD-10-CM: G45.9 ICD-9-CM: 435.9  6/17/2018 ARF (acute renal failure) ([facility]) ICD-10-CM: N17.9 ICD-9-CM: 584.9  6/15/2018 Postmenopausal atrophic vaginitis ICD-10-CM: N95.2 ICD-9-CM: 627.3  1/31/2018 History of hysterectomy including cervix ICD-10-CM: Z90.710 ICD-9-CM: V88.01  1/31/2018 Prolapse of vaginal vault after hysterectomy ICD-10-CM: N99.3 ICD-9-CM: 618.5  1/31/2018 Rectocele ICD-10-CM: N81.6 ICD-9-CM: 618.04  1/31/2018 Somatization disorder ICD-10-CM: F45.0 ICD-9-CM: 300.81  8/18/2015 ADD (attention deficit disorder) ICD-10-CM: F98.8 ICD-9-CM: 314.00  8/18/2015 Overview Signed 8/18/2015  6:35 PM by Weston SANCHEZ PsyD  
  chronic and moderate (6 out of 10) Diabetes mellitus ([email or web address] 75.) ICD-10-CM: E11.9 ICD-9-CM: 250.00  1/19/2012 Syncope and collapse ICD-10-CM: R55 
ICD-9-CM: 780.2  4/24/2011 Hypertension ICD-10-CM: I10 
ICD-9-CM: 401.9  Unknown Hypercholesterolemia ICD-10-CM: E78.00 ICD-9-CM: 272.0  Unknown Hypothyroidism ICD-10-CM: E03.9 ICD-9-CM: 244.9  Unknown Depression ICD-10-CM: F32.9 ICD-9-CM: 311  Unknown Anxiety ICD-10-CM: F41.9 ICD-9-CM: 300.00  Unknown Urinary frequency ICD-10-CM: R35.0 ICD-9-CM: 788.41  Unknown Chronic venous insufficiency ICD-10-CM: I87.2 ICD-9-CM: 459.81  Unknown Chronic back pain ICD-10-CM: M54.9, G89.29 ICD-9-CM: 724.5, 338.29  Unknown Painful hip ICD-10-CM: M25.559 ICD-9-CM: 719.45  Unknown Current Facility-Administered Medications Medication Dose Route Frequency Provider Last Rate Last Dose  sodium chloride (NS) flush 5-40 mL  5-40 mL IntraVENous Q8H Milan Loja MD      
 sodium chloride (NS) flush 5-40 mL  5-40 mL IntraVENous PRN Milan Loja MD      
 iopamidol (ISOVUE-370) 76 % injection 50 mL  50 mL IntraVENous PRN Milan Loja MD      
 atropine injection 1 mg  1 mg IntraVENous PRN Milan Loja MD      
 fentaNYL citrate (PF) injection  mcg   mcg IntraVENous Multiple Milan Loja MD   25 mcg at 01/16/19 9475  lidocaine (XYLOCAINE) 10 mg/mL (1 %) injection 10-30 mL  10-30 mL SubCUTAneous Multiple Milan Loja MD   30 mL at 01/16/19 9387  midazolam (VERSED) injection 1-10 mg  1-10 mg IntraVENous Multiple Milan Loja MD   2 mg at 01/16/19 5851  
 sodium chloride (NS) flush 5-10 mL  5-10 mL IntraVENous PRN Milan Loja MD      
 bacitracin 50,000 Units in sodium chloride irrigation 0.9 % 500 mL irrigation solution  50,000 Units Irrigation Lizette Duarte MD      
 [facility]) injection 1,000 mg  1 g Topical Lizette Duarte MD      
 cephALEXin [facility]) capsule 250 mg  250 mg Oral TID Irving Peck, DARIN      
 amiodarone (CORDARONE) tablet 200 mg  200 mg Oral DAILY Ahmet Perdomo NP   200 mg at 01/15/19 7000  famotidine (PEPCID) tablet 20 mg  20 mg Oral BID Corazon Wilson MD   20 mg at 01/15/19 1658  levothyroxine (SYNTHROID) tablet 50 mcg  50 mcg Oral Sonda Lesches, MD   Stopped at 01/16/19 0600  
 apixaban (ELIQUIS) tablet 2.5 mg  2.5 mg Oral BID Corazon Wilson MD   2.5 mg at 01/15/19 1658  hydrALAZINE (APRESOLINE) 20 mg/mL injection 10 mg  10 mg IntraVENous Q4H PRN Corazon Wilson MD   10 mg at 01/16/19 0154  hydrALAZINE (APRESOLINE) tablet 25 mg  25 mg Oral TID Corazon Wilson MD   25 mg at 01/15/19 6815  aspirin delayed-release tablet 81 mg  81 mg Oral DAILY Isidoro Ceballos DO   81 mg at 01/15/19 6037  rosuvastatin (CRESTOR) tablet 20 mg  20 mg Oral QHS Dodie Bristol M, DO   20 mg at 01/15/19 2238  carbidopa-levodopa (SINEMET)  mg per tablet 1 Tab  1 Tab Oral TID Lexa Blakely MD   1 Tab at 01/15/19 2236  
 insulin lispro (HUMALOG) injection   SubCUTAneous AC&HS Lexa Blakely MD   2 Units at 01/15/19 2237  acetaminophen (TYLENOL) suppository 650 mg  650 mg Rectal Q4H PRN Delia Cobb MD   650 mg at 01/13/19 2041  sodium chloride (NS) flush 5-40 mL  5-40 mL IntraVENous Q8H Lisa Pope MD   10 mL at 01/16/19 6255  sodium chloride (NS) flush 5-40 mL  5-40 mL IntraVENous PRN Lisa Pope MD   10 mL at 01/08/19 1345  ondansetron (ZOFRAN) injection 4 mg  4 mg IntraVENous Q6H PRN Ky Montero MD   4 mg at 01/09/19 0426  
 naloxone (NARCAN) injection 0.4 mg  0.4 mg IntraVENous PRN Ky Montero MD      
 sodium chloride (NS) flush 5-40 mL  5-40 mL IntraVENous Q8H Red Hurtado MD   10 mL at 01/16/19 0616  
 glucose chewable tablet 16 g  4 Tab Oral PRN Ky Montero MD      
 dextrose (D50W) injection syrg 12.5-25 g  25-50 mL IntraVENous PRN Ky Monteor MD      
 glucagon (GLUCAGEN) injection 1 mg  1 mg IntraMUSCular PRN Ky Montero MD      
 sodium chloride (NS) flush 5-40 mL  5-40 mL IntraVENous Q8H Red Hurtado MD   10 mL at 01/16/19 5963  sodium chloride (NS) flush 5-40 mL  5-40 mL IntraVENous PRN Red Hurtado MD      
 LORazepam (ATIVAN) injection 1 mg  1 mg IntraVENous Q2H PRN Omid Mcneill MD   1 mg at 01/08/19 1758 Allergies Allergen Reactions  Norvasc [Amlodipine] Swelling  Flexeril [Cyclobenzaprine] Other (comments) Caused pt to fall & hallucination  Percocet [Oxycodone-Acetaminophen] Itching  Remeron [Mirtazapine] Diarrhea  Tramadol Other (comments) Caused falls Past Medical History:  
Diagnosis Date  Anxiety  Arthritis  Chest pain 2008 Negative stress test and Holter monitor w/Dr Devon Stovall.  Chronic back pain  Chronic venous insufficiency  Colon polyp   
 last scope normal 2007; Dr. Tana Carr  Cyst of right kidney  Depression  Diabetes ([address]  Hypercholesterolemia  Hypertension  Hypothyroidism  Memory disorder  Other ill-defined conditions(799.89)   
 heart murmur  Painful hip  Parkinson disease ([address]  Urinary frequency Sees Dr. Min Alvarez with [address] Past Surgical History:  
Procedure Laterality Date  COLONOSCOPY,DIAGNOSTIC  6/12/2015  HX BREAST BIOPSY  2002  HX HYSTERECTOMY Due to a prolapsed uterus  HX KNEE REPLACEMENT    
 left  HX KNEE REPLACEMENT  2006  
 right  HX ORTHOPAEDIC B knees.  HX OTHER SURGICAL    
 pelvic prolasp  UPPER GI ENDOSCOPY,BIOPSY  6/12/2015 Family History Problem Relation Age of Onset  Cancer Mother   
     pancreatic  Cancer Father   
     colon  Cancer Sister   
     pancreatic  Diabetes Sister  Alzheimer Brother Social History Tobacco Use  Smoking status: Never Smoker  Smokeless tobacco: Never Used Substance Use Topics  Alcohol use: No  
  Alcohol/week: 0.0 oz  
  Comment: Rare Review of Systems:  
Constitutional: Negative for fever, chills, weight loss, malaise/fatigue. HEENT: Negative for nosebleeds, vision changes. Respiratory: Negative for cough, hemoptysis Cardiovascular: Negative for chest pain, palpitations, orthopnea, claudication, leg swelling, syncope, and PND. Gastrointestinal: Negative for nausea, vomiting, diarrhea, blood in stool and melena. Genitourinary: Negative for dysuria, and hematuria. Musculoskeletal: Negative for myalgias, arthralgia. Skin: Negative for rash. Heme: Does not bleed or bruise easily. Neurological: + slurred speech, left facial droop, BLE weakness, LUE weakness. Expressive aphasia. Objective: Visit Vitals /55 (BP 1 Location: Right arm, BP Patient Position: At rest) Pulse 84 Temp 98.2 °F (36.8 °C) Resp 16 Ht 5' 5\" (1.651 m) Wt 150 lb 9.2 oz (68.3 kg) SpO2 96% BMI 25.06 kg/m² Physical Exam:  
Constitutional: well-developed and well-nourished. No respiratory distress. Head: Normocephalic and atraumatic. Eyes: Pupils are equal, round ENT: Hearing grossly normal 
Neck: supple. No JVD present. Cardiovascular: Normal rate, irregular rhythm. Exam reveals no gallop and no friction rub. 3/6 Systolic murmur. Pulmonary/Chest: Effort normal and breath sounds clear in anterior lung fields. Abdominal: Soft, no tenderness. Musculoskeletal: Trace bilat extremity edema. Neurological: Follows some commands, weakness BLEs, LUE.  + left facial droop. Speaks spontaneously some this morning. Skin: Skin is warm and dry. EKG (01/13/2019): NSR with RBBB (QRSd 124 ms), first degree av block. Assessment/Plan: Ms. Chris Peck was admitted on 01/08/2019 for left side weakness, facial droop, & slurred speech. MRI with multiple embolic appearing infarcts, predominantly involving area around R motor cortex. Received TPA, underwent embolectomy. She also had new left basal ganglia CVA, small left occipital hemorrhage that is stable in size, & increased R side infarct. No prior known history of AF, but paroxysmal AF with RVR (ventricular rate 150) reported during admission; telemetry strips showing this are not currently available. Transient complete AV block with 2.3 second pause present. Eliquis 2.5 mg po bid initiated by primary team.  Currently, patient is on reduced dose of eliquis due to recent tPA and hemorrhagic conversion, but in the long run I recommend considering 5 mg po bid if hemorrhagic conversion risk is over. Currently receiving amiodarone 200 mg po daily. LFTs already done, needs baseline TFT. CXR will be done post pacemaker implant. Pacemaker this morning. Once pacer is in place, atrial lead will also serve to monitor for AT/AF. Keflex 250 mg po tid x 5 days for surgical prophylaxis starting tomorrow. Ancef IV overnight. She will likely be discharged to rehab, will plan follow up in EP clinic in approximately 1 month for wound & device check. Subsequent follow up in EP clinic in 3 months. Future Appointments Date Time Provider Ashleigh Keily 2/22/2019 10:15 AM PACEMAKER3, Yunier BATEMANENA SCHED  
2/22/2019 10:20 AM Areli Delcid  [address]  
6/14/2019  1:30 PM MD Johana Ruiz 87 [address] [place] [facility] 01/16/19 Addendum from EP attending: 
 I have seen, examined patient, and discussed with nurse practitioner, registered nurse, reviewed, updated note and agree with the assessment and plan I have talked to her daughter this am she agrees to proceed with pacemaker Vital signs are stable Exam shows irregular rhythm. She had PAT/PAF when coming to EP lab Lungs clear Assessment and Plan: 
Continue to proceed with pacemaker Add toprol to amiodarone after pacemaker Future Appointments Date Time Provider Ashleigh Michaud 2/22/2019 10:15 AM PACEMAKER3, Yunier IVERSON SCHED  
2/22/2019 10:20 AM Areli Delcid  [address] 6/14/2019  1:30 PM MD Johana Ruiz 87 Thank you for involving me in this patient's care and please call with further concerns or questions. Kathlyne Snellen, M.D. Electrophysiology/Cardiology [facility] and [facility] [address], [facility] [address] [place] [address], [address] [place] [address] 
[phone]

## 2019-01-16 NOTE — PROGRESS NOTES
Cardiac Cath Lab Procedure Area Arrival Note: 
 
Michelle Levi arrived to Cardiac Cath Lab, Procedure Area. Patient identifiers verified with NAME and DATE OF BIRTH. Procedure verified with patient. Consent forms verified. Allergies verified. Patient informed of procedure and plan of care. Questions answered with review. Patient voiced understanding of procedure and plan of care. Patient on cardiac monitor, non-invasive blood pressure, SPO2 monitor. On ra, placed om 
n  O2 @ 2 lpm via nc. IV of ns on pump at 25 ml/hr. Patient status doing well without problems. Patient is A&Ox 3. Patient reports sore all over. Patient medicated during procedure with orders obtained and verified by Dr. Charmayne Angst. Refer to patients Cardiac Cath Lab PROCEDURE REPORT for vital signs, assessment, status, and response during procedure, printed at end of case. Printed report on chart or scanned into chart.

## 2019-01-16 NOTE — PROGRESS NOTES
TRANSFER - OUT REPORT: 
 
Verbal report given to Roula Osunaing being transferred to   for routine progression of care Report consisted of patients Situation, Background, Assessment and  
Recommendations(SBAR). Information from the following report(s) Procedure Summary was reviewed with the receiving nurse. Opportunity for questions and clarification was provided.

## 2019-01-16 NOTE — PROGRESS NOTES
Speech Pathology:  Chart reviewed. Attempted to see patient for dysphagia treatment however patient off floor for procedure. SLP to continue to follow. Thank you.  
 
Kan Box, SLP

## 2019-01-16 NOTE — PROGRESS NOTES
07:30 Bedside shift change report given to CHELSEA (oncoming nurse) by Micheline Deejsus (offgoing nurse). Report included the following information SBAR, Kardex, Intake/Output, MAR and Recent Results.

## 2019-01-17 NOTE — ROUTINE PROCESS
Bedside shift change report given to Jae Fonseca (oncoming nurse) by Bibiana Rehman (offgoing nurse). Report included the following information SBAR, ED Summary, MAR and Cardiac Rhythm Paced.

## 2019-01-17 NOTE — PROGRESS NOTES
NUTRITION COMPLETE ASSESSMENT 
 
RECOMMENDATIONS:  
Consider adding daily or PRN bowel regimen since patient had BM yesterday after getting tylenol suppository yesterday Resume Megace OR consider using Marinol (since increased safety for elderly females) Encourage or offer supplements at end/between meals If oral intake remains poor-consider DHT for nocturnal tube feeding to supplement oral intake. Suggest: Osmolite 1.5 @ 50 ml/hr x 12 hr with 75 ml water flush q 4 hr during feeding. Interventions/Plan:  
Food/Nutrient Delivery:  Modify diet/texture/consistency/nutrients-Pureed,  Commercial supplement-Ensure Compact and Ensure [employer] Assessment:  
Reason for Assessment:  
[x] Reassessment Provider Consult-general nutrition management and supplements; tube feeding recommendations Diet: Puree Supplements: Ensure Compact BID & Enlive x1 Nutritionally Significant Medications: [x] Reviewed & Includes: correction scale insulin, KCL, Synthroid, NS @ 50 ml/hr, magnesium sulfate Meal Intake: No data found. Daughter brings in foods & describes to RD some of these foods (baked potato, mashed potato, pureed northern beans, sorbet) Subjective: Pt awake and communicating with daughter. Spoke with daughter & she states her \"mother won't eat any of the hospital food so, I bring in what she likes\" Objective: Ms Yadira Fitzgerald was admitted d/t AMS. Noted: Acute (R) MCA infarct-s/p TPA and thrombectomy; AMS improving; SLP following. PMHx: DM 2, CKD, HTN, others noted. Per weight trends in EHR patient has lost 20# (11%) in the past year which is not considered significant. Per daughter pt started on Megace about a month ago with a little improvement in oral intake. MD may want to consider adding Marinol since it has been shown safer in elderly females. Potassium remains low & being replaced. SLP has advanced diet.  Concerns about pt's ability to meet nutritional needs d/t fatigue con't. She has Ensure at beside but has not had any yet today per daughter & unsure if she has had any since they have been ordered. Encourage pt to consume at end/between meals to increase nutrient intake. Wt remains between 76 - 70kg this admission thus far. If oral intake remains poor-consider DHT for nocturnal tube feeding to supplement oral intake. Suggest: Osmolite 1.5 @ 50 ml/hr x 12 hr with 75 ml water flush q 4 hr during feeding. This will provide 600 ml, 900 calories, 38 gm protein and 680 ml free free water (tube feeding/flush) to meet 55-66% estimated protein and energy needs, respectively. Estimated Nutrition Needs:  
Kcals/day: 1360 Kcals/day(MSJ x 1.2) Protein: 69 g(69-83 (1.0-1.2g/kg) Fluid: (1 ml/kcal) Based On: [address] Weight Used: Actual wt(69 kg) Pt expected to meet estimated nutrient needs:  []   Yes     []  No  [x] Unable to predict at this time (daughter brings in foods from home to feed patient) Nutrition Diagnosis:  
1. Inadequate oral intake related to acute (R) MCA infarct as evidenced by slow diet advancement. Goals:   
 Consume 50% of meals and 1-2 supplements per day in next 5-7 days. Monitoring & Evaluation: - Total energy intake, Protein intake - Weight/weight change Previous Nutrition Goals Met: 
Progressing slowly Previous Recommendations: No - pt will benefit from resuming appetite stimulant Education & Discharge Needs: 
 [x] None Identified 
 [] Identified and addressed [x] Participated in care plan, discharge planning, and/or interdisciplinary rounds Cultural, Caodaism and ethnic food preferences identified: 
 None Skin Integrity: [x]Intact  []Other Edema: [x]None []Other Last BM: 1/16/19 Food Allergies: [x]None []Other Anthropometrics:   
Weight Loss Metrics 1/17/2019 12/17/2018 12/14/2018 10/23/2018 10/5/2018 7/10/2018 6/22/2018 Today's Wt 150 lb 5.7 oz 147 lb 147 lb 154 lb 155 lb 3.2 oz 163 lb 12.8 oz 158 lb 9.6 oz  
BMI 25.02 kg/m2 24.46 kg/m2 24.46 kg/m2 25.63 kg/m2 25.83 kg/m2 27.26 kg/m2 26.39 kg/m2 Last 3 Recorded Weights in this Encounter 01/15/19 0136 01/16/19 0600 01/17/19 0200 Weight: 69.3 kg (152 lb 12.5 oz) 68.3 kg (150 lb 9.2 oz) 68.2 kg (150 lb 5.7 oz) Weight Source: Bed Height: 5' 5\" (165.1 cm), Body mass index is 25.02 kg/m². IBW : 56.7 kg (125 lb), % IBW (Calculated): 121.34 % 
 ,   
 
Labs:   
Lab Results Component Value Date/Time Sodium 142 01/16/2019 02:51 AM  
 Potassium 3.0 (L) 01/16/2019 02:51 AM  
 Chloride 107 01/16/2019 02:51 AM  
 CO2 27 01/16/2019 02:51 AM  
 Glucose 107 (H) 01/16/2019 02:51 AM  
 BUN 13 01/16/2019 02:51 AM  
 Creatinine 0.62 01/16/2019 02:51 AM  
 Calcium 8.4 (L) 01/16/2019 02:51 AM  
 Magnesium 1.9 01/16/2019 02:51 AM  
 Phosphorus 2.4 (L) 01/11/2019 03:09 AM  
 Albumin 2.5 (L) 01/11/2019 03:09 AM  
 
Lab Results Component Value Date/Time Hemoglobin A1c 5.5 01/09/2019 05:19 AM  
 Hemoglobin A1c (POC) 6.6 (A) 02/02/2015 04:32 PM  
 
Lab Results Component Value Date/Time Glucose (POC) 141 (H) 01/10/2019 01:33 PM  
  
Lab Results Component Value Date/Time ALT (SGPT) <6 (L) 01/11/2019 03:09 AM  
 AST (SGOT) 25 01/11/2019 03:09 AM  
 Alk. phosphatase 55 01/11/2019 03:09 AM  
 Bilirubin, total 0.3 01/11/2019 03:09 AM  
  
 
Itzel Kuhn RD, MS, CDE Pager #[phone]

## 2019-01-17 NOTE — PROGRESS NOTES
Problem: Pressure Injury - Risk of 
Goal: *Prevention of pressure injury Document Clint Scale and appropriate interventions in the flowsheet. Outcome: Progressing Towards Goal 
Pressure Injury Interventions: 
Sensory Interventions: Assess changes in LOC, Assess need for specialty bed, Keep linens dry and wrinkle-free, Maintain/enhance activity level, Minimize linen layers, Turn and reposition approx. every two hours (pillows and wedges if needed) Moisture Interventions: Absorbent underpads, Assess need for specialty bed, Check for incontinence Q2 hours and as needed, Limit adult briefs Activity Interventions: PT/OT evaluation Mobility Interventions: PT/OT evaluation Nutrition Interventions: Document food/fluid/supplement intake Friction and Shear Interventions: Lift sheet, Lift team/patient mobility team, HOB 30 degrees or less, Transferring/repositioning devices

## 2019-01-17 NOTE — PROGRESS NOTES
Pharmacist Discharge Medication Reconciliation Discharging Provider: Dr. Denver Ruelas Significant PMH:  
Past Medical History:  
Diagnosis Date  Anxiety  Arthritis  Chest pain 2008 Negative stress test and Holter monitor w/Dr Bal Nino.  Chronic back pain  Chronic venous insufficiency  Colon polyp   
 last scope normal 2007; Dr. Mckenna Kellogg  Cyst of right kidney  Depression  Diabetes ([facility]  Hypercholesterolemia  Hypertension  Hypothyroidism  Memory disorder  Other ill-defined conditions(799.89)   
 heart murmur  Painful hip  Parkinson disease ([facility]  Urinary frequency Sees Dr. Rip Granger with [address] Chief Complaint for this Admission: Chief Complaint Patient presents with  Extremity Weakness  Facial Droop  Dysarthria Allergies: Norvasc [amlodipine]; Flexeril [cyclobenzaprine]; Percocet [oxycodone-acetaminophen]; Remeron [mirtazapine]; and Tramadol Discharge Medications:  
Current Discharge Medication List  
  
 
START taking these medications Details  
apixaban (ELIQUIS) 2.5 mg tablet Take 1 Tab by mouth two (2) times a day. Qty: 30 Tab, Refills: 2  
  
cephALEXin (KEFLEX) 250 mg capsule Take 1 Cap by mouth three (3) times daily for 4 days. Qty: 12 Cap, Refills: 0  
  
hydrALAZINE (APRESOLINE) 25 mg tablet Take 1 Tab by mouth three (3) times daily. Qty: 30 Tab, Refills: 3  
  
metoprolol succinate (TOPROL-XL) 25 mg XL tablet Take 1 Tab by mouth daily. Qty: 30 Tab, Refills: 3 CONTINUE these medications which have NOT CHANGED Details  
amLODIPine-atorvastatin (CADUET) 10-10 mg per tablet Take 1 Tab by mouth daily. glimepiride (AMARYL) 4 mg tablet Take  by mouth every morning. SITagliptin-metFORMIN (JANUMET)  mg per tablet Take 1 Tab by mouth two (2) times daily (with meals). carbidopa-levodopa (SINEMET)  mg per tablet TAKE 1 TABLET BY MOUTH THREE TIMES DAILY Qty: 270 Tab, Refills: 2 Comments: **Patient requests 90 days supply** Associated Diagnoses: PD (Parkinson's disease) ([address]  
  
iron,carbonyl-vitamin C (VITRON-C) 65 mg iron- 125 mg TbEC Take  by mouth. rosuvastatin (CRESTOR) 20 mg tablet TAKE 1 TABLET BY MOUTH EVERY DAY Qty: 90 Tab, Refills: 0  
  
levothyroxine (SYNTHROID) 50 mcg tablet TAKE 1 TABLET BY MOUTH DAILY BEFORE BREAKFAST Qty: 90 Tab, Refills: 3 Comments: **Patient requests 90 days supply** Associated Diagnoses: Hypothyroidism due to acquired atrophy of thyroid  
  
aspirin delayed-release 81 mg tablet Take 81 mg by mouth daily. The patient's chart, MAR and AVS were reviewed by Chela Rascon.

## 2019-01-17 NOTE — PROGRESS NOTES
Bedside and Verbal shift change report given to Sukhdev (oncoming nurse) by Reuben Lao (offgoing nurse). Report included the following information SBAR, Kardex, Intake/Output, MAR and Cardiac Rhythm [id number].

## 2019-01-17 NOTE — PROGRESS NOTES
Bedside and Verbal shift change report given to Annmarie Webb RN (oncoming nurse) by Michelle Maria RN (offgoing nurse). Report included the following information SBAR, Kardex, ED Summary, Procedure Summary, Intake/Output, MAR, Recent Results and Cardiac Rhythm Paced.

## 2019-01-17 NOTE — PROGRESS NOTES
TRANSFER - OUT REPORT: 
 
Verbal report given to Loi Gutierrez RN(name) on Romana Logan  being transferred to [facility]) for routine progression of care Report consisted of patients Situation, Background, Assessment and  
Recommendations(SBAR). Information from the following report(s) SBAR, Kardex, Intake/Output, MAR, Recent Results and Cardiac Rhythm paced was reviewed with the receiving nurse. Lines:  
Peripheral IV 01/13/19 Right Forearm (Active) Site Assessment Clean, dry, & intact 1/17/2019 12:00 PM  
Phlebitis Assessment 0 1/17/2019 12:00 PM  
Infiltration Assessment 0 1/17/2019 12:00 PM  
Dressing Status Clean, dry, & intact 1/17/2019 12:00 PM  
Dressing Type Tape;Transparent 1/17/2019 12:00 PM  
Hub Color/Line Status Yellow;Capped 1/17/2019 12:00 PM  
Action Taken Open ports on tubing capped 1/17/2019 12:00 PM  
Alcohol Cap Used Yes 1/17/2019 12:00 PM  
  
 
Opportunity for questions and clarification was provided. Patient transported with: 
EMT

## 2019-01-17 NOTE — DISCHARGE SUMMARY
Discharge Summary PATIENT ID: Evan Nieves MRN: [id number] YOB: 1932 DATE OF ADMISSION: 1/8/2019  1:02 PM   
DATE OF DISCHARGE: 1/17/2019 PRIMARY CARE PROVIDER: Marlyn Mcelroy MD  
 
 
DISCHARGING PHYSICIAN: Ramses Nettles MD   
To contact this individual call [phone] and ask the  to page. If unavailable ask to be transferred the Adult Hospitalist Department. CONSULTATIONS: IP CONSULT TO HOSPITALIST 
IP CONSULT TO NEUROLOGY 
IP CONSULT TO UROLOGY 
IP CONSULT TO PALLIATIVE CARE - PROVIDER 
IP CONSULT TO ELECTROPHYSIOLOGY PROCEDURES/SURGERIES: * No surgery found * [address] COURSE: The patient is a 59-year-old female with history of diabetes, colonic polyps, increased urinary frequency, chronic kidney disease, history of chronic back pain, depression, hypercholesterolemia, hypertension, thoracic radicular pain, hypothyroidism and syncope, who presents to the hospital via EMS due to altered mental status. Patient had a CT of the head showed acute thromboembolic occlusion of the proximal to mid posterior division M2 segment right middle cerebral artery. She was administered tpa and subsequently underwent thrombectomy which showed organized fibrinous clot with source probably cardio embolic. She was started on low dose eliquis. She will be discharged to [facility] care rehab for rehabilitation. DISCHARGE DIAGNOSES / PLAN:   
 
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 
-MRI brain showed acute right MCA territory infarct,other small scattered acute supratentorial and infratentorial right-sided infarcts and  a small left occipital hemorrhage 
-neurology  following,appreciate recommendations 
-echo showed normal EF.  
-CINDY did not show any clots -As she had new deficits on 1/12 -RLE weakness- repeated MRI which showed new infarcts. Left occipital hemorrhage stable. -Found to have paroxysmal a fib on 1/13. 
-Continue eliquis 2.5 mg BID now -started on lower dose due to occipital hemorrhage. 
-Continue PT/OT, rehab 
  
#Paroxysmal atrial fib: 
- atrioventricular block 
-with intermittent high heart rate and sinus pauses - S/p dual chamber pace maker tomorrow. - Continue keflex 250 mg TID last dose 1/21/19 for prophylaxis.  
  
#HTN: 
-BP goal less than 140 
-will give amlodipine and prn hydralazine. 
  
#Difficulty duran placement: 
-Gyn team placed a coude catheter. 
-need to talk to GYN team prior to removal. 
- Duran catheter removed.  
  
#Chronic anemia: 
-Hb at admission around 10.3,likely hemo concentrated sample 
-trending down -7.3 today 
-will follow iron and ferritin results -no evidence of active bleeding 
  
# Hypokalemia:replete 
  
#Diabetes type 2: BG is stable 
-on glimepiride,sitagliptin-metformin 
-will continue SSI for now and monitor BS 
  
#Parkinson's disease: 
- Continue sinemet. Stable 
  #hypothyrodism: 
-on synthroid  
  
  
Mild elevation of troponin:trended down. - in the setting of stroke ?demand ischemia 
  
Code status: full DVT prophylaxis: eliquis 
  
Disposition prior to discharge: She is hemodynamically stable and has improved. D/c [facility] PENDING TEST RESULTS:  
At the time of discharge the following test results are still pending: FOLLOW UP APPOINTMENTS:   
Follow-up Information Follow up With Specialties Details Why Contact Info Fransisco Benjamin MD Internal Medicine   [address] [facility] [address] [facility] 
[phone] ADDITIONAL CARE RECOMMENDATIONS:  
 
DIET: Pureed Diet ACTIVITY: As tolerated WOUND CARE:  
 
EQUIPMENT needed:  
 
 
DISCHARGE MEDICATIONS: 
Current Discharge Medication List  
  
START taking these medications Details apixaban (ELIQUIS) 2.5 mg tablet Take 1 Tab by mouth two (2) times a day. Qty: 30 Tab, Refills: 2  
  
cephALEXin (KEFLEX) 250 mg capsule Take 1 Cap by mouth three (3) times daily for 4 days. Qty: 12 Cap, Refills: 0  
  
hydrALAZINE (APRESOLINE) 25 mg tablet Take 1 Tab by mouth three (3) times daily. Qty: 30 Tab, Refills: 3  
  
metoprolol succinate (TOPROL-XL) 25 mg XL tablet Take 1 Tab by mouth daily. Qty: 30 Tab, Refills: 3 CONTINUE these medications which have NOT CHANGED Details  
amLODIPine-atorvastatin (CADUET) 10-10 mg per tablet Take 1 Tab by mouth daily. glimepiride (AMARYL) 4 mg tablet Take  by mouth every morning. SITagliptin-metFORMIN (JANUMET)  mg per tablet Take 1 Tab by mouth two (2) times daily (with meals). carbidopa-levodopa (SINEMET)  mg per tablet TAKE 1 TABLET BY MOUTH THREE TIMES DAILY Qty: 270 Tab, Refills: 2 Comments: **Patient requests 90 days supply** Associated Diagnoses: PD (Parkinson's disease) ([facility] [address]  
  
iron,carbonyl-vitamin C (VITRON-C) 65 mg iron- 125 mg TbEC Take  by mouth. rosuvastatin (CRESTOR) 20 mg tablet TAKE 1 TABLET BY MOUTH EVERY DAY Qty: 90 Tab, Refills: 0  
  
levothyroxine (SYNTHROID) 50 mcg tablet TAKE 1 TABLET BY MOUTH DAILY BEFORE BREAKFAST Qty: 90 Tab, Refills: 3 Comments: **Patient requests 90 days supply** Associated Diagnoses: Hypothyroidism due to acquired atrophy of thyroid  
  
aspirin delayed-release 81 mg tablet Take 81 mg by mouth daily. NOTIFY YOUR PHYSICIAN FOR ANY OF THE FOLLOWING:  
Fever over 101 degrees for 24 hours. Chest pain, shortness of breath, fever, chills, nausea, vomiting, diarrhea, change in mentation, falling, weakness, bleeding. Severe pain or pain not relieved by medications. Or, any other signs or symptoms that you may have questions about. DISPOSITION: 
  Home With: 
 OT  PT  HH  RN  
  
x Long term SNF/Inpatient Rehab Independent/assisted living Hospice Other:  
 
 
PATIENT CONDITION AT DISCHARGE:  
 
Functional status Poor   
x Deconditioned Independent Cognition  
 x Lucid Forgetful Dementia Catheters/lines (plus indication) Rivers PICC   
 PEG   
x None Code status Full code   
x DNR PHYSICAL EXAMINATION AT DISCHARGE: 
 Refer to Progress Note Constitutional:  elderly patient ENT:  Oral mucous dry, drooling secretion from the mouth Resp:  slightly decreased breath sounds with gurgling sound. No wheezing/rhonchi/rales. CV:  Regular rhythm, normal rate, no murmurs, gallops, rubs GI:  Soft, non distended, non tender. normoactive bowel sounds Musculoskeletal:  No edema Neurologic: She is A&O X 2,very minimal movement of  LUE and LLE 3/5,RUE and RLE   
 
 
 
 
 
CHRONIC MEDICAL DIAGNOSES: 
Problem List as of 1/17/2019 Date Reviewed: 1/8/2019 Codes Class Noted - Resolved Pacemaker ICD-10-CM: Z95.0 ICD-9-CM: V45.01  1/16/2019 - Present Overview Signed 1/16/2019  9:41 AM by Sonu Rubi MD  
  1/16/2019 dual chamber [facility] [place] pacer * (Principal) Cerebrovascular accident (CVA) due to embolism ([facility] [address] ICD-10-CM: I63.9 ICD-9-CM: 434.11  1/8/2019 - Present Type 2 diabetes with nephropathy (HCC) ICD-10-CM: E11.21 
ICD-9-CM: 250.40, 583.81  7/10/2018 - Present  
   
 TIA (transient ischemic attack) ICD-10-CM: G45.9 ICD-9-CM: 435.9  6/17/2018 - Present ARF (acute renal failure) (HCC) ICD-10-CM: N17.9 ICD-9-CM: 584.9  6/15/2018 - Present Postmenopausal atrophic vaginitis ICD-10-CM: N95.2 ICD-9-CM: 627.3  1/31/2018 - Present History of hysterectomy including cervix ICD-10-CM: Z90.710 ICD-9-CM: V88.01  1/31/2018 - Present Prolapse of vaginal vault after hysterectomy ICD-10-CM: N99.3 ICD-9-CM: 618.5  1/31/2018 - Present Rectocele ICD-10-CM: N81.6 ICD-9-CM: 618.04  1/31/2018 - Present Somatization disorder ICD-10-CM: F45.0 ICD-9-CM: 300.81  8/18/2015 - Present ADD (attention deficit disorder) ICD-10-CM: F98.8 ICD-9-CM: 314.00  8/18/2015 - Present Overview Signed 8/18/2015  6:35 PM by Galo SANCHEZ PsyD  
  chronic and moderate (6 out of 10) Diabetes mellitus ([facility] [address] ICD-10-CM: E11.9 ICD-9-CM: 250.00  1/19/2012 - Present Syncope and collapse ICD-10-CM: R55 
ICD-9-CM: 780.2  4/24/2011 - Present Hypertension ICD-10-CM: I10 
ICD-9-CM: 401.9  Unknown - Present Hypercholesterolemia ICD-10-CM: E78.00 ICD-9-CM: 272.0  Unknown - Present Hypothyroidism ICD-10-CM: E03.9 ICD-9-CM: 244.9  Unknown - Present Depression ICD-10-CM: F32.9 ICD-9-CM: 311  Unknown - Present Anxiety ICD-10-CM: F41.9 ICD-9-CM: 300.00  Unknown - Present Urinary frequency ICD-10-CM: R35.0 ICD-9-CM: 788.41  Unknown - Present Chronic venous insufficiency ICD-10-CM: I87.2 ICD-9-CM: 459.81  Unknown - Present Chronic back pain ICD-10-CM: M54.9, G89.29 ICD-9-CM: 724.5, 338.29  Unknown - Present Painful hip ICD-10-CM: M25.559 ICD-9-CM: 719.45  Unknown - Present Greater than 30 minutes were spent with the patient on counseling and coordination of care Signed:  
Kim Jovel MD 
1/17/2019 12:47 PM

## 2019-01-17 NOTE — PROGRESS NOTES
Problem: Mobility Impaired (Adult and Pediatric) Goal: *Acute Goals and Plan of Care (Insert Text) Physical Therapy Goals Reassessed and remain appropriate 1/17 Initiated 1/10/2019 1. Patient will move from supine to sit and sit to supine  in bed with maximal assistance within 7 day(s). 2.  Patient will transfer from bed to chair and chair to bed with maximal assistance using the least restrictive device within 7 day(s). 3.  Patient will perform sit to stand with maximal assistance within 7 day(s). 4.  Patient will ambulate with maximal assistance for 5 feet with the least restrictive device within 7 day(s). 6.  Patient will improve Tavarez Balance score by 7 points within 7 days. physical Therapy TREATMENT: WEEKLY REASSESSMENT Patient: Cindi Garsia (3 y.o. female) Date: 1/17/2019 Diagnosis: CVA (cerebral vascular accident) [facility]) [I63.9] Cerebrovascular accident (CVA) due to embolism ([facility] [address] Precautions: Fall Chart, physical therapy assessment, plan of care and goals were reviewed. ASSESSMENT: 
Pt received supine in bed and agreeable to therapy. Pt more alert and communicative this session. Pt tolerated session fairly. Pt completed bed mobility with max assist x 2. Pt with fair-poor seated balance requiring CGA briefly, but max assist for support due to posterior and lateral trunk leaning. Pt demonstrating some evidence of pusher syndrome and needs max verbal, visual and facilitation cues to obtain neutral balance. Pt assisted back to supine position with max assist x 2 and elevated LUE on multiple pillows for edema control. Pt will continue to benefit from PT to progress mobility as tolerated. Pt will benefit from inpatient rehab upon discharge, however family prefers to discharge to [address] [place] [address],[address] initially. Patient's progression toward goals since last assessment: no goals have been met, but progress has been made towards goals PLAN: 
 Goals have been updated based on progression since last assessment. Patient continues to benefit from skilled intervention to address the above impairments. Continue to follow the patient 5 times a week to address goals. Planned Interventions: 
[x]              Bed Mobility Training             [x]       Neuromuscular Re-Education [x]              Transfer Training                   []       Orthotic/Prosthetic Training 
[x]              Gait Training                         []       Modalities [x]              Therapeutic Exercises           []       Edema Management/Control [x]              Therapeutic Activities            [x]       Patient and Family Training/Education []              Other (comment): 
Discharge Recommendations: Inpatient Rehab Further Equipment Recommendations for Discharge: tbd SUBJECTIVE:  
Patient stated I'm doing  A little better.  OBJECTIVE DATA SUMMARY:  
Critical Behavior: 
Neurologic State: Alert Orientation Level: Disoriented to situation, Oriented to person, Oriented to place, Oriented to time Cognition: Decreased attention/concentration, Follows commands Safety/Judgement: Not assessed Strength:  
  
 LEs: generally weak LUE: flaccid Functional Mobility Training: 
Bed Mobility: 
  
Supine to Sit: Maximum assistance;Assist x2 Sit to Supine: Maximum assistance;Assist x2 Transfers: 
  
  
     
  
     
  
  
  
  
Balance: 
Sitting: Impaired Sitting - Static: Fair (occasional) Sitting - Dynamic: Poor (constant support)Ambulation/Gait Training: 
  
  
 Neuro Re-Education: 
Seated balance EOB with CGA-max A. Supported LUE on pillows. Pt able to maintain static sitting balance with CGA x 8 seconds and reverted back to needing max assist for support. Pt pushing through RUE towards the L. Therapeutic Exercises:  
 
Pain: 
Pain Scale 1: Numeric (0 - 10) Pain Intensity 1: 0 Activity Tolerance:  
Fair.  vss 
 Please refer to the flowsheet for vital signs taken during this treatment. After treatment:  
[]  Patient left in no apparent distress sitting up in chair 
[x]  Patient left in no apparent distress in bed 
[x]  Call bell left within reach [x]  Nursing notified 
[x]  Caregiver present 
[]  Bed alarm activated COMMUNICATION/COLLABORATION:  
The patients plan of care was discussed with: Occupational Therapist and Registered Nurse Catherine Villarreal, PT, DPT Time Calculation: 16 mins

## 2019-01-17 NOTE — PROGRESS NOTES
Problem: Falls - Risk of 
Goal: *Absence of Falls Document Madison Chaka Fall Risk and appropriate interventions in the flowsheet. Outcome: Progressing Towards Goal 
Fall Risk Interventions: 
Mobility Interventions: Bed/chair exit alarm, Communicate number of staff needed for ambulation/transfer, Patient to call before getting OOB, PT Consult for mobility concerns Mentation Interventions: Adequate sleep, hydration, pain control, Bed/chair exit alarm, Door open when patient unattended, HELP ([address]) if available, Increase mobility, More frequent rounding, Reorient patient Medication Interventions: Bed/chair exit alarm, Patient to call before getting OOB, Teach patient to arise slowly Elimination Interventions: Bed/chair exit alarm, Call light in reach, Toileting schedule/hourly rounds History of Falls Interventions: Bed/chair exit alarm, Door open when patient unattended, Room close to nurse's station

## 2019-01-17 NOTE — DISCHARGE INSTRUCTIONS
PATIENT INSTRUCTIONS POST-PACEMAKER IMPLANT    1. No heavy lifting or exercises with the left arm for 4 weeks. This includes the following:  Do not raise arm above the shoulder level to comb hair, pull on clothes, etc... Do not use the affected arm to pull up or push up from a seated or laying  down position. Do not allow anyone else to pull on the affected arm. 2.  Dressing should remain in place x approx 1 week. Keep site clean & dry for 1 week or until dressing is removed. 3.  Do not drive for 3 days    4. Call Dr. Maru Salguero at [phone] if you experience any of the following symptoms:  1. Redness at the pacemaker site  2. Swelling at or around the pacemaker or in the left arm  3. Pain around the pacemaker  4. Dizziness, lightheadedness, fainting spells  5. Lack of energy  6. Shortness of breath  7. Rapid heart rate  8. Chest or muscle twitches    5. Follow-up with Dr. Maru Salguero as scheduled  Future Appointments   Date Time Provider Ashleigh Michaud   1/25/2019  8:45 AM PACEMAKER3, [address]   1/25/2019  9:00 AM Citlaly Kauffman  [address]   6/14/2019  1:30 PM Margarito Lang [address]     6. You may use pain medication and ice pack for pain relief as needed. You may wear the sling as a reminder to keep your arm below the your shoulder. Yris Sanders M.D.  [facility]  Electrophysiology/Cardiology  [address] [place] [address] Vascular [place]  [facility], [address] , [address]  [phone]         Discharge Instructions       PATIENT ID: Solange Cantrell  MRN: [id number]   YOB: 1932    DATE OF ADMISSION: 1/8/2019  1:02 PM    DATE OF DISCHARGE: 1/17/2019    PRIMARY CARE PROVIDER: Paz Gaytan MD       DISCHARGING PHYSICIAN: Vipin Barajas MD    To contact this individual call [phone] and ask the  to page. If unavailable ask to be transferred the Adult Hospitalist Department. DISCHARGE DIAGNOSES Acute MCA infarct s/p TPA, Complete atrio ventricular block    CONSULTATIONS: IP CONSULT TO HOSPITALIST  IP CONSULT TO NEUROLOGY  IP CONSULT TO UROLOGY  IP CONSULT TO PALLIATIVE CARE - PROVIDER  IP CONSULT TO ELECTROPHYSIOLOGY    Follow up with Neurologist-call within one week for appointment [phone]    PROCEDURES/SURGERIES: * No surgery found *    PENDING TEST RESULTS:   At the time of discharge the following test results are still pending:     FOLLOW UP APPOINTMENTS:   Follow-up Information     Follow up With Specialties Details Why Contact Info    Tamia Pires MD Internal Medicine   [address]  [facility] [address]  [facility]  [phone]             ADDITIONAL CARE RECOMMENDATIONS:     DIET: Pureed Diet    ACTIVITY: Activity as tolerated  WOUND CARE:     EQUIPMENT needed:       DISCHARGE MEDICATIONS:   See Medication Reconciliation Form    · It is important that you take the medication exactly as they are prescribed. · Keep your medication in the bottles provided by the pharmacist and keep a list of the medication names, dosages, and times to be taken in your wallet. · Do not take other medications without consulting your doctor. NOTIFY YOUR PHYSICIAN FOR ANY OF THE FOLLOWING:   Fever over 101 degrees for 24 hours. Chest pain, shortness of breath, fever, chills, nausea, vomiting, diarrhea, change in mentation, falling, weakness, bleeding. Severe pain or pain not relieved by medications. Or, any other signs or symptoms that you may have questions about.       DISPOSITION:    Home With:   OT  PT  HH  RN      x SNF/Inpatient Rehab/LTAC    Independent/assisted living    Hospice    Other:     CDMP Checked:   Yes x       Signed:   Margarita Mobley MD  1/17/2019  1:03 PM

## 2019-01-17 NOTE — PROGRESS NOTES
I have reviewed discharge instructions with the patient and jack. The patient and daughter verbalized understanding.

## 2019-01-18 NOTE — PROGRESS NOTES
Transition of Care Coordination/Hospital to Post Acute Facility: 
  
Date/Time:  1/18/2019 10:56 AM 
 
Patient was admitted to [facility] on 1/8/19 and discharged on 1/17/19 for Acute right CVA - thromboembolic occlusion of MCA M2 s/p tpa and thrombectomy on 1/8/2019, New left basal ganglia stroke and increased rt sided infarcts on 1/12, paroxysmal atrial fibrillation s/p dual chamber pacemaker placement 1/16/19. Patient was transferred to [facility] for continuation of care. Inpatient RRAT score: 37 Top Challenges reviewed - chronic anemia - no evidence of active bleeding this admission. Hemoglobin 1/14/19 - 8.2 (L) compared to admission result of 10.3 (L); per IP documentation, admission result likely hemo concentrated sample 
 
- started on lower dose of Eliquis due to occipital hemorrhage. 
 
- discharged on keflex 250 mg three times daily (last dose 1/21/19) for surgical prophylaxis s/p pacemaker placement - Pureed Diet 
 
- Echocardiogram 1/11/19 - EF estimated in the range of 55%-60% - to f/u with Dr. Maria Esther Espinoza (Cardiology) in 4 weeks - Palliative Medicine consulted this admission to assist with code status; AMD and DDNR completed this admission Method of communication with care team :chart routing Nurse Navigator(NN) outreach to [employer]) today to attempt to review medication reconciliation and current facility orders; unable to reach nursing/clinical staff. ACP:  
Does the patient have a current ACP (including DDNR):  yes Does the post acute facility have a copy of the patients ACP:  unknown Medication(s):  
New Medications at Discharge: eliquis, keflex, hydralazine, toprol-xl Changed Medications at Discharge: none Discontinued Medications at Discharge: none PCP/Specialist follow up:  
Future Appointments Date Time Provider Ashleigh Michaud 2/22/2019 10:15 AM PACEMAKER3, [address]  
 2/22/2019 10:20 AM Suleiman Ram,  [address]  
6/14/2019  1:30 PM MD Johana Matos [address] Opportunity to ask questions was provided. Contact information was provided for future reference or further questions. Will continue to monitor.

## 2019-01-20 NOTE — ED NOTES
Turned pt s/s, CHG bath given, placed on clean linens. Small amount of black stool in pad, perineal care done and clean brief placed. Pt positioned up in bed for comfort. Family back to bedside.

## 2019-01-20 NOTE — ED PROVIDER NOTES
EMERGENCY DEPARTMENT HISTORY AND PHYSICAL EXAM 
 
 
Date: 1/20/2019 Patient Name: Wolf Love History of Presenting Illness Chief Complaint Patient presents with  Low Blood Sugar  
  EMS reports pt found unresponsive, drooling with FSBS 33 atr [facility]. 1mg glucaon given; EMS starte D10 IV. Initial FSBS 104. History Provided By: Patient, Patient's Daughter and EMS 
 
HPI: Wolf Love, 80 y.o. female with PMHx significant for Parkinson's dz, CVA, paroxsymal a-fib, HTN, DM, hypercholesterolemia, arthritis, presents via EMS transport from [facility] to the ED after being found unresponsive, drooling, and with FSBS of 33 just PTA. Per EMS, pt was given 1mg IM glucagon and started on a D10 drip. They state FSBS was rechecked and was 104. Pt states \"I don't feel well\" but denies any current pain. Daughter reports that she found the pt drooling and diaphoretic, stating \"she never sweats\" and notes that the pt would not answer any of her questions. She also notes that the pt has not been eating well, stating that she eats pureed foods at [facility] which she states pt does not like to eat. History is limited due to AMS. There are no other complaints, changes, or physical findings at this time. Social Hx: - EtOH; - Smoker; - Illicit Drugs PCP: Cindy Cazares MD 
 
Current Outpatient Medications Medication Sig Dispense Refill  apixaban (ELIQUIS) 2.5 mg tablet Take 1 Tab by mouth two (2) times a day. 30 Tab 2  cephALEXin (KEFLEX) 250 mg capsule Take 1 Cap by mouth three (3) times daily for 4 days. 12 Cap 0  
 hydrALAZINE (APRESOLINE) 25 mg tablet Take 1 Tab by mouth three (3) times daily. 30 Tab 3  
 metoprolol succinate (TOPROL-XL) 25 mg XL tablet Take 1 Tab by mouth daily. 30 Tab 3  
 amLODIPine-atorvastatin (CADUET) 10-10 mg per tablet Take 1 Tab by mouth daily.  glimepiride (AMARYL) 4 mg tablet Take  by mouth every morning.  SITagliptin-metFORMIN (JANUMET)  mg per tablet Take 1 Tab by mouth two (2) times daily (with meals).  carbidopa-levodopa (SINEMET)  mg per tablet TAKE 1 TABLET BY MOUTH THREE TIMES DAILY 270 Tab 2  
 iron,carbonyl-vitamin C (VITRON-C) 65 mg iron- 125 mg TbEC Take  by mouth.  rosuvastatin (CRESTOR) 20 mg tablet TAKE 1 TABLET BY MOUTH EVERY DAY 90 Tab 0  
 levothyroxine (SYNTHROID) 50 mcg tablet TAKE 1 TABLET BY MOUTH DAILY BEFORE BREAKFAST 90 Tab 3  
 aspirin delayed-release 81 mg tablet Take 81 mg by mouth daily. Past History Past Medical History: 
Past Medical History:  
Diagnosis Date  Anxiety  Arthritis  Chest pain 2008 Negative stress test and Holter monitor w/Dr Jannet Guerra.  Chronic back pain  Chronic venous insufficiency  Colon polyp   
 last scope normal 2007; Dr. Amin Pro  Cyst of right kidney  Depression  Diabetes ([address]  Hypercholesterolemia  Hypertension  Hypothyroidism  Memory disorder  Other ill-defined conditions(799.89)   
 heart murmur  Painful hip  Parkinson disease ([address]  Urinary frequency [place] [facility] History: 
Past Surgical History:  
Procedure Laterality Date  COLONOSCOPY,DIAGNOSTIC  6/12/2015  HX BREAST BIOPSY  2002  HX HYSTERECTOMY Due to a prolapsed uterus  HX KNEE REPLACEMENT    
 left  HX KNEE REPLACEMENT  2006  
 right  HX ORTHOPAEDIC B knees.  HX OTHER SURGICAL    
 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 
Family History Problem Relation Age of Onset  Cancer Mother   
     pancreatic  Cancer Father   
     colon  Cancer Sister   
     pancreatic  Diabetes Sister  Alzheimer Brother Social History: 
Social History Tobacco Use  Smoking status: Never Smoker  Smokeless tobacco: Never Used Substance Use Topics  Alcohol use: No  
  Alcohol/week: 0.0 oz  
  Comment: Rare  Drug use: No  
 
 
Allergies: Allergies Allergen Reactions  Norvasc [Amlodipine] Swelling  Flexeril [Cyclobenzaprine] Other (comments) Caused pt to fall & hallucination  Percocet [Oxycodone-Acetaminophen] Itching  Remeron [Mirtazapine] Diarrhea  Tramadol Other (comments) Caused falls Review of Systems Review of Systems Unable to perform ROS: Mental status change Physical Exam  
Physical Exam  
Constitutional: She is oriented to person, place, and time. She appears well-developed and well-nourished. Confused elderly female HENT:  
Head: Normocephalic and atraumatic. Mouth/Throat: Oropharynx is clear and moist.  
Eyes: Conjunctivae and EOM are normal. Pupils are equal, round, and reactive to light. Right eye exhibits no discharge. Left eye exhibits no discharge. Neck: Normal range of motion. Neck supple. Cardiovascular: Normal rate, regular rhythm and normal heart sounds. No murmur heard. Pulmonary/Chest: Effort normal and breath sounds normal. No respiratory distress. She has no wheezes. She has no rales. New L CW PM site Abdominal: Soft. Bowel sounds are normal. She exhibits no distension. There is no tenderness. Musculoskeletal: Normal range of motion. She exhibits no edema. No pedal edema Neurological: She is alert and oriented to person, place, and time. No cranial nerve deficit. She exhibits normal muscle tone. Slightly slurred speech Slight L facial droop Moving all extremities Skin: Skin is warm and dry. No rash noted. She is not diaphoretic. Nursing note and vitals reviewed. Diagnostic Study Results Labs - Recent Results (from the past 12 hour(s)) GLUCOSE, POC Collection Time: 01/20/19  4:25 PM  
Result Value Ref Range Glucose (POC) 104 (H) 65 - 100 mg/dL Performed by Delma Gonzalez CBC WITH AUTOMATED DIFF Collection Time: 01/20/19  5:14 PM  
Result Value Ref Range WBC 8.5 3.6 - 11.0 K/uL  
 RBC 2.90 (L) 3.80 - 5.20 M/uL HGB 8.9 (L) 11.5 - 16.0 g/dL HCT 28.4 (L) 35.0 - 47.0 % MCV 97.9 80.0 - 99.0 FL  
 MCH 30.7 26.0 - 34.0 PG  
 MCHC 31.3 30.0 - 36.5 g/dL  
 RDW 13.5 11.5 - 14.5 % PLATELET 421 (H) 451 - 400 K/uL MPV 9.3 8.9 - 12.9 FL  
 NRBC 0.0 0  WBC ABSOLUTE NRBC 0.00 0.00 - 0.01 K/uL NEUTROPHILS 82 (H) 32 - 75 % LYMPHOCYTES 8 (L) 12 - 49 % MONOCYTES 8 5 - 13 % EOSINOPHILS 1 0 - 7 % BASOPHILS 0 0 - 1 % IMMATURE GRANULOCYTES 1 (H) 0.0 - 0.5 % ABS. NEUTROPHILS 7.0 1.8 - 8.0 K/UL  
 ABS. LYMPHOCYTES 0.7 (L) 0.8 - 3.5 K/UL  
 ABS. MONOCYTES 0.7 0.0 - 1.0 K/UL  
 ABS. EOSINOPHILS 0.1 0.0 - 0.4 K/UL  
 ABS. BASOPHILS 0.0 0.0 - 0.1 K/UL  
 ABS. IMM. GRANS. 0.1 (H) 0.00 - 0.04 K/UL  
 DF AUTOMATED METABOLIC PANEL, COMPREHENSIVE Collection Time: 01/20/19  5:14 PM  
Result Value Ref Range Sodium 137 136 - 145 mmol/L Potassium 3.0 (L) 3.5 - 5.1 mmol/L Chloride 99 97 - 108 mmol/L  
 CO2 30 21 - 32 mmol/L Anion gap 8 5 - 15 mmol/L Glucose 114 (H) 65 - 100 mg/dL BUN 10 6 - 20 MG/DL Creatinine 0.74 0.55 - 1.02 MG/DL  
 BUN/Creatinine ratio 14 12 - 20 GFR est AA >60 >60 ml/min/1.73m2 GFR est non-AA >60 >60 ml/min/1.73m2 Calcium 8.8 8.5 - 10.1 MG/DL Bilirubin, total 0.4 0.2 - 1.0 MG/DL  
 ALT (SGPT) <6 (L) 12 - 78 U/L  
 AST (SGOT) 30 15 - 37 U/L Alk. phosphatase 76 45 - 117 U/L Protein, total 7.0 6.4 - 8.2 g/dL Albumin 2.5 (L) 3.5 - 5.0 g/dL Globulin 4.5 (H) 2.0 - 4.0 g/dL A-G Ratio 0.6 (L) 1.1 - 2.2 URINALYSIS W/ RFLX MICROSCOPIC Collection Time: 01/20/19  6:12 PM  
Result Value Ref Range Color YELLOW/STRAW Appearance CLOUDY (A) CLEAR Specific gravity 1.008 1.003 - 1.030    
 pH (UA) 6.5 5.0 - 8.0 Protein TRACE (A) NEG mg/dL Glucose NEGATIVE  NEG mg/dL Ketone NEGATIVE  NEG mg/dL Bilirubin NEGATIVE  NEG Blood NEGATIVE  NEG Urobilinogen 1.0 0.2 - 1.0 EU/dL Nitrites POSITIVE (A) NEG Leukocyte Esterase LARGE (A) NEG    
 WBC 10-20 0 - 4 /hpf  
 RBC 0-5 0 - 5 /hpf Epithelial cells FEW FEW /lpf Bacteria 1+ (A) NEG /hpf  
GLUCOSE, POC Collection Time: 01/20/19  6:14 PM  
Result Value Ref Range Glucose (POC) 111 (H) 65 - 100 mg/dL Performed by Galen Rogers Medical Decision Making I am the first provider for this patient. I reviewed the vital signs, available nursing notes, past medical history, past surgical history, family history and social history. Vital Signs-Reviewed the patient's vital signs. Patient Vitals for the past 12 hrs: 
 Temp Pulse Resp BP SpO2  
01/20/19 2048     93 % 01/20/19 2047     94 % 01/20/19 2046     96 % 01/20/19 2045     94 % 01/20/19 2044     93 % 01/20/19 2043     94 % 01/20/19 2042     95 % 01/20/19 2041     94 % 01/20/19 2040     95 % 01/20/19 2039     95 % 01/20/19 2038     95 % 01/20/19 2037     95 % 01/20/19 2036     94 % 01/20/19 2035     94 % 01/20/19 2034     94 % 01/20/19 2033     95 % 01/20/19 2032     95 % 01/20/19 2031     95 % 01/20/19 2030    130/81 95 % 01/20/19 2029     94 % 01/20/19 2028     95 % 01/20/19 2027     95 % 01/20/19 2026     95 % 01/20/19 2025     96 % 01/20/19 2024     95 % 01/20/19 2023     94 % 01/20/19 2022     94 % 01/20/19 2021     94 % 01/20/19 2020     95 % 01/20/19 2019     95 % 01/20/19 2018     95 % 01/20/19 2017     95 % 01/20/19 2016     94 % 01/20/19 2015     95 % 01/20/19 2014     95 % 01/20/19 2013     96 % 01/20/19 2012     95 % 01/20/19 2011     95 % 01/20/19 2010     95 % 01/20/19 2009     95 % 01/20/19 2008     96 % 01/20/19 2007     95 % 01/20/19 2006     95 % 01/20/19 2005     96 % 01/20/19 2004     95 % 01/20/19 2003     96 % 01/20/19 2002     97 % 01/20/19 2001     96 % 01/20/19 2000    (!) 122/95 96 % 01/20/19 1959     95 % 01/20/19 1958     95 % 01/20/19 1957     95 % 01/20/19 1956     96 % 01/20/19 1955     97 % 01/20/19 1954     96 % 01/20/19 1953     95 % 01/20/19 1952     95 % 01/20/19 1951     96 % 01/20/19 1950     96 % 01/20/19 1949     96 % 01/20/19 1945 98 °F (36.7 °C) 70  145/51 95 % 01/20/19 1930    134/63 97 % 01/20/19 1915    139/58 95 % 01/20/19 1900    144/60 95 % 01/20/19 1845    127/50 96 % 01/20/19 1830    139/54 96 % 01/20/19 1815    134/58 95 % 01/20/19 1800    134/54 96 % 01/20/19 1745    123/44 94 % 01/20/19 1730    144/50 94 % 01/20/19 1715    140/55 95 % 01/20/19 1700    146/55 93 % 01/20/19 1645    182/68 94 % 01/20/19 1630 97.2 °F (36.2 °C) 64 18 158/57 93 % Pulse Oximetry Analysis - 95% on RA Cardiac Monitor:  
Rate: 70 bpm 
Rhythm: Normal Sinus Rhythm Records Reviewed: Nursing Notes, Old Medical Records, Previous electrocardiograms, Ambulance Run Sheet, Previous Radiology Studies and Previous Laboratory Studies Provider Notes (Medical Decision Making):  
Hypoglycemia in an elderly female w/ decreased appetite. Corrected appropriately with outpatient measures. Await lab results to r/o infection, metabolic derangement, recurrent hypoglycemia. ED Course:  
Initial assessment performed. The patients presenting problems have been discussed, and they are in agreement with the care plan formulated and outlined with them.   I have encouraged them to ask questions as they arise throughout their visit. 
 
5:08 PM 
 Pt has been reassessed by Jordy Gaviria MD. She is improving with facial droop resolved and speech less slurred. Pt is drinking apple juice. Daughter states pt is still confused but getting better. 6:32 PM 
Pt has been reassessed by Jordy Gaviria MD. She is back to baseline w/o any neurologic deficit. BS remains stable and patient able to tolerate most of a dinner tray. Review recommendations with family and will transfer her back to her facility. Critical Care Time:  
None Disposition: 
Discharge Note: 
7:55 PM 
The patient has been re-evaluated and is ready for discharge. Reviewed available results with patient. Counseled patient/parent/guardian on diagnosis and care plan. Patient has expressed understanding, and all questions have been answered. Patient agrees with plan and agrees to follow up as recommended, or return to the ED if their symptoms worsen. Discharge instructions have been provided and explained to the patient, along with reasons to return to the ED. PLAN: 
1. Discharge Medication List as of 1/20/2019  7:55 PM  
  
 
2. Follow-up Information Follow up With Specialties Details Why Contact Info [facility] EMERGENCY DEPT Emergency Medicine  If symptoms worsen [address] [place] [address] [address] [facility] 
[phone] Return to ED if worse Diagnosis Clinical Impression: 1. Hypoglycemia 2. Decreased appetite This note is prepared by Ad Shepard. Kareem Freire, acting as Scribe for MD Jordy Asif MD: The scribe's documentation has been prepared under my direction and personally reviewed by me in its entirety. I confirm that the note above accurately reflects all work, treatment, procedures, and medical decision making performed by me. This note will not be viewable in [address]

## 2019-01-21 NOTE — ED NOTES
Dr. Jarod Powell has reviewed discharge instructions with the patient. The patient verbalized understanding. Pt. A&Ox4, respirations even and unlabored. VS stable as noted in flowsheet. Ambulance transport from department; paperwork in hand.

## 2019-01-21 NOTE — ED NOTES
Called [facility] for transport back to [facility]. Shyanne Castañeda gave estimate of getting to [id number] [facility] within the hour.

## 2019-01-21 NOTE — ED NOTES
Attempted to call report to [facility], was unable to get a hold of nurse, no answer

## 2019-01-21 NOTE — ED NOTES
Bedside shift change report given to [address] [facility] (oncoming nurse) by Osvaldo Hewitt (offgoing nurse). Report included the following information ED Summary. Assisted pt to set up for meal tray, family assisting.

## 2019-01-21 NOTE — DISCHARGE INSTRUCTIONS
Until the patient resumes her normal diet, please discuss with the site doctor the option of decreasing her janumet and her amaryl. Patient Education        Learning About Low Blood Sugar (Hypoglycemia) in Diabetes  What is low blood sugar (hypoglycemia)? Hypoglycemia means that your blood sugar is low and your body (especially your brain) is not getting enough fuel. If you have diabetes, your blood sugar can go too low if you take too much of some diabetes medicines. It can also go too low if you miss a meal. And it can happen if you exercise too hard without eating enough food. Some medicines used to treat other health problems can cause low blood sugar too. What are the symptoms? Symptoms of low blood sugar can start quickly. It may take just 10 to 15 minutes. If you have had diabetes for many years, you may not realize that your blood sugar is low until it drops very low. · If your blood sugar level drops below 70 (mild low blood sugar), you may feel tired, anxious, dizzy, weak, shaky, or sweaty. You may have a fast heartbeat or blurry vision. · If your blood sugar level continues to drop (usually below 40), your behavior may change. You may feel more irritable. You may find it hard to concentrate or talk. And you may feel unsteady when you stand or walk. You may become too weak or confused to eat something with sugar to raise your blood sugar level. · If your blood sugar level drops very low (usually below 20), you may pass out (lose consciousness). Or you may have a seizure or stroke. If you have symptoms of severe low blood sugar, you need to get medical care right away. If you had a low blood sugar level during the night, you may wake up tired or with a headache. Or you may sweat so much during the night that your pajamas or sheets are damp when you wake up. How is low blood sugar treated? You can treat low blood sugar by eating or drinking something that has 15 grams of carbohydrate.  These should be quick-sugar foods. Check your blood sugar level again 15 minutes after having a quick-sugar food to make sure your level is getting back to your target range. Here are examples of quick-sugar foods that have 15 grams of carbohydrate:  · 3 to 4 glucose tablets  · 1 tube of glucose gel  · Hard candy (such as 3 Jolly Ranchers or 5 to 7 Life Savers)  · 1 tablespoon honey  · 2 tablespoons of raisins  · ½ cup to ¾ cup (4 to 6 ounces) of fruit juice or regular (not diet) soda  · 1 tablespoon of sugar  · 1 cup of fat-free milk  If you have problems with severe low blood sugar, someone else may have to give you a shot of glucagon. This is a hormone that raises blood sugar levels quickly. How can you prevent low blood sugar? You can take steps to prevent low blood sugar. · Follow your treatment plan. Take your insulin or other diabetes medicine exactly as your doctor prescribed it. Talk with your doctor if you're having low blood sugar often. Your medicine may need to be adjusted if it's causing your low blood sugar. · Check your blood sugar levels often. This helps you find early changes before an emergency happens. · Keep a quick-sugar food with you in case your blood sugar level drops low. · Eat small meals more often so that you don't get too hungry between meals. Don't skip meals. · Balance extra exercise with eating more. Check your blood sugar and learn how it changes after exercise. If your blood sugar stays at a normal level, you may not need to eat after you exercise. · Limit how much alcohol you drink. Alcohol can make low blood sugar go even lower. Don't drink alcohol if you have problems recognizing the early signs of low blood sugar. · Keep a diary of your symptoms. This helps you learn when changes in your body may signal low blood sugar. And keep track of how often you have low blood sugar, including when you last ate and what you ate.  This will help you learn what causes your blood sugar to drop. · Learn about diabetes and low blood sugar. Support groups or a diabetes education center can help you understand how medicines, diet, and exercise affect your blood sugar levels. Since low blood sugar levels can quickly become an emergency, be sure to wear medical alert jewelry, such as a medical alert bracelet. This is to let people know you have diabetes so they can get help for you. You can buy this at most drugstores. And make sure your family, friends, and coworkers know the symptoms of low blood sugar. Teach them what to do to get your sugar level up. Follow-up care is a key part of your treatment and safety. Be sure to make and go to all appointments, and call your doctor if you are having problems. It's also a good idea to know your test results and keep a list of the medicines you take. Where can you learn more? Go to http://jeremi-travis.info/. Enter I227 in the search box to learn more about \"Learning About Low Blood Sugar (Hypoglycemia) in Diabetes. \"  Current as of: July 25, 2018  Content Version: 11.9  © 7112-0308 [employer]. Care instructions adapted under license by [employer] (which disclaims liability or warranty for this information). If you have questions about a medical condition or this instruction, always ask your healthcare professional. [address] any warranty or liability for your use of this information.

## 2019-01-23 PROBLEM — K92.2 GI BLEED: Status: ACTIVE | Noted: 2019-01-01

## 2019-01-23 NOTE — PROGRESS NOTES
Problem: Self Care Deficits Care Plan (Adult) Goal: *Acute Goals and Plan of Care (Insert Text) Occupational Therapy Goals Initiated 1/23/2019 1. Patient will perform grooming with minimal assistance in supported sit within 7 day(s). 2.  Patient will perform self-feeding with minimal assistance within 7 day(s). 3.  Patient will sit EOB x 5 minutes with moderate assistance to prepare for participation in seated ADLs within 7 day(s). 4.  Patient will perform rolling with moderate assistance x1 to assist with hygiene within 7 day(s). 5.  Patient will participate in upper extremity therapeutic exercise/activities with moderate assistance  for 5 minutes within 7 day(s). Occupational Therapy EVALUATION Patient: Shellie Dangelo (3 y.o. female) Date: 1/23/2019 Primary Diagnosis: GI bleed Precautions:    
 
ASSESSMENT : 
Cleared by RN to see pt for therapy session. Based on the objective data described below, the patient presents with generalized weakness, decreased trunk control, residual decreased LUE strength and ROM, poor sitting and standing balance, impaired functional mobility and decreased activity tolerance following admission for GI bleed. Pt presented to [facility] from [address] [place] [address],[address] where she had gone for rehab. Received supine in bed with family present, agreeable to participate. LUE ROM and strength overall decreased, no AROM of L fingers. Required mod A x2 and increased time for supine>sit transfer. Pt with poor sitting balance, demonstrated L lateral lean and significant forward flexion due to poor trunk control, constant support needed to maintain balance. Max A x2 for attempt at stand, unable to achieve full upright. Pt returned to semisupine with max A x2. Performed grooming ADL with setup using R hand, required mod cueing to attend to and sequence task. Call bell in reach and family member present at end of session. Vitals monitored and stable throughout.  At this time pt requires assist x2 for mobility and max-total A for ADLs. Recommend return to SNF rehab at discharge to increase independence and safety. Patient will benefit from skilled intervention to address the above impairments. Patients rehabilitation potential is considered to be Good Factors which may influence rehabilitation potential include:  
[x]             None noted []             Mental ability/status []             Medical condition []             Home/family situation and support systems []             Safety awareness []             Pain tolerance/management 
[]             Other: PLAN : 
Recommendations and Planned Interventions: 
[x]               Self Care Training                  [x]        Therapeutic Activities [x]               Functional Mobility Training    []        Cognitive Retraining 
[x]               Therapeutic Exercises           [x]        Endurance Activities [x]               Balance Training                   []        Neuromuscular Re-Education []               Visual/Perceptual Training     [x]   Home Safety Training 
[x]               Patient Education                 [x]        Family Training/Education []               Other (comment): Frequency/Duration: Patient will be followed by occupational therapy 4 times a week to address goals. Discharge Recommendations: Ramón Sweeney Further Equipment Recommendations for Discharge: TBD at SNF SUBJECTIVE:  
Patient stated I need to sit up.  OBJECTIVE DATA SUMMARY:  
HISTORY:  
Past Medical History:  
Diagnosis Date  Anxiety  Arthritis  Chest pain 2008 Negative stress test and Holter monitor w/Dr Olivier Spotted.  Chronic back pain  Chronic venous insufficiency  Colon polyp   
 last scope normal 2007; Dr. Ray Adrian  Cyst of right kidney  Depression  Diabetes ([facility] [address]  Hypercholesterolemia  Hypertension  Hypothyroidism  Memory disorder  Other ill-defined conditions(799.89)   
 heart murmur  Painful hip  Parkinson disease ([facility] [address]  Urinary frequency Sees Dr. Glenda Muro with [address] Past Surgical History:  
Procedure Laterality Date  COLONOSCOPY,DIAGNOSTIC  6/12/2015  HX BREAST BIOPSY  2002  HX HYSTERECTOMY Due to a prolapsed uterus  HX KNEE REPLACEMENT    
 left  HX KNEE REPLACEMENT  2006  
 right  HX ORTHOPAEDIC B knees.  HX OTHER SURGICAL    
 pelvic prolasp  IR PERC ART MECH THROMB INFUSION INTRACRANIAL  1/8/2019  CA INS NEW/RPLCMT PRM PM W/TRANSV ELTRD ATRIAL&VENT  1/16/2019  UPPER GI ENDOSCOPY,BIOPSY  6/12/2015 Prior Level of Function/Environment/Context: Pt presented to [facility] from [facility] where she had gone for rehab following CVA, note pacemaker placement on 1/16/19. Pt's family reports she has been in bed or occasionally in wheelchair over the last few weeks. Was walking with a RW when well. Family also reports pt has needed assistance for all ADLs, but recently has been able to feed herself with R hand. Home Situation Home Environment: Rehabilitation facility([facility]) # Steps to Enter: 0 One/Two Story Residence: One story Living Alone: No 
Support Systems: Child(nicanor) Patient Expects to be Discharged to[de-identified] Rehabilitation facility Current DME Used/Available at Home: Walker, rolling Tub or Shower Type: Shower Hand dominance: RightEXAMINATION OF PERFORMANCE DEFICITS: 
Cognitive/Behavioral Status: 
Neurologic State: Drowsy Orientation Level: Oriented to person;Oriented to situation Cognition: Decreased attention/concentration;Decreased command following;Recognition of people/places(benefits from verbal/tactile cues to follow through with commands) Perception: Cues to attend to left side of body;Cues to maintain midline in sitting; Tactile;Verbal;Visual 
Perseveration: No perseveration noted Safety/Judgement: Fall prevention;Decreased insight into deficits Hearing: Auditory Auditory Impairment: Hard of hearing, bilateral 
Range of Motion: 
AROM: Generally decreased, functional(both LEs, very decreased left UE AROM) PROM: Within functional limits 2-/5 MMT L elbow and wrist, A L shoulder flexion approx 75 degrees. No AROM noted in L fingers, unable to  Strength: 
Strength: Generally decreased, functional(both LEs) Coordination: 
Coordination: Generally decreased, functional(right UE, both LEs) Fine Motor Skills-Upper: Right Intact; Left Impaired Gross Motor Skills-Upper: Left Impaired;Right Intact Tone & Sensation: 
Tone: Abnormal(decreased tone left UE and trunk) Sensation: (not assessed, grossly intact to touch both LEs) Balance: 
Sitting: Impaired Sitting - Static: Fair (occasional)(left /forward lean, can improve with verbal/visual/tactile cues) Sitting - Dynamic: Poor (constant support) Standing: Impaired(unable to achieve more than half-stand with max-total assist x 2) Standing - Static: Constant support;Poor(half-stand) Standing - Dynamic : None Functional Mobility and Transfers for ADLs:Bed Mobility: 
Supine to Sit: Moderate assistance;Assist x2; Additional time; Adaptive equipment Sit to Supine: Assist x2;Maximum assistance Scooting: Total assistance;Assist x2 Transfers: 
Sit to Stand: Maximum assistance;Assist x2(half-stand) Stand to Sit: Assist x2 ADL Assessment: 
Feeding: Moderate assistance Oral Facial Hygiene/Grooming: Moderate assistance Bathing: Total assistance Upper Body Dressing: Total assistance Lower Body Dressing: Total assistance Toileting: Total assistance ADL Intervention and task modifications: 
 Provided facilitation at spinals to promote upright posture while seated EOB as well as constant physical assistance, pt unable to maintain upright and demonstrated significant forward flexion and cervical weakness. Grooming Washing Face: Supervision/set-up(in semisupine usingR hand) Cues: Tactile cues provided;Verbal cues provided;Visual cues provided Lower Body Dressing Assistance Socks: Total assistance (dependent) Cognitive Retraining Safety/Judgement: Fall prevention;Decreased insight into deficits Functional Measure: 
Barthel Index: 
 
Bathin Bladder: 0 Bowels: 5 Groomin Dressin Feedin Mobility: 0 Stairs: 0 Toilet Use: 0 Transfer (Bed to Chair and Back): 0 Total: 10 The Barthel ADL Index: Guidelines 1. The index should be used as a record of what a patient does, not as a record of what a patient could do. 2. The main aim is to establish degree of independence from any help, physical or verbal, however minor and for whatever reason. 3. The need for supervision renders the patient not independent. 4. A patient's performance should be established using the best available evidence. Asking the patient, friends/relatives and nurses are the usual sources, but direct observation and common sense are also important. However direct testing is not needed. 5. Usually the patient's performance over the preceding 24-48 hours is important, but occasionally longer periods will be relevant. 6. Middle categories imply that the patient supplies over 50 per cent of the effort. 7. Use of aids to be independent is allowed. Gato Mcclure., Barthel, D.W. (0279). Functional evaluation: the Barthel Index. [address] [facility] (14)2. RAZ Trevizo, Conner Velez., Joana Osler.[address] (). Measuring the change indisability after inpatient rehabilitation; comparison of the responsiveness of the Barthel Index and Functional Reed Measure. Journal of Neurology, Neurosurgery, and Psychiatry, 66(4), 899-706.  
Valencia Hughes, [place].DANIEL, GLENN Gonzalez, Annemarie Velasco M.A. (2004.) Assessment of post-stroke quality of life in cost-effectiveness studies: The usefulness of the Barthel Index and the EuroQoL-5D. [facility], 13, [phone] Occupational Therapy Evaluation Charge Determination History Examination Decision-Making MEDIUM Complexity : Expanded review of history including physical, cognitive and psychosocial  history  MEDIUM Complexity : 3-5 performance deficits relating to physical, cognitive , or psychosocial skils that result in activity limitations and / or participation restrictions HIGH Complexity : Patient presents with comorbidities that affect occupational performance. Signifigant modification of tasks or assistance (eg, physical or verbal) with assessment (s) is necessary to enable patient to complete evaluation Based on the above components, the patient evaluation is determined to be of the following complexity level: MEDIUM Pain: 
Pain Scale 1: Numeric (0 - 10) Pain Intensity 1: 0 Activity Tolerance:  
Good Please refer to the flowsheet for vital signs taken during this treatment. After treatment:  
[] Patient left in no apparent distress sitting up in chair 
[x] Patient left in no apparent distress in bed 
[x] Call bell left within reach [x] Nursing notified 
[x] Caregiver present 
[] Bed alarm activated COMMUNICATION/EDUCATION:  
The patients plan of care was discussed with: Physical Therapist and Registered Nurse. [x] Home safety education was provided and the patient/caregiver indicated understanding. [x] Patient/family have participated as able in goal setting and plan of care. [x] Patient/family agree to work toward stated goals and plan of care. [] Patient understands intent and goals of therapy, but is neutral about his/her participation. [] Patient is unable to participate in goal setting and plan of care. This patients plan of care is appropriate for delegation to [facility].  
 
Thank you for this referral. 
 Koko Zabala, OT Time Calculation: 25 mins

## 2019-01-23 NOTE — H&P
Hospitalist Admission NoteNAME: Dimas Blanchard :  1932 MRN:  [id number] Date/Time:  2019 2:06 AM 
 
Patient PCP: Cherri De La Rosa MD 
______________________________________________________________________ Assessment & Plan: 
80year old woman presenting with acute on chronic anemia and occult blood positive stools (question of grossly positive bloody stool prior to presentation). 1. Lower gastrointestinal hemorrhage POA 
-- Suspect slow bleed; she is hemodynamically stable and baseline hemoglobin level was already low prior to today's presentation -- Holding ASA and Apixaban for present time 
-- CT suggests possible component of colitis; will order a dose of empiric antibiotics with low threshold to discontinue 
-- NPO; will request GI evaluation (but discussed with family that bleeding may be self limited and avoiding colonoscopy would probably be best if possible) 2. Acute on chronic anemia POA 
-- Transfusing 1 unit PRBCs 
-- Continue iron and folic acid supplementation 3. Weakness due to cerebrovascular accident 
-- Improving overall -- PT and OT consultations requested -- Holding ASA and anticoagulant due to GI bleeding; suggest coordinating plan with neurology prior to hospital discharge (given very recent ischemic strokes) -- Continue statin 4. Type II diabetes: 
-- SSI while in hospital 
 
5. Parkinson's disease 
-- Continue Sinemet 6. Paroxysmal atrial fibrillation and AV block 
-- Cardiac monitoring; continue metoprolol; hold ASA and Apixaban due to #1. 
 
7. Urinary retention with Rivers cathether POA 8. Hypothyroidism 
-- Continue levothyroxine 9. Essential hypertension: 
-- Holding hydralazine for now; reintroduce if she remains hemodynamically stable Body mass index is 23.82 kg/m². Code: full, discussed with patient and daughter at bedside DVT prophylaxis: SCDs Surrogate decision maker:    
  
 
Subjective: CHIEF COMPLAINT:  Blood in stool HISTORY OF PRESENT ILLNESS:    
Lizzie Rincon is a 80 y.o.  female who presents with complaint noted above. Her daughter, Gorge Sandifer, provides most of the history. She relates that Oscar Dee recently has been staying at [facility] for rehabilitation following a hospitalization for multiple ischemic strokes; earlier this month, she underwent thrombolysis and thombectomy for CVA that were thought to be cardioembolic secondary to newly diagnosed atrial fibrillation (also underwent placement of PPM for atrial fibrillation and intermittent 3rd degree AV block). Her strokes caused dysphagia as well as left upper extremity and bilateral lower extremity weakness, all of which have improved to some extent since hospital discharge. Owing to the nature of her strokes as well as the presence of a left occipital hemorrhage, she was discharged from [facility] on low-dose Apixaban (2.5 mg BID) and ASA 81 mg. The last several days, Oscar Dee apparently has been moaning and complaining of left lower quadrant abdominal pain, and had not passed a bowel movement in about 6 days, despite administration of laxatives and suppositories. She was examined on 1/22 for the possibility of fecal impaction and, per her daughter's report, stool was removed from her rectum with blood. There is no further description as to whether this was bright red blood, maroon stool, or black tarry stool. Transfer to the hospital was therefore requested. Here, she is found to be slightly more anemic than her recent baseline (Hgb 8.2 earlier this month, now Hgb 6.8). Patient complains of feeling tired and expresses worry about whether she is ever going to get better given her recent health issues. She denies any other bleeding at this time, denies lightheadedness, denies chest pain, palpitations, or dyspnea. Colonoscopy in 2015 showed diverticulitis.  EGD in 2015 showed minimal chronic superficial gastritis with nonspecific capillary ectasia and hemorrhage of lamina propria. We were asked to admit for work up and evaluation of the above problems. Past Medical History:  
Diagnosis Date  Anxiety  Arthritis  Chest pain 2008 Negative stress test and Holter monitor w/Dr Dora Felix.  Chronic back pain  Chronic venous insufficiency  Colon polyp   
 last scope normal 2007; Dr. Segundo Patel  Cyst of right kidney  Depression  Diabetes ([address]  Hypercholesterolemia  Hypertension  Hypothyroidism  Memory disorder  Other ill-defined conditions(799.89)   
 heart murmur  Painful hip  Parkinson disease ([address]  Urinary frequency Sees Dr. Glenda Muro with [facility] Past Surgical History:  
Procedure Laterality Date  COLONOSCOPY,DIAGNOSTIC  6/12/2015  HX BREAST BIOPSY  2002  HX HYSTERECTOMY Due to a prolapsed uterus  HX KNEE REPLACEMENT    
 left  HX KNEE REPLACEMENT  2006  
 right  HX ORTHOPAEDIC B knees.  HX OTHER SURGICAL    
 pelvic prolasp  IR PERC ART MECH THROMB INFUSION INTRACRANIAL  1/8/2019  [facility] INS NEW/RPLCMT PRM PM W/TRANSV ELTRD ATRIAL&VENT  1/16/2019  UPPER GI ENDOSCOPY,BIOPSY  6/12/2015 Social History Tobacco Use  Smoking status: Never Smoker  Smokeless tobacco: Never Used Substance Use Topics  Alcohol use: No  
  Alcohol/week: 0.0 oz  
  Comment: Rare Drug use:  denies Family History Problem Relation Age of Onset  Cancer Mother   
     pancreatic  Cancer Father   
     colon  Cancer Sister   
     pancreatic  Diabetes Sister  Alzheimer Brother Allergies Allergen Reactions  Norvasc [Amlodipine] Swelling  Flexeril [Cyclobenzaprine] Other (comments) Caused pt to fall & hallucination  Percocet [Oxycodone-Acetaminophen] Itching  Remeron [Mirtazapine] Diarrhea  Tramadol Other (comments) Caused falls Prior to Admission medications Medication Sig Start Date End Date Taking? Authorizing Provider  
apixaban (ELIQUIS) 2.5 mg tablet Take 1 Tab by mouth two (2) times a day. 1/17/19   John Lopez MD  
metoprolol succinate (TOPROL-XL) 25 mg XL tablet Take 1 Tab by mouth daily. 1/18/19   John Lopez MD  
glimepiride (AMARYL) 4 mg tablet Take  by mouth every morning. Provider, Historical  
SITagliptin-metFORMIN (JANUMET)  mg per tablet Take 1 Tab by mouth two (2) times daily (with meals). 11/21/18   Provider, Historical  
carbidopa-levodopa (SINEMET)  mg per tablet TAKE 1 TABLET BY MOUTH THREE TIMES DAILY 11/20/18   Catina Donovan MD  
iron,carbonyl-vitamin C (VITRON-C) 65 mg iron- 125 mg TbEC Take  by mouth. Provider, Historical  
levothyroxine (SYNTHROID) 50 mcg tablet TAKE 1 TABLET BY MOUTH DAILY BEFORE BREAKFAST 6/15/18   Mari Shepard MD  
aspirin delayed-release 81 mg tablet Take 81 mg by mouth daily. Provider, Historical  
 
REVIEW OF SYSTEMS:  POSITIVE= Bold. Negative = normal text General:  fever, chills, sweats, generalized weakness, weight loss/gain, loss of appetite. Poor eating habits recently due to modified diet. Eyes:  blurred vision, eye pain, loss of vision, diplopia Ear Nose and Throat:  rhinorrhea, pharyngitis Respiratory:   cough, sputum production, SOB, wheezing, ESPINOZA, pleuritic pain 
Cardiology:  chest pain, palpitations, orthopnea, PND, edema, syncope Gastrointestinal:  abdominal pain, N/V, dysphagia, diarrhea, constipation, bleeding Genitourinary:  frequency, urgency, dysuria, hematuria, incontinence. +urinary retention with Rivers present on admission. Muskuloskeletal :  arthralgia, myalgia Hematology:  easy bruising, bleeding, lymphadenopathy Dermatological:  rash, ulceration, pruritis Endocrine:  hot flashes or polydipsia Neurological:  headache, dizziness, confusion, focal weakness, paresthesia, memory loss, gait disturbance Psychological: anxiety, depression, agitation Objective: VITALS:   
Visit Vitals /59 (BP 1 Location: Right arm, BP Patient Position: At rest) Pulse 77 Temp 98.2 °F (36.8 °C) Resp 16 Ht 5' 7\" (1.702 m) Wt 69 kg (152 lb 1.9 oz) SpO2 97% BMI 23.82 kg/m² Temp (24hrs), Av.6 °F (37 °C), Min:98.2 °F (36.8 °C), Max:98.9 °F (37.2 °C) Body mass index is 23.82 kg/m². PHYSICAL EXAM: 
 
General:    Alert, cooperative, no distress, appears stated age. HEENT: Atraumatic, anicteric sclerae, pink conjunctivae No oral ulcers, mucosa moist, throat clear. Hearing reduced. Neck:  Supple, symmetrical,  thyroid: non tender. No JVD. Lungs:   Clear to auscultation bilaterally. No Wheezing or Rhonchi. No rales. Chest wall:  No tenderness  No Accessory muscle use. PPM present in left upper chest, clean and nontender. Heart:   Regular  rhythm, +soft II/VI systolic murmur audible across precordium. No gallop. No edema. Abdomen:   Soft, non-tender. Not distended. Bowel sounds normal. No masses Extremities: No cyanosis. No clubbing Skin:     Not pale Not Jaundiced  No rashes Psych:  Good insight. Not depressed. Not anxious or agitated. Neurologic: EOMs intact. Slight lower left facial droop at mouth. No aphasia or slurred speech. 5/5 RUE strength, 3/5 LUE strength with markedly reduced hand . 3/5 RLE strength, 3/5 LLE strength, able to wiggle toes bilaterally. Alert and oriented X 3. Peripheral pulse: Bilateral, DP, 2+ Capillary refill:  normal 
 
IMAGING RESULTS: 
 [x]       I have personally reviewed the actual   [x]     CXR  [x]     CT scan CXR: low lung volumes, left sided PPM in place, no infiltrates, effusions, or vascular congestion. CT : left kidney cyst, large fecal load reaching rectum without evidence of obstruction, distal sigmoid and rectal wall thickening EKG: 
 ________________________________________________________________________ Care Plan discussed with: 
  Comments Patient y Family  y   
RN Care Manager Consultant:     
________________________________________________________________________ Prophylaxis: 
GI PPI  
DVT SCDs  
________________________________________________________________________ Recommended Disposition:  
Home with Family HH/PT/OT/RN   
SNF/LTC [facility]   
________________________________________________________________________ Code Status: 
Full Code y  
DNR/DNI Patient has durable DNR; however, upon discussion, she clarifies that she wants resuscitation attempted under acute conditions. Rather, she only would wish not to be resuscitated if she had already been unresponsive for a matter of days. ________________________________________________________________________ TOTAL TIME:  45 minutes Comments  
 y Reviewed previous records  
>50% of visit spent in counseling and coordination of care y Discussion with patient and/or family and questions answered 
  
 
 
______________________________________________________________________ Jenna Hoskins MD 
 
 
Procedures: see electronic medical records for all procedures/Xrays and details which were not copied into this note but were reviewed prior to creation of Plan. LAB DATA REVIEWED:   
Recent Results (from the past 24 hour(s)) CBC WITH AUTOMATED DIFF Collection Time: 01/22/19 10:31 PM  
Result Value Ref Range WBC 7.4 3.6 - 11.0 K/uL  
 RBC 2.23 (L) 3.80 - 5.20 M/uL HGB 6.8 (L) 11.5 - 16.0 g/dL HCT 21.3 (L) 35.0 - 47.0 % MCV 95.5 80.0 - 99.0 FL  
 MCH 30.5 26.0 - 34.0 PG  
 MCHC 31.9 30.0 - 36.5 g/dL  
 RDW 13.5 11.5 - 14.5 % PLATELET 630 652 - 132 K/uL MPV 8.9 8.9 - 12.9 FL  
 NRBC 0.0 0  WBC ABSOLUTE NRBC 0.00 0.00 - 0.01 K/uL NEUTROPHILS 67 32 - 75 % LYMPHOCYTES 22 12 - 49 % MONOCYTES 10 5 - 13 % EOSINOPHILS 1 0 - 7 % BASOPHILS 0 0 - 1 % IMMATURE GRANULOCYTES 1 (H) 0.0 - 0.5 % ABS. NEUTROPHILS 5.0 1.8 - 8.0 K/UL  
 ABS. LYMPHOCYTES 1.6 0.8 - 3.5 K/UL  
 ABS. MONOCYTES 0.7 0.0 - 1.0 K/UL  
 ABS. EOSINOPHILS 0.1 0.0 - 0.4 K/UL  
 ABS. BASOPHILS 0.0 0.0 - 0.1 K/UL  
 ABS. IMM. GRANS. 0.0 0.00 - 0.04 K/UL  
 DF AUTOMATED METABOLIC PANEL, COMPREHENSIVE Collection Time: 01/22/19 10:31 PM  
Result Value Ref Range Sodium 140 136 - 145 mmol/L Potassium 3.3 (L) 3.5 - 5.1 mmol/L Chloride 103 97 - 108 mmol/L  
 CO2 33 (H) 21 - 32 mmol/L Anion gap 4 (L) 5 - 15 mmol/L Glucose 101 (H) 65 - 100 mg/dL BUN 11 6 - 20 MG/DL Creatinine 0.67 0.55 - 1.02 MG/DL  
 BUN/Creatinine ratio 16 12 - 20 GFR est AA >60 >60 ml/min/1.73m2 GFR est non-AA >60 >60 ml/min/1.73m2 Calcium 8.3 (L) 8.5 - 10.1 MG/DL Bilirubin, total 0.3 0.2 - 1.0 MG/DL  
 ALT (SGPT) 11 (L) 12 - 78 U/L  
 AST (SGOT) 23 15 - 37 U/L Alk. phosphatase 64 45 - 117 U/L Protein, total 5.9 (L) 6.4 - 8.2 g/dL Albumin 2.3 (L) 3.5 - 5.0 g/dL Globulin 3.6 2.0 - 4.0 g/dL A-G Ratio 0.6 (L) 1.1 - 2.2 TYPE & SCREEN Collection Time: 01/22/19 10:31 PM  
Result Value Ref Range Crossmatch Expiration 01/25/2019 ABO/Rh(D) A POSITIVE Antibody screen NEG Unit number [id number] Blood component type RC LR Unit division 00 Status of unit ISSUED Crossmatch result Compatible PROTHROMBIN TIME + INR Collection Time: 01/22/19 11:20 PM  
Result Value Ref Range INR 1.1 0.9 - 1.1 Prothrombin time 11.1 9.0 - 11.1 sec PTT Collection Time: 01/22/19 11:20 PM  
Result Value Ref Range aPTT 31.3 22.1 - 32.0 sec  
 aPTT, therapeutic range     58.0 - 77.0 SECS  
OCCULT BLOOD, STOOL Collection Time: 01/23/19 12:20 AM  
Result Value Ref Range  Occult blood, stool POSITIVE (A) NEG

## 2019-01-23 NOTE — PROGRESS NOTES
Problem: Mobility Impaired (Adult and Pediatric) Goal: *Acute Goals and Plan of Care (Insert Text) Physical Therapy Goals Initiated 1/23/2019 1. Patient will move from supine to sit and sit to supine  in bed with maximal assistance x 1 within 7 day(s). 2.  Patient will transfer from bed to chair and chair to bed with moderate assistance x 2  using the least restrictive device within 7 day(s). 3.  Patient will perform sit to stand with maximal assistance x 2 within 7 day(s). 4.  Patient will tolerate static sitting at edge of bed for >/= 5 minutes with midline posture and occasional contact guard assist within 7 day(s). physical Therapy EVALUATION Patient: Jim Hernandez (85 y.o. female) Date: 1/23/2019 Primary Diagnosis: GI bleed Precautions:     
 
ASSESSMENT : 
Based on the objective data described below, the patient presents with general decrease in strength/activity tolerance, specific decrease AROM/strength/tone left UE and trunk, decreased sitting balance/tolerance, decreased standing balance/tolerance, and decreased functional mobility, including bed mobility and transfers. She is unable to achieve more than a half-stand with maximal/total assist x 2 for 3 seconds. Patient has been in/out of the hospital recently and has not been able to have much Rehab at [facility]; she will benefit from resuming SNF Rehab there to maximize her strength and functional independence before returning to her home setting. Delmar Varela Patient will benefit from skilled intervention to address the above impairments. Patients rehabilitation potential is considered to be Good Factors which may influence rehabilitation potential include:  
[x]         None noted 
[]         Mental ability/status []         Medical condition 
[]         Home/family situation and support systems 
[]         Safety awareness 
[]         Pain tolerance/management 
[]         Other: PLAN : 
 Recommendations and Planned Interventions: 
[x]           Bed Mobility Training             [x]    Neuromuscular Re-Education 
[x]           Transfer Training                   []    Orthotic/Prosthetic Training 
[]           Gait Training                         []    Modalities [x]           Therapeutic Exercises           []    Edema Management/Control 
[x]           Therapeutic Activities            [x]    Patient and Family Training/Education 
[]           Other (comment): Frequency/Duration: Patient will be followed by physical therapy  4 times a week to address goals. Discharge Recommendations: [facility] Further Equipment Recommendations for Discharge: to be determined by Rehab SUBJECTIVE:  
Patient stated I'm cold.  OBJECTIVE DATA SUMMARY:  
HISTORY:   
Past Medical History:  
Diagnosis Date  Anxiety  Arthritis  Chest pain 2008 Negative stress test and Holter monitor w/Dr Ko.  Chronic back pain  Chronic venous insufficiency  Colon polyp   
 last scope normal 2007; Dr. Crow An  Cyst of right kidney  Depression  Diabetes ([address]  Hypercholesterolemia  Hypertension  Hypothyroidism  Memory disorder  Other ill-defined conditions(799.89)   
 heart murmur  Painful hip  Parkinson disease ([address]  Urinary frequency Se[facility] Lyle [facility] Past Surgical History:  
Procedure Laterality Date  COLONOSCOPY,DIAGNOSTIC  6/12/2015  HX BREAST BIOPSY  2002  HX HYSTERECTOMY Due to a prolapsed uterus  HX KNEE REPLACEMENT    
 left  HX KNEE REPLACEMENT  2006  
 right  HX ORTHOPAEDIC B knees.  HX OTHER SURGICAL    
 pelvic prolasp  IR PERC ART MECH THROMB INFUSION INTRACRANIAL  1/8/2019  [facility] INS NEW/RPLCMT PRM PM W/TRANSV ELTRD ATRIAL&VENT  1/16/2019  UPPER GI ENDOSCOPY,BIOPSY  6/12/2015 Prior Level of Function/Home Situation: Patient has been receiving SNF Rehab after being hospitalized with a CVA; supportive granddaughter is present and states that patient hasn't walked in awhile, but uses a rolling walker when well. Lately, she has been dependent with mobility and ADLs, granddaughter states that patient began feeding herself within the last couple of days. Patient is right handed. Personal factors and/or comorbidities impacting plan of care: She is one week out of pacemaker insertion Home Situation Home Environment: Rehabilitation facility([facility]) # Steps to Enter: 0 One/Two Story Residence: One story Living Alone: No 
Support Systems: Child(nicanor) Patient Expects to be Discharged to[de-identified] Rehabilitation facility Current DME Used/Available at Home: Walker, rolling Tub or Shower Type: Shower EXAMINATION/PRESENTATION/DECISION MAKING: Critical Behavior: 
Neurologic State: Drowsy Orientation Level: Oriented to person, Oriented to situation Cognition: Decreased attention/concentration, Decreased command following, Recognition of people/places(benefits from verbal/tactile cues to follow through with commands) Safety/Judgement: Fall prevention, Decreased insight into deficits Hearing: Auditory Auditory Impairment: Hard of hearing, bilateralSkin:  IV right UE, + duran, + pacemaker (1/17/2019), + telemetry Edema: none noted Range Of Motion: 
AROM: Generally decreased, functional(both LEs, very decreased left UE AROM) PROM: Within functional limits Strength:   
Strength: Generally decreased, functional(both LEs) Tone & Sensation:  
Tone: Abnormal(decreased tone left UE and trunk) Sensation: (not assessed, grossly intact to touch both LEs) Coordination: 
Coordination: Generally decreased, functional(right UE, both LEs) Vision:  
  
Functional Mobility: 
Bed Mobility: Supine to Sit: Moderate assistance;Assist x2; Additional time; Adaptive equipment Sit to Supine: Assist x2;Maximum assistance Scooting: Total assistance;Assist x2 Transfers: 
Sit to Stand: Maximum assistance;Assist x2(half-stand) Stand to Sit: Assist x2 Balance:  
Sitting: Impaired Sitting - Static: Fair (occasional)(left /forward lean, can improve with verbal/visual/tactile cues) Sitting - Dynamic: Poor (constant support) Standing: Impaired(unable to achieve more than half-stand with max-total assist x 2) Standing - Static: Constant support;Poor(half-stand) Standing - Dynamic : NoneAmbulation/Gait Training:  
  
  
  
Gait Description (WDL): (unable to assess due to inability to achieve standing) Therapeutic Exercises: A/AAROM in available ranges both LEs prior to mobility Functional Measure: 
 
Elder Mobility Scale 0/20 Scores under 10  generally these patients are dependent in mobility maneuvers; require help with 
basic ADL, such as transfers, toileting and dressing. Scores between 10  13  generally these patients are borderline in terms of safe mobility and 
independence in ADL i.e. they require some help with some mobility maneuvers. Scores over 14  Generally these patients are able to perform mobility maneuvers alone and safely 
and are independent in basic ADL. Physical Therapy Evaluation Charge Determination History Examination Presentation Decision-Making MEDIUM  Complexity : 1-2 comorbidities / personal factors will impact the outcome/ POC  MEDIUM Complexity : 3 Standardized tests and measures addressing body structure, function, activity limitation and / or participation in recreation  LOW Complexity : Stable, uncomplicated  LOW Complexity : FOTO score of  Based on the above components, the patient evaluation is determined to be of the following complexity level: LOW Pain: 
Pain Scale 1: Numeric (0 - 10) Pain Intensity 1: 0 Activity Tolerance:  
Low, only able to achieve half-stand once, unable to clear hips with second attempt Please refer to the flowsheet for vital signs taken during this treatment. After treatment:  
[]         Patient left in no apparent distress sitting up in chair 
[x]         Patient left in no apparent distress in bed 
[x]         Call bell left within reach [x]         Nursing notified 
[x]         Caregiver present 
[]         Bed alarm activated COMMUNICATION/EDUCATION:  
The patients plan of care was discussed with: Occupational Therapist and Registered Nurse. [x]         Fall prevention education was provided and the patient/caregiver indicated understanding. [x]         Patient/family have participated as able in goal setting and plan of care. [x]         Patient/family agree to work toward stated goals and plan of care. []         Patient understands intent and goals of therapy, but is neutral about his/her participation. []         Patient is unable to participate in goal setting and plan of care. Thank you for this referral. 
Myriam Alaniz, PT Time Calculation: 23 mins

## 2019-01-23 NOTE — CDMP QUERY
Account Number: [de-identified] MRN: [id number] Patient: Roberto Palomino Created: [id number] Clinician Name: Oliverio Flynn RN, CCDS Dr. Lucio Hunter M.D. : 
Please clarify if this patient is (was) being treated/managed for:  
 
=> Precipitous drop in Hemoglobin POA requiring blood transfusion  
=> Acute Blood loss anemia POA  
=> Anemia NOS 
=> Other explanation of clinical findings 
=> Clinically Undetermined (no explanation for clinical findings) The medical record reflects the following clinical findings, treatment, and risk factors. Risk Factors:  anticoag use, chronic anemia Clinical Indicators:  81yo AAF presenting w/ blood in stool, c/o  left lower quadrant abdominal pain,  fatigue, H/H 6.8/21. 3-- 8.1/24.8, CT findings of possilble colitis Treatment: 1 unit PRBC transfusion, GI consult, IVF, & lab monitoring. Please clarify and document your clinical opinion in the progress notes and discharge summary including the definitive and/or presumptive diagnosis, (suspected or probable), related to the above clinical findings. Please include clinical findings supporting your diagnosis.  
Thank Valentín Rivera RN, CCDS

## 2019-01-23 NOTE — ED PROVIDER NOTES
EMERGENCY DEPARTMENT HISTORY AND PHYSICAL EXAM 
 
 
Date: 1/22/2019 Patient Name: Sloange Cantrell History of Presenting Illness Chief Complaint Patient presents with  Anemia To ED via EMS, patient is from nursing home who reports bloody stools and a \"critical hemoglobin\" History Provided By: Patient HPI: Solange Cantrell, 80 y.o. female with PMHx significant for Parkinson's dz, dementia, CVA, paroxsymal a-fib, HTN, DM, hypercholesterolemia, arthritis, presents via EMS transport from [facility] to the ED for evaluation of \"critical hemoglobin\" and blood stools. Per EMS, pt comes from [address] [place] [address] after pt had a \"critical hemoglobin. \" Pt currently w/o complaint. She denies noticing any blood in her stool. She specifically denies any weakness, dizziness, SOB, abdominal pain, or N/V. History is limited due to dementia. Social Hx: - EtOH; - Smoker; - Illicit Drugs PCP: Paz Gaytan MD 
 
Current Facility-Administered Medications Medication Dose Route Frequency Provider Last Rate Last Dose  sodium chloride (NS) flush 10 mL  10 mL IntraVENous RAD ONCE Kasandra Hoops, DO      
 iopamidol (ISOVUE-370) 76 % injection 100 mL  100 mL IntraVENous RAD ONCE Kasandra Hoops, DO      
 sodium chloride (NS) flush 5-40 mL  5-40 mL IntraVENous Q8H Kasandra Hoops, DO   10 mL at 01/22/19 2341  sodium chloride (NS) flush 5-40 mL  5-40 mL IntraVENous PRN [place] Hoops, DO      
 0.9% sodium chloride infusion 250 mL  250 mL IntraVENous PRN [place] Hoops, DO      
 
Current Outpatient Medications Medication Sig Dispense Refill  apixaban (ELIQUIS) 2.5 mg tablet Take 1 Tab by mouth two (2) times a day. 30 Tab 2  
 hydrALAZINE (APRESOLINE) 25 mg tablet Take 1 Tab by mouth three (3) times daily. 30 Tab 3  
 metoprolol succinate (TOPROL-XL) 25 mg XL tablet Take 1 Tab by mouth daily. 30 Tab 3  
 amLODIPine-atorvastatin (CADUET) 10-10 mg per tablet Take 1 Tab by mouth daily.  glimepiride (AMARYL) 4 mg tablet Take  by mouth every morning.  SITagliptin-metFORMIN (JANUMET)  mg per tablet Take 1 Tab by mouth two (2) times daily (with meals).  carbidopa-levodopa (SINEMET)  mg per tablet TAKE 1 TABLET BY MOUTH THREE TIMES DAILY 270 Tab 2  
 iron,carbonyl-vitamin C (VITRON-C) 65 mg iron- 125 mg TbEC Take  by mouth.  rosuvastatin (CRESTOR) 20 mg tablet TAKE 1 TABLET BY MOUTH EVERY DAY 90 Tab 0  
 levothyroxine (SYNTHROID) 50 mcg tablet TAKE 1 TABLET BY MOUTH DAILY BEFORE BREAKFAST 90 Tab 3  
 aspirin delayed-release 81 mg tablet Take 81 mg by mouth daily. Past History Past Medical History: 
Past Medical History:  
Diagnosis Date  Anxiety  Arthritis  Chest pain 2008 Negative stress test and Holter monitor w/Dr Bal Nino.  Chronic back pain  Chronic venous insufficiency  Colon polyp   
 last scope normal 2007; Dr. Mckenna Kellogg  Cyst of right kidney  Depression  Diabetes ([facility] [address]  Hypercholesterolemia  Hypertension  Hypothyroidism  Memory disorder  Other ill-defined conditions(799.89)   
 heart murmur  Painful hip  Parkinson disease ([address]  Urinary frequency Sees Dr. Rip Granger with [address] Past Surgical History: 
Past Surgical History:  
Procedure Laterality Date  COLONOSCOPY,DIAGNOSTIC  6/12/2015  HX BREAST BIOPSY  2002  HX HYSTERECTOMY Due to a prolapsed uterus  HX KNEE REPLACEMENT    
 left  HX KNEE REPLACEMENT  2006  
 right  HX ORTHOPAEDIC B knees.  HX OTHER SURGICAL    
 pelvic prolasp  IR PERC ART MECH THROMB INFUSION INTRACRANIAL  1/8/2019  SD INS NEW/RPLCMT PRM PM W/TRANSV ELTRD ATRIAL&VENT  1/16/2019  UPPER GI ENDOSCOPY,BIOPSY  6/12/2015 Family History: 
Family History Problem Relation Age of Onset  Cancer Mother   
     pancreatic  Cancer Father   
     colon  Cancer Sister pancreatic  Diabetes Sister  Alzheimer Brother Social History: 
Social History Tobacco Use  Smoking status: Never Smoker  Smokeless tobacco: Never Used Substance Use Topics  Alcohol use: No  
  Alcohol/week: 0.0 oz  
  Comment: Rare  Drug use: No  
 
 
Allergies: Allergies Allergen Reactions  Norvasc [Amlodipine] Swelling  Flexeril [Cyclobenzaprine] Other (comments) Caused pt to fall & hallucination  Percocet [Oxycodone-Acetaminophen] Itching  Remeron [Mirtazapine] Diarrhea  Tramadol Other (comments) Caused falls Review of Systems Review of Systems Unable to perform ROS: Dementia Physical Exam  
Physical Exam  
Constitutional: She is oriented to person, place, and time. She appears well-developed and well-nourished. Elderly female, appears fatigued HENT:  
Head: Normocephalic and atraumatic. Mouth/Throat: Oropharynx is clear and moist. No oropharyngeal exudate. Eyes: EOM are normal. Pupils are equal, round, and reactive to light. Pale conjunctiva Neck: Normal range of motion. Neck supple. No JVD present. No tracheal deviation present. Cardiovascular: Normal rate, regular rhythm, normal heart sounds and intact distal pulses. No murmur heard. Pulmonary/Chest: Effort normal and breath sounds normal. No stridor. No respiratory distress. She has no wheezes. She has no rales. Pacemaker present Abdominal: Soft. She exhibits no distension. There is tenderness (Mild ttp- LLQ). There is no rebound and no guarding. Musculoskeletal: Normal range of motion. She exhibits no edema or tenderness. Neurological: She is alert and oriented to person, place, and time. No sensory deficits, weakness in both lower legs, + left facial droop with left Upper ext weakness, slow to respond to question but no aphasia, expressive aphasia and pt is awake and alert Skin: Skin is warm and dry. She is not diaphoretic. There is pallor. Psychiatric: Her behavior is normal.  
Flat affect, depressed mood Nursing note and vitals reviewed. Diagnostic Study Results Labs - Recent Results (from the past 12 hour(s)) CBC WITH AUTOMATED DIFF Collection Time: 01/22/19 10:31 PM  
Result Value Ref Range WBC 7.4 3.6 - 11.0 K/uL  
 RBC 2.23 (L) 3.80 - 5.20 M/uL HGB 6.8 (L) 11.5 - 16.0 g/dL HCT 21.3 (L) 35.0 - 47.0 % MCV 95.5 80.0 - 99.0 FL  
 MCH 30.5 26.0 - 34.0 PG  
 MCHC 31.9 30.0 - 36.5 g/dL  
 RDW 13.5 11.5 - 14.5 % PLATELET 540 086 - 154 K/uL MPV 8.9 8.9 - 12.9 FL  
 NRBC 0.0 0  WBC ABSOLUTE NRBC 0.00 0.00 - 0.01 K/uL NEUTROPHILS 67 32 - 75 % LYMPHOCYTES 22 12 - 49 % MONOCYTES 10 5 - 13 % EOSINOPHILS 1 0 - 7 % BASOPHILS 0 0 - 1 % IMMATURE GRANULOCYTES 1 (H) 0.0 - 0.5 % ABS. NEUTROPHILS 5.0 1.8 - 8.0 K/UL  
 ABS. LYMPHOCYTES 1.6 0.8 - 3.5 K/UL  
 ABS. MONOCYTES 0.7 0.0 - 1.0 K/UL  
 ABS. EOSINOPHILS 0.1 0.0 - 0.4 K/UL  
 ABS. BASOPHILS 0.0 0.0 - 0.1 K/UL  
 ABS. IMM. GRANS. 0.0 0.00 - 0.04 K/UL  
 DF AUTOMATED METABOLIC PANEL, COMPREHENSIVE Collection Time: 01/22/19 10:31 PM  
Result Value Ref Range Sodium 140 136 - 145 mmol/L Potassium 3.3 (L) 3.5 - 5.1 mmol/L Chloride 103 97 - 108 mmol/L  
 CO2 33 (H) 21 - 32 mmol/L Anion gap 4 (L) 5 - 15 mmol/L Glucose 101 (H) 65 - 100 mg/dL BUN 11 6 - 20 MG/DL Creatinine 0.67 0.55 - 1.02 MG/DL  
 BUN/Creatinine ratio 16 12 - 20 GFR est AA >60 >60 ml/min/1.73m2 GFR est non-AA >60 >60 ml/min/1.73m2 Calcium 8.3 (L) 8.5 - 10.1 MG/DL Bilirubin, total 0.3 0.2 - 1.0 MG/DL  
 ALT (SGPT) 11 (L) 12 - 78 U/L  
 AST (SGOT) 23 15 - 37 U/L Alk. phosphatase 64 45 - 117 U/L Protein, total 5.9 (L) 6.4 - 8.2 g/dL Albumin 2.3 (L) 3.5 - 5.0 g/dL Globulin 3.6 2.0 - 4.0 g/dL A-G Ratio 0.6 (L) 1.1 - 2.2 TYPE & SCREEN Collection Time: 01/22/19 10:31 PM  
Result Value Ref Range Crossmatch Expiration 01/25/2019 ABO/Rh(D) A POSITIVE Antibody screen NEG PROTHROMBIN TIME + INR Collection Time: 01/22/19 11:20 PM  
Result Value Ref Range INR 1.1 0.9 - 1.1 Prothrombin time 11.1 9.0 - 11.1 sec PTT Collection Time: 01/22/19 11:20 PM  
Result Value Ref Range aPTT 31.3 22.1 - 32.0 sec  
 aPTT, therapeutic range     58.0 - 77.0 SECS Radiologic Studies -  
XR CHEST PORT Final Result IMPRESSION:  
Minimal bibasilar atelectasis. .  . CT ABD PELV W CONT    (Results Pending) CT Results  (Last 48 hours) None CXR Results  (Last 48 hours) 01/22/19 2251  XR CHEST PORT Final result Impression:  IMPRESSION:  
Minimal bibasilar atelectasis. .  . Narrative:  INDICATION:  Chest pain EXAM: Chest single view. COMPARISON: 1/16/2019. FINDINGS: A single frontal view of the chest at 2240 hours shows stable  
elevation of the right hemidiaphragm with minimal bibasilar atelectasis but no  
new infiltrate or pleural effusion. No pneumothorax. .  The heart, mediastinum  
and pulmonary vasculature are stable . Transvenous pacemaker from the left is  
stable. The bony thorax is unremarkable for age. .  
   
  
  
 
 
Medical Decision Making I am the first provider for this patient. I reviewed the vital signs, available nursing notes, past medical history, past surgical history, family history and social history. Vital Signs-Reviewed the patient's vital signs. Patient Vitals for the past 12 hrs: 
 Temp Pulse Resp BP SpO2  
01/22/19 2343 98.2 °F (36.8 °C)      
01/22/19 2149 98.9 °F (37.2 °C) 77 16 147/59 97 % Pulse Oximetry Analysis - 97% on RA Cardiac Monitor:  
Rate: 77 bpm 
Rhythm: Normal Sinus Rhythm Records Reviewed: Nursing Notes, Old Medical Records, Previous electrocardiograms, Ambulance Run Sheet, Previous Radiology Studies and Previous Laboratory Studies Provider Notes (Medical Decision Making):  
 DDx: anemia, upper/lower GI bleed, electrolyte abnormality ED Course:  
Initial assessment performed. The patients presenting problems have been discussed, and they are in agreement with the care plan formulated and outlined with them. I have encouraged them to ask questions as they arise throughout their visit. Procedure Note - Rectal Exam:  
12:00 AM  
Performed by: Jose Alfredo Shelton DO Chaperoned by: Gustavo Villegas RN Rectal exam performed. No stool in the rectal vault. Pt had dark brown stool in the diaper, no blood. Stool was collected and sent to the lab for Hemoccult testing. The procedure took 1-15 minutes, and pt tolerated well. CONSULT NOTE:  
12:13 AM 
Jose Alfredo Shelton DO, spoke with Dr. Iris Wyman Specialty: Hospitalist 
Discussed pt's hx, disposition, and available diagnostic and imaging results. Reviewed care plans. Consultant will evaluate pt for admission. CT abd/pelvis pending. Written by Shanika Nj. Carlos A Moreira, ED Scribe, as dictated by Jose Alfredo Shelton DO 
 
Critical Care Time: CRITICAL CARE NOTE : 
 
IMPENDING DETERIORATION -Cardiovascular ASSOCIATED RISK FACTORS - Bleeding MANAGEMENT- Bedside Assessment, supervision of care INTERPRETATION -  Xrays, CT Scan, Blood Pressure, Cardiac Output Measures  and Labs INTERVENTIONS - hemodynamic mngmt- blood transfusion CASE REVIEW - Hospitalist 
TREATMENT RESPONSE -Stable PERFORMED BY - Self NOTES   : 
I have spent 40 minutes of critical care time involved in lab review, consultations with specialist, family decision- making, bedside attention and documentation. During this entire length of time I was immediately available to the patient . Madhuri Fraser DO Discussed CT results with Dr. Iris Wyman, circumferential bowel wall thickening with a large amount of colonic stool- hold on Ab will further discuss with pt and family. Currently no fever, no leukocytosis.   
 
Disposition: 
Admit Note: 
12:13 AM 
 Pt is being admitted by Dr. Anish Shaikh. The results of their tests and reason(s) for their admission have been discussed with pt and/or available family. They convey agreement and understanding for the need to be admitted and for admission diagnosis. PLAN: 
1. Admit to Hospitalist 
Diagnosis Clinical Impression: 1. Acute on chronic blood loss anemia (HCC) 2. Gastrointestinal hemorrhage, unspecified gastrointestinal hemorrhage type 3. Coagulopathy ([facility] [address] This note is prepared by Katherine Freire. Felipe Russell, acting as Scribe for DO Laura Ahuja DO: The scribe's documentation has been prepared under my direction and personally reviewed by me in its entirety. I confirm that the note above accurately reflects all work, treatment, procedures, and medical decision making performed by me. This note will not be viewable in [address]

## 2019-01-23 NOTE — PROGRESS NOTES
Reason for Readmission:   GI Bleed RRAT Score and Risk Level:    39 - High Risk Level of Readmission:   1 Care Conference scheduled:  Not at this time Resources/supports as identified by patient/family:   Good family support - son and daughter Top Challenges facing patient (as identified by patient/family and CM): Finances/Medication cost?    No issues - Medicare & [employer] secondary Transportation   Pt's family transports Support system or lack thereof? Good family support Living arrangements? Lives alone in a 1 story apt with no steps to the entrance and pt's children live next door Self-care/ADLs/Cognition? Needs assistance with ADL's and IADL's since her stroke Current Advanced Directive/Advance Care Plan:  Full Code Plan for utilizing home health:   No - SNF - return to [facility] for rehab Likelihood of additional readmission:  Low risk Transition of Care Plan:    Based on readmission, the patient's previous Plan of Care 
 has been evaluated and/or modified. The current Transition of Care Plan is:    F/u appts and return to [facility] for rehab. Pt is a Level 1 Readmission with a recent discharge from [facility] on 1/17/19 and pt went to [facility] for rehab. CM met with pt and pt's daughter at the bedside. CM discussed [facility] and pt's daughter, [facility] who is the MPOA would like pt to return. [address] form signed by pt's daughter and referral was sent to [facility] via [facility] and they accepted. Prior to pt's hospitalization at [facility] pt was living alone at the [facility] apts in a first floor apt with no steps to the entrance. Pt's daughter and son live next door. Pt has a walker and wc. Preferred pharmacy is [facility]. Pt will need medical transport to rehab at discharge. CM will continue to follow pt for discharge planning needs. Care Management Interventions PCP Verified by CM: Yes(Dr. Junie Batista) Mode of Transport at Discharge: BLS(medical transport ) Transition of Care Consult (CM Consult): Discharge Planning, SNF Discharge Durable Medical Equipment: No(walker, wc ) Physical Therapy Consult: Yes Occupational Therapy Consult: Yes Speech Therapy Consult: No 
Current Support Network: Lives Alone, Own Home(lives alone in 1 story apt with no steps and family lives next door) Confirm Follow Up Transport: Other (see comment)(medical transport ) Plan discussed with Pt/Family/Caregiver: Yes Freedom of Choice Offered: Yes Discharge Location Discharge Placement: Skilled nursing facility [facility], [facility]

## 2019-01-23 NOTE — ED NOTES
TRANSFER - OUT REPORT: 
 
Verbal report given to Melissa RN(name) on Jensen Plater  being transferred to 2244(unit) for routine progression of care Report consisted of patients Situation, Background, Assessment and  
Recommendations(SBAR). Information from the following report(s) ED Summary was reviewed with the receiving nurse. Opportunity for questions and clarification was provided. Patient transported with: 
 Registered Nurse Tech

## 2019-01-23 NOTE — PROGRESS NOTES
INTERNAL MEDICINE ATTENDING NOTE 
 
S: Ms. Paulette Mcdaniels was seen by me today during rounds. She was admitted overnight for GI bleed. At this time, she is resting comfortably. She denies pain, N/V. No overt hematochezia or melena in hospital. The patient has no new complaints today. ROS otherwise unremarkable except as noted elsewhere. O: Blood pressure 147/62, pulse 61, temperature 98.4 °F (36.9 °C), resp. rate 20, height 5' 7\" (1.702 m), weight 154 lb 15.7 oz (70.3 kg), SpO2 97 %. Gen: Patient is in no acute distress. Lungs: CTAB. Heart: RRR. Abd: S, NT, ND, BS present. Extremities: Warm. Recent Results (from the past 12 hour(s)) CBC WITH AUTOMATED DIFF Collection Time: 01/22/19 10:31 PM  
Result Value Ref Range WBC 7.4 3.6 - 11.0 K/uL  
 RBC 2.23 (L) 3.80 - 5.20 M/uL HGB 6.8 (L) 11.5 - 16.0 g/dL HCT 21.3 (L) 35.0 - 47.0 % MCV 95.5 80.0 - 99.0 FL  
 MCH 30.5 26.0 - 34.0 PG  
 MCHC 31.9 30.0 - 36.5 g/dL  
 RDW 13.5 11.5 - 14.5 % PLATELET 943 331 - 757 K/uL MPV 8.9 8.9 - 12.9 FL  
 NRBC 0.0 0  WBC ABSOLUTE NRBC 0.00 0.00 - 0.01 K/uL NEUTROPHILS 67 32 - 75 % LYMPHOCYTES 22 12 - 49 % MONOCYTES 10 5 - 13 % EOSINOPHILS 1 0 - 7 % BASOPHILS 0 0 - 1 % IMMATURE GRANULOCYTES 1 (H) 0.0 - 0.5 % ABS. NEUTROPHILS 5.0 1.8 - 8.0 K/UL  
 ABS. LYMPHOCYTES 1.6 0.8 - 3.5 K/UL  
 ABS. MONOCYTES 0.7 0.0 - 1.0 K/UL  
 ABS. EOSINOPHILS 0.1 0.0 - 0.4 K/UL  
 ABS. BASOPHILS 0.0 0.0 - 0.1 K/UL  
 ABS. IMM. GRANS. 0.0 0.00 - 0.04 K/UL  
 DF AUTOMATED METABOLIC PANEL, COMPREHENSIVE Collection Time: 01/22/19 10:31 PM  
Result Value Ref Range Sodium 140 136 - 145 mmol/L Potassium 3.3 (L) 3.5 - 5.1 mmol/L Chloride 103 97 - 108 mmol/L  
 CO2 33 (H) 21 - 32 mmol/L Anion gap 4 (L) 5 - 15 mmol/L Glucose 101 (H) 65 - 100 mg/dL BUN 11 6 - 20 MG/DL Creatinine 0.67 0.55 - 1.02 MG/DL  
 BUN/Creatinine ratio 16 12 - 20 GFR est AA >60 >60 ml/min/1.73m2 GFR est non-AA >60 >60 ml/min/1.73m2 Calcium 8.3 (L) 8.5 - 10.1 MG/DL Bilirubin, total 0.3 0.2 - 1.0 MG/DL  
 ALT (SGPT) 11 (L) 12 - 78 U/L  
 AST (SGOT) 23 15 - 37 U/L Alk. phosphatase 64 45 - 117 U/L Protein, total 5.9 (L) 6.4 - 8.2 g/dL Albumin 2.3 (L) 3.5 - 5.0 g/dL Globulin 3.6 2.0 - 4.0 g/dL A-G Ratio 0.6 (L) 1.1 - 2.2 TYPE & SCREEN Collection Time: 01/22/19 10:31 PM  
Result Value Ref Range Crossmatch Expiration 01/25/2019 ABO/Rh(D) A POSITIVE Antibody screen NEG Unit number [id number] Blood component type RC LR Unit division 00 Status of unit ISSUED Crossmatch result Compatible FERRITIN Collection Time: 01/22/19 11:20 PM  
Result Value Ref Range Ferritin 394 (H) 8 - 252 NG/ML  
IRON PROFILE Collection Time: 01/22/19 11:20 PM  
Result Value Ref Range Iron 33 (L) 35 - 150 ug/dL TIBC 178 (L) 250 - 450 ug/dL Iron % saturation 19 (L) 20 - 50 % VITAMIN B12 Collection Time: 01/22/19 11:20 PM  
Result Value Ref Range Vitamin B12 1,876 (H) 193 - 986 pg/mL FOLATE Collection Time: 01/22/19 11:20 PM  
Result Value Ref Range Folate 14.4 5.0 - 21.0 ng/mL PROTHROMBIN TIME + INR Collection Time: 01/22/19 11:20 PM  
Result Value Ref Range INR 1.1 0.9 - 1.1 Prothrombin time 11.1 9.0 - 11.1 sec PTT Collection Time: 01/22/19 11:20 PM  
Result Value Ref Range aPTT 31.3 22.1 - 32.0 sec  
 aPTT, therapeutic range     58.0 - 77.0 SECS  
OCCULT BLOOD, STOOL Collection Time: 01/23/19 12:20 AM  
Result Value Ref Range Occult blood, stool POSITIVE (A) NEG    
GLUCOSE, POC Collection Time: 01/23/19  3:06 AM  
Result Value Ref Range Glucose (POC) 98 65 - 100 mg/dL Performed by Felicity Jackson GLUCOSE, POC Collection Time: 01/23/19  5:29 AM  
Result Value Ref Range Glucose (POC) 113 (H) 65 - 100 mg/dL Performed by Haim Figueroa (PCT) METABOLIC PANEL, COMPREHENSIVE  
 Collection Time: 01/23/19  6:47 AM  
Result Value Ref Range Sodium 138 136 - 145 mmol/L Potassium 3.6 3.5 - 5.1 mmol/L Chloride 103 97 - 108 mmol/L  
 CO2 29 21 - 32 mmol/L Anion gap 6 5 - 15 mmol/L Glucose 95 65 - 100 mg/dL BUN 9 6 - 20 MG/DL Creatinine 0.67 0.55 - 1.02 MG/DL  
 BUN/Creatinine ratio 13 12 - 20 GFR est AA >60 >60 ml/min/1.73m2 GFR est non-AA >60 >60 ml/min/1.73m2 Calcium 8.5 8.5 - 10.1 MG/DL Bilirubin, total 0.4 0.2 - 1.0 MG/DL  
 ALT (SGPT) 14 12 - 78 U/L  
 AST (SGOT) 18 15 - 37 U/L Alk. phosphatase 64 45 - 117 U/L Protein, total 5.9 (L) 6.4 - 8.2 g/dL Albumin 2.2 (L) 3.5 - 5.0 g/dL Globulin 3.7 2.0 - 4.0 g/dL A-G Ratio 0.6 (L) 1.1 - 2.2    
CBC WITH AUTOMATED DIFF Collection Time: 01/23/19  6:47 AM  
Result Value Ref Range WBC 6.8 3.6 - 11.0 K/uL  
 RBC 2.65 (L) 3.80 - 5.20 M/uL HGB 8.1 (L) 11.5 - 16.0 g/dL HCT 24.8 (L) 35.0 - 47.0 % MCV 93.6 80.0 - 99.0 FL  
 MCH 30.6 26.0 - 34.0 PG  
 MCHC 32.7 30.0 - 36.5 g/dL  
 RDW 14.6 (H) 11.5 - 14.5 % PLATELET 856 607 - 476 K/uL MPV 9.0 8.9 - 12.9 FL  
 NRBC 0.0 0  WBC ABSOLUTE NRBC 0.00 0.00 - 0.01 K/uL NEUTROPHILS 72 32 - 75 % LYMPHOCYTES 17 12 - 49 % MONOCYTES 10 5 - 13 % EOSINOPHILS 1 0 - 7 % BASOPHILS 0 0 - 1 % IMMATURE GRANULOCYTES 1 (H) 0.0 - 0.5 % ABS. NEUTROPHILS 4.9 1.8 - 8.0 K/UL  
 ABS. LYMPHOCYTES 1.1 0.8 - 3.5 K/UL  
 ABS. MONOCYTES 0.7 0.0 - 1.0 K/UL  
 ABS. EOSINOPHILS 0.1 0.0 - 0.4 K/UL  
 ABS. BASOPHILS 0.0 0.0 - 0.1 K/UL  
 ABS. IMM. GRANS. 0.1 (H) 0.00 - 0.04 K/UL  
 DF AUTOMATED A / P:  
1. GI bleed (1/23/2019): Patient admitted; anticoagulant medications held. GI consulted. 2. Diabetes: Controlled at last check; due for follow up. 3. Parkinsons: Continue sinemet. She has outpatient neurology followup. 4. Hypertension: BP fine today.   
5. 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 6. Hypothyroidism: Continue syhyroid; Normal TSH at last check. 7. Depression/Anxiety: Stable. Shayan Musa MD, [address] Best contact is via Pager: [phone], or via hospital  at [phone]

## 2019-01-23 NOTE — PROGRESS NOTES
TRANSFER - IN REPORT: 
 
Verbal report received from Sabrina Gold RN(name) on Laz Tapia  being received from ED(unit) for routine progression of care Report consisted of patients Situation, Background, Assessment and  
Recommendations(SBAR). Information from the following report(s) SBAR, Kardex, ED Summary, Intake/Output, MAR, Accordion and Recent Results was reviewed with the receiving nurse. Opportunity for questions and clarification was provided. Assessment completed upon patients arrival to unit and care assumed.

## 2019-01-23 NOTE — CONSULTS
GI Consultation Note Sridhar Baig) NAME: Ila Patel : 1932 MRN: [id number] ATTG/ PCP: Ila Avila MD 
Date/Time:  2019 9:35 AM 
Subjective:  
REASON FOR CONSULT:     
 
Leif Cerda is a 80 y.o.  AA female who I was asked to see for hematochezia. Discussed with daughter and granddaughter as well as chart review, as patient suffers from dementia and had a recent CVA, afterwhich she required rehab stay at [facility] and loq dose eliquis at 2.5 mg BID. She had fecal impaction, 6 days w/o a BM and after manual disimpaction some blood was noted on the edge of her rectum. This sounds likely secondary to relative trauma of manual disimpaction. Hgb was checked and found to be 6.8, from previous 8.2 g/dL, so she was sent for admission. Last colonoscopy  with Dr. Steve Cardenas, diverticulosis present. EGD basically normal. 
 
Past Medical History:  
Diagnosis Date  Anxiety  Arthritis  Chest pain  Negative stress test and Holter monitor w/Dr Haider Russo.  Chronic back pain  Chronic venous insufficiency  Colon polyp   
 last scope normal ; Dr. Steve Cardenas  Cyst of right kidney  Depression  Diabetes ([address]  Hypercholesterolemia  Hypertension  Hypothyroidism  Memory disorder  Other ill-defined conditions(799.89)   
 heart murmur  Painful hip  Parkinson disease ([address]  Urinary frequency Sees Dr. Smith July with [address] Past Surgical History:  
Procedure Laterality Date  COLONOSCOPY,DIAGNOSTIC  2015  HX BREAST BIOPSY    HX HYSTERECTOMY Due to a prolapsed uterus  HX KNEE REPLACEMENT    
 left  HX KNEE REPLACEMENT  2006  
 right  HX ORTHOPAEDIC B knees.  HX OTHER SURGICAL    
 pelvic prolasp  IR PERC ART MECH THROMB INFUSION INTRACRANIAL  2019  DC INS NEW/RPLCMT PRM PM W/TRANSV ELTRD ATRIAL&VENT  2019  UPPER GI ENDOSCOPY,BIOPSY  2015 Social History Tobacco Use  Smoking status: Never Smoker  Smokeless tobacco: Never Used Substance Use Topics  Alcohol use: No  
  Alcohol/week: 0.0 oz  
  Comment: Rare Family History Problem Relation Age of Onset  Cancer Mother   
     pancreatic  Cancer Father   
     colon  Cancer Sister   
     pancreatic  Diabetes Sister  Alzheimer Brother Allergies Allergen Reactions  Norvasc [Amlodipine] Swelling  Flexeril [Cyclobenzaprine] Other (comments) Caused pt to fall & hallucination  Percocet [Oxycodone-Acetaminophen] Itching  Remeron [Mirtazapine] Diarrhea  Tramadol Other (comments) Caused falls Home Medications: 
Prior to Admission Medications Prescriptions Last Dose Informant Patient Reported? Taking? SITagliptin-metFORMIN (JANUMET)  mg per tablet   Yes No  
Sig: Take 1 Tab by mouth two (2) times daily (with meals). apixaban (ELIQUIS) 2.5 mg tablet   No No  
Sig: Take 1 Tab by mouth two (2) times a day. aspirin delayed-release 81 mg tablet   Yes No  
Sig: Take 81 mg by mouth daily. carbidopa-levodopa (SINEMET)  mg per tablet   No No  
Sig: TAKE 1 TABLET BY MOUTH THREE TIMES DAILY  
cephALEXin (KEFLEX) 250 mg capsule   No No  
Sig: Take 1 Cap by mouth three (3) times daily for 4 days. glimepiride (AMARYL) 4 mg tablet   Yes No  
Sig: Take  by mouth every morning. iron,carbonyl-vitamin C (VITRON-C) 65 mg iron- 125 mg TbEC   Yes No  
Sig: Take  by mouth.  
levothyroxine (SYNTHROID) 50 mcg tablet   No No  
Sig: TAKE 1 TABLET BY MOUTH DAILY BEFORE BREAKFAST  
metoprolol succinate (TOPROL-XL) 25 mg XL tablet   No No  
Sig: Take 1 Tab by mouth daily. Facility-Administered Medications: None Hospital medications: 
Current Facility-Administered Medications Medication Dose Route Frequency  sodium chloride (NS) flush 5-40 mL  5-40 mL IntraVENous Q8H  
 sodium chloride (NS) flush 5-40 mL  5-40 mL IntraVENous PRN  
  0.9% sodium chloride infusion  75 mL/hr IntraVENous CONTINUOUS  
 acetaminophen (TYLENOL) solution 650 mg  650 mg Oral Q4H PRN  
 HYDROmorphone (PF) (DILAUDID) injection 0.5 mg  0.5 mg IntraVENous Q4H PRN  
 ondansetron (ZOFRAN) injection 4 mg  4 mg IntraVENous Q4H PRN  
 bisacodyl (DULCOLAX) suppository 10 mg  10 mg Rectal DAILY PRN  
 carbidopa-levodopa (SINEMET)  mg per tablet 1 Tab  1 Tab Oral TID  multivitamin, tx-iron-ca-min (THERA-M w/ IRON) tablet 1 Tab  1 Tab Oral DAILY  levothyroxine (SYNTHROID) tablet 50 mcg  50 mcg Oral 6am  
 glucose chewable tablet 16 g  4 Tab Oral PRN  
 dextrose (D50W) injection syrg 12.5-25 g  25-50 mL IntraVENous PRN  
 glucagon (GLUCAGEN) injection 1 mg  1 mg IntraMUSCular PRN  
 insulin lispro (HUMALOG) injection   SubCUTAneous Q6H  
 metoprolol succinate (TOPROL-XL) XL tablet 25 mg  25 mg Oral DAILY  pantoprazole (PROTONIX) tablet 40 mg  40 mg Oral ACB  metroNIDAZOLE (FLAGYL) IVPB premix 500 mg  500 mg IntraVENous Q8H  
 atorvastatin (LIPITOR) tablet 20 mg  20 mg Oral QHS  sodium chloride (NS) flush 5-40 mL  5-40 mL IntraVENous Q8H  
 sodium chloride (NS) flush 5-40 mL  5-40 mL IntraVENous PRN  
 0.9% sodium chloride infusion 250 mL  250 mL IntraVENous PRN REVIEW OF SYSTEMS:   
 []     Unable to obtain  ROS due to  []    mental status change  []    sedated   []    intubated 
 [x]    Total of 11 systems reviewed as follows: 
Const:  negative fever, negative chills, negative weight loss Eyes:   negative diplopia or visual changes, negative eye pain ENT:   negative coryza, negative sore throat Resp:   negative cough, hemoptysis, dyspnea Cards:   negative for chest pain, palpitations, lower extremity edema :  negative for frequency, dysuria and hematuria Skin:   negative for rash and pruritus Heme:   negative for easy bruising and gum/nose bleeding MS:  negative for myalgias, arthralgias, back pain and muscle weakness Neurolo:  negative for headaches, dizziness, vertigo, memory problems Psych:   negative for feelings of anxiety, depression Pertinent Positives include : 
 
Objective: VITALS:   
Visit Vitals /57 (BP 1 Location: Right arm, BP Patient Position: At rest) Pulse 69 Temp 98.1 °F (36.7 °C) Resp 20 Ht 5' 7\" (1.702 m) Wt 70.3 kg (154 lb 15.7 oz) SpO2 94% BMI 24.27 kg/m² Temp (24hrs), Av.3 °F (36.8 °C), Min:98 °F (36.7 °C), Max:98.9 °F (37.2 °C) PHYSICAL EXAM:  
General:    Alert, cooperative, no distress, appears stated age. Head:   Normocephalic, without obvious abnormality, atraumatic. Eyes:   Conjunctivae clear, anicteric sclerae. Pupils are equal 
Nose:  Nares normal. No drainage or sinus tenderness. Throat:    Lips, mucosa, and tongue normal.  No Thrush Neck:  Supple, symmetrical,  no adenopathy, thyroid: non tender Back:    Symmetric,  No CVA tenderness. Lungs:   CTA bilaterally. No wheezing/rhonchi/rales. Chest wall:  No tenderness or deformity. No Accessory muscle use. Heart:   Regular rate and rhythm,  no murmur, rub or gallop. Abdomen:   Soft, non-tender. Not distended. Bowel sounds normal. No masses KAI:   No masses, brown stool in rectal vault and then had a brown BM after digital stimulation. Palpable internal hemorrhoids but no active bleeding. No anal fissure either. Extremities: Atraumatic, No cyanosis. No edema. No clubbing Skin:     Texture, turgor normal. No rashes/lesions/jaundice Lymph:  Cervical, supraclavicular normal. 
Psych:  Poor insight. Not depressed. Not anxious or agitated. Neurologic: EOMs intact. No facial asymmetry. +slurred speech. Decreased strength, A/O X 1. LAB DATA REVIEWED:   
Recent Results (from the past 48 hour(s)) CBC WITH AUTOMATED DIFF Collection Time: 19 10:31 PM  
Result Value Ref Range WBC 7.4 3.6 - 11.0 K/uL  
 RBC 2.23 (L) 3.80 - 5.20 M/uL HGB 6.8 (L) 11.5 - 16.0 g/dL HCT 21.3 (L) 35.0 - 47.0 % MCV 95.5 80.0 - 99.0 FL  
 MCH 30.5 26.0 - 34.0 PG  
 MCHC 31.9 30.0 - 36.5 g/dL  
 RDW 13.5 11.5 - 14.5 % PLATELET 304 354 - 387 K/uL MPV 8.9 8.9 - 12.9 FL  
 NRBC 0.0 0  WBC ABSOLUTE NRBC 0.00 0.00 - 0.01 K/uL NEUTROPHILS 67 32 - 75 % LYMPHOCYTES 22 12 - 49 % MONOCYTES 10 5 - 13 % EOSINOPHILS 1 0 - 7 % BASOPHILS 0 0 - 1 % IMMATURE GRANULOCYTES 1 (H) 0.0 - 0.5 % ABS. NEUTROPHILS 5.0 1.8 - 8.0 K/UL  
 ABS. LYMPHOCYTES 1.6 0.8 - 3.5 K/UL  
 ABS. MONOCYTES 0.7 0.0 - 1.0 K/UL  
 ABS. EOSINOPHILS 0.1 0.0 - 0.4 K/UL  
 ABS. BASOPHILS 0.0 0.0 - 0.1 K/UL  
 ABS. IMM. GRANS. 0.0 0.00 - 0.04 K/UL  
 DF AUTOMATED METABOLIC PANEL, COMPREHENSIVE Collection Time: 01/22/19 10:31 PM  
Result Value Ref Range Sodium 140 136 - 145 mmol/L Potassium 3.3 (L) 3.5 - 5.1 mmol/L Chloride 103 97 - 108 mmol/L  
 CO2 33 (H) 21 - 32 mmol/L Anion gap 4 (L) 5 - 15 mmol/L Glucose 101 (H) 65 - 100 mg/dL BUN 11 6 - 20 MG/DL Creatinine 0.67 0.55 - 1.02 MG/DL  
 BUN/Creatinine ratio 16 12 - 20 GFR est AA >60 >60 ml/min/1.73m2 GFR est non-AA >60 >60 ml/min/1.73m2 Calcium 8.3 (L) 8.5 - 10.1 MG/DL Bilirubin, total 0.3 0.2 - 1.0 MG/DL  
 ALT (SGPT) 11 (L) 12 - 78 U/L  
 AST (SGOT) 23 15 - 37 U/L Alk. phosphatase 64 45 - 117 U/L Protein, total 5.9 (L) 6.4 - 8.2 g/dL Albumin 2.3 (L) 3.5 - 5.0 g/dL Globulin 3.6 2.0 - 4.0 g/dL A-G Ratio 0.6 (L) 1.1 - 2.2 TYPE & SCREEN Collection Time: 01/22/19 10:31 PM  
Result Value Ref Range Crossmatch Expiration 01/25/2019 ABO/Rh(D) A POSITIVE Antibody screen NEG Unit number [id number] Blood component type RC LR Unit division 00 Status of unit ISSUED Crossmatch result Compatible FERRITIN Collection Time: 01/22/19 11:20 PM  
Result Value Ref Range Ferritin 394 (H) 8 - 252 NG/ML  
IRON PROFILE  Collection Time: 01/22/19 11:20 PM  
 Result Value Ref Range Iron 33 (L) 35 - 150 ug/dL TIBC 178 (L) 250 - 450 ug/dL Iron % saturation 19 (L) 20 - 50 % VITAMIN B12 Collection Time: 01/22/19 11:20 PM  
Result Value Ref Range Vitamin B12 1,876 (H) 193 - 986 pg/mL FOLATE Collection Time: 01/22/19 11:20 PM  
Result Value Ref Range Folate 14.4 5.0 - 21.0 ng/mL PROTHROMBIN TIME + INR Collection Time: 01/22/19 11:20 PM  
Result Value Ref Range INR 1.1 0.9 - 1.1 Prothrombin time 11.1 9.0 - 11.1 sec PTT Collection Time: 01/22/19 11:20 PM  
Result Value Ref Range aPTT 31.3 22.1 - 32.0 sec  
 aPTT, therapeutic range     58.0 - 77.0 SECS  
OCCULT BLOOD, STOOL Collection Time: 01/23/19 12:20 AM  
Result Value Ref Range Occult blood, stool POSITIVE (A) NEG    
GLUCOSE, POC Collection Time: 01/23/19  3:06 AM  
Result Value Ref Range Glucose (POC) 98 65 - 100 mg/dL Performed by Mina Coates GLUCOSE, POC Collection Time: 01/23/19  5:29 AM  
Result Value Ref Range Glucose (POC) 113 (H) 65 - 100 mg/dL Performed by Haim Figueroa (PCT) METABOLIC PANEL, COMPREHENSIVE Collection Time: 01/23/19  6:47 AM  
Result Value Ref Range Sodium 138 136 - 145 mmol/L Potassium 3.6 3.5 - 5.1 mmol/L Chloride 103 97 - 108 mmol/L  
 CO2 29 21 - 32 mmol/L Anion gap 6 5 - 15 mmol/L Glucose 95 65 - 100 mg/dL BUN 9 6 - 20 MG/DL Creatinine 0.67 0.55 - 1.02 MG/DL  
 BUN/Creatinine ratio 13 12 - 20 GFR est AA >60 >60 ml/min/1.73m2 GFR est non-AA >60 >60 ml/min/1.73m2 Calcium 8.5 8.5 - 10.1 MG/DL Bilirubin, total 0.4 0.2 - 1.0 MG/DL  
 ALT (SGPT) 14 12 - 78 U/L  
 AST (SGOT) 18 15 - 37 U/L Alk. phosphatase 64 45 - 117 U/L Protein, total 5.9 (L) 6.4 - 8.2 g/dL Albumin 2.2 (L) 3.5 - 5.0 g/dL Globulin 3.7 2.0 - 4.0 g/dL A-G Ratio 0.6 (L) 1.1 - 2.2    
CBC WITH AUTOMATED DIFF Collection Time: 01/23/19  6:47 AM  
Result Value Ref Range WBC 6.8 3.6 - 11.0 K/uL RBC 2.65 (L) 3.80 - 5.20 M/uL HGB 8.1 (L) 11.5 - 16.0 g/dL HCT 24.8 (L) 35.0 - 47.0 % MCV 93.6 80.0 - 99.0 FL  
 MCH 30.6 26.0 - 34.0 PG  
 MCHC 32.7 30.0 - 36.5 g/dL  
 RDW 14.6 (H) 11.5 - 14.5 % PLATELET 579 495 - 373 K/uL MPV 9.0 8.9 - 12.9 FL  
 NRBC 0.0 0  WBC ABSOLUTE NRBC 0.00 0.00 - 0.01 K/uL NEUTROPHILS 72 32 - 75 % LYMPHOCYTES 17 12 - 49 % MONOCYTES 10 5 - 13 % EOSINOPHILS 1 0 - 7 % BASOPHILS 0 0 - 1 % IMMATURE GRANULOCYTES 1 (H) 0.0 - 0.5 % ABS. NEUTROPHILS 4.9 1.8 - 8.0 K/UL  
 ABS. LYMPHOCYTES 1.1 0.8 - 3.5 K/UL  
 ABS. MONOCYTES 0.7 0.0 - 1.0 K/UL  
 ABS. EOSINOPHILS 0.1 0.0 - 0.4 K/UL  
 ABS. BASOPHILS 0.0 0.0 - 0.1 K/UL  
 ABS. IMM. GRANS. 0.1 (H) 0.00 - 0.04 K/UL  
 DF AUTOMATED IMAGING RESULTS: 
CT  
\"IMPRESSION:  
Distal sigmoid colon and rectal wall thickening with adjacent inflammation 
suggesting distal colitis that may be attributable to nonspecific infection or 
inflammation. There is a very large amount of colonic stool. No bowel Obstruction. \" Assessment/Plan: Active Problems: 
  GI bleed (1/23/2019) 79 yo F with fecal impaction, CT with relatively mild colitis, likely associated with massive fecal impaction and/or trauma from manual disimpaction. Had some rectal bleeding, but also significant anemia with recent anticoagulation. Etiology could be malignancy, bleeding polyp, hemorrhoid, less likely diverticulosis, or even remote possibility of upper Gi source. Discussed procedural risk with family, who want to wait on making a decision about pursuing repeat endoscopic eval. 
___________________________________________________ RECOMMENDATIONS:   
 
-- follow H/H 
-- follow GI output, BM just now brown w/o any blood 
-- last colonoscopy 4 years ago, could get repeat after bowel prep to follow CT findings and look for possible bleeding source, but elevated risk -- for now, hopefully can stay off anticoagulation for at least another day while family decides about invasive testing Discussed Code Status:    [x]    Full Code      []    DNR   
___________________________________________________ Care Plan discussed with: 
  [x]    Patient   [x]    Family   []    Nursing   []    Attending Total Time :  45   minutes 
 ___________________________________________________ GI: Andressa Hood MD

## 2019-01-24 PROBLEM — Z71.89 COUNSELING REGARDING ADVANCED CARE PLANNING AND GOALS OF CARE: Status: ACTIVE | Noted: 2019-01-01

## 2019-01-24 PROBLEM — R53.83 OTHER FATIGUE: Status: ACTIVE | Noted: 2019-01-01

## 2019-01-24 PROBLEM — R53.81 PHYSICAL DEBILITY: Status: ACTIVE | Noted: 2019-01-01

## 2019-01-24 NOTE — PROGRESS NOTES
Pharmacy Medication Reconciliation The patient was interviewed regarding current PTA medication list, use and drug allergies; Patient, patient's daughter Candelaria Patel and son Mady Pelaez were present in room and obtained permission from patient to discuss drug regimen with visitor(s) present. The patient was questioned regarding use of any other inhalers, topical products, over the counter medications, herbal medications, vitamin products or ophthalmic/nasal/otic medication use. Allergy Update: Daughter reports patient is not allergic to amlodipine or mirtazapine; all other allergies are current. Daughter states she will be going to patient's home tomorrow and will write down a list of her mother's medications and dosages to bring back with her. Recommendations/Findings: The following amendments were made to the patient's active medication list on file at [facility]:  
1) Additions:  
? Lisinopril 40 mg 1 tablet PO daily ? Mirtazapine 15 mg 1 tablet PO QHS ? Rosuvastatin 20 mg 1 tablet PO daily ? B12 sublingual 
? Calcium-Vitamin D 
 
2) Deletions:  
? Apixaban ? Cephalexin ? Glimepiride ? Iron-vitamin C 
? Metoprolol 3) Changes: None 
 
 
-Clarified PTA med list with patient's daughter. PTA medication list was corrected to the following:  
Prior to Admission Medications Prescriptions Last Dose Informant Patient Reported? Taking? SITagliptin-metFORMIN (JANUMET)  mg per tablet  Child Yes Yes Sig: Take 1 Tab by mouth two (2) times daily (with meals). aspirin delayed-release 81 mg tablet  Child Yes Yes Sig: Take 81 mg by mouth daily. calcium carb/vit D3/minerals (CALCIUM-VITAMIN D PO)  Child Yes Yes Sig: Take  by mouth.  
carbidopa-levodopa (SINEMET)  mg per tablet  Child No Yes Sig: TAKE 1 TABLET BY MOUTH THREE TIMES DAILY  
cyanocobalamin/cobamamide (B12 SL)  Child Yes Yes Sig: by SubLINGual route. levothyroxine (SYNTHROID) 50 mcg tablet  Child No Yes Sig: TAKE 1 TABLET BY MOUTH DAILY BEFORE BREAKFAST  
lisinopril (PRINIVIL, ZESTRIL) 40 mg tablet  Child Yes Yes Sig: Take 40 mg by mouth daily. mirtazapine (REMERON) 15 mg tablet  Child Yes Yes Sig: Take 15 mg by mouth nightly. rosuvastatin (CRESTOR) 20 mg tablet  Child Yes Yes Sig: Take 20 mg by mouth nightly. Facility-Administered Medications: None Thank you, Chaim Olivares, PharmD Candidate

## 2019-01-24 NOTE — PROGRESS NOTES
Problem: Mobility Impaired (Adult and Pediatric) Goal: *Acute Goals and Plan of Care (Insert Text) Physical Therapy Goals Initiated 1/23/2019 1. Patient will move from supine to sit and sit to supine  in bed with maximal assistance x 1 within 7 day(s). 2.  Patient will transfer from bed to chair and chair to bed with moderate assistance x 2  using the least restrictive device within 7 day(s). 3.  Patient will perform sit to stand with maximal assistance x 2 within 7 day(s). 4.  Patient will tolerate static sitting at edge of bed for >/= 5 minutes with midline posture and occasional contact guard assist within 7 day(s). physical Therapy TREATMENT Patient: Torin Mehta (41 y.o. female) Date: 1/24/2019 Diagnosis: GI bleed <principal problem not specified> Precautions:   
Chart, physical therapy assessment, plan of care and goals were reviewed. ASSESSMENT: 
Pt received supine in bed w/ family present in room and pt agreeable to participation with therapy. Pt with overall improved functional mobility this date, demonstrating improved bed mobility, sitting balance, and ability to assume standing position as well as ambulate 5-6 steps along EOB. Sit>>stand transfer occurred with modAx2 and manual cues to facilitate trunk and b/l hip extension. Once standing, pt able to sidestep along EOB w/ HHAx2 and modAx2 initially. However, as pt fatigued, pt required up to maxAx2 while sidestepping along EOB. Overall, pt tolerated therapy session well however fatigued at completion and not safe to attempt mobilization to bedside chair this date. Given progress demonstrated today, anticipate that pt will be able to mobilize to bedside chair during next therapy session. Continue to recommend pt return to SNF at discharge. Progression toward goals: 
[]    Improving appropriately and progressing toward goals 
[]    Improving slowly and progressing toward goals []    Not making progress toward goals and plan of care will be adjusted PLAN: 
Patient continues to benefit from skilled intervention to address the above impairments. Continue treatment per established plan of care. Discharge Recommendations:  Ramón Sweeney Further Equipment Recommendations for Discharge:  TBD by facility SUBJECTIVE:  
Patient stated I cant.  OBJECTIVE DATA SUMMARY:  
Critical Behavior: 
Neurologic State: Alert Orientation Level: Oriented to person, Oriented to place Cognition: Follows commands, Impaired decision making Safety/Judgement: Fall prevention Functional Mobility Training: 
Bed Mobility: 
Rolling: Minimum assistance;Assist x1 Supine to Sit: Minimum assistance;Assist x1 Sit to Supine: Moderate assistance;Assist x1 Scooting: Maximum assistance;Assist x1 Transfers: 
Sit to Stand: Moderate assistance;Assist x2 Balance: 
Sitting: Impaired; Without support Sitting - Static: Fair (occasional) Sitting - Dynamic: Fair (occasional) Standing: Impaired; Without support Standing - Static: Poor Standing - Dynamic : PoorAmbulation/Gait Training: 
  
  
  
  
  
  
  
  
  
  
  
  
  
  
  
  
  
 
  
  
   
 
 
Pain: 
Pain Scale 1: Numeric (0 - 10) Pain Intensity 1: 0 Pain Location 1: Abdomen Pain Orientation 1: Anterior Pain Description 1: Aching;Cramping Pain Intervention(s) 1: Medication (see MAR) Activity Tolerance: VSS throughout on RA Please refer to the flowsheet for vital signs taken during this treatment. After treatment:  
[]    Patient left in no apparent distress sitting up in chair 
[x]    Patient left in no apparent distress in bed 
[x]    Call bell left within reach [x]    Nursing notified 
[x]    Caregiver present [x]    Bed alarm activated COMMUNICATION/COLLABORATION:  
The patients plan of care was discussed with: Occupational Therapist and Registered Nurse Bay Magana, PT, DPT 
 Time Calculation: 15 mins

## 2019-01-24 NOTE — PROGRESS NOTES
GI Progress Note ([place]) NAME:Diana Media Fees :[id number] MVI:[id number] ATTG/PCP: Doug Olsen MD 
Date/Time:  2019 7:33 AM  
 
Reason for following: hematochezia Assessment:  
· Hematochezia · Anemia · Lower abd pain, CT with some colitis likely 2/2 fecal impaction · Recent CVA on low dose anticoagulation with eliquis, dementia, DM, Parkinsons Plan: · Await decision from family about possibility of pursuing any colonoscopy · Continue to hold eliquis in the meantime · Would favor conservative therapy as stools brown and no signs of bleeding, but scant blood noted after impaction does not really explain profound anemia · If no colonoscopy, would repeat CT scan at some point, in 2-4 weeks maybe Subjective:  
Several Brown BMs yesterday. And brown stool without any bleeding on my rectal exam yesterday. Today, she's pleasantly confused. Son at bedside. He's hesitant to make a decision about anything yet. Review of Systems: 
Symptom Y/N Comments  Symptom Y/N Comments Fever/Chills    Chest Pain Cough    Headaches Sputum    Joint Pain SOB/ESPINOZA    Pruritis/Rash Tolerating Diet    Other Could NOT obtain due to: dementia Objective: VITALS:  
Last 24hrs VS reviewed since prior progress note. Most recent are: 
Visit Vitals BP (!) 143/114 Pulse 63 Temp 98.1 °F (36.7 °C) Resp 18 Ht 5' 7\" (1.702 m) Wt 70.3 kg (154 lb 15.7 oz) SpO2 97% BMI 24.27 kg/m² Intake/Output Summary (Last 24 hours) at 2019 7091 Last data filed at 2019 0425 Gross per 24 hour Intake  Output 1450 ml Net -1450 ml PHYSICAL EXAM: 
General: WD, WN. Sleepy but arousable, cooperative, no acute distress   
HEENT: NC, Atraumatic. Anicteric sclerae. Lungs:  CTA Bilaterally. No Wheezing/Rhonchi/Rales. Heart:  Regular  rhythm,  No murmur (), No Rubs, No Gallops Abdomen: Soft, Non distended, Non tender.  +Bowel sounds, no HSM Neurologic:  Alert and oriented X 1. No acute neurological distress Psych:   Poor insight. Not anxious nor agitated. Lab and Radiology Data Reviewed: (see below) Medications Reviewed: (see below) PMH/SH reviewed - no change compared to H&P 
________________________________________________________________________ Total time spent with patient: 15 minutes ________________________________________________________________________ Care Plan discussed with: 
Patient x Family  x  
RN Consultant:    
 
Juan M Damon MD  
 
Procedures: see electronic medical records for all procedures/Xrays and details which were not copied into this note but were reviewed prior to creation of Plan. LABS: 
Recent Labs  
  01/24/19 
0342 01/23/19 
1710 WBC 5.3 5.6 HGB 8.0* 8.3* HCT 24.0* 25.2*  
 362 Recent Labs  
  01/23/19 
0647 01/22/19 
2231  140  
K 3.6 3.3*  
 103 CO2 29 33* BUN 9 11 CREA 0.67 0.67 GLU 95 101* CA 8.5 8.3* Recent Labs  
  01/23/19 
0647 01/22/19 
2231 SGOT 18 23 AP 64 64  
TP 5.9* 5.9* ALB 2.2* 2.3*  
GLOB 3.7 3.6 Recent Labs  
  01/22/19 
2320 INR 1.1 PTP 11.1 APTT 31.3 Recent Labs  
  01/22/19 
2320 TIBC 178* PSAT 19* FERR 394* Lab Results Component Value Date/Time Folate 14.4 01/22/2019 11:20 PM  
 
No results for input(s): PH, PCO2, PO2 in the last 72 hours. No results for input(s): CPK, CKMB in the last 72 hours. No lab exists for component: TROPONINI Lab Results Component Value Date/Time  Color YELLOW/STRAW 01/20/2019 06:12 PM  
 Appearance CLOUDY (A) 01/20/2019 06:12 PM  
 Specific gravity 1.008 01/20/2019 06:12 PM  
 pH (UA) 6.5 01/20/2019 06:12 PM  
 Protein TRACE (A) 01/20/2019 06:12 PM  
 Glucose NEGATIVE  01/20/2019 06:12 PM  
 Ketone NEGATIVE  01/20/2019 06:12 PM  
 Bilirubin NEGATIVE  01/20/2019 06:12 PM  
 Urobilinogen 1.0 01/20/2019 06:12 PM  
 Nitrites POSITIVE (A) 01/20/2019 06:12 PM  
 Leukocyte Esterase LARGE (A) 01/20/2019 06:12 PM  
 Epithelial cells FEW 01/20/2019 06:12 PM  
 Bacteria 1+ (A) 01/20/2019 06:12 PM  
 WBC 10-20 01/20/2019 06:12 PM  
 RBC 0-5 01/20/2019 06:12 PM  
 
 
MEDICATIONS: 
Current Facility-Administered Medications Medication Dose Route Frequency  sodium chloride (NS) flush 5-40 mL  5-40 mL IntraVENous Q8H  
 sodium chloride (NS) flush 5-40 mL  5-40 mL IntraVENous PRN  
 acetaminophen (TYLENOL) solution 650 mg  650 mg Oral Q4H PRN  
 HYDROmorphone (PF) (DILAUDID) injection 0.5 mg  0.5 mg IntraVENous Q4H PRN  
 ondansetron (ZOFRAN) injection 4 mg  4 mg IntraVENous Q4H PRN  
 bisacodyl (DULCOLAX) suppository 10 mg  10 mg Rectal DAILY PRN  
 carbidopa-levodopa (SINEMET)  mg per tablet 1 Tab  1 Tab Oral TID  multivitamin, tx-iron-ca-min (THERA-M w/ IRON) tablet 1 Tab  1 Tab Oral DAILY  levothyroxine (SYNTHROID) tablet 50 mcg  50 mcg Oral 6am  
 glucose chewable tablet 16 g  4 Tab Oral PRN  
 dextrose (D50W) injection syrg 12.5-25 g  25-50 mL IntraVENous PRN  
 glucagon (GLUCAGEN) injection 1 mg  1 mg IntraMUSCular PRN  
 insulin lispro (HUMALOG) injection   SubCUTAneous Q6H  
 metoprolol succinate (TOPROL-XL) XL tablet 25 mg  25 mg Oral DAILY  pantoprazole (PROTONIX) tablet 40 mg  40 mg Oral ACB  atorvastatin (LIPITOR) tablet 20 mg  20 mg Oral QHS  sodium chloride (NS) flush 5-40 mL  5-40 mL IntraVENous Q8H  
 sodium chloride (NS) flush 5-40 mL  5-40 mL IntraVENous PRN  
 0.9% sodium chloride infusion 250 mL  250 mL IntraVENous PRN

## 2019-01-24 NOTE — PROGRESS NOTES
INTERNAL MEDICINE ATTENDING NOTE 
 
S: Ms. Zackery Rosas was seen by me today during rounds. She was admitted overnight for GI bleed. At this time, she is resting comfortably. She denies pain, N/V. No overt hematochezia or melena in hospital. The patient has no new complaints today. ROS otherwise unremarkable except as noted elsewhere. O: Blood pressure (!) 143/114, pulse 63, temperature 98.1 °F (36.7 °C), resp. rate 18, height 5' 7\" (1.702 m), weight 154 lb 15.7 oz (70.3 kg), SpO2 97 %. Gen: Patient is in no acute distress. Lungs: CTAB. Heart: RRR. Abd: S, NT, ND, BS present. Extremities: Warm. Recent Results (from the past 12 hour(s)) GLUCOSE, POC Collection Time: 01/23/19 11:59 PM  
Result Value Ref Range Glucose (POC) 96 65 - 100 mg/dL Performed by Autumn Young (PCT) CBC WITH AUTOMATED DIFF Collection Time: 01/24/19  3:42 AM  
Result Value Ref Range WBC 5.3 3.6 - 11.0 K/uL  
 RBC 2.55 (L) 3.80 - 5.20 M/uL HGB 8.0 (L) 11.5 - 16.0 g/dL HCT 24.0 (L) 35.0 - 47.0 % MCV 94.1 80.0 - 99.0 FL  
 MCH 31.4 26.0 - 34.0 PG  
 MCHC 33.3 30.0 - 36.5 g/dL  
 RDW 14.6 (H) 11.5 - 14.5 % PLATELET 711 673 - 439 K/uL MPV 8.8 (L) 8.9 - 12.9 FL  
 NRBC 0.0 0  WBC ABSOLUTE NRBC 0.00 0.00 - 0.01 K/uL NEUTROPHILS 56 32 - 75 % LYMPHOCYTES 30 12 - 49 % MONOCYTES 11 5 - 13 % EOSINOPHILS 1 0 - 7 % BASOPHILS 0 0 - 1 % IMMATURE GRANULOCYTES 1 (H) 0.0 - 0.5 % ABS. NEUTROPHILS 3.0 1.8 - 8.0 K/UL  
 ABS. LYMPHOCYTES 1.6 0.8 - 3.5 K/UL  
 ABS. MONOCYTES 0.6 0.0 - 1.0 K/UL  
 ABS. EOSINOPHILS 0.1 0.0 - 0.4 K/UL  
 ABS. BASOPHILS 0.0 0.0 - 0.1 K/UL  
 ABS. IMM. GRANS. 0.0 0.00 - 0.04 K/UL  
 DF AUTOMATED    
GLUCOSE, POC Collection Time: 01/24/19  6:46 AM  
Result Value Ref Range Glucose (POC) 99 65 - 100 mg/dL Performed by Farheen Cho   
  
 
A / P:  
1. GI bleed (1/23/2019): Patient admitted; anticoagulant medications held. GI consulted. For now, might watch CBC and clinical course for another day, off anticoagulation, then determine need for invasive testing. Discussed that there may be no good options here--high risks of bleeding on anticoagulation; high risk of thrombotic events off [address]. Overall, she seems a poor candidate for EzLike due to poor functional status, fall risk. 2. Thromboembolic stroke R MCA: S/p admission at [facility] on Jan 8, 2019; s/p tPa and thrombectomy. Started on eliquis. 3. Diabetes: Controlled at last check; due for follow up. 4. Parkinsons: Continue sinemet. She has outpatient neurology followup. 5. Hypertension: BP fine today. 6. 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 7. Hypothyroidism: Continue synthroid; Normal TSH at last check. 8. Depression/Anxiety: Stable. 9. Full code. We'll invite a conversation on goals of care with Palliative Care team.  
  
Chacho Galvez MD, [address] Best contact is via Pager: [phone], or via hospital  at [phone]

## 2019-01-24 NOTE — CONSULTS
Palliative Medicine Consult Kyle: [phone] ([phone]) Patient Name: Luis Castañeda YOB: 1932 Date of Initial Consult: 1/24/19 Reason for Consult: care decisions Requesting Provider: Diamond Flores MD 
Primary Care Physician: Vero Crar MD 
 
 SUMMARY:  
Luis Castañeda is a 80 y.o. with a past history of Parkinson's dz, dementia, CVA, paroxsymal a-fib, HTN, DM, hypercholesterolemia, arthritis, who was admitted on 1/22/2019 from [facility] with a diagnosis of GIB. Current medical issues leading to Palliative Medicine involvement include: at [facility] for rehab following hospitalization for multiple ischemic strokes, during which she underwent thrombolysis and thrombectomy, thought to be cardioembolic 2/2 newly diagnosed afib. Jose Eduardo Snow Psychosocial; Prior to CVA on 1/8/19 pt was living in her own apt, ambulated with walker. Daughter Kajal Lomeli lives in apt next door, helps pt with meals, driving. PALLIATIVE DIAGNOSES:  
1. GIB 2. Acute on chronic blood loss anemia 3. Coagulopathy 4. Debility due to CVA and PD 5. Dysphagia due to strokes 6. Urinary retention has duran 7. Chronic low back pain and chronic neuropathic pain/postherpatic neuralgia right breast 
8. Depression and anxiety 9. Care decisions PLAN:  
1. Prior to visit, I completed an extensive review of patient's medical records, including medical documentation, vital signs, MARs, and results of various labs and other diagnostics. I also spoke with patient's nurse, Leola Landry RN. 2. 11:45am-12:30pm: family meeting with pt, dtr Wilverrah Duarte, son Audrey Gudino, pt's brother:  introduced Palliative Medicine as a support for pt and families dealing with life limiting disease, to help with symptom management, education and understanding disease process and treatment options, and to discuss goals of care, what pt wants in terms of treatment as well as code status.   Started meeting by reviewing pt's DDNR document which was signed by family, explained that although dtr told ED MD that pt was FULL CODE, only pt could change her code status. Pt confirmed that she wants to be DNR, family respects her wishes. I told pt and family that RN was concerned because pt was on the phone telling family member she was tired and didn't want aggressive care, and it sounded like the person she was speaking to was trying to convince her otherwise. We discussed that at age 79 y/o, pt may not want invasive procedures done, and this would be appropriate. Using POST form, we discussed the different levels of medical care 1) comfort measures, 2) limited medical interventions, 3) full interventions. Also discussed feeding tube; pt is clear no feeding tube. Pt was very fatigued during our meeting so I told her I would come back tomorrow to find out what level of care she wants, and we could complete the POST form then. Encouraged family to continue talking with pt about her wishes today as she is able. 3. I will return tomorrow am to assist completing POST form. 4. Code status changed to DNR. 5. Provided family with my contact info. 6. Initial consult note routed to primary continuity provider 7. Communicated plan of care with: Palliative IDT, RN Yunier Aviles,  GOALS OF CARE / TREATMENT PREFERENCES:  
 
GOALS OF CARE: 
Patient/Health Care Proxy Stated Goals: Prolong life1) DNR 
2) level of care to be determined; continue current level of care 3) no feeding tube TREATMENT PREFERENCES:  
Code Status: DNR Advance Care Planning: 
[x] The [facility] Interdisciplinary Team has updated the ACP Navigator with [address] and Patient Capacity Primary Decision [facility] (Health Care Agent): Vero Proper Relationship to patient:  Daughter Phone number:  [phone] [x] Named in a scanned document  
[] Legal Next of Kin 
[] Guardian Secondary Decision Maker (First Alternate Health Care Agent): Socorro Chen Relationship to patient:  son Phone number:  [phone] [x] Named in a scanned document  
[] Legal Next of Kin 
[] Guardian Medical Interventions: Full interventions Other Instructions:   
Artificially Administered Nutrition: No feeding tube Other: As far as possible, the palliative care team has discussed with patient / health care proxy about goals of care / treatment preferences for patient. HISTORY:  
 
History obtained from: chart, son, dtr, brother and pt CHIEF COMPLAINT: LLQ pain, blood in stool HPI/SUBJECTIVE: The patient is:  
[x] Verbal and participatory [] Non-participatory due to:   
  
1/22: BIBA for evaluation of \"critical hemoglobin\" and bloody stool. Was recently admitted to [facility] for rehab, over the past few days pt has been c/o LLQ abd pain, had not had a BM in 6 days despite laxatives and suppositories. On 1/22 she was examined for fecal impaction, bloody stool removed per daughter. Colonoscopy in 2015 showed diverticulitis. ED W/U: 
EXAM: elderly, fatigued, pale conjunctiva, pacemaker, LLQ TTP, left facial droop with LUE weakness, slow to respond, pallor LAB:  H/h 6.8/21.3, K 3.3, IMAGING:  CXR: minimal bibasilar atelectasis. CT: distal sigmoid colon and rectal wall thickening with adjacent inflammation suggesting distal colitis that may  Be attributable to nonspecific infection or inflammation. There is a very large amount of colonic stool, no bowel obstruction. HOSPITAL COURSE: 1/23: admitted for continuation of care. 1/24: pt able to participate in family meeting, however, was very fatigued. Per family short-term memory is poor, long-term is good. Clinical Pain Assessment (nonverbal scale for severity on nonverbal patients):  
Clinical Pain Assessment Severity: 0 Activity (Movement): Lying quietly, normal position Duration: for how long has pt been experiencing pain (e.g., 2 days, 1 month, years) Frequency: how often pain is an issue (e.g., several times per day, once every few days, constant) FUNCTIONAL ASSESSMENT:  
 
Palliative Performance Scale (PPS): PPS: 20 
 
 
 PSYCHOSOCIAL/SPIRITUAL SCREENING:  
 
Palliative IDT has assessed this patient for cultural preferences / practices and a referral made as appropriate to needs (Cultural Services, Patient Advocacy, Ethics, etc.) Any spiritual / Anabaptist concerns: 
[] Yes /  [x] No 
 
Caregiver Burnout: 
[] Yes /  [x] No /  [] No Caregiver Present Anticipatory grief assessment:  
[x] Normal  / [] Maladaptive ESAS Anxiety: Anxiety: 0 
 
ESAS Depression: Depression: 3 REVIEW OF SYSTEMS:  
 
Positive and pertinent negative findings in ROS are noted above in HPI. The following systems were [x] reviewed / [] unable to be reviewed as noted in HPI Other findings are noted below. Systems: constitutional, ears/nose/mouth/throat, respiratory, gastrointestinal, genitourinary, musculoskeletal, integumentary, neurologic, psychiatric, endocrine. Positive findings noted below. Modified ESAS Completed by: provider Fatigue: 8 Drowsiness: 3 Depression: 3 Pain: 0 Anxiety: 0 Nausea: 0 Anorexia: 8 Dyspnea: 0 Constipation: Yes Stool Occurrence(s): 1 PHYSICAL EXAM:  
 
From RN flowsheet: 
Wt Readings from Last 3 Encounters:  
01/23/19 154 lb 15.7 oz (70.3 kg) 01/20/19 152 lb 1.9 oz (69 kg) 01/17/19 150 lb 5.7 oz (68.2 kg) Blood pressure 152/62, pulse 62, temperature 98.1 °F (36.7 °C), resp. rate 16, height 5' 7\" (1.702 m), weight 154 lb 15.7 oz (70.3 kg), SpO2 95 %. Pain Scale 1: Numeric (0 - 10) Pain Intensity 1: 0 Pain Onset 1: acute Pain Location 1: Abdomen Pain Orientation 1: Anterior Pain Description 1: Aching, Cramping Pain Intervention(s) 1: Medication (see MAR) Last bowel movement, if known:  
 
Constitutional: elderly, frail, fatigued Eyes: pupils equal, anicteric ENMT: no nasal discharge, moist mucous membranes Cardiovascular: regular rhythm, distal pulses intact Respiratory: breathing not labored, symmetric Gastrointestinal: soft non-tender, +bowel sounds Musculoskeletal: no deformity, no tenderness to palpation Skin: warm, dry Neurologic: following commands, moving all extremities Psychiatric: full affect, no hallucinations HISTORY:  
 
Active Problems: 
  GI bleed (1/23/2019) Past Medical History:  
Diagnosis Date  Anxiety  Arthritis  Chest pain 2008 Negative stress test and Holter monitor w/Dr Terese Roblero.  Chronic back pain  Chronic venous insufficiency  Colon polyp   
 last scope normal 2007; Dr. Boyle Crater  Cyst of right kidney  Depression  Diabetes ([facility] [address]  Hypercholesterolemia  Hypertension  Hypothyroidism  Memory disorder  Other ill-defined conditions(799.89)   
 heart murmur  Painful hip  Parkinson disease ([facility] [address]  Urinary frequency Sees Dr. Dominga Nicole with [address] Past Surgical History:  
Procedure Laterality Date  COLONOSCOPY,DIAGNOSTIC  6/12/2015  HX BREAST BIOPSY  2002  HX HYSTERECTOMY Due to a prolapsed uterus  HX KNEE REPLACEMENT    
 left  HX KNEE REPLACEMENT  2006  
 right  HX ORTHOPAEDIC B knees.  HX OTHER SURGICAL    
 pelvic prolasp  IR PERC ART MECH THROMB INFUSION INTRACRANIAL  1/8/2019  NH INS NEW/RPLCMT PRM PM W/TRANSV ELTRD ATRIAL&VENT  1/16/2019  UPPER GI ENDOSCOPY,BIOPSY  6/12/2015 Family History Problem Relation Age of Onset  Cancer Mother   
     pancreatic  Cancer Father   
     colon  Cancer Sister   
     pancreatic  Diabetes Sister  Alzheimer Brother History reviewed, no pertinent family history. Social History Tobacco Use  Smoking status: Never Smoker  Smokeless tobacco: Never Used Substance Use Topics  Alcohol use:  No  
 Alcohol/week: 0.0 oz  
  Comment: Rare Allergies Allergen Reactions  Norvasc [Amlodipine] Swelling  Flexeril [Cyclobenzaprine] Other (comments) Caused pt to fall & hallucination  Percocet [Oxycodone-Acetaminophen] Itching  Remeron [Mirtazapine] Diarrhea  Tramadol Other (comments) Caused falls Current Facility-Administered Medications Medication Dose Route Frequency  sodium chloride (NS) flush 5-40 mL  5-40 mL IntraVENous Q8H  
 sodium chloride (NS) flush 5-40 mL  5-40 mL IntraVENous PRN  
 acetaminophen (TYLENOL) solution 650 mg  650 mg Oral Q4H PRN  
 HYDROmorphone (PF) (DILAUDID) injection 0.5 mg  0.5 mg IntraVENous Q4H PRN  
 ondansetron (ZOFRAN) injection 4 mg  4 mg IntraVENous Q4H PRN  
 bisacodyl (DULCOLAX) suppository 10 mg  10 mg Rectal DAILY PRN  
 carbidopa-levodopa (SINEMET)  mg per tablet 1 Tab  1 Tab Oral TID  multivitamin, tx-iron-ca-min (THERA-M w/ IRON) tablet 1 Tab  1 Tab Oral DAILY  levothyroxine (SYNTHROID) tablet 50 mcg  50 mcg Oral 6am  
 glucose chewable tablet 16 g  4 Tab Oral PRN  
 dextrose (D50W) injection syrg 12.5-25 g  25-50 mL IntraVENous PRN  
 glucagon (GLUCAGEN) injection 1 mg  1 mg IntraMUSCular PRN  
 insulin lispro (HUMALOG) injection   SubCUTAneous Q6H  
 metoprolol succinate (TOPROL-XL) XL tablet 25 mg  25 mg Oral DAILY  pantoprazole (PROTONIX) tablet 40 mg  40 mg Oral ACB  atorvastatin (LIPITOR) tablet 20 mg  20 mg Oral QHS  sodium chloride (NS) flush 5-40 mL  5-40 mL IntraVENous Q8H  
 sodium chloride (NS) flush 5-40 mL  5-40 mL IntraVENous PRN  
 0.9% sodium chloride infusion 250 mL  250 mL IntraVENous PRN  
 
 
 
 LAB AND IMAGING FINDINGS:  
 
Lab Results Component Value Date/Time WBC 5.7 01/24/2019 11:43 AM  
 HGB 8.3 (L) 01/24/2019 11:43 AM  
 PLATELET 389 49/12/2901 11:43 AM  
 
Lab Results Component Value Date/Time  Sodium 138 01/23/2019 06:47 AM  
 Potassium 3.6 01/23/2019 06:47 AM  
 Chloride 103 01/23/2019 06:47 AM  
 CO2 29 01/23/2019 06:47 AM  
 BUN 9 01/23/2019 06:47 AM  
 Creatinine 0.67 01/23/2019 06:47 AM  
 Calcium 8.5 01/23/2019 06:47 AM  
 Magnesium 1.9 01/16/2019 02:51 AM  
 Phosphorus 2.4 (L) 01/11/2019 03:09 AM  
  
Lab Results Component Value Date/Time AST (SGOT) 18 01/23/2019 06:47 AM  
 Alk. phosphatase 64 01/23/2019 06:47 AM  
 Protein, total 5.9 (L) 01/23/2019 06:47 AM  
 Albumin 2.2 (L) 01/23/2019 06:47 AM  
 Globulin 3.7 01/23/2019 06:47 AM  
 
Lab Results Component Value Date/Time INR 1.1 01/22/2019 11:20 PM  
 Prothrombin time 11.1 01/22/2019 11:20 PM  
 aPTT 31.3 01/22/2019 11:20 PM  
  
Lab Results Component Value Date/Time Iron 33 (L) 01/22/2019 11:20 PM  
 TIBC 178 (L) 01/22/2019 11:20 PM  
 Iron % saturation 19 (L) 01/22/2019 11:20 PM  
 Ferritin 394 (H) 01/22/2019 11:20 PM  
  
No results found for: PH, PCO2, PO2 No components found for: [place] Lab Results Component Value Date/Time CK 87 06/18/2018 12:31 AM  
 CK - MB <1.0 06/16/2018 06:15 AM  
  
 
 
   
 
Total time: 85 
Counseling / coordination time, spent as noted above: 75 
> 50% counseling / coordination?: y 
 
Prolonged service was provided for  []30 min   []75 min in face to face time in the presence of the patient, spent as noted above. Time Start:  
Time End:  
Note: this can only be billed with 99910 (initial) or 75114 (follow up). If multiple start / stop times, list each separately.

## 2019-01-24 NOTE — PROGRESS NOTES
Bedside shift change report given to KATRINA Napier RN (oncoming nurse) by Deya Leon RN (offgoing nurse). Report included the following information SBAR, Kardex, Procedure Summary, Intake/Output, MAR, Accordion, Recent Results, Med Rec Status and Cardiac Rhythm paced. Pt assessed- q2hr turns family at bedside- VSS- skin intact 2230-1 smear mucous brown BM 
  
0415- pt c/o of abdominal pain 0.5mg iv dilaudid given 
 
0500- pt resting comfortably Hgb 8.0 this am

## 2019-01-24 NOTE — PROGRESS NOTES
Spiritual Care Assessment/Progress Note [place] [address] 
 
 
NAME: Tu Guzman      MRN: [id number] AGE: 80 y.o. SEX: female Zoroastrian Affiliation: Alevism  
Language: English  
 
1/24/2019     Total Time (in minutes): 21 Spiritual Assessment begun in [facility] PROGRESSIVE CARE through conversation with: 
  
    [x]Patient        [x] Family    [] Friend(s) Reason for Consult: Palliative Care, Initial/Spiritual Assessment Spiritual beliefs: (Please include comment if needed) [x] Identifies with a mckenna tradition:     
   [x] Supported by a mckenna community:        
   [] Claims no spiritual orientation:       
   [] Seeking spiritual identity:            
   [] Adheres to an individual form of spirituality:       
   [] Not able to assess:                   
 
    
Identified resources for coping:  
   [x] Prayer                           
   [] Music                  [] Guided Imagery [x] Family/friends                 [] Pet visits [] Devotional reading                         [] Unknown 
   [] Other:  Patient attends [facility] [address] [place] [address] Interventions offered during this visit: (See comments for more details) Patient Interventions: Affirmation of mckenna, Affirmation of emotions/emotional suffering, Coping skills reviewed/reinforced, Iconic (affirming the presence of God/Higher Power), Prayer (actual), Prayer (assurance of) Family/Friend(s): Prayer (assurance of), Prayer (actual) Plan of Care: 
 
 [x] Support spiritual and/or cultural needs  
 [] Support AMD and/or advance care planning process    
 [] Support grieving process 
 [] Coordinate Rites and/or Rituals  
 [] Coordination with community clergy 
 [x] No spiritual needs identified at this time 
 [] Detailed Plan of Care below (See Comments)  [] Make referral to Music Therapy 
[] Make referral to Pet Therapy    
[] Make referral to Addiction services [] Make referral to [facility] 
[] Make referral to Spiritual Care Partner 
[] No future visits requested       
[x] Follow up visits as needed Comments: The patient was resting in bed when I arrived. I introduced myself to the patient and the two visitors. The person seated at her bedside was a brother and the second visitor was her nephew. The brother of the patient insisted that I take his seat. I sat and talked with the patient while the two men retreated to the sofa bench and conversed with each other. The patient presented a very cogent conversation with no signs consistent with dementia as noted in her chart. The patient discussed her life activities and we also discussed her spiritual history. The patient stated that she would like to have prayer before I left the room. The other family members joined with me at the patient's bed and we prayed. The patient and visitors expressed gratitude for the visit. . Marine Winn EdD MDiv Palliative  Fellow For E[facility]iel Page [phone])

## 2019-01-24 NOTE — PROGRESS NOTES
PCU SHIFT NURSING NOTE Bedside and Verbal shift change report given to [address] [facility]  (oncoming nurse) by Mindy Moura RN  (offgoing nurse). Report included the following information SBAR. Shift Summary:  
0730 bedside report pt in bed resting, son at bedside, pt on RA  
1100 was told by palliative that there was a DNR on file 1930 Bedside and Verbal shift change report given to  [address] (oncoming nurse) by Heather Phalen RN (offgoing nurse). Report included the following information SBAR, Kardex, Intake/Output and MAR. Admission Date 1/22/2019 Admission Diagnosis GI bleed Consults IP CONSULT TO HOSPITALIST 
IP CONSULT TO GASTROENTEROLOGY 
IP CONSULT TO PALLIATIVE CARE - PROVIDER Consults []PT []OT []Speech  
[]Case Management  
  
[] Palliative Cardiac Monitoring Order []Yes []No  
 
IV drips []Yes Drip:                            Dose: 
Drip:                            Dose: 
Drip:                            Dose:  
[]No  
 
GI Prophylaxis []Yes []No  
 
 
 
DVT Prophylaxis SCDs:     
     
 Danny stockings:     
  
[] Medication []Contraindicated []None Activity Level Activity Level: Bed Rest   
    
Purposeful Rounding every 1-2 hour? []Yes Mcpherson Score  Total Score: 4 Bed Alarm (If score 3 or >) []Yes  
[] Refused (See signed refusal form in chart) Clint Score  Clint Score: 13 Clint Score (if score 14 or less) []PMT consult  
[]Wound Care consult []Specialty bed  
[] Nutrition consult Needs prior to discharge:  
Home O2 required:   
[]Yes []No  
 If yes, how much O2 required? Other:  
 Last Bowel Movement: Last Bowel Movement Date: 01/23/19 Influenza Vaccine Received Flu Vaccine for Current Season (usually Sept-March): Yes Pneumonia Vaccine Diet Active Orders Diet DIET CLEAR LIQUID  
  
LDAs Peripheral IV 01/22/19 Left Wrist (Active) Site Assessment Clean, dry, & intact 1/24/2019  8:54 AM  
 Phlebitis Assessment 0 1/24/2019  8:54 AM  
Infiltration Assessment 0 1/24/2019  8:54 AM  
Dressing Status Clean, dry, & intact 1/24/2019  8:54 AM  
Dressing Type Transparent;Tape 1/24/2019  8:54 AM  
Hub Color/Line Status Pink 1/24/2019  8:54 AM  
Action Taken Open ports on tubing capped 1/24/2019  3:19 AM  
Alcohol Cap Used Yes 1/24/2019  3:19 AM  
   
Peripheral IV 01/23/19 Right Hand (Active) Site Assessment Clean, dry, & intact 1/24/2019  8:54 AM  
Phlebitis Assessment 0 1/24/2019  8:54 AM  
Infiltration Assessment 0 1/24/2019  8:54 AM  
Dressing Status Clean, dry, & intact 1/24/2019  8:54 AM  
Dressing Type Tape;Transparent 1/24/2019  8:54 AM  
Hub Color/Line Status Pink 1/24/2019  8:54 AM  
Action Taken Open ports on tubing capped 1/24/2019  3:19 AM  
Alcohol Cap Used Yes 1/24/2019  3:19 AM  
                  
Urinary Catheter Urinary Catheter 01/18/19 [facility]-Indications for Use: Acute urinary retention/bladder outlet obstruction Intake & Output Date 01/23/19 0700 - 01/24/19 0659 01/24/19 0700 - 01/25/19 3872 Shift 6720-1101 4787-5082 24 Hour Total 4816-5795 0416-7126 24 Hour Total  
INTAKE  
P.O.    280  280  
  P. O.    280  280 Shift Total(mL/kg)    280(4)  280(4) OUTPUT Urine(mL/kg/hr) 600(0.7) 850(1) 1450(0.9) Urine Output (mL) (Urinary Catheter 01/18/19 [facility])  Stool Stool Occurrence(s)  1 x 1 x Shift Total(mL/kg) 600(8.5) 850(12.1) 1450(20.6) NET -600 -850 -1450 280  280 Weight (kg) 70.3 70.3 70.3 70.3 70.3 70.3 Readmission Risk Assessment Tool Score High Risk   
      
 39 Total Score 3 Has Seen PCP in Last 6 Months (Yes=3, No=0)  
 4 IP Visits Last 12 Months (1-3=4, 4=9, >4=11) 5 Pt. Coverage (Medicare=5 , Medicaid, or Self-Pay=4) 27 Charlson Comorbidity Score (Age + Comorbid Conditions) Criteria that do not apply:  
 . Living with Significant Other. Assisted Living. LTAC. SNF. or  
Rehab Patient Length of Stay (>5 days = 3) Expected Length of Stay 2d 2h Actual Length of Stay 1

## 2019-01-24 NOTE — PROGRESS NOTES
Problem: Self Care Deficits Care Plan (Adult) Goal: *Acute Goals and Plan of Care (Insert Text) Occupational Therapy Goals Initiated 1/23/2019 1. Patient will perform grooming with minimal assistance in supported sit within 7 day(s). 2.  Patient will perform self-feeding with minimal assistance within 7 day(s). 3.  Patient will sit EOB x 5 minutes with moderate assistance to prepare for participation in seated ADLs within 7 day(s). 4.  Patient will perform rolling with moderate assistance x1 to assist with hygiene within 7 day(s). 5.  Patient will participate in upper extremity therapeutic exercise/activities with moderate assistance  for 5 minutes within 7 day(s). Occupational Therapy TREATMENT Patient: Elvia Suarez (55 y.o. female) Date: 1/24/2019 Diagnosis: GI bleed <principal problem not specified> Precautions:   
Chart, occupational therapy assessment, plan of care, and goals were reviewed. ASSESSMENT: 
Pt was cleared to be seen for therapy and all VSS . Pt was encouraged to work with therapy and was min assist of 1 for bed mobility, more with her legs and hand placement. Pt was able to sit on EOb with SBA and leaning to left at times but was able to correct with VCs. Pt was able to stand with mod assist of 2 and needed mod tactile cues to stand up rigth and decrease her posterior lean. Pt was able to stand 3 times and on the 3 time she was max assist of 2 to side step to [facility] and mod assist for sit to supine. Pt was left supine in bed with family in room and pt did have more of a  in her left hand today and was using left Ue to assist with pushing to stand but remains weak and limited. Recommendations for pt at discharge is rehab at [facility] Progression toward goals: 
[x]       Improving appropriately and progressing toward goals 
[]       Improving slowly and progressing toward goals 
[]       Not making progress toward goals and plan of care will be adjusted PLAN: 
 Patient continues to benefit from skilled intervention to address the above impairments. Continue treatment per established plan of care. Discharge Recommendations:  Ramón Sweeney Further Equipment Recommendations for Discharge:  tbd SUBJECTIVE:  
Patient stated i'm tired today.  OBJECTIVE DATA SUMMARY:  
Cognitive/Behavioral Status: 
Neurologic State: Alert Orientation Level: Oriented to person;Oriented to place Cognition: Follows commands; Impaired decision making Perception: Cues to attend left visual field;Cues to attend to left side of body Perseveration: No perseveration noted Safety/Judgement: Fall prevention Functional Mobility and Transfers for ADLs:Bed Mobility: 
Rolling: Minimum assistance;Assist x1 Supine to Sit: Minimum assistance;Assist x1 Sit to Supine: Moderate assistance;Assist x1 Scooting: Maximum assistance;Assist x1 Transfers: 
Sit to Stand: Moderate assistance;Assist x2 Balance: 
Sitting: Impaired; Without support Sitting - Static: Fair (occasional) Sitting - Dynamic: Fair (occasional) Standing: Impaired; Without support Standing - Static: Poor Standing - Dynamic : Poor ADL Intervention: 
  
 
Grooming Grooming Assistance: Minimum assistance;Contact guard assistance Washing Face: Minimum assistance;Contact guard assistance Washing Hands: Minimum assistance;Contact guard assistance Brushing Teeth: Minimum assistance;Contact guard assistance Upper Body Bathing Bathing Assistance: Maximum assistance Lower Body Bathing Bathing Assistance: Maximum assistance Toileting Toileting Assistance: Maximum assistance Bladder Hygiene: Maximum assistance Bowel Hygiene: Maximum assistance Cognitive Retraining Safety/Judgement: Fall prevention Pain: 
Pain Scale 1: Numeric (0 - 10) Pain Intensity 1: 0 Pain Location 1: Abdomen Pain Orientation 1: Anterior Pain Description 1: Aching;Cramping Pain Intervention(s) 1: Medication (see MAR) Activity Tolerance:  
fair Please refer to the flowsheet for vital signs taken during this treatment. After treatment:  
[] Patient left in no apparent distress sitting up in chair 
[x] Patient left in no apparent distress in bed 
[x] Call bell left within reach [x] Nursing notified 
[] Caregiver present 
[] Bed alarm activated COMMUNICATION/COLLABORATION:  
The patients plan of care was discussed with: Physical Therapist, Registered Nurse and family GUY Castanon Time Calculation: 16 mins

## 2019-01-25 PROBLEM — K62.5 RECTAL BLEEDING: Status: ACTIVE | Noted: 2019-01-01

## 2019-01-25 NOTE — PROGRESS NOTES
GI Progress Note ([place]) NAME:Diana Cole :1932 LYY:[id number] ATTG/PCP: Adrian Malcolm MD 
Date/Time:  2019 7:33 AM  
 
Reason for following: hematochezia Assessment:  
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 · Since elected for no colonoscopy, would repeat CT scan at some point, in 2-4 weeks · GI on call team will see her on reqest this weekend, please call if needed. **Discussed plan with patient family and beside nurse Shadiyolanda Subjective: No bowel movements in 2 days, has previously been on clear liquid diet. No rectal exam yesterday. Today, she's pleasantly confused. daughter at bedside. Decision made not to pursue colonoscopy at this time. Review of Systems: 
Symptom Y/N Comments  Symptom Y/N Comments Fever/Chills N   Chest Pain N   
Cough N   Headaches N Sputum N   Joint Pain N   
SOB/ESPINOZA N   Pruritis/Rash N Tolerating Diet   NPO  Other Could NOT obtain due to: dementia Objective: VITALS:  
Last 24hrs VS reviewed since prior progress note. Most recent are: 
Visit Vitals /63 Pulse 64 Temp 98.2 °F (36.8 °C) Resp 18 Ht 5' 7\" (1.702 m) Wt 70.3 kg (154 lb 15.7 oz) SpO2 98% BMI 24.27 kg/m² Intake/Output Summary (Last 24 hours) at 1/25/2019 1431 Last data filed at 1/25/2019 1050 Gross per 24 hour Intake 580 ml Output 2600 ml Net -2020 ml PHYSICAL EXAM: 
General: WD, WN. Awake and alert, cooperative, no acute distress   
HEENT: NC, Atraumatic. Anicteric sclerae. Lungs:  CTA Bilaterally. No Wheezing/Rhonchi/Rales. Heart:  Regular  rhythm,  No murmur (), No Rubs, No Gallops Abdomen: Soft, Non distended, Non tender.  +Bowel sounds, no HSM Neurologic:  Alert and oriented X 1. No acute neurological distress Psych:   Poor insight. Not anxious nor agitated. Lab and Radiology Data Reviewed: (see below) Medications Reviewed: (see below) PMH/SH reviewed - no change compared to H&P 
________________________________________________________________________ Total time spent with patient: 15 minutes ________________________________________________________________________ Care Plan discussed with: 
Patient x Family  X (daughter) RN X Consultant:  Kb Colvin NP Palliative Sandra Chadwick NP Procedures: see electronic medical records for all procedures/Xrays and details which were not copied into this note but were reviewed prior to creation of Plan. LABS: 
Recent Labs  
  01/25/19 
1349 01/25/19 
0525 WBC 5.9 6.1 HGB 8.8* 8.3* HCT 27.0* 25.6*  
 395 Recent Labs  
  01/23/19 
0647 01/22/19 
2231  140  
K 3.6 3.3*  
 103 CO2 29 33* BUN 9 11 CREA 0.67 0.67 GLU 95 101* CA 8.5 8.3* Recent Labs  
  01/23/19 
0647 01/22/19 
2231 SGOT 18 23 AP 64 64  
TP 5.9* 5.9* ALB 2.2* 2.3*  
GLOB 3.7 3.6 Recent Labs  
  01/22/19 
2320 INR 1.1 PTP 11.1 APTT 31.3 Recent Labs  
  01/22/19 
2320 TIBC 178* PSAT 19* FERR 394* Lab Results Component Value Date/Time Folate 14.4 01/22/2019 11:20 PM  
 
No results for input(s): PH, PCO2, PO2 in the last 72 hours. No results for input(s): CPK, CKMB in the last 72 hours. No lab exists for component: TROPONINI Lab Results Component Value Date/Time Color YELLOW/STRAW 01/20/2019 06:12 PM  
 Appearance CLOUDY (A) 01/20/2019 06:12 PM  
 Specific gravity 1.008 01/20/2019 06:12 PM  
 pH (UA) 6.5 01/20/2019 06:12 PM  
 Protein TRACE (A) 01/20/2019 06:12 PM  
 Glucose NEGATIVE  01/20/2019 06:12 PM  
 Ketone NEGATIVE  01/20/2019 06:12 PM  
 Bilirubin NEGATIVE  01/20/2019 06:12 PM  
 Urobilinogen 1.0 01/20/2019 06:12 PM  
 Nitrites POSITIVE (A) 01/20/2019 06:12 PM  
 Leukocyte Esterase LARGE (A) 01/20/2019 06:12 PM  
 Epithelial cells FEW 01/20/2019 06:12 PM  
 Bacteria 1+ (A) 01/20/2019 06:12 PM  
 WBC 10-20 01/20/2019 06:12 PM  
 RBC 0-5 01/20/2019 06:12 PM  
 
 
MEDICATIONS: 
Current Facility-Administered Medications Medication Dose Route Frequency  sodium chloride (NS) flush 5-40 mL  5-40 mL IntraVENous Q8H  
 sodium chloride (NS) flush 5-40 mL  5-40 mL IntraVENous PRN  
 acetaminophen (TYLENOL) solution 650 mg  650 mg Oral Q4H PRN  
 HYDROmorphone (PF) (DILAUDID) injection 0.5 mg  0.5 mg IntraVENous Q4H PRN  
 ondansetron (ZOFRAN) injection 4 mg  4 mg IntraVENous Q4H PRN  
 bisacodyl (DULCOLAX) suppository 10 mg  10 mg Rectal DAILY PRN  
 carbidopa-levodopa (SINEMET)  mg per tablet 1 Tab  1 Tab Oral TID  multivitamin, tx-iron-ca-min (THERA-M w/ IRON) tablet 1 Tab  1 Tab Oral DAILY  levothyroxine (SYNTHROID) tablet 50 mcg  50 mcg Oral 6am  
 glucose chewable tablet 16 g  4 Tab Oral PRN  
 dextrose (D50W) injection syrg 12.5-25 g  25-50 mL IntraVENous PRN  
 glucagon (GLUCAGEN) injection 1 mg  1 mg IntraMUSCular PRN  
 insulin lispro (HUMALOG) injection   SubCUTAneous Q6H  
 metoprolol succinate (TOPROL-XL) XL tablet 25 mg  25 mg Oral DAILY  pantoprazole (PROTONIX) tablet 40 mg  40 mg Oral ACB  atorvastatin (LIPITOR) tablet 20 mg  20 mg Oral QHS  sodium chloride (NS) flush 5-40 mL  5-40 mL IntraVENous Q8H  
 sodium chloride (NS) flush 5-40 mL  5-40 mL IntraVENous PRN  
 0.9% sodium chloride infusion 250 mL  250 mL IntraVENous PRN

## 2019-01-25 NOTE — PROGRESS NOTES
Speech pathology note Note SLP consulted due to patient having difficulty with medications whole with thin liquid. Patient known to this SLP from recent admission at [facility] for CVA. During that time, patient's diet was puree/thin, straws were ok, and meds needed to be crushed in puree. If patient with continued dysphagia on puree/thin liquid diet with meds crushed, please re-consult with MD order and SLP can evaluate swallowing. Thank you. Michael Goode., CCC-SLP

## 2019-01-25 NOTE — PROGRESS NOTES
Problem: Mobility Impaired (Adult and Pediatric) Goal: *Acute Goals and Plan of Care (Insert Text) Physical Therapy Goals Initiated 1/23/2019 1. Patient will move from supine to sit and sit to supine  in bed with maximal assistance x 1 within 7 day(s). 2.  Patient will transfer from bed to chair and chair to bed with moderate assistance x 2  using the least restrictive device within 7 day(s). 3.  Patient will perform sit to stand with maximal assistance x 2 within 7 day(s). 4.  Patient will tolerate static sitting at edge of bed for >/= 5 minutes with midline posture and occasional contact guard assist within 7 day(s). physical Therapy TREATMENT Patient: Lizzie Rincon (52 y.o. female) Date: 1/25/2019 Diagnosis: GI bleed <principal problem not specified> Precautions:   
Chart, physical therapy assessment, plan of care and goals were reviewed. ASSESSMENT: 
Pt received supine in bed, more alert than yesterday, agreeable to participation with therapy. Bed mobility improved this date with pt assuming seated position EOB w/ minAx1. Sitting balance remains fair overall with pt exhibiting L lateral lean, requiring tactile cues to R hip/trunk as well as Janelle to achieve and maintain midline sitting position. Sit>>stand transfer attempted x2 from EOB w/ modAx2 and tactile cues to facilitate trunk and b/l hip extension. Pt maxAx1 in order to facilitate lateral weight shift, pt able to sidestep 3-4 steps along EOB w/ HHAx2 and modAx2. Pt with hyperextension of b/l knees, increased trunk flexion, and overall poor tolerance of weightbearing through BLEs. Following seated rest break, standpivot transfer occurred from EOB>>bedside chair w/ totalAx2. All VSS throughout. Continue to recommend SNF at discharge. Progression toward goals: 
[]    Improving appropriately and progressing toward goals 
[]    Improving slowly and progressing toward goals []    Not making progress toward goals and plan of care will be adjusted PLAN: 
Patient continues to benefit from skilled intervention to address the above impairments. Continue treatment per established plan of care. Discharge Recommendations:  Ramón Sweeney Further Equipment Recommendations for Discharge:  TBD by facility SUBJECTIVE:  
Patient stated Okay.  OBJECTIVE DATA SUMMARY:  
Critical Behavior: 
Neurologic State: Drowsy Orientation Level: Oriented to person, Oriented to place Cognition: Impaired decision making, Memory loss, Decreased command following Safety/Judgement: Fall prevention Functional Mobility Training: 
Bed Mobility: 
Rolling: Minimum assistance;Assist x1 Supine to Sit: Minimum assistance;Assist x1 Transfers: 
Sit to Stand: Moderate assistance;Assist x2 Bed to Chair: Total assistance;Assist x2 Balance: 
Sitting: Impaired; Without support Sitting - Static: Fair (occasional) Sitting - Dynamic: Poor (constant support) Standing: Impaired; Without support Standing - Static: Poor Standing - Dynamic : PoorAmbulation/Gait Training: 
  
  
  
  
  
  
  
  
  
  
  
  
  
  
  
  
  
  
Therapeutic Exercises:  
1x 10 LAQ Pain: 
Pain Scale 1: Numeric (0 - 10) Pain Intensity 1: 0 Activity Tolerance: VSS throughout, pt fatigues quickly Please refer to the flowsheet for vital signs taken during this treatment. After treatment:  
[x]    Patient left in no apparent distress sitting up in chair 
[]    Patient left in no apparent distress in bed 
[x]    Call bell left within reach [x]    Nursing notified 
[x]    Caregiver present 
[]    Bed alarm activated COMMUNICATION/COLLABORATION:  
The patients plan of care was discussed with: Occupational Therapist and Registered Nurse Eli Batista, PT, DPT Time Calculation: 22 mins

## 2019-01-25 NOTE — PROGRESS NOTES
Problem: Self Care Deficits Care Plan (Adult) Goal: *Acute Goals and Plan of Care (Insert Text) Occupational Therapy Goals Initiated 1/23/2019 1. Patient will perform grooming with minimal assistance in supported sit within 7 day(s). 2.  Patient will perform self-feeding with minimal assistance within 7 day(s). 3.  Patient will sit EOB x 5 minutes with moderate assistance to prepare for participation in seated ADLs within 7 day(s). 4.  Patient will perform rolling with moderate assistance x1 to assist with hygiene within 7 day(s). 5.  Patient will participate in upper extremity therapeutic exercise/activities with moderate assistance  for 5 minutes within 7 day(s). Occupational Therapy TREATMENT Patient: Cindi Garsia (38 y.o. female) Date: 1/25/2019 Diagnosis: GI bleed <principal problem not specified> Precautions:   
Chart, occupational therapy assessment, plan of care, and goals were reviewed. ASSESSMENT: 
Pt was cleared to be seen for therapy and all VSS and pt was supine in bed. Pt was min assist of 1 for bed mobility, and was leaning to the right while sitting on EOB. Pt was able to regain sitting in midline with VC and tactile cues. Pt states that she knows when she is leaning to the right. She is weak and but endurance is improving. Pt was mod assist of 2 to stand and continues to need max assist to stand upright and tactile cues/assist.  She was mod assist of 2 for transfer to chair and pillows placed on right and left side of pt for support. Pt was able to feed herself applesauce while sitting up and talked to pts family that pt needs to do as much for herself as possible.  (daughter was feeding pt when OT arrived). Recommendations for pt at discharge is rehab at [facility] Progression toward goals: 
[x]       Improving appropriately and progressing toward goals 
[]       Improving slowly and progressing toward goals []       Not making progress toward goals and plan of care will be adjusted PLAN: 
Patient continues to benefit from skilled intervention to address the above impairments. Continue treatment per established plan of care. Discharge Recommendations:  Ramón Sweeney Further Equipment Recommendations for Discharge:  tbd SUBJECTIVE:  
Patient stated I'm tired.  OBJECTIVE DATA SUMMARY:  
Cognitive/Behavioral Status: 
Neurologic State: Drowsy Orientation Level: Oriented to person;Oriented to place Cognition: Impaired decision making;Memory loss;Decreased command following Perception: Cues to attend left visual field;Cues to attend to left side of body Perseveration: No perseveration noted Safety/Judgement: Fall prevention Functional Mobility and Transfers for ADLs:Bed Mobility: 
Rolling: Minimum assistance;Assist x1 Supine to Sit: Minimum assistance;Assist x1 Transfers: 
Sit to Stand: Moderate assistance;Assist x2 Bed to Chair: Moderate assistance;Assist x2 Balance: 
Sitting: Impaired; Without support Sitting - Static: Fair (occasional) Sitting - Dynamic: Poor (constant support) Standing: Impaired; Without support Standing - Static: Poor Standing - Dynamic : Poor ADL Intervention: 
Feeding Feeding Assistance: Contact guard assistance;Supervision/set-up 
 
 pt is max for ADLs Toileting Toileting Assistance: Maximum assistance Bladder Hygiene: Maximum assistance Bowel Hygiene: Maximum assistance Cognitive Retraining Safety/Judgement: Fall prevention Pain: 
Pain Scale 1: Numeric (0 - 10) Pain Intensity 1: 0 Activity Tolerance:  
Fair/poor Please refer to the flowsheet for vital signs taken during this treatment. After treatment:  
[x] Patient left in no apparent distress sitting up in chair 
[] Patient left in no apparent distress in bed 
[x] Call bell left within reach [x] Nursing notified 
[x] Caregiver present 
[] Bed alarm activated COMMUNICATION/COLLABORATION:  
The patients plan of care was discussed with: Physical Therapist, Registered Nurse and family Roger Nj OT Time Calculation: 22 mins

## 2019-01-25 NOTE — PROGRESS NOTES
Palliative Medicine Consult Kyle: [phone]) Patient Name: Lizzie Rincon YOB: 1932 Date of Initial Consult: 1/24/19 Reason for Consult: care decisions Requesting Provider: Stanislaw Dan MD 
Primary Care Physician: Meg Landeros MD 
 
 SUMMARY:  
Lizzie Rincon is a 80 y.o. with a past history of Parkinson's dz, dementia, CVA, paroxsymal a-fib, HTN, DM, hypercholesterolemia, arthritis, who was admitted on 1/22/2019 from [facility] with a diagnosis of GIB. Current medical issues leading to Palliative Medicine involvement include: at [facility] for rehab following hospitalization for multiple ischemic strokes, during which she underwent thrombolysis and thrombectomy, thought to be cardioembolic 2/2 newly diagnosed afib. Hugo Ruiz Psychosocial; Prior to CVA on 1/8/19 pt was living in her own apt, ambulated with walker. Daughter Pedro Rosales lives in apt next door, helps pt with meals, driving. PALLIATIVE DIAGNOSES:  
1. GIB 2. Acute on chronic blood loss anemia 3. Coagulopathy 4. Debility due to CVA and PD 5. Dysphagia due to strokes 6. Urinary retention has duran 7. Chronic low back pain and chronic neuropathic pain/postherpatic neuralgia right breast 
8. Depression and anxiety 9. Constipation with fecal impaction 10. Care decisions PLAN:  
1. CARE DECISIONS:  Met with pt and dtr Gorge Sandifer: family had a long discussion with pt yesterday about what level of care she wanted, pt chose limited medical  interventions. We completed the POST form today. 2. CONSTIPATION: last BM 2 days ago, pt came in with constipation, pt and dtr report pt normally has issues with constipation, but not this bad; counseled both that inactivity can worsen constipation. Pt and family decided against colonoscopy, asking for regular diet. 1. Spoke with gastroenterologist DARIN Carrillo, she will discuss with Yana Ann starting a bowel regimen and progress diet. 3. Provided family with my contact info. 4. Initial consult note routed to primary continuity provider 5. Communicated plan of care with: Palliative IDT, family, Honey Palencia GOALS OF CARE / TREATMENT PREFERENCES:  
 
GOALS OF CARE: 
Patient/Health Care Proxy Stated Goals: Prolong life 1) DNR 
2) limited medical interventions 3) no feeding tube TREATMENT PREFERENCES:  
Code Status: DNR Advance Care Planning: 
[x] [facility] Interdisciplinary Team has updated the ACP Navigator with [address] and Patient Capacity Primary Decision [facility] (Health Care Agent): Skyler Wallis Relationship to patient:  Daughter Phone number:  [phone] [x] Named in a scanned document  
[] Legal Next of Kin 
[] Guardian Secondary Decision Maker (First Alternate Health Care Agent): Renetta Jung Relationship to patient:  son Phone number:  [phone] [x] Named in a scanned document  
[] Legal Next of Kin 
[] Guardian Medical Interventions: Limited additional interventions Other Instructions:   
Artificially Administered Nutrition: No feeding tube Other: As far as possible, the palliative care team has discussed with patient / health care proxy about goals of care / treatment preferences for patient. HISTORY:  
 
History obtained from: chart, son, dtr, brother and pt CHIEF COMPLAINT: LLQ pain, blood in stool HPI/SUBJECTIVE: The patient is:  
[x] Verbal and participatory [] Non-participatory due to:   
  
1/22: BIBA for evaluation of \"critical hemoglobin\" and bloody stool. Was recently admitted to [facility] for rehab, over the past few days pt has been c/o LLQ abd pain, had not had a BM in 6 days despite laxatives and suppositories. On 1/22 she was examined for fecal impaction, bloody stool removed per daughter. Colonoscopy in 2015 showed diverticulitis.  
 
ED W/U: 
EXAM: elderly, fatigued, pale conjunctiva, pacemaker, LLQ TTP, left facial droop with LUE weakness, slow to respond, pallor LAB:  H/h 6.8/21.3, K 3.3, IMAGING:  CXR: minimal bibasilar atelectasis. CT: distal sigmoid colon and rectal wall thickening with adjacent inflammation suggesting distal colitis that may  Be attributable to nonspecific infection or inflammation. There is a very large amount of colonic stool, no bowel obstruction. HOSPITAL COURSE: 1/23: admitted for continuation of care. 1/24: pt able to participate in family meeting, however, was very fatigued. Per family short-term memory is poor, long-term is good. 1/25: up with PT/OT today, very fatigued. Clinical Pain Assessment (nonverbal scale for severity on nonverbal patients):  
Clinical Pain Assessment Severity: 0 Activity (Movement): Lying quietly, normal position Duration: for how long has pt been experiencing pain (e.g., 2 days, 1 month, years) Frequency: how often pain is an issue (e.g., several times per day, once every few days, constant) FUNCTIONAL ASSESSMENT:  
 
Palliative Performance Scale (PPS): PPS: 20 
 
 
 PSYCHOSOCIAL/SPIRITUAL SCREENING:  
 
Palliative IDT has assessed this patient for cultural preferences / practices and a referral made as appropriate to needs (Cultural Services, Patient Advocacy, Ethics, etc.) Any spiritual / Hinduism concerns: 
[] Yes /  [x] No 
 
Caregiver Burnout: 
[] Yes /  [x] No /  [] No Caregiver Present Anticipatory grief assessment:  
[x] Normal  / [] Maladaptive ESAS Anxiety: Anxiety: 0 
 
ESAS Depression: Depression: 3 REVIEW OF SYSTEMS:  
 
Positive and pertinent negative findings in ROS are noted above in HPI. The following systems were [x] reviewed / [] unable to be reviewed as noted in HPI Other findings are noted below. Systems: constitutional, ears/nose/mouth/throat, respiratory, gastrointestinal, genitourinary, musculoskeletal, integumentary, neurologic, psychiatric, endocrine. Positive findings noted below. Modified ESAS Completed by: provider Fatigue: 7 Drowsiness: 2 Depression: 3 Pain: 0 Anxiety: 0 Nausea: 0 Anorexia: 8 Dyspnea: 0 Constipation: Yes Stool Occurrence(s): 1 PHYSICAL EXAM:  
 
From RN flowsheet: 
Wt Readings from Last 3 Encounters:  
01/23/19 154 lb 15.7 oz (70.3 kg) 01/20/19 152 lb 1.9 oz (69 kg) 01/17/19 150 lb 5.7 oz (68.2 kg) Blood pressure 155/63, pulse 64, temperature 98.2 °F (36.8 °C), resp. rate 18, height 5' 7\" (1.702 m), weight 154 lb 15.7 oz (70.3 kg), SpO2 98 %. Pain Scale 1: Numeric (0 - 10) Pain Intensity 1: 0 Pain Onset 1: acute Pain Location 1: Abdomen Pain Orientation 1: Anterior Pain Description 1: Aching, Cramping Pain Intervention(s) 1: Medication (see MAR) Last bowel movement, if known:  
 
Constitutional: elderly, frail, fatigued, lying in bed Eyes: pupils equal, anicteric ENMT: no nasal discharge, moist mucous membranes Cardiovascular: regular rhythm, distal pulses intact Respiratory: breathing not labored, symmetric Gastrointestinal: soft non-tender, +bowel sounds Musculoskeletal: no deformity, no tenderness to palpation Skin: warm, dry Neurologic: following commands, moving all extremities Psychiatric: full affect, no hallucinations HISTORY:  
 
Active Problems: 
  GI bleed (1/23/2019) Other fatigue (1/24/2019) Counseling regarding advanced care planning and goals of care (1/24/2019) Physical debility (1/24/2019) Past Medical History:  
Diagnosis Date  Anxiety  Arthritis  Chest pain 2008 Negative stress test and Holter monitor w/Dr Soo Acharya.  Chronic back pain  Chronic venous insufficiency  Colon polyp   
 last scope normal 2007; Dr. Sadie Dai  Cyst of right kidney  Depression  Diabetes ([facility] [address]  Hypercholesterolemia  Hypertension  Hypothyroidism  Memory disorder  Other ill-defined conditions(799.89)   
 heart murmur  Painful hip  Parkinson disease ([facility] [address]  Urinary frequency Sees Dr. Zora Smith with [address] Past Surgical History:  
Procedure Laterality Date  COLONOSCOPY,DIAGNOSTIC  6/12/2015  HX BREAST BIOPSY  2002  HX HYSTERECTOMY Due to a prolapsed uterus  HX KNEE REPLACEMENT    
 left  HX KNEE REPLACEMENT  2006  
 right  HX ORTHOPAEDIC B knees.  HX OTHER SURGICAL    
 pelvic prolasp  IR PERC ART MECH THROMB INFUSION INTRACRANIAL  1/8/2019  UT INS NEW/RPLCMT PRM PM W/TRANSV ELTRD ATRIAL&VENT  1/16/2019  UPPER GI ENDOSCOPY,BIOPSY  6/12/2015 Family History Problem Relation Age of Onset  Cancer Mother   
     pancreatic  Cancer Father   
     colon  Cancer Sister   
     pancreatic  Diabetes Sister  Alzheimer Brother History reviewed, no pertinent family history. Social History Tobacco Use  Smoking status: Never Smoker  Smokeless tobacco: Never Used Substance Use Topics  Alcohol use: No  
  Alcohol/week: 0.0 oz  
  Comment: Rare Allergies Allergen Reactions  Flexeril [Cyclobenzaprine] Other (comments) Caused pt to fall & hallucination  Percocet [Oxycodone-Acetaminophen] Itching  Tramadol Other (comments) Caused falls Current Facility-Administered Medications Medication Dose Route Frequency  sodium chloride (NS) flush 5-40 mL  5-40 mL IntraVENous Q8H  
 sodium chloride (NS) flush 5-40 mL  5-40 mL IntraVENous PRN  
 acetaminophen (TYLENOL) solution 650 mg  650 mg Oral Q4H PRN  
 HYDROmorphone (PF) (DILAUDID) injection 0.5 mg  0.5 mg IntraVENous Q4H PRN  
 ondansetron (ZOFRAN) injection 4 mg  4 mg IntraVENous Q4H PRN  
 bisacodyl (DULCOLAX) suppository 10 mg  10 mg Rectal DAILY PRN  
 carbidopa-levodopa (SINEMET)  mg per tablet 1 Tab  1 Tab Oral TID  multivitamin, tx-iron-ca-min (THERA-M w/ IRON) tablet 1 Tab  1 Tab Oral DAILY  levothyroxine (SYNTHROID) tablet 50 mcg  50 mcg Oral 6am  
 glucose chewable tablet 16 g  4 Tab Oral PRN  
 dextrose (D50W) injection syrg 12.5-25 g  25-50 mL IntraVENous PRN  
 glucagon (GLUCAGEN) injection 1 mg  1 mg IntraMUSCular PRN  
 insulin lispro (HUMALOG) injection   SubCUTAneous Q6H  
 metoprolol succinate (TOPROL-XL) XL tablet 25 mg  25 mg Oral DAILY  pantoprazole (PROTONIX) tablet 40 mg  40 mg Oral ACB  atorvastatin (LIPITOR) tablet 20 mg  20 mg Oral QHS  sodium chloride (NS) flush 5-40 mL  5-40 mL IntraVENous Q8H  
 sodium chloride (NS) flush 5-40 mL  5-40 mL IntraVENous PRN  
 0.9% sodium chloride infusion 250 mL  250 mL IntraVENous PRN  
 
 
 
 LAB AND IMAGING FINDINGS:  
 
Lab Results Component Value Date/Time WBC 6.1 01/25/2019 05:25 AM  
 HGB 8.3 (L) 01/25/2019 05:25 AM  
 PLATELET 522 32/92/4572 05:25 AM  
 
Lab Results Component Value Date/Time Sodium 138 01/23/2019 06:47 AM  
 Potassium 3.6 01/23/2019 06:47 AM  
 Chloride 103 01/23/2019 06:47 AM  
 CO2 29 01/23/2019 06:47 AM  
 BUN 9 01/23/2019 06:47 AM  
 Creatinine 0.67 01/23/2019 06:47 AM  
 Calcium 8.5 01/23/2019 06:47 AM  
 Magnesium 1.9 01/16/2019 02:51 AM  
 Phosphorus 2.4 (L) 01/11/2019 03:09 AM  
  
Lab Results Component Value Date/Time AST (SGOT) 18 01/23/2019 06:47 AM  
 Alk. phosphatase 64 01/23/2019 06:47 AM  
 Protein, total 5.9 (L) 01/23/2019 06:47 AM  
 Albumin 2.2 (L) 01/23/2019 06:47 AM  
 Globulin 3.7 01/23/2019 06:47 AM  
 
Lab Results Component Value Date/Time INR 1.1 01/22/2019 11:20 PM  
 Prothrombin time 11.1 01/22/2019 11:20 PM  
 aPTT 31.3 01/22/2019 11:20 PM  
  
Lab Results Component Value Date/Time Iron 33 (L) 01/22/2019 11:20 PM  
 TIBC 178 (L) 01/22/2019 11:20 PM  
 Iron % saturation 19 (L) 01/22/2019 11:20 PM  
 Ferritin 394 (H) 01/22/2019 11:20 PM  
  
No results found for: PH, PCO2, PO2 No components found for: [place] Lab Results Component Value Date/Time CK 87 06/18/2018 12:31 AM  
 CK - MB <1.0 06/16/2018 06:15 AM  
  
 
 
   
 
Total time: 40 
Counseling / coordination time, spent as noted above: 35 
> 50% counseling / coordination?: y 
 
Prolonged service was provided for  []30 min   []75 min in face to face time in the presence of the patient, spent as noted above. Time Start:  
Time End:  
Note: this can only be billed with 39567 (initial) or 17684 (follow up). If multiple start / stop times, list each separately.

## 2019-01-25 NOTE — PROGRESS NOTES
PCU SHIFT NURSING NOTE Bedside and Verbal shift change report given to [address] [place] [address]  (oncoming nurse) by   Castillo Pelaez nurse). Report included the following information SBAR, Kardex and MAR. Shift Summary:   
0730 pt in bed resting no complaints of pain, on Room air, pt asking for something to help sleep tonight, states she did not sleep last night. 1100 duran removed 1530 pt family request speech/swallow evual for diet 1600 has not voided,  bladder scanned 239mL  Feels like she can \"try to go\" 1630 Speech in room 1815 pt bladder scanned again 339 paged Dr Loreto Brito, verbal order for straight cath 1930 Bedside and Verbal shift change report given to [facility]  (oncoming nurse) by Yunier Aviles RN  (offgoing nurse). Report included the following information SBAR, Kardex and MAR. Admission Date 1/22/2019 Admission Diagnosis GI bleed Consults IP CONSULT TO HOSPITALIST 
IP CONSULT TO GASTROENTEROLOGY 
IP CONSULT TO PALLIATIVE CARE - PROVIDER Consults []PT []OT []Speech  
[]Case Management  
  
[] Palliative Cardiac Monitoring Order []Yes []No  
 
IV drips []Yes Drip:                            Dose: 
Drip:                            Dose: 
Drip:                            Dose:  
[]No  
 
GI Prophylaxis []Yes []No  
 
 
 
DVT Prophylaxis SCDs:     
     
 Danyn stockings:     
  
[] Medication []Contraindicated []None Activity Level Activity Level: Bed Rest   
 Activity Assistance: Partial (two people) Purposeful Rounding every 1-2 hour? []Yes Mcpherson Score  Total Score: 4 Bed Alarm (If score 3 or >) []Yes  
[] Refused (See signed refusal form in chart) Clint Score  Clint Score: 13 Clint Score (if score 14 or less) []PMT consult  
[]Wound Care consult []Specialty bed  
[] Nutrition consult Needs prior to discharge:  
Home O2 required:   
[]Yes []No  
 If yes, how much O2 required? Other: Last Bowel Movement: Last Bowel Movement Date: 01/23/19 Influenza Vaccine Received Flu Vaccine for Current Season (usually Sept-March): Yes Pneumonia Vaccine Diet Active Orders Diet DIET CLEAR LIQUID  
  
LDAs Peripheral IV 01/22/19 Left Wrist (Active) Site Assessment Clean, dry, & intact 1/24/2019  8:46 PM  
Phlebitis Assessment 0 1/24/2019  3:58 PM  
Infiltration Assessment 0 1/24/2019  3:58 PM  
Dressing Status Clean, dry, & intact 1/24/2019  8:46 PM  
Dressing Type Transparent;Tape 1/24/2019  8:46 PM  
Hub Color/Line Status Pink;Flushed 1/24/2019  8:46 PM  
Action Taken Open ports on tubing capped 1/24/2019  3:19 AM  
Alcohol Cap Used Yes 1/24/2019  3:19 AM  
   
Peripheral IV 01/23/19 Right Hand (Active) Site Assessment Clean, dry, & intact 1/24/2019  8:46 PM  
Phlebitis Assessment 0 1/24/2019  3:58 PM  
Infiltration Assessment 0 1/24/2019  3:58 PM  
Dressing Status Clean, dry, & intact 1/24/2019  8:46 PM  
Dressing Type Tape;Transparent 1/24/2019  8:46 PM  
Hub Color/Line Status Pink;Flushed 1/24/2019  8:46 PM  
Action Taken Open ports on tubing capped 1/24/2019  3:19 AM  
Alcohol Cap Used Yes 1/24/2019  3:19 AM  
                  
Urinary Catheter Urinary Catheter 01/18/19 [facility]-Indications for Use: Acute urinary retention/bladder outlet obstruction Intake & Output Date 01/24/19 0700 - 01/25/19 7055 01/25/19 0700 - 01/26/19 7289 Shift 7598-3826 6917-4715 24 Hour Total 9409-8927 2457-3732 24 Hour Total  
INTAKE  
P.O.      
  P. O.  Shift Total(mL/kg) 940(13.4) 240(3.4) 1180(16.8) OUTPUT Urine(mL/kg/hr) 900(1.1) 1350(1.6) 2250(1.3) Urine Output (mL) (Urinary Catheter 01/18/19 [facility]) 900 1350 2250 Shift Total(mL/kg) [id number]) [id number]) 5544(82) NET 49 -5036 -1035 Weight (kg) 70.3 70.3 70.3 70.3 70.3 70.3 Readmission Risk Assessment Tool Score High Risk   
      
 39 Total Score 3 Has Seen PCP in Last 6 Months (Yes=3, No=0)  
 4 IP Visits Last 12 Months (1-3=4, 4=9, >4=11) 5 Pt. Coverage (Medicare=5 , Medicaid, or Self-Pay=4) 27 Charlson Comorbidity Score (Age + Comorbid Conditions) Criteria that do not apply:  
 . Living with Significant Other. Assisted Living. LTAC. SNF. or  
Rehab Patient Length of Stay (>5 days = 3) Expected Length of Stay 2d 2h Actual Length of Stay 2

## 2019-01-25 NOTE — PROGRESS NOTES
INTERNAL MEDICINE ATTENDING NOTE 
 
S: Ms. Agustin Hearn was seen by me today during rounds. She was admitted overnight for GI bleed. At this time, she is resting comfortably. Patient met with palliative care and indicated a clear preference for DNR, despite some opposition to this from daughter. She denies pain, N/V. No overt hematochezia or melena in hospital. The patient has no new complaints today. ROS otherwise unremarkable except as noted elsewhere. O: Blood pressure 143/56, pulse 76, temperature 98.8 °F (37.1 °C), resp. rate 18, height 5' 7\" (1.702 m), weight 154 lb 15.7 oz (70.3 kg), SpO2 94 %. Gen: Patient is in no acute distress. Lungs: CTAB. Heart: RRR. Abd: S, NT, ND, BS present. Extremities: Warm. Recent Results (from the past 12 hour(s)) GLUCOSE, POC Collection Time: 01/24/19  8:58 PM  
Result Value Ref Range Glucose (POC) 157 (H) 65 - 100 mg/dL Performed by Luis Alberto Gum CBC WITH AUTOMATED DIFF Collection Time: 01/24/19  9:59 PM  
Result Value Ref Range WBC 6.4 3.6 - 11.0 K/uL  
 RBC 2.83 (L) 3.80 - 5.20 M/uL HGB 8.8 (L) 11.5 - 16.0 g/dL HCT 27.1 (L) 35.0 - 47.0 % MCV 95.8 80.0 - 99.0 FL  
 MCH 31.1 26.0 - 34.0 PG  
 MCHC 32.5 30.0 - 36.5 g/dL  
 RDW 14.3 11.5 - 14.5 % PLATELET 632 659 - 254 K/uL MPV 8.6 (L) 8.9 - 12.9 FL  
 NRBC 0.0 0  WBC ABSOLUTE NRBC 0.00 0.00 - 0.01 K/uL NEUTROPHILS 65 32 - 75 % LYMPHOCYTES 21 12 - 49 % MONOCYTES 11 5 - 13 % EOSINOPHILS 1 0 - 7 % BASOPHILS 0 0 - 1 % IMMATURE GRANULOCYTES 1 (H) 0.0 - 0.5 % ABS. NEUTROPHILS 4.1 1.8 - 8.0 K/UL  
 ABS. LYMPHOCYTES 1.4 0.8 - 3.5 K/UL  
 ABS. MONOCYTES 0.7 0.0 - 1.0 K/UL  
 ABS. EOSINOPHILS 0.1 0.0 - 0.4 K/UL  
 ABS. BASOPHILS 0.0 0.0 - 0.1 K/UL  
 ABS. IMM. GRANS. 0.1 (H) 0.00 - 0.04 K/UL  
 DF AUTOMATED    
GLUCOSE, POC Collection Time: 01/25/19 12:51 AM  
Result Value Ref Range Glucose (POC) 102 (H) 65 - 100 mg/dL Performed by Jackie Dominguez (PCT) CBC WITH AUTOMATED DIFF Collection Time: 01/25/19  5:25 AM  
Result Value Ref Range WBC 6.1 3.6 - 11.0 K/uL  
 RBC 2.66 (L) 3.80 - 5.20 M/uL HGB 8.3 (L) 11.5 - 16.0 g/dL HCT 25.6 (L) 35.0 - 47.0 % MCV 96.2 80.0 - 99.0 FL  
 MCH 31.2 26.0 - 34.0 PG  
 MCHC 32.4 30.0 - 36.5 g/dL  
 RDW 14.5 11.5 - 14.5 % PLATELET 340 804 - 202 K/uL MPV 8.9 8.9 - 12.9 FL  
 NRBC 0.0 0  WBC ABSOLUTE NRBC 0.00 0.00 - 0.01 K/uL NEUTROPHILS 64 32 - 75 % LYMPHOCYTES 20 12 - 49 % MONOCYTES 13 5 - 13 % EOSINOPHILS 2 0 - 7 % BASOPHILS 0 0 - 1 % IMMATURE GRANULOCYTES 1 (H) 0.0 - 0.5 % ABS. NEUTROPHILS 3.9 1.8 - 8.0 K/UL  
 ABS. LYMPHOCYTES 1.2 0.8 - 3.5 K/UL  
 ABS. MONOCYTES 0.8 0.0 - 1.0 K/UL  
 ABS. EOSINOPHILS 0.1 0.0 - 0.4 K/UL  
 ABS. BASOPHILS 0.0 0.0 - 0.1 K/UL  
 ABS. IMM. GRANS. 0.1 (H) 0.00 - 0.04 K/UL  
 DF AUTOMATED    
GLUCOSE, POC Collection Time: 01/25/19  6:52 AM  
Result Value Ref Range Glucose (POC) 118 (H) 65 - 100 mg/dL Performed by Jackie Dominguez (PCT) A / P:  
1. GI bleed (1/23/2019) with acute blood loss anemia POA: Hgb stable. Patient admitted; anticoagulant medications held. GI consulted; Will determine whether there is need for invasive testing. Favor conservative approach. Daughter was concerned about holding blood thinners; We discussed yesterday that there may be no good options here--high risks of bleeding on anticoagulation; high risk of thrombotic events off OAC. Overall, she seems a poor candidate [facility] due to poor functional status, fall risk. 2. Thromboembolic stroke R MCA: S/p admission at [facility] on Jan 8, 2019; s/p tPa and thrombectomy. Started on eliquis. 3. Diabetes: Controlled at last check; due for follow up. 4. Parkinsons: Continue sinemet. She has outpatient neurology followup. 5. Hypertension: BP fine today.   
6. 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 7. Hypothyroidism: Continue synthroid; Normal TSH at last check. 8. Depression/Anxiety: Stable. 9. DNR. Denise Leung MD, [address] Best contact is via Pager: [phone], or via hospital  at [phone]

## 2019-01-25 NOTE — PROGRESS NOTES
Speech Pathology bedside swallow evaluation/discharge Patient: Ila Patel (72 y.o. female) Date: 1/25/2019 Primary Diagnosis: GI bleed Precautions:     
 
ASSESSMENT : 
Based on the objective data described below, the patient presents with functional swallow of thins, purees and soft solids. Mastication is mildly slow but she cleared her mouth well. Pharyngeal swallow is characterized by mild swallow delay. Hyolaryngeal excursion is functional. No choking or coughing on any texture. The pt was on a minced diet with thins at [facility]. meds whole with liquids. Skilled acute therapy provided by a speech-language pathologist is not indicated at this time. PLAN : 
Recommendations: 
dys 2 diet with thins; meds whole with water. Discharge Recommendations: None SUBJECTIVE:  
Patient was interacting with her daughter. OBJECTIVE:  
 
Past Medical History:  
Diagnosis Date  Anxiety  Arthritis  Chest pain 2008 Negative stress test and Holter monitor w/Dr Haider Russo.  Chronic back pain  Chronic venous insufficiency  Colon polyp   
 last scope normal 2007; Dr. Steve Cardenas  Cyst of right kidney  Depression  Diabetes ([address]  Hypercholesterolemia  Hypertension  Hypothyroidism  Memory disorder  Other ill-defined conditions(799.89)   
 heart murmur  Painful hip  Parkinson disease ([address]  Urinary frequency Sees Dr. Smith July with [address] Past Surgical History:  
Procedure Laterality Date  COLONOSCOPY,DIAGNOSTIC  6/12/2015  HX BREAST BIOPSY  2002  HX HYSTERECTOMY Due to a prolapsed uterus  HX KNEE REPLACEMENT    
 left  HX KNEE REPLACEMENT  2006  
 right  HX ORTHOPAEDIC B knees.  HX OTHER SURGICAL    
 pelvic prolasp  IR PERC ART MECH THROMB INFUSION INTRACRANIAL  1/8/2019  GA INS NEW/RPLCMT PRM PM W/TRANSV ELTRD ATRIAL&VENT  1/16/2019  UPPER GI ENDOSCOPY,BIOPSY  6/12/2015 Prior Level of Function/Home Situation:  
Home Situation Home Environment: Rehabilitation facility([facility]) # Steps to Enter: 0 One/Two Story Residence: One story Living Alone: No 
Support Systems: Child(nicanor) Patient Expects to be Discharged to[de-identified] Rehabilitation facility Current DME Used/Available at Home: Walker, rolling Tub or Shower Type: Shower Diet prior to admission: minced diet with thins Current Diet:  clears Cognitive and Communication Status: 
Neurologic State: Alert Orientation Level: Oriented to person, Oriented to place Cognition: Impaired decision making, Memory loss, Decreased command following Perception: Cues to attend left visual field, Cues to attend to left side of body Perseveration: No perseveration noted Safety/Judgement: Fall prevention Oral Assessment: 
Oral Assessment Labial: Left droop Dentition: Edentulous; Upper & lower dentures Lingual: Decreased rate Mandible: No impairment P.O. Trials: 
Patient Position: upright in bed Vocal quality prior to P.O.: No impairment Consistency Presented: Thin liquid;Puree; Solid How Presented: Self-fed/presented;Cup/sip; Successive swallows Bolus Acceptance: No impairment Bolus Formation/Control: Impaired Type of Impairment: Delayed;Mastication Propulsion: Delayed (# of seconds) Oral Residue: None Initiation of Swallow: Delayed (# of seconds) Laryngeal Elevation: Functional 
Aspiration Signs/Symptoms: None Pharyngeal Phase Characteristics: No impairment, issues, or problems Oral Phase Severity: Mild Pharyngeal Phase Severity : Mild NOMS:  
The NOMS functional outcome measure was used to quantify this patient's level of swallowing impairment. Based on the NOMS, the patient was determined to be at level 4 for swallow function NOMS Swallowing Levels: 
Level 1 (CN): NPO Level 2 (CM): NPO but takes consistency in therapy Level 3 (CL): Takes less than 50% of nutrition p.o. and continues with nonoral feedings; and/or safe with mod cues; and/or max diet restriction Level 4 (CK): Safe swallow but needs mod cues; and/or mod diet restriction; and/or still requires some nonoral feeding/supplements Level 5 (CJ): Safe swallow with min diet restriction; and/or needs min cues Level 6 (CI): Independent with p.o.; rare cues; usually self cues; may need to avoid some foods or needs extra time Level 7 (CH): Independent for all p.o. GISELA. (2003). National Outcomes Measurement System (NOMS): Adult Speech-Language Pathology User's Guide. Pain: 
Pain Scale 1: Numeric (0 - 10) Pain Intensity 1: 0 After treatment:  
[] Patient left in no apparent distress sitting up in chair 
[x] Patient left in no apparent distress in bed 
[x] Call bell left within reach [x] Nursing notified 
[] Caregiver present 
[] Bed alarm activated COMMUNICATION/EDUCATION:  
The patients plan of care including findings, recommendations, and recommended diet changes were discussed with: Registered Nurse. [x] Posted safety precautions in patient's room. [] Patient/family have participated as able and agree with findings and recommendations. [] Patient is unable to participate in plan of care at this time. Thank you for this referral. 
CRYSTAL Taylor Time Calculation: 10 mins

## 2019-01-26 NOTE — PROGRESS NOTES
PCU SHIFT NURSING NOTE Bedside and Verbal shift change report given to [facility] ABDULAZIZ JACKSON (oncoming nurse) by Riesa Brittle, RN (offgoing nurse). Report included the following information SBAR, Kardex, Intake/Output, MAR, Accordion, Recent Results, Med Rec Status and Cardiac Rhythm Paced. Shift Summary:  
 
0300 - Patient bladder scanned for 500. MD paged and Rivers placed per protocol. 800 clear yellow urine drained. Will continue to monitor. Admission Date 1/22/2019 Admission Diagnosis GI bleed Consults IP CONSULT TO HOSPITALIST 
IP CONSULT TO GASTROENTEROLOGY 
IP CONSULT TO PALLIATIVE CARE - PROVIDER Consults [x]PT [x]OT []Speech [x]Case Management  
  
[] Palliative Cardiac Monitoring Order []Yes []No  
 
IV drips []Yes Drip:                            Dose: 
Drip:                            Dose: 
Drip:                            Dose:  
[x]No  
 
GI Prophylaxis [x]Yes []No  
 
 
 
DVT Prophylaxis SCDs:     
     
 Danny stockings:     
  
[] Medication []Contraindicated []None Activity Level Activity Level: (P) Up with Assistance Activity Assistance: (P) Partial (two people) Purposeful Rounding every 1-2 hour? [x]Yes Mcpherson Score  Total Score: 5 Bed Alarm (If score 3 or >) [x]Yes  
[] Refused (See signed refusal form in chart) Clint Score  Clint Score: (P) 14 Clint Score (if score 14 or less) [x]PMT consult  
[]Wound Care consult []Specialty bed  
[] Nutrition consult Needs prior to discharge:  
Home O2 required:   
[]Yes  
[x]No  
 If yes, how much O2 required? Other:  
 Last Bowel Movement: Last Bowel Movement Date: 01/23/19 Influenza Vaccine Received Flu Vaccine for Current Season (usually Sept-March): Yes Pneumonia Vaccine Diet Active Orders Diet DIET DYSPHAGIA MECH ALTERED (NDD2) Consistent Carb 1800kcal  
  
LDAs Peripheral IV 01/22/19 Left Wrist (Active) Site Assessment Clean, dry, & intact 1/25/2019  4:22 PM  
Phlebitis Assessment 0 1/25/2019  4:22 PM  
Infiltration Assessment 0 1/25/2019  4:22 PM  
Dressing Status Clean, dry, & intact 1/25/2019  4:22 PM  
Dressing Type Tape;Transparent 1/25/2019  4:22 PM  
Hub Color/Line Status Pink 1/25/2019  4:22 PM  
Action Taken Open ports on tubing capped 1/24/2019  3:19 AM  
Alcohol Cap Used Yes 1/24/2019  3:19 AM  
   
Peripheral IV 01/23/19 Right Hand (Active) Site Assessment Clean, dry, & intact 1/25/2019  4:22 PM  
Phlebitis Assessment 0 1/25/2019  4:22 PM  
Infiltration Assessment 0 1/25/2019  4:22 PM  
Dressing Status Clean, dry, & intact 1/25/2019  4:22 PM  
Dressing Type Tape 1/25/2019  4:22 PM  
Hub Color/Line Status Pink 1/25/2019 10:50 AM  
Action Taken Open ports on tubing capped 1/24/2019  3:19 AM  
Alcohol Cap Used Yes 1/24/2019  3:19 AM  
                  
Urinary Catheter [REMOVED] Urinary Catheter 01/18/19 Rivers-Indications for Use: Acute urinary retention/bladder outlet obstruction Intake & Output Date 01/25/19 0700 - 01/26/19 0659 01/26/19 0700 - 01/27/19 0367 Shift 3786-6278 0523-3018 24 Hour Total 8520-9350 3989-4374 24 Hour Total  
INTAKE  
P.O. 600  600     
  P. O. 600  600 Shift Total(mL/kg) 600(8.5)  600(8.5) OUTPUT Urine(mL/kg/hr) 0(0)  0 Urine Voided 0  0 Shift Total(mL/kg) 0(0)  0(0)   600 Weight (kg) 70.3 70.3 70.3 70.3 70.3 70.3 Readmission Risk Assessment Tool Score High Risk   
      
 39 Total Score 3 Has Seen PCP in Last 6 Months (Yes=3, No=0)  
 4 IP Visits Last 12 Months (1-3=4, 4=9, >4=11) 5 Pt. Coverage (Medicare=5 , Medicaid, or Self-Pay=4) 27 Charlson Comorbidity Score (Age + Comorbid Conditions) Criteria that do not apply:  
 . Living with Significant Other. Assisted Living. LTAC. SNF. or  
Rehab Patient Length of Stay (>5 days = 3) Expected Length of Stay 2d 2h  
 Actual Length of Stay 3

## 2019-01-26 NOTE — DISCHARGE SUMMARY
Physician Discharge Summary Patient ID: 
Emilee Renner [id number] 
2 y.o. 
2/7/1932 Admit date: 1/22/2019 Discharge date and time: 1/26/2019 Admission Diagnoses: GI bleed Discharge Diagnoses:  Principal Diagnosis Active Problems: 
  GI bleed (1/23/2019) Other fatigue (1/24/2019) Counseling regarding advanced care planning and goals of care (1/24/2019) Physical debility (1/24/2019) Rectal bleeding (1/25/2019) Consults: GI and Palliative Care Significant Diagnostic Studies:  
 
CT abd/pelvis:  
Distal sigmoid colon and rectal wall thickening with adjacent inflammation 
suggesting distal colitis that may be attributable to nonspecific infection or 
inflammation. There is a very large amount of colonic stool. No bowel 
obstruction. Hospital Course: Ms. Emilee Renner is a patient of mine who presented with the problems above. See the initial H and P for full details. CHIEF COMPLAINT:  Blood in stool  
  
HISTORY OF PRESENT ILLNESS:    
Emilee Renner is a 80 y.o.  female who presents with complaint noted above. Her daughter, Saniya Sprague, provides most of the history. She relates that [facility] recently has been staying at [address] [place] [address],[address] for rehabilitation following a hospitalization for multiple ischemic strokes; earlier this month, she underwent thrombolysis and thombectomy for CVA that were thought to be cardioembolic secondary to newly diagnosed atrial fibrillation (also underwent placement of PPM for atrial fibrillation and intermittent 3rd degree AV block). Her strokes caused dysphagia as well as left upper extremity and bilateral lower extremity weakness, all of which have improved to some extent since hospital discharge.  Owing to the nature of her strokes as well as the presence of a left occipital hemorrhage, she was discharged from [facility] on low-dose Apixaban (2.5 mg BID) and ASA 81 mg. The last several days, Phong farmer has been moaning and complaining of left lower quadrant abdominal pain, and had not passed a bowel movement in about 6 days, despite administration of laxatives and suppositories. She was examined on 1/22 for the possibility of fecal impaction and, per her daughter's report, stool was removed from her rectum with blood. There is no further description as to whether this was bright red blood, maroon stool, or black tarry stool. Transfer to the hospital was therefore requested. Here, she is found to be slightly more anemic than her recent baseline (Hgb 8.2 earlier this month, now Hgb 6.8). Patient complains of feeling tired and expresses worry about whether she is ever going to get better given her recent health issues. She denies any other bleeding at this time, denies lightheadedness, denies chest pain, palpitations, or dyspnea. 
  
Colonoscopy in 2015 showed diverticulitis. EGD in 2015 showed minimal chronic superficial gastritis with nonspecific capillary ectasia and hemorrhage of lamina propria. By problem. .. 1. GI bleed (1/23/2019) with acute blood loss anemia POA: The patient was admitted; anticoagulant medications were held, and she was transfused 1 unit. Hgb has been stable since then. GI was consulted; For now, defer invasive testing; Favor conservative approach. Dr. Maddy brito with resuming Eliquis at discharge if desired. 2. Daughter was concerned about holding blood thinners; We discussed again today that there may be no good options here--high risks of bleeding on anticoagulation; high risk of thrombotic events off [employer]. Overall, she seems a poor candidate for [employer] due to poor functional status, fall risk, and risk of bleeding. For now, stay off of it. 3. Thromboembolic stroke R MCA: S/p admission at [facility] on Jan 8, 2019; s/p tPa and thrombectomy. Started on eliquis.  
4. Anemia, chronic: Her baseline Hgb has been about 8.5 since June of 2018. ?Chronic disease. 5. Diabetes: Controlled at last check; due for follow up. 6. Parkinsons: Continue sinemet. She has outpatient neurology followup. 7. Hypertension: BP fine today. 8. 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 9. Hypothyroidism: Continue synthroid; Normal TSH at last check. 10. Depression/Anxiety: Stable. 11. Urinary retention: For now, requiring catheter. We will discharge with duran and ask her to see urology in outpatient setting, in near future. 12. DNR. 13. Discharge Plan: [facility]. Discharge Exam: 
See Today's progress note. Disposition: SNF Patient Instructions:  
Current Discharge Medication List  
  
START taking these medications Details  
metoprolol succinate (TOPROL-XL) 25 mg XL tablet Take 1 Tab by mouth daily. Qty: 1 Tab, Refills: 0  
  
pantoprazole (PROTONIX) 40 mg tablet Take 1 Tab by mouth Daily (before breakfast). Qty: 1 Tab, Refills: 0 CONTINUE these medications which have NOT CHANGED Details  
lisinopril (PRINIVIL, ZESTRIL) 40 mg tablet Take 40 mg by mouth daily. mirtazapine (REMERON) 15 mg tablet Take 15 mg by mouth nightly. rosuvastatin (CRESTOR) 20 mg tablet Take 20 mg by mouth nightly. cyanocobalamin/cobamamide (B12 SL) by SubLINGual route. calcium carb/vit D3/minerals (CALCIUM-VITAMIN D PO) Take  by mouth. SITagliptin-metFORMIN (JANUMET)  mg per tablet Take 1 Tab by mouth two (2) times daily (with meals). carbidopa-levodopa (SINEMET)  mg per tablet TAKE 1 TABLET BY MOUTH THREE TIMES DAILY Qty: 270 Tab, Refills: 2 Comments: **Patient requests 90 days supply** Associated Diagnoses: PD (Parkinson's disease) ([facility] [address] levothyroxine (SYNTHROID) 50 mcg tablet TAKE 1 TABLET BY MOUTH DAILY BEFORE BREAKFAST Qty: 90 Tab, Refills: 3  Comments: **Patient requests 90 days supply** 
 Associated Diagnoses: Hypothyroidism due to acquired atrophy of thyroid  
  
aspirin delayed-release 81 mg tablet Take 81 mg by mouth daily. STOP taking these medications  
  
 iron,carbonyl-vitamin C (VITRON-C) 65 mg iron- 125 mg TbEC Comments:  
Reason for Stopping:   
   
  
 
Activity: PT/OT Eval and Treat Diet: Cardiac Diet, dysphagia 2 with thin liquids Rivers catheter to straight drain. Follow-up With  Details  Why  Contact Info [address] [place] [address])      [address] [facility] 
[phone] Alicja Gaona MD      [address] [facility] 
[phone] Fabio Sierra MD  In 2 weeks  Urinary retention  [facility] [address] [facility] 
[phone] Also, repeat CT scan in 2-4 weeks Signed: 
Aleksandra Lozoya MD 
1/26/2019 
10:38 AM 
 
Note: Greater than 30 minutes were spent on activities related to this discharge.

## 2019-01-26 NOTE — PROGRESS NOTES
INTERNAL MEDICINE ATTENDING NOTE 
 
S: Ms. Solange Cantrell was seen by me today during rounds. She was admitted overnight for GI bleed. At this time, she is resting comfortably. Patient met with palliative care and indicated a clear preference for DNR, despite some opposition to this from daughter. She denies pain, N/V. No overt hematochezia or melena in hospital. The patient has no new complaints today. ROS otherwise unremarkable except as noted elsewhere. O: Blood pressure 143/57, pulse 67, temperature 98.6 °F (37 °C), resp. rate 16, height 5' 7\" (1.702 m), weight 156 lb 8.4 oz (71 kg), SpO2 94 %. Gen: Patient is in no acute distress. Lungs: CTAB. Heart: RRR. Abd: S, NT, ND, BS present. Extremities: Warm. Recent Results (from the past 12 hour(s)) CBC WITH AUTOMATED DIFF Collection Time: 01/25/19 10:10 PM  
Result Value Ref Range WBC 5.3 3.6 - 11.0 K/uL  
 RBC 2.67 (L) 3.80 - 5.20 M/uL HGB 8.4 (L) 11.5 - 16.0 g/dL HCT 25.3 (L) 35.0 - 47.0 % MCV 94.8 80.0 - 99.0 FL  
 MCH 31.5 26.0 - 34.0 PG  
 MCHC 33.2 30.0 - 36.5 g/dL  
 RDW 14.3 11.5 - 14.5 % PLATELET 496 927 - 184 K/uL MPV 8.9 8.9 - 12.9 FL  
 NRBC 0.0 0  WBC ABSOLUTE NRBC 0.00 0.00 - 0.01 K/uL NEUTROPHILS 59 32 - 75 % LYMPHOCYTES 26 12 - 49 % MONOCYTES 13 5 - 13 % EOSINOPHILS 2 0 - 7 % BASOPHILS 0 0 - 1 % IMMATURE GRANULOCYTES 1 (H) 0.0 - 0.5 % ABS. NEUTROPHILS 3.1 1.8 - 8.0 K/UL  
 ABS. LYMPHOCYTES 1.4 0.8 - 3.5 K/UL  
 ABS. MONOCYTES 0.7 0.0 - 1.0 K/UL  
 ABS. EOSINOPHILS 0.1 0.0 - 0.4 K/UL  
 ABS. BASOPHILS 0.0 0.0 - 0.1 K/UL  
 ABS. IMM. GRANS. 0.0 0.00 - 0.04 K/UL  
 DF AUTOMATED    
GLUCOSE, POC Collection Time: 01/26/19 12:18 AM  
Result Value Ref Range Glucose (POC) 165 (H) 65 - 100 mg/dL Performed by Haim Figueroa (PCT) CBC WITH AUTOMATED DIFF Collection Time: 01/26/19  5:43 AM  
Result Value Ref Range WBC 6.0 3.6 - 11.0 K/uL  
 RBC 2.78 (L) 3.80 - 5.20 M/uL HGB 8.6 (L) 11.5 - 16.0 g/dL HCT 27.2 (L) 35.0 - 47.0 % MCV 97.8 80.0 - 99.0 FL  
 MCH 30.9 26.0 - 34.0 PG  
 MCHC 31.6 30.0 - 36.5 g/dL  
 RDW 14.5 11.5 - 14.5 % PLATELET 925 087 - 358 K/uL MPV 8.7 (L) 8.9 - 12.9 FL  
 NRBC 0.0 0  WBC ABSOLUTE NRBC 0.00 0.00 - 0.01 K/uL NEUTROPHILS 67 32 - 75 % LYMPHOCYTES 18 12 - 49 % MONOCYTES 12 5 - 13 % EOSINOPHILS 2 0 - 7 % BASOPHILS 0 0 - 1 % IMMATURE GRANULOCYTES 1 (H) 0.0 - 0.5 % ABS. NEUTROPHILS 4.0 1.8 - 8.0 K/UL  
 ABS. LYMPHOCYTES 1.1 0.8 - 3.5 K/UL  
 ABS. MONOCYTES 0.7 0.0 - 1.0 K/UL  
 ABS. EOSINOPHILS 0.1 0.0 - 0.4 K/UL  
 ABS. BASOPHILS 0.0 0.0 - 0.1 K/UL  
 ABS. IMM. GRANS. 0.0 0.00 - 0.04 K/UL  
 DF AUTOMATED METABOLIC PANEL, BASIC Collection Time: 01/26/19  5:43 AM  
Result Value Ref Range Sodium 141 136 - 145 mmol/L Potassium 3.1 (L) 3.5 - 5.1 mmol/L Chloride 105 97 - 108 mmol/L  
 CO2 27 21 - 32 mmol/L Anion gap 9 5 - 15 mmol/L Glucose 80 65 - 100 mg/dL BUN 6 6 - 20 MG/DL Creatinine 0.66 0.55 - 1.02 MG/DL  
 BUN/Creatinine ratio 9 (L) 12 - 20 GFR est AA >60 >60 ml/min/1.73m2 GFR est non-AA >60 >60 ml/min/1.73m2 Calcium 8.7 8.5 - 10.1 MG/DL  
GLUCOSE, POC Collection Time: 01/26/19  6:39 AM  
Result Value Ref Range Glucose (POC) 105 (H) 65 - 100 mg/dL Performed by Haim Figueroa (PCT) A / P:  
1. GI bleed (1/23/2019) with acute blood loss anemia POA: Hgb stable. Patient admitted; anticoagulant medications held. GI consulted; For now, defer invasive testing. Favor conservative approach. Dr. Patricia vanay with resuming Eliquis at discharge if desired. 2. Daughter was concerned about holding blood thinners; We discussed again today that there may be no good options here--high risks of bleeding on anticoagulation; high risk of thrombotic events off [employer].  Overall, she seems a poor candidate for [employer] due to poor functional status, fall risk, and risk of bleeding. For now, stay off of it. 3. Thromboembolic stroke R MCA: S/p admission at [facility] on Jan 8, 2019; s/p tPa and thrombectomy. Started on eliquis. 4. Anemia, chronic: Her baseline Hgb has been about 8.5 since June of 2018. ? Chronic disease. 5. Diabetes: Controlled at last check; due for follow up. 6. Parkinsons: Continue sinemet. She has outpatient neurology followup. 7. Hypertension: BP fine today. 8. 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 9. Hypothyroidism: Continue synthroid; Normal TSH at last check. 10. Depression/Anxiety: Stable. 11. Urinary retention: For now, requiring catheter. We will discharge with duran and ask her to see urology in outpatient setting, in near future. 12. DNR. 13. Discharge Plan: [facility]. Kevin Nguyen MD, [address] Best contact is via Pager: [phone], or via hospital  at [phone]

## 2019-01-26 NOTE — DISCHARGE INSTRUCTIONS
PATIENT DISCHARGE INSTRUCTIONS      PATIENT DISCHARGE INSTRUCTIONS    Lillie Manjarrez / [id number] : 1932    Admitted 2019 Discharged: 2019       · It is important that you take the medication exactly as they are prescribed. · Keep your medication in the bottles provided by the pharmacist and keep a list of the medication names, dosages, and times to be taken in your wallet. · Do not take other medications without consulting your doctor. What to do at Home    Recommended Diet: Dysphagia 2 diet with thin liquids, Heart healthy    Recommended Activity: PT/OT Eval and Treat    Rivers catheter to straight drain.         Signed By: Lm Tapia MD     2019

## 2019-01-26 NOTE — PROGRESS NOTES
Discharge orders acknowleged. Per chart review, pt to return to [facility] to finish rehab. CM contacted [facility] regarding discharge, they are able to accept pt today (have her bed on hold). CM contacted Nursing to confirm discharge plan. Nursing to call [facility] to request BLS transport. Pt and/or family to schedule PCP f/u appointment after completion of rehab. All information entered into pt AVS.  
 
Pt has no additional CM needs at this time. Call Report [phone]. Please ensure any hard prescriptions for narcotics discharge with pt to SNF. Care Management Interventions PCP Verified by CM: Yes(Dr. Miya Lo) Mode of Transport at Discharge: BLS(medical transport ) Transition of Care Consult (CM Consult): SNF([facility]) Partner SNF: Yes Discharge Durable Medical Equipment: No(walker, wc ) Health Maintenance Reviewed: Yes Physical Therapy Consult: Yes Occupational Therapy Consult: Yes Speech Therapy Consult: No 
Current Support Network: Lives Alone, Own Home(lives alone in 1 story apt with no steps and family lives next door) Confirm Follow Up Transport: Other (see comment)(medical transport ) Plan discussed with Pt/Family/Caregiver: Yes Freedom of Choice Offered: Yes Discharge Location Discharge Placement: Skilled nursing facility([facility]) Meagan Acosta, MSW Supervisee in Social Work, [facility] [phone]

## 2019-01-28 NOTE — TELEPHONE ENCOUNTER
Patient's Daughter, Arthur Black, states she needs a call back in reference to patient not sleeping & Melatonin not working. Keturah Cristina states patient is in [address] states she is requesting to having something prescribed to help patient sleep & with her anxiousness at nights. Please call to advise.  Thank you

## 2019-01-28 NOTE — PROGRESS NOTES
Transition of Care Coordination/Hospital to Post Acute Facility: 
  
Date/Time:  2019 2:44 PM 
 
Patient was admitted to [facility] on 19 and discharged on 19 for c/o bloody stool and critical hemoglobin (transfer from [facility]); diagnosis of GI Bleed with acute blood loss anemia. Patient was transferred to [facility]) for continuation of care. Inpatient RRAT score: 39 Top Challenges reviewed   
- transfused 1 unit PRBCs this admission. Hemoglobin on admit 6.8 (L); improved to 8.6 (L) at discharge 
- eliquis discontinued due to GI Bleed/Acute blood loss anemia, bleeding risk - What to do at Home (Discharge AVS) Recommended Diet: Dysphagia 2 diet with thin liquids, Heart healthy Recommended Activity: PT/OT Eval and Treat Rivers catheter to straight drain. - to f/u with Dr. Hernan Astorga (Urology) in 2 weeks for urinary retention 
- repeat abdominal CT in 2-4 weeks Method of communication with care team :chart routing Nurse Navigator(NN) spoke with Starla Brown LPN to provide introduction to self and explanation of the Nurse Navigator Role. Verified name and  as patient identifiers. Discussed and reviewed  diet restrictions, follow up appointments, medication reconciliation, wound care orders Diet: LCS/LISSETTE diet/Ground Texture, thin consistency. Mighty shake with meals for supplement. Wound Care: Desitin to buttocks every shift for prevention Outpatient Follow-Up: Starla Brown LPN will follow-up with Daughter regarding scheduling of outpatient follow-up with Urology. Per Starla Brown LPN: additional SNF medication orders include - 
Humalog Sliding Scale before meals and at bedtime Miralax daily as needed Senna one tablet twice daily Hydrocortisone Suppository twice daily for anal discomfort Lidocaine Gel twice daily for anal discomfort No active SNF order for Kayleen;  Starla Brown LPN reports active order for metformin 500 mg twice daily. Unclear as to whether this was changed at SNF admission or post-SNF admission; Meir Oglesby will review with SNF attending to clarify. [facility] Rec during this call Current Outpatient Medications Medication Sig  
 metoprolol succinate (TOPROL-XL) 25 mg XL tablet Take 1 Tab by mouth daily.  lisinopril (PRINIVIL, ZESTRIL) 40 mg tablet Take 40 mg by mouth daily.  mirtazapine (REMERON) 15 mg tablet Take 15 mg by mouth nightly.  rosuvastatin (CRESTOR) 20 mg tablet Take 20 mg by mouth nightly.  calcium carb/vit D3/minerals (CALCIUM-VITAMIN D PO) Take 1 Tab by mouth daily.  carbidopa-levodopa (SINEMET)  mg per tablet TAKE 1 TABLET BY MOUTH THREE TIMES DAILY  levothyroxine (SYNTHROID) 50 mcg tablet TAKE 1 TABLET BY MOUTH DAILY BEFORE BREAKFAST  aspirin delayed-release 81 mg tablet Take 81 mg by mouth daily.  pantoprazole (PROTONIX) 40 mg tablet Take 1 Tab by mouth Daily (before breakfast). (Patient not taking: Reported on 1/29/2019)  cyanocobalamin/cobamamide (B12 SL) by SubLINGual route.  SITagliptin-metFORMIN (JANUMET)  mg per tablet Take 1 Tab by mouth two (2) times daily (with meals). No current facility-administered medications for this visit. There are no discontinued medications. ACP:  
Does the patient have a current ACP (including DDNR):  yes; POST, DDNR, Advance Medical Directive Does the post acute facility have a copy of the patients ACP:  No; reports that daughter advised SNF staff that she does not have original/copy to provide. LPN reports Unit Manager was made aware and is following up. Medication(s):  
New Medications at Discharge: toprol-xl, protonix Changed Medications at Discharge: none Discontinued Medications at Discharge: none PCP/Specialist follow up:  
Future Appointments Date Time Provider Ashleigh Michaud 2/22/2019 10:15 AM PACEMAKER3, [address]  
 2/22/2019 10:20 AM Rubina Priest  [address]  
6/14/2019  1:30 PM MD Johana Khan 87 Opportunity to ask questions was provided. Contact information was provided for future reference or further questions. Will continue to monitor.

## 2019-01-28 NOTE — TELEPHONE ENCOUNTER
HIPAA verified with patient's  Daughter Candelaria Patel. Patient is not sleeping at nigh too 3mg Melantonin  Which did not help. [address] is in [address] Wants to know if something could be prescribed.

## 2019-01-29 NOTE — TELEPHONE ENCOUNTER
Call completed to [facility], two patient identifiers verified. Advised [facility]'s medical director needs to prescribe any medications. Understanding verbalized.

## 2019-01-30 NOTE — TELEPHONE ENCOUNTER
HIPAA verified with  With [facility] patient's daughter, patient is continuing to be impacted and laxatives and suppositories to helping, patient is unable to sleep and is in a lot of pain. No x rays have been done, patient vomiting, but negative for bleeding.

## 2019-01-30 NOTE — TELEPHONE ENCOUNTER
Patient's POA/Daughter, Olivia Yi states she needs a call back from Dr. Junie Batista or his nurse in reference to Patient that is in [facility] is having constipation issues & fecal impact that Barbara Brito reports patient was in the Hospital for last week & is happening again & needs to know what can be done. Barbara Brito states patient is doing a lot of moaning during the day & not sleeping at nights due to her symptoms. Please call to advise.  Thank you

## 2019-01-30 NOTE — TELEPHONE ENCOUNTER
Accepted call from [facility]; ID verified. [facility] is coming by to  a cell adapter for [employer]. She was paired in Hospital, not connected since then. Once received, a manual transmission will be sent. I left her the information sheet for remotes too. Mom's not coming in until next month.

## 2019-01-31 NOTE — TELEPHONE ENCOUNTER
HIPAA verified with [facility] patient's daughter and was informed of Dr. Chantal Land response.

## 2019-01-31 NOTE — PROGRESS NOTES
Community Care Team documentation for patient in [facility] Initial Follow Up Patient was discharged to [facility]. Information included in this progress note has been provided to SNF. Hospital Admission and Diagnosis: [facility] 1/22-1/26/19  GI bleed RRAT Score:  39 Advance Care Planning: DDNR, Advance directive and POST on file PCP : Isaias Arguelles MD 
Nurse Navigator in PCP office: Hailee Mcclain Note routed to Nurse Navigator team. 
 
SNF Attending:  Eliud Lock MD 
 
Spoke with SNF team. Ensured patient arrived to SNF safely with admission packet in order. Provided follow up appointment information from hospital discharge summary: Urology Cristóbal Mccloud MD in 2 weeks for Urinary retention ; CT scan Repeat abdominal CT in 2-4 weeks; [facility] for pacemaker check on 2/22 at 10:15 AM followed by appt with Priscila Wheatley NP on 2/22 at 10:20 AM.     PT/OT/ST providing skilled therapy in SNF. ST for cognition. Currently dependent with transfers and all ADLs. Not ambulating. Rivers remains in place. Patient declining. Experiencing emesis. KUB and abdominal ultrasound ordered for today 1/31, along with labs and UA/Culture. Hgb stable at 8.9 on 1/28. Anticipate 3-4 weeks LOS. Discharge plans pending. PTA, lives alone and used [facility]. Community Care Team will follow up weekly with [facility] until discharge. Medications were not reconciled and general patient assessment was not completed during this [facility] outreach. Honey Christensen, MSN, RN, ACNS-BC, [facility] Nurse Navigator, [address] [place] [address] [phone]

## 2019-01-31 NOTE — TELEPHONE ENCOUNTER
While she is in [facility], they will need to make sure that the medical director there gets involved and sees her. I am not currently able to give orders at [facility].    BELEM

## 2019-02-05 NOTE — PROGRESS NOTES
NN outreach to [facility] ([facility]) in order to f/u in on patient admission status; patient is still currently admitted to SNF. Followed by CCT during SNF admission. Most recent CCT documentation reviewed. Anticipate 3-4 more weeks SNF length of stay; discharge disposition/plans pending. Remote pacemaker check by Cardiology on 1/31/19; to f/u in 3 months.

## 2019-02-07 NOTE — PROGRESS NOTES
Community Care Team Documentation for Patient in [facility] Subsequent Follow up Patient remains at [facility] ([facility] See previous [facility] Team notes. PCP : Kalina Hennessy MD 
Nurse Navigator in PCP office: Higinio Christianson Spoke with SNF team.  PT/OT continue. Pt is non ambulatory at this time. Max to dependent with ADLs. Care plan meeting held 2/6. Copay to begin 2/24. Plan for daughter and son to stay with pt after SNF discharge. 3-4 weeks LOS recommended. Pt attended urology follow up 2/4, failed voiding trial, Rivers reinserted. Medications were not reconciled and general patient assessment was not completed during this skilled nursing facility outreach.

## 2019-02-13 NOTE — TELEPHONE ENCOUNTER
Returned Teresa's call. Xena Farrar stated that patient is at rehab and they want to start working with her left arm. She was informed that we like patient to wait at least 4 weeks before lifting arm above the shoulder because we do not want to dislodge the leads. Daughter verbalized understanding and denies any further questions at this time.

## 2019-02-13 NOTE — TELEPHONE ENCOUNTER
Patient daughter, Nelson Berger, is calling because patient is now in rehab. They are wondering when they can start working with her left arm in rehab since she just had a pacemaker put in.      She can be reached @ [phone]

## 2019-02-14 NOTE — PROGRESS NOTES
Community Care Team Documentation for Patient in [facility] Subsequent Follow up Patient remains at [facility] ([facility]). See previous [facility] Team notes. PCP : Tamia Pires MD 
Nurse Navigator in PCP office: Rena Roger Spoke with SNF team.  PT/OT continue. Currently max assist x 2 with transfers. Not ambulating. Max assist with upper body ADLs. Dependent with lower body ADLs. Care plan meeting held. Family/patient requested discharge prior to copay days beginning. Plan for discharge 2/20 to home alone. Daughter will stay with her (daughter lives next door). Medications were not reconciled and general patient assessment was not completed during this skilled nursing facility outreach. Darien Christensen, MSN, RN, ACNS-BC, [facility] Nurse Navigator, [address] [place] [address] [phone]

## 2019-02-15 NOTE — PROGRESS NOTES
Received secure email communication from SNF Transitional Care Coordinator; plan for patient discharge on 2/20/19 due to patient copay, daughter lives next door and plans to move in with patient. Most recent CCT documentation reviewed by this NN.

## 2019-02-21 NOTE — PROGRESS NOTES
Community Care Team Documentation for Patient in [facility] Subsequent Follow up Patient remains at [facility] ([facility]). See previous [facility] Team notes. PCP : Radha Peña MD 
Nurse Navigator in PCP office:Genoveva Hyatt Spoke with SNF team. Family decided pay for copay, undetermined LOS, Therapy estimates needs 4 weeks but family may not be able to pay copay for that long. Currently not walking, dep-max A x 2 person assist with transfers, upper max A, lower dependent. Cipro started 2/19 for UTI. Medications were not reconciled and general patient assessment was not completed during this skilled nursing facility outreach.

## 2019-02-22 NOTE — PROGRESS NOTES
Cardiac Electrophysiology Wound Check Note      Wound Check   Patient is here for wound check, is s/p 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 Device check today shows proper lead & generator function. Incision is well healed. No fever, drainage. Currently residing at rehab facility. Physical Exam   Constitutional: well-developed and well-nourished. Skin: Left side pocket is healing without redness, drainage, hematoma. ASSESSMENT and PLAN     ICD-10-CM ICD-9-CM    1. Cardiac pacemaker in situ Z95.0 V45.01    2. Visit for wound check Z51.89 V58.89      Ms. Ky Parker is s/p [employer] dual chamber pacemaker (DOI 01/16/2019). Device check today shows proper lead & generator function. Site is healing well, no drainage or hematoma. Finished antibiotic, no longer has any ROM restrictions. She may participate in PT for upper extremities. Follow up in 2 months, will have pacer rechecked at that time.       Future Appointments   Date Time Provider Ashleigh Michaud   4/18/2019  1:45 PM PACEMAKER3, [address]   4/18/2019  2:00 PM Sonu Rubi  [address]   6/14/2019  1:30 PM MD Rob Alejo 57, FNP-C  [address] [facility] [place] [facility]  02/22/19

## 2019-02-22 NOTE — PROGRESS NOTES
NN attempted patient outreach today in order to perform CITLALI follow-up and to schedule the patient for ULISES MCKEON appt with Dr. Kg Agarwal; richard requesting a return phone call to this NN. Per [facility] (SNF), patient is still currently admitted to [facility] (SNF). NN spoke with Discharge Daxa Santana; Sherwood Closs reports that patient is making progress with therapy, however therapy department is recommending additional inpatient rehab and family is agreeable to this. Patient only has Medicare and has used 21 skilled days and is now under copay; anticipated length of stay is up to four additional weeks for continued inpatient rehab/therapy. Discharge date and disposition to be determined. NN will continue to follow.

## 2019-02-28 NOTE — PROGRESS NOTES
Community Care Team Documentation for Patient in [facility] Subsequent Follow up Patient remains at [facility] ([facility]). See previous [facility] Team notes. PCP : Alicja Gaona MD 
Nurse Navigator in PCP office: Vanesa Morrissey Spoke with SNF team.  Family paying copay as of 2/20, undetermined LOS. LOS depends on how long family can pay copay. PT/OT continue. Not ambulating, dep to max X2 for transfers, upper max, lower dependent. Dispo TBD. PTA pt was living alone and plan was for daughter to stay with pt short term. (daughter lives next door). Medications were not reconciled and general patient assessment was not completed during this skilled nursing facility outreach.

## 2019-03-01 NOTE — PROGRESS NOTES
NN outreach to [facility] ([facility]) in order to f/u in on patient admission status; patient is still currently admitted to SNF. 
  
Followed by CCT during SNF admission. Most recent CCT documentation reviewed. Family paying copay as of 2/20; SNF length of stay and discharge disposition undetermined - length of stay is dependent upon how long family is able to pay copay.

## 2019-03-08 NOTE — PROGRESS NOTES
Community Care Team Documentation for Patient in [facility]  Discharge Note    Patient has been discharged from [facility] ([facility]). See previous [facility] Team notes. PCP : Alicja Gaona MD  Nurse Navigator in PCP office: Rubina Mac  Note routed to Nurse Navigator team.    Spoke with SNF team.  Patient/family chose to go home with At [facility] on 3/6. Will alert PCP office and nurse navigator. Community Care Team will sign off at this time. Medications were not reconciled and general patient assessment was not completed during this skilled nursing facility outreach.      Ayaz Lira, MSN, RN, ACNS-BC, [facility]  Nurse Navigator, Shout -[phone]

## 2019-03-08 NOTE — PROGRESS NOTES
Fax received from [facility] w/ discharge instructions. Patient discharge to home on 3/6/19 with At [facility]. Hospice to order necessary DME. At discharge patient is able to walk 0 feet with dependent assistance and is recommended to use a wheelchair; needs assistance with bathing/dressing of upper body and is total care with bathing/dressing of lower body, toileting and transfers (requires ashley lift).

## 2019-03-08 NOTE — PROGRESS NOTES
NN outreach to At [facility] today to confirm patient admission to [facility]. Patient was admitted to At [facility] 3/6/19; most recent nurse visit 3/7/19 and has visits scheduled for today by  and home health aide.

## 2019-03-15 NOTE — PROGRESS NOTES
NN outreach to At [facility] today to follow-up on patient status; confirmed that patient is still currently admitted to home hospice services.

## 2019-03-29 NOTE — PROGRESS NOTES
NN outreach to At [facility] today to follow-up on patient status; confirmed that patient is still currently admitted to home hospice services with no plan for discharge from hospice services at this time. - To the best of this NN's knowledge, this patient had no additional Inpatient Hospital Admissions during 30 day CITLALI period following admission to [facility] 1/22/19-1/26/19.  
- CITLALI period has ended. Episode resolved.

## 2019-05-03 NOTE — PROGRESS NOTES
Atrial flutter/fibrillation noted on device check and NSVT   No future appointments.    She missed appt on 4/18  Will make new appt

## 2019-05-06 NOTE — PROGRESS NOTES
Attempted to call patient. Pt daughter answer the phone. She was informed of message. Attempted to reschedule appointment but per the daughter the patient is not doing well and is paralyzed, unable to talk and on hospice now. Per the daughter is is unable to come in for a follow up.  Daughter was informed that we will informed .

## 2020-09-01 NOTE — PROGRESS NOTES
Incidental finding on CTA head and neck  1 4 cm nodule  Plan:  Patient made aware  Recommended thyroid ultrasound  Follow-up with PCP to discuss Problem: Pressure Injury - Risk of 
Goal: *Prevention of pressure injury Document Clint Scale and appropriate interventions in the flowsheet. Outcome: Progressing Towards Goal 
Pressure Injury Interventions: 
Sensory Interventions: Discuss PT/OT consult with provider, Float heels, Keep linens dry and wrinkle-free, Maintain/enhance activity level, Turn and reposition approx. every two hours (pillows and wedges if needed), Pad between skin to skin Moisture Interventions: Check for incontinence Q2 hours and as needed, Internal/External urinary devices, Maintain skin hydration (lotion/cream), Minimize layers, Limit adult briefs Activity Interventions: Increase time out of bed, Pressure redistribution bed/mattress(bed type), PT/OT evaluation Mobility Interventions: Float heels, Pressure redistribution bed/mattress (bed type), PT/OT evaluation, Suspension boots, Turn and reposition approx. every two hours(pillow and wedges) Nutrition Interventions: Document food/fluid/supplement intake, Discuss nutritional consult with provider Friction and Shear Interventions: HOB 30 degrees or less, Lift team/patient mobility team, Minimize layers Problem: Falls - Risk of 
Goal: *Absence of Falls Document Hamp Mandy Fall Risk and appropriate interventions in the flowsheet. Outcome: Progressing Towards Goal 
Fall Risk Interventions: 
Mobility Interventions: Communicate number of staff needed for ambulation/transfer, Patient to call before getting OOB, OT consult for ADLs, PT Consult for mobility concerns, PT Consult for assist device competence Mentation Interventions: Adequate sleep, hydration, pain control, Increase mobility, More frequent rounding, Reorient patient Medication Interventions: Evaluate medications/consider consulting pharmacy, Patient to call before getting OOB, Teach patient to arise slowly Elimination Interventions: Elevated toilet seat, Patient to call for help with toileting needs, Toilet paper/wipes in reach History of Falls Interventions: Door open when patient unattended, Evaluate medications/consider consulting pharmacy, Investigate reason for fall

## 2025-02-04 NOTE — PROGRESS NOTES
Spiritual Care Assessment/Progress Note [address] 
 
 
NAME: Romana Logan      MRN: [id number] AGE: 80 y.o. SEX: female Episcopalian Affiliation: [facility]  
Language: [place] 1/14/2019     Total Time (in minutes): 10 Spiritual Assessment begun in [facility] 6S NEURO-SCI TELE through conversation with: 
  
    [x]Patient        [x] Family    [] Friend(s) Reason for Consult: Palliative Care, Initial/Spiritual Assessment Spiritual beliefs: (Please include comment if needed) [x] Identifies with a mckenna tradition: Scientologist    
   [x] Supported by a mckenna community:        
   [] Claims no spiritual orientation:       
   [] Seeking spiritual identity:            
   [] Adheres to an individual form of spirituality:       
   [] Not able to assess:                   
 
    
Identified resources for coping:  
   [x] Prayer                           
   [] Music                  [] Guided Imagery [x] Family/friends                 [] Pet visits [] Devotional reading                         [] Unknown 
   [] Other Interventions offered during this visit: (See comments for more details) Patient Interventions: Prayer (assurance of), Initial/Spiritual assessment, patient floor Family/Friend(s): Prayer (assurance of), Episcopalian beliefs/image of God discussed, Initial Assessment Plan of Care: 
 
 [] Support spiritual and/or cultural needs  
 [] Support AMD and/or advance care planning process    
 [] Support grieving process 
 [] Coordinate Rites and/or Rituals  
 [] Coordination with community clergy [] No spiritual needs identified at this time 
 [] Detailed Plan of Care below (See Comments)  [] Make referral to Music Therapy 
[] Make referral to Pet Therapy    
[] Make referral to Addiction services 
[] Make referral to [facility] 
[] Make referral to Spiritual Care Partner 
[] No future visits requested [x] Follow up visits as needed Comments: Visited with pt and daughter for initial spiritual assessment. Daughter shared that they have good family support and pt has had quite a few visitors. They continue to have contact with their Gnosticism/. Explored ways that we might be of additional support; daughter indicated that prayers are appreciated. Offered spoken prayer and assurance of continued prayers. Kath Purcell, Palliative  [phone]